# Patient Record
Sex: MALE | Race: WHITE | NOT HISPANIC OR LATINO | Employment: FULL TIME | URBAN - METROPOLITAN AREA
[De-identification: names, ages, dates, MRNs, and addresses within clinical notes are randomized per-mention and may not be internally consistent; named-entity substitution may affect disease eponyms.]

---

## 2017-01-21 ENCOUNTER — COMMUNICATION - HEALTHEAST (OUTPATIENT)
Dept: FAMILY MEDICINE | Facility: CLINIC | Age: 62
End: 2017-01-21

## 2017-04-04 ENCOUNTER — OFFICE VISIT - HEALTHEAST (OUTPATIENT)
Dept: FAMILY MEDICINE | Facility: CLINIC | Age: 62
End: 2017-04-04

## 2017-04-04 DIAGNOSIS — E11.9 TYPE 2 DIABETES MELLITUS (H): ICD-10-CM

## 2017-04-04 DIAGNOSIS — E78.5 HYPERLIPIDEMIA, UNSPECIFIED HYPERLIPIDEMIA TYPE: ICD-10-CM

## 2017-04-04 DIAGNOSIS — I10 ESSENTIAL HYPERTENSION WITH GOAL BLOOD PRESSURE LESS THAN 130/80: ICD-10-CM

## 2017-04-04 DIAGNOSIS — M70.61 TROCHANTERIC BURSITIS, RIGHT HIP: ICD-10-CM

## 2017-04-04 DIAGNOSIS — B35.3 TINEA PEDIS, UNSPECIFIED LATERALITY: ICD-10-CM

## 2017-04-04 DIAGNOSIS — B35.6 TINEA CRURIS: ICD-10-CM

## 2017-04-04 LAB — HBA1C MFR BLD: 8.9 % (ref 3.5–6)

## 2017-05-06 ENCOUNTER — COMMUNICATION - HEALTHEAST (OUTPATIENT)
Dept: FAMILY MEDICINE | Facility: CLINIC | Age: 62
End: 2017-05-06

## 2017-05-06 DIAGNOSIS — J45.30 MILD PERSISTENT ASTHMA WITHOUT COMPLICATION: ICD-10-CM

## 2017-05-18 ENCOUNTER — COMMUNICATION - HEALTHEAST (OUTPATIENT)
Dept: FAMILY MEDICINE | Facility: CLINIC | Age: 62
End: 2017-05-18

## 2017-05-18 DIAGNOSIS — E78.5 HYPERLIPIDEMIA: ICD-10-CM

## 2017-05-23 ENCOUNTER — COMMUNICATION - HEALTHEAST (OUTPATIENT)
Dept: FAMILY MEDICINE | Facility: CLINIC | Age: 62
End: 2017-05-23

## 2017-05-23 ENCOUNTER — AMBULATORY - HEALTHEAST (OUTPATIENT)
Dept: FAMILY MEDICINE | Facility: CLINIC | Age: 62
End: 2017-05-23

## 2017-05-23 ENCOUNTER — OFFICE VISIT - HEALTHEAST (OUTPATIENT)
Dept: FAMILY MEDICINE | Facility: CLINIC | Age: 62
End: 2017-05-23

## 2017-05-23 ENCOUNTER — COMMUNICATION - HEALTHEAST (OUTPATIENT)
Dept: SCHEDULING | Facility: CLINIC | Age: 62
End: 2017-05-23

## 2017-05-23 DIAGNOSIS — E11.9 TYPE 2 DIABETES MELLITUS WITHOUT COMPLICATION, WITHOUT LONG-TERM CURRENT USE OF INSULIN (H): ICD-10-CM

## 2017-05-23 DIAGNOSIS — R21 RASH: ICD-10-CM

## 2017-05-23 DIAGNOSIS — E11.9 TYPE 2 DIABETES MELLITUS (H): ICD-10-CM

## 2017-05-23 DIAGNOSIS — B35.6 TINEA CRURIS: ICD-10-CM

## 2017-06-08 ENCOUNTER — COMMUNICATION - HEALTHEAST (OUTPATIENT)
Dept: FAMILY MEDICINE | Facility: CLINIC | Age: 62
End: 2017-06-08

## 2017-06-15 ENCOUNTER — COMMUNICATION - HEALTHEAST (OUTPATIENT)
Dept: FAMILY MEDICINE | Facility: CLINIC | Age: 62
End: 2017-06-15

## 2017-06-16 ENCOUNTER — COMMUNICATION - HEALTHEAST (OUTPATIENT)
Dept: FAMILY MEDICINE | Facility: CLINIC | Age: 62
End: 2017-06-16

## 2017-06-16 ENCOUNTER — AMBULATORY - HEALTHEAST (OUTPATIENT)
Dept: EDUCATION SERVICES | Facility: CLINIC | Age: 62
End: 2017-06-16

## 2017-06-16 ENCOUNTER — COMMUNICATION - HEALTHEAST (OUTPATIENT)
Dept: EDUCATION SERVICES | Facility: CLINIC | Age: 62
End: 2017-06-16

## 2017-06-16 DIAGNOSIS — Z79.4 UNCONTROLLED TYPE 2 DIABETES MELLITUS WITH HYPERGLYCEMIA, WITH LONG-TERM CURRENT USE OF INSULIN (H): ICD-10-CM

## 2017-06-16 DIAGNOSIS — E11.65 UNCONTROLLED TYPE 2 DIABETES MELLITUS WITH HYPERGLYCEMIA, WITH LONG-TERM CURRENT USE OF INSULIN (H): ICD-10-CM

## 2017-06-17 ENCOUNTER — COMMUNICATION - HEALTHEAST (OUTPATIENT)
Dept: FAMILY MEDICINE | Facility: CLINIC | Age: 62
End: 2017-06-17

## 2017-07-03 ENCOUNTER — AMBULATORY - HEALTHEAST (OUTPATIENT)
Dept: EDUCATION SERVICES | Facility: CLINIC | Age: 62
End: 2017-07-03

## 2017-07-11 ENCOUNTER — OFFICE VISIT - HEALTHEAST (OUTPATIENT)
Dept: FAMILY MEDICINE | Facility: CLINIC | Age: 62
End: 2017-07-11

## 2017-07-11 DIAGNOSIS — Z00.00 PREVENTATIVE HEALTH CARE: ICD-10-CM

## 2017-07-11 DIAGNOSIS — E11.9 TYPE 2 DIABETES MELLITUS (H): ICD-10-CM

## 2017-07-11 DIAGNOSIS — E78.5 HYPERLIPIDEMIA, UNSPECIFIED HYPERLIPIDEMIA TYPE: ICD-10-CM

## 2017-07-11 DIAGNOSIS — J45.30 MILD PERSISTENT ASTHMA WITHOUT COMPLICATION: ICD-10-CM

## 2017-07-11 DIAGNOSIS — B35.6 TINEA CRURIS: ICD-10-CM

## 2017-07-11 DIAGNOSIS — I10 ESSENTIAL HYPERTENSION WITH GOAL BLOOD PRESSURE LESS THAN 130/80: ICD-10-CM

## 2017-07-11 LAB
CHOLEST SERPL-MCNC: 140 MG/DL
FASTING STATUS PATIENT QL REPORTED: YES
HBA1C MFR BLD: 8.8 % (ref 3.5–6)
HDLC SERPL-MCNC: 36 MG/DL
LDLC SERPL CALC-MCNC: 80 MG/DL
TRIGL SERPL-MCNC: 119 MG/DL

## 2017-08-18 ENCOUNTER — COMMUNICATION - HEALTHEAST (OUTPATIENT)
Dept: FAMILY MEDICINE | Facility: CLINIC | Age: 62
End: 2017-08-18

## 2017-08-18 DIAGNOSIS — E11.9 TYPE 2 DIABETES MELLITUS WITHOUT COMPLICATION (H): ICD-10-CM

## 2017-08-23 ENCOUNTER — COMMUNICATION - HEALTHEAST (OUTPATIENT)
Dept: FAMILY MEDICINE | Facility: CLINIC | Age: 62
End: 2017-08-23

## 2017-08-23 DIAGNOSIS — E78.5 HYPERLIPIDEMIA: ICD-10-CM

## 2017-08-23 DIAGNOSIS — B35.6 TINEA CRURIS: ICD-10-CM

## 2017-08-23 DIAGNOSIS — J45.30 MILD PERSISTENT ASTHMA WITHOUT COMPLICATION: ICD-10-CM

## 2017-08-23 DIAGNOSIS — B35.3 TINEA PEDIS, UNSPECIFIED LATERALITY: ICD-10-CM

## 2017-09-19 ENCOUNTER — AMBULATORY - HEALTHEAST (OUTPATIENT)
Dept: EDUCATION SERVICES | Facility: CLINIC | Age: 62
End: 2017-09-19

## 2017-09-19 DIAGNOSIS — E11.9 CONTROLLED TYPE 2 DIABETES MELLITUS WITHOUT COMPLICATION, WITH LONG-TERM CURRENT USE OF INSULIN (H): ICD-10-CM

## 2017-09-19 DIAGNOSIS — Z79.4 CONTROLLED TYPE 2 DIABETES MELLITUS WITHOUT COMPLICATION, WITH LONG-TERM CURRENT USE OF INSULIN (H): ICD-10-CM

## 2017-10-17 ENCOUNTER — COMMUNICATION - HEALTHEAST (OUTPATIENT)
Dept: FAMILY MEDICINE | Facility: CLINIC | Age: 62
End: 2017-10-17

## 2017-10-20 ENCOUNTER — COMMUNICATION - HEALTHEAST (OUTPATIENT)
Dept: FAMILY MEDICINE | Facility: CLINIC | Age: 62
End: 2017-10-20

## 2017-10-20 DIAGNOSIS — I89.0 LYMPHEDEMA: ICD-10-CM

## 2017-10-20 DIAGNOSIS — I10 HTN (HYPERTENSION): ICD-10-CM

## 2017-10-25 ENCOUNTER — OFFICE VISIT - HEALTHEAST (OUTPATIENT)
Dept: FAMILY MEDICINE | Facility: CLINIC | Age: 62
End: 2017-10-25

## 2017-10-25 DIAGNOSIS — E11.9 TYPE 2 DIABETES MELLITUS (H): ICD-10-CM

## 2017-10-25 DIAGNOSIS — E78.5 HYPERLIPIDEMIA, UNSPECIFIED HYPERLIPIDEMIA TYPE: ICD-10-CM

## 2017-10-25 DIAGNOSIS — R20.2 RIGHT HAND PARESTHESIA: ICD-10-CM

## 2017-10-25 DIAGNOSIS — M79.672 PAIN OF LEFT HEEL: ICD-10-CM

## 2017-10-25 DIAGNOSIS — I10 ESSENTIAL HYPERTENSION: ICD-10-CM

## 2017-10-25 LAB — HBA1C MFR BLD: 7.2 % (ref 3.5–6)

## 2017-10-31 ENCOUNTER — COMMUNICATION - HEALTHEAST (OUTPATIENT)
Dept: FAMILY MEDICINE | Facility: CLINIC | Age: 62
End: 2017-10-31

## 2017-10-31 DIAGNOSIS — G89.29 HEEL PAIN, CHRONIC, RIGHT: ICD-10-CM

## 2017-10-31 DIAGNOSIS — M79.671 HEEL PAIN, CHRONIC, RIGHT: ICD-10-CM

## 2017-11-14 ENCOUNTER — COMMUNICATION - HEALTHEAST (OUTPATIENT)
Dept: EDUCATION SERVICES | Facility: CLINIC | Age: 62
End: 2017-11-14

## 2017-11-14 DIAGNOSIS — E11.65 UNCONTROLLED TYPE 2 DIABETES MELLITUS WITH HYPERGLYCEMIA, WITH LONG-TERM CURRENT USE OF INSULIN (H): ICD-10-CM

## 2017-11-14 DIAGNOSIS — Z79.4 UNCONTROLLED TYPE 2 DIABETES MELLITUS WITH HYPERGLYCEMIA, WITH LONG-TERM CURRENT USE OF INSULIN (H): ICD-10-CM

## 2017-12-06 ENCOUNTER — OFFICE VISIT - HEALTHEAST (OUTPATIENT)
Dept: PODIATRY | Age: 62
End: 2017-12-06

## 2017-12-06 DIAGNOSIS — M72.2 PLANTAR FASCIITIS: ICD-10-CM

## 2017-12-06 ASSESSMENT — MIFFLIN-ST. JEOR: SCORE: 1928.44

## 2017-12-08 ENCOUNTER — COMMUNICATION - HEALTHEAST (OUTPATIENT)
Dept: OTHER | Facility: CLINIC | Age: 62
End: 2017-12-08

## 2017-12-21 ENCOUNTER — RECORDS - HEALTHEAST (OUTPATIENT)
Dept: ADMINISTRATIVE | Facility: OTHER | Age: 62
End: 2017-12-21

## 2017-12-28 ENCOUNTER — COMMUNICATION - HEALTHEAST (OUTPATIENT)
Dept: OTHER | Facility: CLINIC | Age: 62
End: 2017-12-28

## 2018-01-08 ENCOUNTER — COMMUNICATION - HEALTHEAST (OUTPATIENT)
Dept: PODIATRY | Age: 63
End: 2018-01-08

## 2018-01-09 ENCOUNTER — COMMUNICATION - HEALTHEAST (OUTPATIENT)
Dept: FAMILY MEDICINE | Facility: CLINIC | Age: 63
End: 2018-01-09

## 2018-01-17 ENCOUNTER — OFFICE VISIT - HEALTHEAST (OUTPATIENT)
Dept: PODIATRY | Age: 63
End: 2018-01-17

## 2018-01-17 DIAGNOSIS — M72.2 PLANTAR FASCIITIS: ICD-10-CM

## 2018-01-17 ASSESSMENT — MIFFLIN-ST. JEOR: SCORE: 1928.44

## 2018-01-30 ENCOUNTER — COMMUNICATION - HEALTHEAST (OUTPATIENT)
Dept: FAMILY MEDICINE | Facility: CLINIC | Age: 63
End: 2018-01-30

## 2018-01-30 ENCOUNTER — AMBULATORY - HEALTHEAST (OUTPATIENT)
Dept: EDUCATION SERVICES | Facility: CLINIC | Age: 63
End: 2018-01-30

## 2018-01-30 DIAGNOSIS — J45.909 ASTHMA: ICD-10-CM

## 2018-02-08 ENCOUNTER — COMMUNICATION - HEALTHEAST (OUTPATIENT)
Dept: FAMILY MEDICINE | Facility: CLINIC | Age: 63
End: 2018-02-08

## 2018-02-08 DIAGNOSIS — Z79.4 UNCONTROLLED TYPE 2 DIABETES MELLITUS WITH HYPERGLYCEMIA, WITH LONG-TERM CURRENT USE OF INSULIN (H): ICD-10-CM

## 2018-02-08 DIAGNOSIS — E11.65 UNCONTROLLED TYPE 2 DIABETES MELLITUS WITH HYPERGLYCEMIA, WITH LONG-TERM CURRENT USE OF INSULIN (H): ICD-10-CM

## 2018-02-12 ENCOUNTER — COMMUNICATION - HEALTHEAST (OUTPATIENT)
Dept: FAMILY MEDICINE | Facility: CLINIC | Age: 63
End: 2018-02-12

## 2018-02-12 DIAGNOSIS — B35.3 TINEA PEDIS, UNSPECIFIED LATERALITY: ICD-10-CM

## 2018-02-12 DIAGNOSIS — B35.6 TINEA CRURIS: ICD-10-CM

## 2018-02-15 ENCOUNTER — COMMUNICATION - HEALTHEAST (OUTPATIENT)
Dept: PODIATRY | Age: 63
End: 2018-02-15

## 2018-02-21 ENCOUNTER — OFFICE VISIT - HEALTHEAST (OUTPATIENT)
Dept: FAMILY MEDICINE | Facility: CLINIC | Age: 63
End: 2018-02-21

## 2018-02-21 ENCOUNTER — OFFICE VISIT - HEALTHEAST (OUTPATIENT)
Dept: PODIATRY | Age: 63
End: 2018-02-21

## 2018-02-21 DIAGNOSIS — I10 ESSENTIAL HYPERTENSION: ICD-10-CM

## 2018-02-21 DIAGNOSIS — D12.6 BENIGN NEOPLASM OF COLON, UNSPECIFIED PART OF COLON: ICD-10-CM

## 2018-02-21 DIAGNOSIS — Z00.00 ROUTINE GENERAL MEDICAL EXAMINATION AT A HEALTH CARE FACILITY: ICD-10-CM

## 2018-02-21 DIAGNOSIS — J45.30 MILD PERSISTENT ASTHMA WITHOUT COMPLICATION: ICD-10-CM

## 2018-02-21 DIAGNOSIS — M72.2 PLANTAR FASCIITIS: ICD-10-CM

## 2018-02-21 DIAGNOSIS — I89.0 LYMPHEDEMA: ICD-10-CM

## 2018-02-21 DIAGNOSIS — N52.9 MALE ERECTILE DYSFUNCTION: ICD-10-CM

## 2018-02-21 DIAGNOSIS — E78.5 HYPERLIPIDEMIA, UNSPECIFIED HYPERLIPIDEMIA TYPE: ICD-10-CM

## 2018-02-21 DIAGNOSIS — E11.9 TYPE 2 DIABETES MELLITUS (H): ICD-10-CM

## 2018-02-21 LAB
ANION GAP SERPL CALCULATED.3IONS-SCNC: 9 MMOL/L (ref 5–18)
BUN SERPL-MCNC: 14 MG/DL (ref 8–22)
CALCIUM SERPL-MCNC: 9.5 MG/DL (ref 8.5–10.5)
CHLORIDE BLD-SCNC: 107 MMOL/L (ref 98–107)
CHOLEST SERPL-MCNC: 134 MG/DL
CO2 SERPL-SCNC: 26 MMOL/L (ref 22–31)
CREAT SERPL-MCNC: 0.81 MG/DL (ref 0.7–1.3)
FASTING STATUS PATIENT QL REPORTED: YES
GFR SERPL CREATININE-BSD FRML MDRD: >60 ML/MIN/1.73M2
GLUCOSE BLD-MCNC: 111 MG/DL (ref 70–125)
HBA1C MFR BLD: 7.5 % (ref 3.5–6)
HDLC SERPL-MCNC: 35 MG/DL
LDLC SERPL CALC-MCNC: 70 MG/DL
POTASSIUM BLD-SCNC: 4.6 MMOL/L (ref 3.5–5)
PSA SERPL-MCNC: 0.8 NG/ML (ref 0–4.5)
SODIUM SERPL-SCNC: 142 MMOL/L (ref 136–145)
TRIGL SERPL-MCNC: 144 MG/DL

## 2018-02-21 ASSESSMENT — MIFFLIN-ST. JEOR: SCORE: 1942.05

## 2018-02-22 LAB — HCV AB SERPL QL IA: NEGATIVE

## 2018-03-05 ENCOUNTER — COMMUNICATION - HEALTHEAST (OUTPATIENT)
Dept: FAMILY MEDICINE | Facility: CLINIC | Age: 63
End: 2018-03-05

## 2018-03-05 DIAGNOSIS — J45.30 MILD PERSISTENT ASTHMA WITHOUT COMPLICATION: ICD-10-CM

## 2018-03-21 ENCOUNTER — COMMUNICATION - HEALTHEAST (OUTPATIENT)
Dept: FAMILY MEDICINE | Facility: CLINIC | Age: 63
End: 2018-03-21

## 2018-03-26 ENCOUNTER — COMMUNICATION - HEALTHEAST (OUTPATIENT)
Dept: EDUCATION SERVICES | Facility: CLINIC | Age: 63
End: 2018-03-26

## 2018-04-23 ENCOUNTER — COMMUNICATION - HEALTHEAST (OUTPATIENT)
Dept: EDUCATION SERVICES | Facility: CLINIC | Age: 63
End: 2018-04-23

## 2018-04-23 DIAGNOSIS — E11.65 UNCONTROLLED TYPE 2 DIABETES MELLITUS WITH HYPERGLYCEMIA, WITH LONG-TERM CURRENT USE OF INSULIN (H): ICD-10-CM

## 2018-04-23 DIAGNOSIS — Z79.4 UNCONTROLLED TYPE 2 DIABETES MELLITUS WITH HYPERGLYCEMIA, WITH LONG-TERM CURRENT USE OF INSULIN (H): ICD-10-CM

## 2018-05-05 ENCOUNTER — COMMUNICATION - HEALTHEAST (OUTPATIENT)
Dept: FAMILY MEDICINE | Facility: CLINIC | Age: 63
End: 2018-05-05

## 2018-05-05 DIAGNOSIS — B35.6 TINEA CRURIS: ICD-10-CM

## 2018-05-05 DIAGNOSIS — B35.3 TINEA PEDIS, UNSPECIFIED LATERALITY: ICD-10-CM

## 2018-05-08 ENCOUNTER — COMMUNICATION - HEALTHEAST (OUTPATIENT)
Dept: FAMILY MEDICINE | Facility: CLINIC | Age: 63
End: 2018-05-08

## 2018-05-09 ENCOUNTER — COMMUNICATION - HEALTHEAST (OUTPATIENT)
Dept: ENDOCRINOLOGY | Facility: CLINIC | Age: 63
End: 2018-05-09

## 2018-05-22 ENCOUNTER — COMMUNICATION - HEALTHEAST (OUTPATIENT)
Dept: EDUCATION SERVICES | Facility: CLINIC | Age: 63
End: 2018-05-22

## 2018-05-22 DIAGNOSIS — Z79.4 UNCONTROLLED TYPE 2 DIABETES MELLITUS WITH HYPERGLYCEMIA, WITH LONG-TERM CURRENT USE OF INSULIN (H): ICD-10-CM

## 2018-05-22 DIAGNOSIS — E11.65 UNCONTROLLED TYPE 2 DIABETES MELLITUS WITH HYPERGLYCEMIA, WITH LONG-TERM CURRENT USE OF INSULIN (H): ICD-10-CM

## 2018-06-06 ENCOUNTER — COMMUNICATION - HEALTHEAST (OUTPATIENT)
Dept: PODIATRY | Age: 63
End: 2018-06-06

## 2018-06-07 ENCOUNTER — AMBULATORY - HEALTHEAST (OUTPATIENT)
Dept: PODIATRY | Facility: CLINIC | Age: 63
End: 2018-06-07

## 2018-06-07 DIAGNOSIS — M72.2 PLANTAR FASCIITIS: ICD-10-CM

## 2018-06-07 DIAGNOSIS — E11.9 TYPE 2 DIABETES MELLITUS WITHOUT COMPLICATION, WITHOUT LONG-TERM CURRENT USE OF INSULIN (H): ICD-10-CM

## 2018-06-19 ENCOUNTER — OFFICE VISIT - HEALTHEAST (OUTPATIENT)
Dept: FAMILY MEDICINE | Facility: CLINIC | Age: 63
End: 2018-06-19

## 2018-06-19 ENCOUNTER — COMMUNICATION - HEALTHEAST (OUTPATIENT)
Dept: FAMILY MEDICINE | Facility: CLINIC | Age: 63
End: 2018-06-19

## 2018-06-19 ENCOUNTER — AMBULATORY - HEALTHEAST (OUTPATIENT)
Dept: FAMILY MEDICINE | Facility: CLINIC | Age: 63
End: 2018-06-19

## 2018-06-19 DIAGNOSIS — M70.62 TROCHANTERIC BURSITIS OF BOTH HIPS: ICD-10-CM

## 2018-06-19 DIAGNOSIS — E11.9 DIABETES MELLITUS, TYPE 2 (H): ICD-10-CM

## 2018-06-19 DIAGNOSIS — M72.2 PLANTAR FASCIITIS: ICD-10-CM

## 2018-06-19 DIAGNOSIS — M70.61 TROCHANTERIC BURSITIS OF BOTH HIPS: ICD-10-CM

## 2018-06-19 DIAGNOSIS — I10 ESSENTIAL HYPERTENSION: ICD-10-CM

## 2018-06-19 DIAGNOSIS — J45.30 MILD PERSISTENT ASTHMA WITHOUT COMPLICATION: ICD-10-CM

## 2018-06-19 DIAGNOSIS — E78.5 HYPERLIPIDEMIA, UNSPECIFIED HYPERLIPIDEMIA TYPE: ICD-10-CM

## 2018-06-19 LAB
ANION GAP SERPL CALCULATED.3IONS-SCNC: 12 MMOL/L (ref 5–18)
BUN SERPL-MCNC: 18 MG/DL (ref 8–22)
CALCIUM SERPL-MCNC: 10.1 MG/DL (ref 8.5–10.5)
CHLORIDE BLD-SCNC: 108 MMOL/L (ref 98–107)
CO2 SERPL-SCNC: 22 MMOL/L (ref 22–31)
CREAT SERPL-MCNC: 0.84 MG/DL (ref 0.7–1.3)
GFR SERPL CREATININE-BSD FRML MDRD: >60 ML/MIN/1.73M2
GLUCOSE BLD-MCNC: 105 MG/DL (ref 70–125)
HBA1C MFR BLD: 6.5 % (ref 3.5–6)
POTASSIUM BLD-SCNC: 4.7 MMOL/L (ref 3.5–5)
SODIUM SERPL-SCNC: 142 MMOL/L (ref 136–145)

## 2018-06-20 ENCOUNTER — COMMUNICATION - HEALTHEAST (OUTPATIENT)
Dept: FAMILY MEDICINE | Facility: CLINIC | Age: 63
End: 2018-06-20

## 2018-06-20 DIAGNOSIS — E11.65 UNCONTROLLED TYPE 2 DIABETES MELLITUS WITH HYPERGLYCEMIA, WITH LONG-TERM CURRENT USE OF INSULIN (H): ICD-10-CM

## 2018-06-20 DIAGNOSIS — Z79.4 UNCONTROLLED TYPE 2 DIABETES MELLITUS WITH HYPERGLYCEMIA, WITH LONG-TERM CURRENT USE OF INSULIN (H): ICD-10-CM

## 2018-07-03 ENCOUNTER — COMMUNICATION - HEALTHEAST (OUTPATIENT)
Dept: FAMILY MEDICINE | Facility: CLINIC | Age: 63
End: 2018-07-03

## 2018-07-03 ENCOUNTER — OFFICE VISIT - HEALTHEAST (OUTPATIENT)
Dept: FAMILY MEDICINE | Facility: CLINIC | Age: 63
End: 2018-07-03

## 2018-07-03 DIAGNOSIS — L03.115 CELLULITIS OF RIGHT ANTERIOR LOWER LEG: ICD-10-CM

## 2018-07-05 ENCOUNTER — COMMUNICATION - HEALTHEAST (OUTPATIENT)
Dept: FAMILY MEDICINE | Facility: CLINIC | Age: 63
End: 2018-07-05

## 2018-07-19 ENCOUNTER — OFFICE VISIT - HEALTHEAST (OUTPATIENT)
Dept: FAMILY MEDICINE | Facility: CLINIC | Age: 63
End: 2018-07-19

## 2018-07-19 DIAGNOSIS — M70.62 TROCHANTERIC BURSITIS OF BOTH HIPS: ICD-10-CM

## 2018-07-19 DIAGNOSIS — M70.61 TROCHANTERIC BURSITIS OF BOTH HIPS: ICD-10-CM

## 2018-07-19 DIAGNOSIS — H00.012 HORDEOLUM EXTERNUM OF RIGHT LOWER EYELID: ICD-10-CM

## 2018-08-07 ENCOUNTER — COMMUNICATION - HEALTHEAST (OUTPATIENT)
Dept: FAMILY MEDICINE | Facility: CLINIC | Age: 63
End: 2018-08-07

## 2018-08-07 DIAGNOSIS — I89.0 LYMPHEDEMA: ICD-10-CM

## 2018-08-21 ENCOUNTER — COMMUNICATION - HEALTHEAST (OUTPATIENT)
Dept: FAMILY MEDICINE | Facility: CLINIC | Age: 63
End: 2018-08-21

## 2018-08-21 DIAGNOSIS — B35.6 TINEA CRURIS: ICD-10-CM

## 2018-08-21 DIAGNOSIS — B35.3 TINEA PEDIS, UNSPECIFIED LATERALITY: ICD-10-CM

## 2018-08-22 ENCOUNTER — COMMUNICATION - HEALTHEAST (OUTPATIENT)
Dept: FAMILY MEDICINE | Facility: CLINIC | Age: 63
End: 2018-08-22

## 2018-08-22 DIAGNOSIS — E11.9 TYPE 2 DIABETES MELLITUS WITHOUT COMPLICATION (H): ICD-10-CM

## 2018-09-02 ENCOUNTER — COMMUNICATION - HEALTHEAST (OUTPATIENT)
Dept: FAMILY MEDICINE | Facility: CLINIC | Age: 63
End: 2018-09-02

## 2018-09-04 ENCOUNTER — COMMUNICATION - HEALTHEAST (OUTPATIENT)
Dept: SCHEDULING | Facility: CLINIC | Age: 63
End: 2018-09-04

## 2018-09-05 ENCOUNTER — OFFICE VISIT - HEALTHEAST (OUTPATIENT)
Dept: FAMILY MEDICINE | Facility: CLINIC | Age: 63
End: 2018-09-05

## 2018-09-05 DIAGNOSIS — M70.62 TROCHANTERIC BURSITIS OF BOTH HIPS: ICD-10-CM

## 2018-09-05 DIAGNOSIS — L03.116 CELLULITIS OF LEFT LEG: ICD-10-CM

## 2018-09-05 DIAGNOSIS — E11.9 TYPE 2 DIABETES MELLITUS WITHOUT COMPLICATION, WITHOUT LONG-TERM CURRENT USE OF INSULIN (H): ICD-10-CM

## 2018-09-05 DIAGNOSIS — M70.61 TROCHANTERIC BURSITIS OF BOTH HIPS: ICD-10-CM

## 2018-09-17 ENCOUNTER — COMMUNICATION - HEALTHEAST (OUTPATIENT)
Dept: FAMILY MEDICINE | Facility: CLINIC | Age: 63
End: 2018-09-17

## 2018-10-02 ENCOUNTER — RECORDS - HEALTHEAST (OUTPATIENT)
Dept: ADMINISTRATIVE | Facility: OTHER | Age: 63
End: 2018-10-02

## 2018-10-02 ENCOUNTER — OFFICE VISIT - HEALTHEAST (OUTPATIENT)
Dept: FAMILY MEDICINE | Facility: CLINIC | Age: 63
End: 2018-10-02

## 2018-10-02 DIAGNOSIS — E11.9 DIABETES MELLITUS, TYPE 2 (H): ICD-10-CM

## 2018-10-02 DIAGNOSIS — I10 ESSENTIAL HYPERTENSION: ICD-10-CM

## 2018-10-02 DIAGNOSIS — E78.5 HYPERLIPIDEMIA, UNSPECIFIED HYPERLIPIDEMIA TYPE: ICD-10-CM

## 2018-10-02 DIAGNOSIS — J45.30 MILD PERSISTENT ASTHMA WITHOUT COMPLICATION: ICD-10-CM

## 2018-10-02 DIAGNOSIS — M48.061 SPINAL STENOSIS OF LUMBAR REGION, UNSPECIFIED WHETHER NEUROGENIC CLAUDICATION PRESENT: ICD-10-CM

## 2018-10-02 LAB
ALBUMIN SERPL-MCNC: 3.9 G/DL (ref 3.5–5)
ALP SERPL-CCNC: 58 U/L (ref 45–120)
ALT SERPL W P-5'-P-CCNC: 43 U/L (ref 0–45)
ANION GAP SERPL CALCULATED.3IONS-SCNC: 11 MMOL/L (ref 5–18)
AST SERPL W P-5'-P-CCNC: 20 U/L (ref 0–40)
BILIRUB SERPL-MCNC: 0.7 MG/DL (ref 0–1)
BUN SERPL-MCNC: 19 MG/DL (ref 8–22)
CALCIUM SERPL-MCNC: 9.9 MG/DL (ref 8.5–10.5)
CHLORIDE BLD-SCNC: 104 MMOL/L (ref 98–107)
CHOLEST SERPL-MCNC: 149 MG/DL
CO2 SERPL-SCNC: 26 MMOL/L (ref 22–31)
CREAT SERPL-MCNC: 0.82 MG/DL (ref 0.7–1.3)
CREAT UR-MCNC: 18.8 MG/DL
FASTING STATUS PATIENT QL REPORTED: YES
GFR SERPL CREATININE-BSD FRML MDRD: >60 ML/MIN/1.73M2
GLUCOSE BLD-MCNC: 118 MG/DL (ref 70–125)
HBA1C MFR BLD: 6.8 % (ref 3.5–6)
HDLC SERPL-MCNC: 48 MG/DL
LDLC SERPL CALC-MCNC: 78 MG/DL
MICROALBUMIN UR-MCNC: <0.5 MG/DL (ref 0–1.99)
MICROALBUMIN/CREAT UR: NORMAL MG/G
POTASSIUM BLD-SCNC: 4.6 MMOL/L (ref 3.5–5)
PROT SERPL-MCNC: 6.6 G/DL (ref 6–8)
SODIUM SERPL-SCNC: 141 MMOL/L (ref 136–145)
TRIGL SERPL-MCNC: 117 MG/DL

## 2018-10-24 ENCOUNTER — COMMUNICATION - HEALTHEAST (OUTPATIENT)
Dept: FAMILY MEDICINE | Facility: CLINIC | Age: 63
End: 2018-10-24

## 2018-10-24 DIAGNOSIS — E78.5 HYPERLIPIDEMIA: ICD-10-CM

## 2018-11-19 ENCOUNTER — RECORDS - HEALTHEAST (OUTPATIENT)
Dept: ADMINISTRATIVE | Facility: OTHER | Age: 63
End: 2018-11-19

## 2018-12-07 ENCOUNTER — COMMUNICATION - HEALTHEAST (OUTPATIENT)
Dept: FAMILY MEDICINE | Facility: CLINIC | Age: 63
End: 2018-12-07

## 2018-12-10 ENCOUNTER — COMMUNICATION - HEALTHEAST (OUTPATIENT)
Dept: FAMILY MEDICINE | Facility: CLINIC | Age: 63
End: 2018-12-10

## 2018-12-11 ENCOUNTER — COMMUNICATION - HEALTHEAST (OUTPATIENT)
Dept: FAMILY MEDICINE | Facility: CLINIC | Age: 63
End: 2018-12-11

## 2018-12-19 ENCOUNTER — AMBULATORY - HEALTHEAST (OUTPATIENT)
Dept: FAMILY MEDICINE | Facility: CLINIC | Age: 63
End: 2018-12-19

## 2018-12-19 DIAGNOSIS — B35.6 TINEA CRURIS: ICD-10-CM

## 2019-01-08 ENCOUNTER — OFFICE VISIT - HEALTHEAST (OUTPATIENT)
Dept: FAMILY MEDICINE | Facility: CLINIC | Age: 64
End: 2019-01-08

## 2019-01-08 DIAGNOSIS — M16.11 PRIMARY OSTEOARTHRITIS OF RIGHT HIP: ICD-10-CM

## 2019-01-08 DIAGNOSIS — E66.812 CLASS 2 SEVERE OBESITY DUE TO EXCESS CALORIES WITH SERIOUS COMORBIDITY AND BODY MASS INDEX (BMI) OF 37.0 TO 37.9 IN ADULT (H): ICD-10-CM

## 2019-01-08 DIAGNOSIS — Z01.810 PRE-OPERATIVE CARDIOVASCULAR EXAMINATION: ICD-10-CM

## 2019-01-08 DIAGNOSIS — E11.8 TYPE 2 DIABETES MELLITUS WITH COMPLICATION, WITH LONG-TERM CURRENT USE OF INSULIN (H): ICD-10-CM

## 2019-01-08 DIAGNOSIS — I10 ESSENTIAL HYPERTENSION: ICD-10-CM

## 2019-01-08 DIAGNOSIS — Z79.4 TYPE 2 DIABETES MELLITUS WITH COMPLICATION, WITH LONG-TERM CURRENT USE OF INSULIN (H): ICD-10-CM

## 2019-01-08 DIAGNOSIS — B35.6 TINEA CRURIS: ICD-10-CM

## 2019-01-08 DIAGNOSIS — J45.30 MILD PERSISTENT ASTHMA WITHOUT COMPLICATION: ICD-10-CM

## 2019-01-08 DIAGNOSIS — M48.061 SPINAL STENOSIS OF LUMBAR REGION, UNSPECIFIED WHETHER NEUROGENIC CLAUDICATION PRESENT: ICD-10-CM

## 2019-01-08 DIAGNOSIS — E66.01 CLASS 2 SEVERE OBESITY DUE TO EXCESS CALORIES WITH SERIOUS COMORBIDITY AND BODY MASS INDEX (BMI) OF 37.0 TO 37.9 IN ADULT (H): ICD-10-CM

## 2019-01-08 DIAGNOSIS — E78.5 HYPERLIPIDEMIA, UNSPECIFIED HYPERLIPIDEMIA TYPE: ICD-10-CM

## 2019-01-08 LAB
ANION GAP SERPL CALCULATED.3IONS-SCNC: 12 MMOL/L (ref 5–18)
ATRIAL RATE - MUSE: 79 BPM
BUN SERPL-MCNC: 15 MG/DL (ref 8–22)
CALCIUM SERPL-MCNC: 10.1 MG/DL (ref 8.5–10.5)
CHLORIDE BLD-SCNC: 104 MMOL/L (ref 98–107)
CO2 SERPL-SCNC: 26 MMOL/L (ref 22–31)
CREAT SERPL-MCNC: 0.83 MG/DL (ref 0.7–1.3)
DIASTOLIC BLOOD PRESSURE - MUSE: NORMAL MMHG
ERYTHROCYTE [DISTWIDTH] IN BLOOD BY AUTOMATED COUNT: 17.2 % (ref 11–14.5)
GFR SERPL CREATININE-BSD FRML MDRD: >60 ML/MIN/1.73M2
GLUCOSE BLD-MCNC: 104 MG/DL (ref 70–125)
HCT VFR BLD AUTO: 40.2 % (ref 40–54)
HGB BLD-MCNC: 13.2 G/DL (ref 14–18)
INTERPRETATION ECG - MUSE: NORMAL
MCH RBC QN AUTO: 22 PG (ref 27–34)
MCHC RBC AUTO-ENTMCNC: 32.7 G/DL (ref 32–36)
MCV RBC AUTO: 67 FL (ref 80–100)
P AXIS - MUSE: 54 DEGREES
PLATELET # BLD AUTO: 289 THOU/UL (ref 140–440)
PMV BLD AUTO: 8.5 FL (ref 7–10)
POTASSIUM BLD-SCNC: 4.5 MMOL/L (ref 3.5–5)
PR INTERVAL - MUSE: 152 MS
QRS DURATION - MUSE: 96 MS
QT - MUSE: 360 MS
QTC - MUSE: 412 MS
R AXIS - MUSE: 25 DEGREES
RBC # BLD AUTO: 5.98 MILL/UL (ref 4.4–6.2)
SODIUM SERPL-SCNC: 142 MMOL/L (ref 136–145)
SYSTOLIC BLOOD PRESSURE - MUSE: NORMAL MMHG
T AXIS - MUSE: 41 DEGREES
VENTRICULAR RATE- MUSE: 79 BPM
WBC: 10.3 THOU/UL (ref 4–11)

## 2019-01-08 ASSESSMENT — MIFFLIN-ST. JEOR: SCORE: 1928.44

## 2019-01-14 ENCOUNTER — COMMUNICATION - HEALTHEAST (OUTPATIENT)
Dept: FAMILY MEDICINE | Facility: CLINIC | Age: 64
End: 2019-01-14

## 2019-01-15 ENCOUNTER — COMMUNICATION - HEALTHEAST (OUTPATIENT)
Dept: FAMILY MEDICINE | Facility: CLINIC | Age: 64
End: 2019-01-15

## 2019-01-16 ENCOUNTER — COMMUNICATION - HEALTHEAST (OUTPATIENT)
Dept: FAMILY MEDICINE | Facility: CLINIC | Age: 64
End: 2019-01-16

## 2019-01-16 DIAGNOSIS — B35.3 TINEA PEDIS, UNSPECIFIED LATERALITY: ICD-10-CM

## 2019-01-16 DIAGNOSIS — B35.6 TINEA CRURIS: ICD-10-CM

## 2019-01-17 ENCOUNTER — COMMUNICATION - HEALTHEAST (OUTPATIENT)
Dept: FAMILY MEDICINE | Facility: CLINIC | Age: 64
End: 2019-01-17

## 2019-01-21 ENCOUNTER — COMMUNICATION - HEALTHEAST (OUTPATIENT)
Dept: FAMILY MEDICINE | Facility: CLINIC | Age: 64
End: 2019-01-21

## 2019-01-23 ENCOUNTER — COMMUNICATION - HEALTHEAST (OUTPATIENT)
Dept: FAMILY MEDICINE | Facility: CLINIC | Age: 64
End: 2019-01-23

## 2019-02-09 ENCOUNTER — COMMUNICATION - HEALTHEAST (OUTPATIENT)
Dept: FAMILY MEDICINE | Facility: CLINIC | Age: 64
End: 2019-02-09

## 2019-02-18 ENCOUNTER — RECORDS - HEALTHEAST (OUTPATIENT)
Dept: ADMINISTRATIVE | Facility: OTHER | Age: 64
End: 2019-02-18

## 2019-02-21 ENCOUNTER — OFFICE VISIT - HEALTHEAST (OUTPATIENT)
Dept: FAMILY MEDICINE | Facility: CLINIC | Age: 64
End: 2019-02-21

## 2019-02-21 DIAGNOSIS — E66.01 CLASS 2 SEVERE OBESITY DUE TO EXCESS CALORIES WITH SERIOUS COMORBIDITY AND BODY MASS INDEX (BMI) OF 36.0 TO 36.9 IN ADULT (H): ICD-10-CM

## 2019-02-21 DIAGNOSIS — D50.9 MICROCYTIC ANEMIA: ICD-10-CM

## 2019-02-21 DIAGNOSIS — E78.5 HYPERLIPIDEMIA, UNSPECIFIED HYPERLIPIDEMIA TYPE: ICD-10-CM

## 2019-02-21 DIAGNOSIS — I10 ESSENTIAL HYPERTENSION: ICD-10-CM

## 2019-02-21 DIAGNOSIS — E66.812 CLASS 2 SEVERE OBESITY DUE TO EXCESS CALORIES WITH SERIOUS COMORBIDITY AND BODY MASS INDEX (BMI) OF 36.0 TO 36.9 IN ADULT (H): ICD-10-CM

## 2019-02-21 DIAGNOSIS — Z22.322 MRSA COLONIZATION: ICD-10-CM

## 2019-02-21 DIAGNOSIS — B35.1 ONYCHOMYCOSIS: ICD-10-CM

## 2019-02-21 DIAGNOSIS — J45.30 MILD PERSISTENT ASTHMA WITHOUT COMPLICATION: ICD-10-CM

## 2019-02-21 DIAGNOSIS — Z79.4 UNCONTROLLED TYPE 2 DIABETES MELLITUS WITH HYPERGLYCEMIA, WITH LONG-TERM CURRENT USE OF INSULIN (H): ICD-10-CM

## 2019-02-21 DIAGNOSIS — D12.6 TUBULAR ADENOMA OF COLON: ICD-10-CM

## 2019-02-21 DIAGNOSIS — E11.65 UNCONTROLLED TYPE 2 DIABETES MELLITUS WITH HYPERGLYCEMIA, WITH LONG-TERM CURRENT USE OF INSULIN (H): ICD-10-CM

## 2019-02-21 DIAGNOSIS — Z00.00 ROUTINE GENERAL MEDICAL EXAMINATION AT A HEALTH CARE FACILITY: ICD-10-CM

## 2019-02-21 DIAGNOSIS — M16.11 PRIMARY OSTEOARTHRITIS OF RIGHT HIP: ICD-10-CM

## 2019-02-21 LAB
ANION GAP SERPL CALCULATED.3IONS-SCNC: 10 MMOL/L (ref 5–18)
BUN SERPL-MCNC: 13 MG/DL (ref 8–22)
CALCIUM SERPL-MCNC: 9.7 MG/DL (ref 8.5–10.5)
CHLORIDE BLD-SCNC: 106 MMOL/L (ref 98–107)
CO2 SERPL-SCNC: 24 MMOL/L (ref 22–31)
CREAT SERPL-MCNC: 0.82 MG/DL (ref 0.7–1.3)
ERYTHROCYTE [DISTWIDTH] IN BLOOD BY AUTOMATED COUNT: 16.2 % (ref 11–14.5)
GFR SERPL CREATININE-BSD FRML MDRD: >60 ML/MIN/1.73M2
GLUCOSE BLD-MCNC: 110 MG/DL (ref 70–125)
HBA1C MFR BLD: 6 % (ref 3.5–6)
HCT VFR BLD AUTO: 40.3 % (ref 40–54)
HGB BLD-MCNC: 12.6 G/DL (ref 14–18)
MCH RBC QN AUTO: 21.8 PG (ref 27–34)
MCHC RBC AUTO-ENTMCNC: 31.2 G/DL (ref 32–36)
MCV RBC AUTO: 70 FL (ref 80–100)
PLATELET # BLD AUTO: 380 THOU/UL (ref 140–440)
PMV BLD AUTO: 8.5 FL (ref 7–10)
POTASSIUM BLD-SCNC: 4.4 MMOL/L (ref 3.5–5)
PSA SERPL-MCNC: 0.9 NG/ML (ref 0–4.5)
RBC # BLD AUTO: 5.77 MILL/UL (ref 4.4–6.2)
SODIUM SERPL-SCNC: 140 MMOL/L (ref 136–145)
WBC: 7 THOU/UL (ref 4–11)

## 2019-02-21 ASSESSMENT — MIFFLIN-ST. JEOR: SCORE: 1919.37

## 2019-02-26 ENCOUNTER — COMMUNICATION - HEALTHEAST (OUTPATIENT)
Dept: FAMILY MEDICINE | Facility: CLINIC | Age: 64
End: 2019-02-26

## 2019-02-26 DIAGNOSIS — I10 ESSENTIAL HYPERTENSION: ICD-10-CM

## 2019-02-27 ENCOUNTER — RECORDS - HEALTHEAST (OUTPATIENT)
Dept: ADMINISTRATIVE | Facility: OTHER | Age: 64
End: 2019-02-27

## 2019-03-07 ENCOUNTER — COMMUNICATION - HEALTHEAST (OUTPATIENT)
Dept: FAMILY MEDICINE | Facility: CLINIC | Age: 64
End: 2019-03-07

## 2019-03-07 DIAGNOSIS — E11.65 UNCONTROLLED TYPE 2 DIABETES MELLITUS WITH HYPERGLYCEMIA, WITH LONG-TERM CURRENT USE OF INSULIN (H): ICD-10-CM

## 2019-03-07 DIAGNOSIS — Z79.4 UNCONTROLLED TYPE 2 DIABETES MELLITUS WITH HYPERGLYCEMIA, WITH LONG-TERM CURRENT USE OF INSULIN (H): ICD-10-CM

## 2019-03-12 ENCOUNTER — COMMUNICATION - HEALTHEAST (OUTPATIENT)
Dept: FAMILY MEDICINE | Facility: CLINIC | Age: 64
End: 2019-03-12

## 2019-03-13 ENCOUNTER — COMMUNICATION - HEALTHEAST (OUTPATIENT)
Dept: FAMILY MEDICINE | Facility: CLINIC | Age: 64
End: 2019-03-13

## 2019-03-13 DIAGNOSIS — I10 ESSENTIAL HYPERTENSION: ICD-10-CM

## 2019-03-13 DIAGNOSIS — E11.65 UNCONTROLLED TYPE 2 DIABETES MELLITUS WITH HYPERGLYCEMIA, WITH LONG-TERM CURRENT USE OF INSULIN (H): ICD-10-CM

## 2019-03-13 DIAGNOSIS — E78.5 HYPERLIPIDEMIA: ICD-10-CM

## 2019-03-13 DIAGNOSIS — Z79.4 UNCONTROLLED TYPE 2 DIABETES MELLITUS WITH HYPERGLYCEMIA, WITH LONG-TERM CURRENT USE OF INSULIN (H): ICD-10-CM

## 2019-04-09 ENCOUNTER — COMMUNICATION - HEALTHEAST (OUTPATIENT)
Dept: FAMILY MEDICINE | Facility: CLINIC | Age: 64
End: 2019-04-09

## 2019-04-10 ENCOUNTER — COMMUNICATION - HEALTHEAST (OUTPATIENT)
Dept: FAMILY MEDICINE | Facility: CLINIC | Age: 64
End: 2019-04-10

## 2019-04-11 ENCOUNTER — COMMUNICATION - HEALTHEAST (OUTPATIENT)
Dept: FAMILY MEDICINE | Facility: CLINIC | Age: 64
End: 2019-04-11

## 2019-04-15 ENCOUNTER — RECORDS - HEALTHEAST (OUTPATIENT)
Dept: ADMINISTRATIVE | Facility: OTHER | Age: 64
End: 2019-04-15

## 2019-04-19 ENCOUNTER — OFFICE VISIT - HEALTHEAST (OUTPATIENT)
Dept: FAMILY MEDICINE | Facility: CLINIC | Age: 64
End: 2019-04-19

## 2019-04-19 DIAGNOSIS — M16.11 PRIMARY OSTEOARTHRITIS OF RIGHT HIP: ICD-10-CM

## 2019-04-19 DIAGNOSIS — L03.115 CELLULITIS OF RIGHT LOWER EXTREMITY: ICD-10-CM

## 2019-04-19 DIAGNOSIS — L30.4 INTERTRIGO: ICD-10-CM

## 2019-04-19 DIAGNOSIS — A41.9 SEPSIS, DUE TO UNSPECIFIED ORGANISM: ICD-10-CM

## 2019-04-19 DIAGNOSIS — I10 ESSENTIAL HYPERTENSION: ICD-10-CM

## 2019-04-19 DIAGNOSIS — E11.8 TYPE 2 DIABETES MELLITUS WITH COMPLICATION, WITH LONG-TERM CURRENT USE OF INSULIN (H): ICD-10-CM

## 2019-04-19 DIAGNOSIS — J45.30 MILD PERSISTENT ASTHMA WITHOUT COMPLICATION: ICD-10-CM

## 2019-04-19 DIAGNOSIS — Z79.4 TYPE 2 DIABETES MELLITUS WITH COMPLICATION, WITH LONG-TERM CURRENT USE OF INSULIN (H): ICD-10-CM

## 2019-04-24 ENCOUNTER — COMMUNICATION - HEALTHEAST (OUTPATIENT)
Dept: FAMILY MEDICINE | Facility: CLINIC | Age: 64
End: 2019-04-24

## 2019-04-24 DIAGNOSIS — J45.909 ASTHMA: ICD-10-CM

## 2019-04-24 DIAGNOSIS — I89.0 LYMPHEDEMA: ICD-10-CM

## 2019-05-08 ENCOUNTER — COMMUNICATION - HEALTHEAST (OUTPATIENT)
Dept: FAMILY MEDICINE | Facility: CLINIC | Age: 64
End: 2019-05-08

## 2019-05-08 DIAGNOSIS — L30.4 INTERTRIGO: ICD-10-CM

## 2019-05-08 DIAGNOSIS — M20.40 HAMMER TOE, UNSPECIFIED LATERALITY: ICD-10-CM

## 2019-05-21 ENCOUNTER — OFFICE VISIT - HEALTHEAST (OUTPATIENT)
Dept: PODIATRY | Facility: CLINIC | Age: 64
End: 2019-05-21

## 2019-05-21 DIAGNOSIS — E11.9 TYPE 2 DIABETES MELLITUS WITHOUT COMPLICATION, WITHOUT LONG-TERM CURRENT USE OF INSULIN (H): ICD-10-CM

## 2019-05-21 DIAGNOSIS — B35.1 NAIL FUNGUS: ICD-10-CM

## 2019-05-21 DIAGNOSIS — L60.2 ONYCHAUXIS: ICD-10-CM

## 2019-05-21 ASSESSMENT — MIFFLIN-ST. JEOR: SCORE: 1978.34

## 2019-05-24 ENCOUNTER — OFFICE VISIT - HEALTHEAST (OUTPATIENT)
Dept: FAMILY MEDICINE | Facility: CLINIC | Age: 64
End: 2019-05-24

## 2019-05-24 DIAGNOSIS — L03.115 CELLULITIS OF RIGHT LOWER EXTREMITY: ICD-10-CM

## 2019-05-24 DIAGNOSIS — I10 ESSENTIAL HYPERTENSION: ICD-10-CM

## 2019-05-24 DIAGNOSIS — E11.65 UNCONTROLLED TYPE 2 DIABETES MELLITUS WITH HYPERGLYCEMIA, WITH LONG-TERM CURRENT USE OF INSULIN (H): ICD-10-CM

## 2019-05-24 DIAGNOSIS — J45.30 MILD PERSISTENT ASTHMA WITHOUT COMPLICATION: ICD-10-CM

## 2019-05-24 DIAGNOSIS — Z79.4 UNCONTROLLED TYPE 2 DIABETES MELLITUS WITH HYPERGLYCEMIA, WITH LONG-TERM CURRENT USE OF INSULIN (H): ICD-10-CM

## 2019-05-24 LAB
ANION GAP SERPL CALCULATED.3IONS-SCNC: 9 MMOL/L (ref 5–18)
BUN SERPL-MCNC: 20 MG/DL (ref 8–22)
CALCIUM SERPL-MCNC: 10.2 MG/DL (ref 8.5–10.5)
CHLORIDE BLD-SCNC: 101 MMOL/L (ref 98–107)
CO2 SERPL-SCNC: 25 MMOL/L (ref 22–31)
CREAT SERPL-MCNC: 0.87 MG/DL (ref 0.7–1.3)
GFR SERPL CREATININE-BSD FRML MDRD: >60 ML/MIN/1.73M2
GLUCOSE BLD-MCNC: 117 MG/DL (ref 70–125)
HBA1C MFR BLD: 7.5 % (ref 3.5–6)
POTASSIUM BLD-SCNC: 4.8 MMOL/L (ref 3.5–5)
SODIUM SERPL-SCNC: 135 MMOL/L (ref 136–145)

## 2019-05-28 ENCOUNTER — COMMUNICATION - HEALTHEAST (OUTPATIENT)
Dept: FAMILY MEDICINE | Facility: CLINIC | Age: 64
End: 2019-05-28

## 2019-05-28 DIAGNOSIS — L30.4 INTERTRIGO: ICD-10-CM

## 2019-05-31 ENCOUNTER — COMMUNICATION - HEALTHEAST (OUTPATIENT)
Dept: FAMILY MEDICINE | Facility: CLINIC | Age: 64
End: 2019-05-31

## 2019-05-31 DIAGNOSIS — E11.65 UNCONTROLLED TYPE 2 DIABETES MELLITUS WITH HYPERGLYCEMIA, WITH LONG-TERM CURRENT USE OF INSULIN (H): ICD-10-CM

## 2019-05-31 DIAGNOSIS — Z79.4 UNCONTROLLED TYPE 2 DIABETES MELLITUS WITH HYPERGLYCEMIA, WITH LONG-TERM CURRENT USE OF INSULIN (H): ICD-10-CM

## 2019-06-03 ENCOUNTER — COMMUNICATION - HEALTHEAST (OUTPATIENT)
Dept: FAMILY MEDICINE | Facility: CLINIC | Age: 64
End: 2019-06-03

## 2019-06-05 ENCOUNTER — RECORDS - HEALTHEAST (OUTPATIENT)
Dept: GENERAL RADIOLOGY | Facility: CLINIC | Age: 64
End: 2019-06-05

## 2019-06-05 ENCOUNTER — OFFICE VISIT - HEALTHEAST (OUTPATIENT)
Dept: FAMILY MEDICINE | Facility: CLINIC | Age: 64
End: 2019-06-05

## 2019-06-05 DIAGNOSIS — M25.551 HIP PAIN, RIGHT: ICD-10-CM

## 2019-06-05 DIAGNOSIS — M25.551 PAIN IN RIGHT HIP: ICD-10-CM

## 2019-06-05 DIAGNOSIS — L30.4 INTERTRIGO: ICD-10-CM

## 2019-06-05 ASSESSMENT — MIFFLIN-ST. JEOR: SCORE: 1953.85

## 2019-06-06 ENCOUNTER — COMMUNICATION - HEALTHEAST (OUTPATIENT)
Dept: FAMILY MEDICINE | Facility: CLINIC | Age: 64
End: 2019-06-06

## 2019-06-06 DIAGNOSIS — Z79.4 UNCONTROLLED TYPE 2 DIABETES MELLITUS WITH HYPERGLYCEMIA, WITH LONG-TERM CURRENT USE OF INSULIN (H): ICD-10-CM

## 2019-06-06 DIAGNOSIS — E11.65 UNCONTROLLED TYPE 2 DIABETES MELLITUS WITH HYPERGLYCEMIA, WITH LONG-TERM CURRENT USE OF INSULIN (H): ICD-10-CM

## 2019-06-10 ENCOUNTER — HOSPITAL ENCOUNTER (OUTPATIENT)
Dept: PHYSICAL MEDICINE AND REHAB | Facility: CLINIC | Age: 64
Discharge: HOME | End: 2019-06-10
Attending: PHYSICIAN ASSISTANT

## 2019-06-10 DIAGNOSIS — Z98.890 HISTORY OF LUMBAR SURGERY: ICD-10-CM

## 2019-06-10 DIAGNOSIS — M54.16 LUMBAR RADICULAR PAIN: ICD-10-CM

## 2019-06-10 DIAGNOSIS — M47.816 LUMBAR FACET ARTHROPATHY: ICD-10-CM

## 2019-06-10 ASSESSMENT — MIFFLIN-ST. JEOR: SCORE: 1957.47

## 2019-06-17 ENCOUNTER — COMMUNICATION - HEALTHEAST (OUTPATIENT)
Dept: FAMILY MEDICINE | Facility: CLINIC | Age: 64
End: 2019-06-17

## 2019-06-17 DIAGNOSIS — E11.65 UNCONTROLLED TYPE 2 DIABETES MELLITUS WITH HYPERGLYCEMIA, WITH LONG-TERM CURRENT USE OF INSULIN (H): ICD-10-CM

## 2019-06-17 DIAGNOSIS — Z79.4 UNCONTROLLED TYPE 2 DIABETES MELLITUS WITH HYPERGLYCEMIA, WITH LONG-TERM CURRENT USE OF INSULIN (H): ICD-10-CM

## 2019-06-19 ENCOUNTER — COMMUNICATION - HEALTHEAST (OUTPATIENT)
Dept: FAMILY MEDICINE | Facility: CLINIC | Age: 64
End: 2019-06-19

## 2019-06-19 DIAGNOSIS — E11.65 UNCONTROLLED TYPE 2 DIABETES MELLITUS WITH HYPERGLYCEMIA, WITH LONG-TERM CURRENT USE OF INSULIN (H): ICD-10-CM

## 2019-06-19 DIAGNOSIS — Z79.4 UNCONTROLLED TYPE 2 DIABETES MELLITUS WITH HYPERGLYCEMIA, WITH LONG-TERM CURRENT USE OF INSULIN (H): ICD-10-CM

## 2019-07-08 ENCOUNTER — HOSPITAL ENCOUNTER (OUTPATIENT)
Dept: PHYSICAL MEDICINE AND REHAB | Facility: CLINIC | Age: 64
Discharge: HOME | End: 2019-07-08
Attending: PHYSICIAN ASSISTANT

## 2019-07-08 DIAGNOSIS — M54.16 LUMBAR RADICULAR PAIN: ICD-10-CM

## 2019-07-08 DIAGNOSIS — Z98.890 HISTORY OF LUMBAR SURGERY: ICD-10-CM

## 2019-07-08 DIAGNOSIS — M47.816 LUMBAR FACET ARTHROPATHY: ICD-10-CM

## 2019-07-11 ENCOUNTER — COMMUNICATION - HEALTHEAST (OUTPATIENT)
Dept: FAMILY MEDICINE | Facility: CLINIC | Age: 64
End: 2019-07-11

## 2019-07-11 DIAGNOSIS — I89.0 LYMPHEDEMA: ICD-10-CM

## 2019-07-25 ENCOUNTER — OFFICE VISIT - HEALTHEAST (OUTPATIENT)
Dept: FAMILY MEDICINE | Facility: CLINIC | Age: 64
End: 2019-07-25

## 2019-07-25 ENCOUNTER — RECORDS - HEALTHEAST (OUTPATIENT)
Dept: ADMINISTRATIVE | Facility: OTHER | Age: 64
End: 2019-07-25

## 2019-07-25 ENCOUNTER — COMMUNICATION - HEALTHEAST (OUTPATIENT)
Dept: FAMILY MEDICINE | Facility: CLINIC | Age: 64
End: 2019-07-25

## 2019-07-25 DIAGNOSIS — B35.3 TINEA PEDIS OF RIGHT FOOT: ICD-10-CM

## 2019-07-25 DIAGNOSIS — Z86.14 HISTORY OF MRSA INFECTION: ICD-10-CM

## 2019-07-25 DIAGNOSIS — E11.65 UNCONTROLLED TYPE 2 DIABETES MELLITUS WITH HYPERGLYCEMIA, WITH LONG-TERM CURRENT USE OF INSULIN (H): ICD-10-CM

## 2019-07-25 DIAGNOSIS — L30.4 INTERTRIGO: ICD-10-CM

## 2019-07-25 DIAGNOSIS — L03.818 CELLULITIS OF OTHER SPECIFIED SITE: ICD-10-CM

## 2019-07-25 DIAGNOSIS — Z79.4 UNCONTROLLED TYPE 2 DIABETES MELLITUS WITH HYPERGLYCEMIA, WITH LONG-TERM CURRENT USE OF INSULIN (H): ICD-10-CM

## 2019-07-27 LAB — BACTERIA SPEC CULT: ABNORMAL

## 2019-08-07 ENCOUNTER — COMMUNICATION - HEALTHEAST (OUTPATIENT)
Dept: FAMILY MEDICINE | Facility: CLINIC | Age: 64
End: 2019-08-07

## 2019-08-07 DIAGNOSIS — J45.30 MILD PERSISTENT ASTHMA WITHOUT COMPLICATION: ICD-10-CM

## 2019-08-08 ENCOUNTER — OFFICE VISIT - HEALTHEAST (OUTPATIENT)
Dept: FAMILY MEDICINE | Facility: CLINIC | Age: 64
End: 2019-08-08

## 2019-08-08 DIAGNOSIS — I89.0 LYMPHEDEMA: ICD-10-CM

## 2019-08-08 DIAGNOSIS — J45.30 MILD PERSISTENT ASTHMA WITHOUT COMPLICATION: ICD-10-CM

## 2019-08-08 DIAGNOSIS — I10 ESSENTIAL HYPERTENSION: ICD-10-CM

## 2019-08-08 DIAGNOSIS — Z86.14 HISTORY OF MRSA INFECTION: ICD-10-CM

## 2019-08-08 DIAGNOSIS — E11.65 UNCONTROLLED TYPE 2 DIABETES MELLITUS WITH HYPERGLYCEMIA, WITH LONG-TERM CURRENT USE OF INSULIN (H): ICD-10-CM

## 2019-08-08 DIAGNOSIS — Z79.4 UNCONTROLLED TYPE 2 DIABETES MELLITUS WITH HYPERGLYCEMIA, WITH LONG-TERM CURRENT USE OF INSULIN (H): ICD-10-CM

## 2019-08-08 DIAGNOSIS — L03.116 CELLULITIS OF LEFT LOWER EXTREMITY: ICD-10-CM

## 2019-08-08 DIAGNOSIS — B35.3 TINEA PEDIS OF LEFT FOOT: ICD-10-CM

## 2019-08-23 ENCOUNTER — COMMUNICATION - HEALTHEAST (OUTPATIENT)
Dept: FAMILY MEDICINE | Facility: CLINIC | Age: 64
End: 2019-08-23

## 2019-08-23 DIAGNOSIS — Z79.4 UNCONTROLLED TYPE 2 DIABETES MELLITUS WITH HYPERGLYCEMIA, WITH LONG-TERM CURRENT USE OF INSULIN (H): ICD-10-CM

## 2019-08-23 DIAGNOSIS — E11.65 UNCONTROLLED TYPE 2 DIABETES MELLITUS WITH HYPERGLYCEMIA, WITH LONG-TERM CURRENT USE OF INSULIN (H): ICD-10-CM

## 2019-08-24 ENCOUNTER — COMMUNICATION - HEALTHEAST (OUTPATIENT)
Dept: FAMILY MEDICINE | Facility: CLINIC | Age: 64
End: 2019-08-24

## 2019-08-24 DIAGNOSIS — E11.65 UNCONTROLLED TYPE 2 DIABETES MELLITUS WITH HYPERGLYCEMIA, WITH LONG-TERM CURRENT USE OF INSULIN (H): ICD-10-CM

## 2019-08-24 DIAGNOSIS — Z79.4 UNCONTROLLED TYPE 2 DIABETES MELLITUS WITH HYPERGLYCEMIA, WITH LONG-TERM CURRENT USE OF INSULIN (H): ICD-10-CM

## 2019-09-08 ENCOUNTER — OFFICE VISIT - HEALTHEAST (OUTPATIENT)
Dept: FAMILY MEDICINE | Facility: CLINIC | Age: 64
End: 2019-09-08

## 2019-09-08 DIAGNOSIS — A49.02 METHICILLIN RESISTANT STAPHYLOCOCCUS AUREUS INFECTION: ICD-10-CM

## 2019-09-08 DIAGNOSIS — R60.0 LEG EDEMA, RIGHT: ICD-10-CM

## 2019-09-08 DIAGNOSIS — R00.0 TACHYCARDIA: ICD-10-CM

## 2019-09-08 DIAGNOSIS — E11.65 UNCONTROLLED TYPE 2 DIABETES MELLITUS WITH HYPERGLYCEMIA, WITH LONG-TERM CURRENT USE OF INSULIN (H): ICD-10-CM

## 2019-09-08 DIAGNOSIS — Z79.4 UNCONTROLLED TYPE 2 DIABETES MELLITUS WITH HYPERGLYCEMIA, WITH LONG-TERM CURRENT USE OF INSULIN (H): ICD-10-CM

## 2019-09-08 ASSESSMENT — MIFFLIN-ST. JEOR: SCORE: 1976.07

## 2019-09-12 ENCOUNTER — COMMUNICATION - HEALTHEAST (OUTPATIENT)
Dept: CARE COORDINATION | Facility: CLINIC | Age: 64
End: 2019-09-12

## 2019-09-15 ENCOUNTER — COMMUNICATION - HEALTHEAST (OUTPATIENT)
Dept: FAMILY MEDICINE | Facility: CLINIC | Age: 64
End: 2019-09-15

## 2019-09-15 DIAGNOSIS — E78.5 HYPERLIPIDEMIA: ICD-10-CM

## 2019-09-17 ENCOUNTER — OFFICE VISIT - HEALTHEAST (OUTPATIENT)
Dept: FAMILY MEDICINE | Facility: CLINIC | Age: 64
End: 2019-09-17

## 2019-09-17 DIAGNOSIS — B35.1 ONYCHOMYCOSIS: ICD-10-CM

## 2019-09-17 DIAGNOSIS — Z23 NEED FOR VACCINATION: ICD-10-CM

## 2019-09-17 DIAGNOSIS — N52.9 MALE ERECTILE DYSFUNCTION: ICD-10-CM

## 2019-09-17 DIAGNOSIS — D50.9 MICROCYTIC ANEMIA: ICD-10-CM

## 2019-09-17 DIAGNOSIS — L03.115 CELLULITIS OF RIGHT LOWER LEG: ICD-10-CM

## 2019-09-17 DIAGNOSIS — E11.42 DIABETIC POLYNEUROPATHY ASSOCIATED WITH TYPE 2 DIABETES MELLITUS (H): ICD-10-CM

## 2019-09-17 DIAGNOSIS — I89.0 LYMPHEDEMA: ICD-10-CM

## 2019-09-17 DIAGNOSIS — I10 ESSENTIAL HYPERTENSION: ICD-10-CM

## 2019-09-17 DIAGNOSIS — Z79.4 UNCONTROLLED TYPE 2 DIABETES MELLITUS WITH HYPERGLYCEMIA, WITH LONG-TERM CURRENT USE OF INSULIN (H): ICD-10-CM

## 2019-09-17 DIAGNOSIS — Z51.81 ENCOUNTER FOR THERAPEUTIC DRUG MONITORING: ICD-10-CM

## 2019-09-17 DIAGNOSIS — Z86.14 HISTORY OF MRSA INFECTION: ICD-10-CM

## 2019-09-17 DIAGNOSIS — E11.65 UNCONTROLLED TYPE 2 DIABETES MELLITUS WITH HYPERGLYCEMIA, WITH LONG-TERM CURRENT USE OF INSULIN (H): ICD-10-CM

## 2019-09-17 LAB
ALBUMIN SERPL-MCNC: 3.8 G/DL (ref 3.5–5)
ALP SERPL-CCNC: 87 U/L (ref 45–120)
ALT SERPL W P-5'-P-CCNC: 47 U/L (ref 0–45)
ANION GAP SERPL CALCULATED.3IONS-SCNC: 11 MMOL/L (ref 5–18)
AST SERPL W P-5'-P-CCNC: 23 U/L (ref 0–40)
BASOPHILS # BLD AUTO: 0.1 THOU/UL (ref 0–0.2)
BASOPHILS NFR BLD AUTO: 1 % (ref 0–2)
BILIRUB SERPL-MCNC: 0.4 MG/DL (ref 0–1)
BUN SERPL-MCNC: 17 MG/DL (ref 8–22)
C REACTIVE PROTEIN LHE: 1.9 MG/DL (ref 0–0.8)
CALCIUM SERPL-MCNC: 10.1 MG/DL (ref 8.5–10.5)
CHLORIDE BLD-SCNC: 104 MMOL/L (ref 98–107)
CO2 SERPL-SCNC: 24 MMOL/L (ref 22–31)
CREAT SERPL-MCNC: 0.87 MG/DL (ref 0.7–1.3)
EOSINOPHIL # BLD AUTO: 0.3 THOU/UL (ref 0–0.4)
EOSINOPHIL NFR BLD AUTO: 3 % (ref 0–6)
ERYTHROCYTE [DISTWIDTH] IN BLOOD BY AUTOMATED COUNT: 20.6 % (ref 11–14.5)
GFR SERPL CREATININE-BSD FRML MDRD: >60 ML/MIN/1.73M2
GLUCOSE BLD-MCNC: 185 MG/DL (ref 70–125)
HCT VFR BLD AUTO: 38.4 % (ref 40–54)
HGB BLD-MCNC: 11.5 G/DL (ref 14–18)
LYMPHOCYTES # BLD AUTO: 2.5 THOU/UL (ref 0.8–4.4)
LYMPHOCYTES NFR BLD AUTO: 27 % (ref 20–40)
MCH RBC QN AUTO: 19.6 PG (ref 27–34)
MCHC RBC AUTO-ENTMCNC: 29.9 G/DL (ref 32–36)
MCV RBC AUTO: 65 FL (ref 80–100)
MONOCYTES # BLD AUTO: 0.5 THOU/UL (ref 0–0.9)
MONOCYTES NFR BLD AUTO: 5 % (ref 2–10)
NEUTROPHILS # BLD AUTO: 5.6 THOU/UL (ref 2–7.7)
NEUTROPHILS NFR BLD AUTO: 61 % (ref 50–70)
PLATELET # BLD AUTO: 445 THOU/UL (ref 140–440)
PMV BLD AUTO: 10 FL (ref 8.5–12.5)
POTASSIUM BLD-SCNC: 5 MMOL/L (ref 3.5–5)
PROT SERPL-MCNC: 7.4 G/DL (ref 6–8)
RBC # BLD AUTO: 5.87 MILL/UL (ref 4.4–6.2)
SODIUM SERPL-SCNC: 139 MMOL/L (ref 136–145)
WBC: 9.2 THOU/UL (ref 4–11)

## 2019-09-21 ENCOUNTER — COMMUNICATION - HEALTHEAST (OUTPATIENT)
Dept: FAMILY MEDICINE | Facility: CLINIC | Age: 64
End: 2019-09-21

## 2019-09-21 DIAGNOSIS — E11.65 UNCONTROLLED TYPE 2 DIABETES MELLITUS WITH HYPERGLYCEMIA, WITH LONG-TERM CURRENT USE OF INSULIN (H): ICD-10-CM

## 2019-09-21 DIAGNOSIS — Z79.4 UNCONTROLLED TYPE 2 DIABETES MELLITUS WITH HYPERGLYCEMIA, WITH LONG-TERM CURRENT USE OF INSULIN (H): ICD-10-CM

## 2019-09-23 ENCOUNTER — COMMUNICATION - HEALTHEAST (OUTPATIENT)
Dept: FAMILY MEDICINE | Facility: CLINIC | Age: 64
End: 2019-09-23

## 2019-09-23 DIAGNOSIS — L03.115 CELLULITIS OF RIGHT LOWER LEG: ICD-10-CM

## 2019-10-01 ENCOUNTER — OFFICE VISIT - HEALTHEAST (OUTPATIENT)
Dept: FAMILY MEDICINE | Facility: CLINIC | Age: 64
End: 2019-10-01

## 2019-10-01 DIAGNOSIS — D50.9 MICROCYTIC ANEMIA: ICD-10-CM

## 2019-10-01 DIAGNOSIS — E11.65 UNCONTROLLED TYPE 2 DIABETES MELLITUS WITH HYPERGLYCEMIA, WITH LONG-TERM CURRENT USE OF INSULIN (H): ICD-10-CM

## 2019-10-01 DIAGNOSIS — J45.30 MILD PERSISTENT ASTHMA WITHOUT COMPLICATION: ICD-10-CM

## 2019-10-01 DIAGNOSIS — Z79.4 UNCONTROLLED TYPE 2 DIABETES MELLITUS WITH HYPERGLYCEMIA, WITH LONG-TERM CURRENT USE OF INSULIN (H): ICD-10-CM

## 2019-10-01 DIAGNOSIS — Z86.14 HISTORY OF MRSA INFECTION: ICD-10-CM

## 2019-10-01 DIAGNOSIS — I89.0 LYMPHEDEMA: ICD-10-CM

## 2019-10-01 DIAGNOSIS — Z01.810 PRE-OPERATIVE CARDIOVASCULAR EXAMINATION: ICD-10-CM

## 2019-10-01 DIAGNOSIS — M16.12 PRIMARY OSTEOARTHRITIS OF LEFT HIP: ICD-10-CM

## 2019-10-01 DIAGNOSIS — B35.1 ONYCHOMYCOSIS: ICD-10-CM

## 2019-10-01 DIAGNOSIS — I10 ESSENTIAL HYPERTENSION: ICD-10-CM

## 2019-10-01 DIAGNOSIS — E11.42 DIABETIC POLYNEUROPATHY ASSOCIATED WITH TYPE 2 DIABETES MELLITUS (H): ICD-10-CM

## 2019-10-01 DIAGNOSIS — Z87.2 HISTORY OF CELLULITIS: ICD-10-CM

## 2019-10-01 LAB
ALBUMIN SERPL-MCNC: 4.5 G/DL (ref 3.5–5)
ALP SERPL-CCNC: 84 U/L (ref 45–120)
ALT SERPL W P-5'-P-CCNC: 21 U/L (ref 0–45)
ANION GAP SERPL CALCULATED.3IONS-SCNC: 10 MMOL/L (ref 5–18)
AST SERPL W P-5'-P-CCNC: 19 U/L (ref 0–40)
ATRIAL RATE - MUSE: 81 BPM
BASO STIPL BLD QL SMEAR: ABNORMAL
BASOPHILS # BLD AUTO: 0.1 THOU/UL (ref 0–0.2)
BASOPHILS NFR BLD AUTO: 1 % (ref 0–2)
BILIRUB SERPL-MCNC: 0.4 MG/DL (ref 0–1)
BUN SERPL-MCNC: 19 MG/DL (ref 8–22)
C REACTIVE PROTEIN LHE: 1.1 MG/DL (ref 0–0.8)
CALCIUM SERPL-MCNC: 10.4 MG/DL (ref 8.5–10.5)
CHLORIDE BLD-SCNC: 104 MMOL/L (ref 98–107)
CO2 SERPL-SCNC: 28 MMOL/L (ref 22–31)
CREAT SERPL-MCNC: 0.91 MG/DL (ref 0.7–1.3)
DIASTOLIC BLOOD PRESSURE - MUSE: NORMAL
EOSINOPHIL # BLD AUTO: 0.1 THOU/UL (ref 0–0.4)
EOSINOPHIL NFR BLD AUTO: 1 % (ref 0–6)
ERYTHROCYTE [DISTWIDTH] IN BLOOD BY AUTOMATED COUNT: 20.9 % (ref 11–14.5)
GFR SERPL CREATININE-BSD FRML MDRD: >60 ML/MIN/1.73M2
GLUCOSE BLD-MCNC: 103 MG/DL (ref 70–125)
HCT VFR BLD AUTO: 38.1 % (ref 40–54)
HGB BLD-MCNC: 11.2 G/DL (ref 14–18)
INTERPRETATION ECG - MUSE: NORMAL
LYMPHOCYTES # BLD AUTO: 2.7 THOU/UL (ref 0.8–4.4)
LYMPHOCYTES NFR BLD AUTO: 33 % (ref 20–40)
MCH RBC QN AUTO: 19.3 PG (ref 27–34)
MCHC RBC AUTO-ENTMCNC: 29.4 G/DL (ref 32–36)
MCV RBC AUTO: 66 FL (ref 80–100)
MONOCYTES # BLD AUTO: 0.6 THOU/UL (ref 0–0.9)
MONOCYTES NFR BLD AUTO: 7 % (ref 2–10)
NEUTROPHILS # BLD AUTO: 4.6 THOU/UL (ref 2–7.7)
NEUTROPHILS NFR BLD AUTO: 58 % (ref 50–70)
OVALOCYTES: ABNORMAL
P AXIS - MUSE: 58 DEGREES
PLAT MORPH BLD: ABNORMAL
PLATELET # BLD AUTO: 396 THOU/UL (ref 140–440)
PMV BLD AUTO: 10 FL (ref 8.5–12.5)
POLYCHROMASIA BLD QL SMEAR: ABNORMAL
POTASSIUM BLD-SCNC: 4.5 MMOL/L (ref 3.5–5)
PR INTERVAL - MUSE: 154 MS
PROT SERPL-MCNC: 7.8 G/DL (ref 6–8)
QRS DURATION - MUSE: 98 MS
QT - MUSE: 356 MS
QTC - MUSE: 413 MS
R AXIS - MUSE: 18 DEGREES
RBC # BLD AUTO: 5.79 MILL/UL (ref 4.4–6.2)
SODIUM SERPL-SCNC: 142 MMOL/L (ref 136–145)
SYSTOLIC BLOOD PRESSURE - MUSE: NORMAL
T AXIS - MUSE: 29 DEGREES
VENTRICULAR RATE- MUSE: 81 BPM
WBC: 7.9 THOU/UL (ref 4–11)

## 2019-10-01 ASSESSMENT — MIFFLIN-ST. JEOR: SCORE: 1937.52

## 2019-10-02 ENCOUNTER — COMMUNICATION - HEALTHEAST (OUTPATIENT)
Dept: PHYSICAL MEDICINE AND REHAB | Facility: CLINIC | Age: 64
End: 2019-10-02

## 2019-10-03 ENCOUNTER — AMBULATORY - HEALTHEAST (OUTPATIENT)
Dept: PHYSICAL MEDICINE AND REHAB | Facility: CLINIC | Age: 64
End: 2019-10-03

## 2019-10-21 ENCOUNTER — RECORDS - HEALTHEAST (OUTPATIENT)
Dept: ADMINISTRATIVE | Facility: OTHER | Age: 64
End: 2019-10-21

## 2019-10-24 ENCOUNTER — RECORDS - HEALTHEAST (OUTPATIENT)
Dept: ADMINISTRATIVE | Facility: OTHER | Age: 64
End: 2019-10-24

## 2019-10-28 ENCOUNTER — RECORDS - HEALTHEAST (OUTPATIENT)
Dept: ADMINISTRATIVE | Facility: OTHER | Age: 64
End: 2019-10-28

## 2019-11-07 ENCOUNTER — COMMUNICATION - HEALTHEAST (OUTPATIENT)
Dept: FAMILY MEDICINE | Facility: CLINIC | Age: 64
End: 2019-11-07

## 2019-11-07 DIAGNOSIS — Z12.11 COLON CANCER SCREENING: ICD-10-CM

## 2019-11-07 DIAGNOSIS — Z12.11 SPECIAL SCREENING FOR MALIGNANT NEOPLASMS, COLON: ICD-10-CM

## 2019-11-12 ENCOUNTER — COMMUNICATION - HEALTHEAST (OUTPATIENT)
Dept: FAMILY MEDICINE | Facility: CLINIC | Age: 64
End: 2019-11-12

## 2019-11-14 ENCOUNTER — COMMUNICATION - HEALTHEAST (OUTPATIENT)
Dept: FAMILY MEDICINE | Facility: CLINIC | Age: 64
End: 2019-11-14

## 2019-11-14 DIAGNOSIS — E11.65 UNCONTROLLED TYPE 2 DIABETES MELLITUS WITH HYPERGLYCEMIA, WITH LONG-TERM CURRENT USE OF INSULIN (H): ICD-10-CM

## 2019-11-14 DIAGNOSIS — Z79.4 UNCONTROLLED TYPE 2 DIABETES MELLITUS WITH HYPERGLYCEMIA, WITH LONG-TERM CURRENT USE OF INSULIN (H): ICD-10-CM

## 2019-11-15 ENCOUNTER — COMMUNICATION - HEALTHEAST (OUTPATIENT)
Dept: FAMILY MEDICINE | Facility: CLINIC | Age: 64
End: 2019-11-15

## 2019-11-15 DIAGNOSIS — E11.65 UNCONTROLLED TYPE 2 DIABETES MELLITUS WITH HYPERGLYCEMIA, WITH LONG-TERM CURRENT USE OF INSULIN (H): ICD-10-CM

## 2019-11-15 DIAGNOSIS — Z79.4 UNCONTROLLED TYPE 2 DIABETES MELLITUS WITH HYPERGLYCEMIA, WITH LONG-TERM CURRENT USE OF INSULIN (H): ICD-10-CM

## 2019-11-16 ENCOUNTER — COMMUNICATION - HEALTHEAST (OUTPATIENT)
Dept: FAMILY MEDICINE | Facility: CLINIC | Age: 64
End: 2019-11-16

## 2019-11-16 DIAGNOSIS — E11.65 UNCONTROLLED TYPE 2 DIABETES MELLITUS WITH HYPERGLYCEMIA, WITH LONG-TERM CURRENT USE OF INSULIN (H): ICD-10-CM

## 2019-11-16 DIAGNOSIS — Z79.4 UNCONTROLLED TYPE 2 DIABETES MELLITUS WITH HYPERGLYCEMIA, WITH LONG-TERM CURRENT USE OF INSULIN (H): ICD-10-CM

## 2019-11-21 ENCOUNTER — RECORDS - HEALTHEAST (OUTPATIENT)
Dept: ADMINISTRATIVE | Facility: OTHER | Age: 64
End: 2019-11-21

## 2019-11-25 ENCOUNTER — RECORDS - HEALTHEAST (OUTPATIENT)
Dept: ADMINISTRATIVE | Facility: OTHER | Age: 64
End: 2019-11-25

## 2019-11-29 ENCOUNTER — HOSPITAL ENCOUNTER (OUTPATIENT)
Dept: PHYSICAL MEDICINE AND REHAB | Facility: CLINIC | Age: 64
Discharge: HOME | End: 2019-11-29
Attending: PHYSICIAN ASSISTANT

## 2019-11-29 DIAGNOSIS — Z98.890 HISTORY OF LUMBAR SURGERY: ICD-10-CM

## 2019-11-29 DIAGNOSIS — M54.16 LUMBAR RADICULITIS: ICD-10-CM

## 2019-12-02 ENCOUNTER — RECORDS - HEALTHEAST (OUTPATIENT)
Dept: ADMINISTRATIVE | Facility: OTHER | Age: 64
End: 2019-12-02

## 2019-12-04 ENCOUNTER — COMMUNICATION - HEALTHEAST (OUTPATIENT)
Dept: FAMILY MEDICINE | Facility: CLINIC | Age: 64
End: 2019-12-04

## 2019-12-04 DIAGNOSIS — I89.0 LYMPHEDEMA: ICD-10-CM

## 2019-12-05 ENCOUNTER — HOSPITAL ENCOUNTER (OUTPATIENT)
Dept: PHYSICAL MEDICINE AND REHAB | Facility: CLINIC | Age: 64
Discharge: HOME | End: 2019-12-05
Attending: PHYSICIAN ASSISTANT

## 2019-12-05 DIAGNOSIS — M54.16 LUMBAR RADICULITIS: ICD-10-CM

## 2019-12-05 LAB — GLUCOSE BLDC GLUCOMTR-MCNC: 246 MG/DL (ref 70–125)

## 2019-12-06 ENCOUNTER — COMMUNICATION - HEALTHEAST (OUTPATIENT)
Dept: FAMILY MEDICINE | Facility: CLINIC | Age: 64
End: 2019-12-06

## 2019-12-06 DIAGNOSIS — B35.1 ONYCHOMYCOSIS: ICD-10-CM

## 2019-12-12 ENCOUNTER — RECORDS - HEALTHEAST (OUTPATIENT)
Dept: ADMINISTRATIVE | Facility: OTHER | Age: 64
End: 2019-12-12

## 2019-12-17 ENCOUNTER — COMMUNICATION - HEALTHEAST (OUTPATIENT)
Dept: FAMILY MEDICINE | Facility: CLINIC | Age: 64
End: 2019-12-17

## 2019-12-19 ENCOUNTER — HOSPITAL ENCOUNTER (OUTPATIENT)
Dept: PHYSICAL MEDICINE AND REHAB | Facility: CLINIC | Age: 64
Discharge: HOME | End: 2019-12-19
Attending: PHYSICIAN ASSISTANT

## 2019-12-19 DIAGNOSIS — M54.16 LUMBAR RADICULITIS: ICD-10-CM

## 2019-12-19 DIAGNOSIS — Z98.890 HISTORY OF LUMBAR SURGERY: ICD-10-CM

## 2019-12-28 ENCOUNTER — HOSPITAL ENCOUNTER (OUTPATIENT)
Dept: MRI IMAGING | Facility: CLINIC | Age: 64
Discharge: HOME | End: 2019-12-28
Attending: PHYSICIAN ASSISTANT

## 2019-12-28 ENCOUNTER — HOSPITAL ENCOUNTER (OUTPATIENT)
Dept: RADIOLOGY | Facility: CLINIC | Age: 64
Discharge: HOME | End: 2019-12-28
Attending: PHYSICIAN ASSISTANT

## 2019-12-28 DIAGNOSIS — M54.16 LUMBAR RADICULITIS: ICD-10-CM

## 2019-12-28 DIAGNOSIS — Z98.890 HISTORY OF LUMBAR SURGERY: ICD-10-CM

## 2019-12-30 ENCOUNTER — COMMUNICATION - HEALTHEAST (OUTPATIENT)
Dept: PHYSICAL MEDICINE AND REHAB | Facility: CLINIC | Age: 64
End: 2019-12-30

## 2019-12-30 DIAGNOSIS — M54.16 LUMBAR RADICULOPATHY: ICD-10-CM

## 2020-01-02 ENCOUNTER — COMMUNICATION - HEALTHEAST (OUTPATIENT)
Dept: FAMILY MEDICINE | Facility: CLINIC | Age: 65
End: 2020-01-02

## 2020-01-02 ENCOUNTER — OFFICE VISIT - HEALTHEAST (OUTPATIENT)
Dept: FAMILY MEDICINE | Facility: CLINIC | Age: 65
End: 2020-01-02

## 2020-01-02 ENCOUNTER — AMBULATORY - HEALTHEAST (OUTPATIENT)
Dept: FAMILY MEDICINE | Facility: CLINIC | Age: 65
End: 2020-01-02

## 2020-01-02 DIAGNOSIS — E66.01 CLASS 2 SEVERE OBESITY DUE TO EXCESS CALORIES WITH SERIOUS COMORBIDITY AND BODY MASS INDEX (BMI) OF 36.0 TO 36.9 IN ADULT (H): ICD-10-CM

## 2020-01-02 DIAGNOSIS — M48.061 SPINAL STENOSIS OF LUMBAR REGION, UNSPECIFIED WHETHER NEUROGENIC CLAUDICATION PRESENT: ICD-10-CM

## 2020-01-02 DIAGNOSIS — M72.2 PLANTAR FASCIITIS: ICD-10-CM

## 2020-01-02 DIAGNOSIS — E78.5 HYPERLIPIDEMIA, UNSPECIFIED HYPERLIPIDEMIA TYPE: ICD-10-CM

## 2020-01-02 DIAGNOSIS — Z79.4 UNCONTROLLED TYPE 2 DIABETES MELLITUS WITH HYPERGLYCEMIA, WITH LONG-TERM CURRENT USE OF INSULIN (H): ICD-10-CM

## 2020-01-02 DIAGNOSIS — E11.65 UNCONTROLLED TYPE 2 DIABETES MELLITUS WITH HYPERGLYCEMIA, WITH LONG-TERM CURRENT USE OF INSULIN (H): ICD-10-CM

## 2020-01-02 DIAGNOSIS — E66.812 CLASS 2 SEVERE OBESITY DUE TO EXCESS CALORIES WITH SERIOUS COMORBIDITY AND BODY MASS INDEX (BMI) OF 36.0 TO 36.9 IN ADULT (H): ICD-10-CM

## 2020-01-02 DIAGNOSIS — I10 ESSENTIAL HYPERTENSION: ICD-10-CM

## 2020-01-02 DIAGNOSIS — J45.30 MILD PERSISTENT ASTHMA WITHOUT COMPLICATION: ICD-10-CM

## 2020-01-02 DIAGNOSIS — E11.42 DIABETIC POLYNEUROPATHY ASSOCIATED WITH TYPE 2 DIABETES MELLITUS (H): ICD-10-CM

## 2020-01-02 DIAGNOSIS — I89.0 LYMPHEDEMA: ICD-10-CM

## 2020-01-02 DIAGNOSIS — Z51.81 ENCOUNTER FOR THERAPEUTIC DRUG MONITORING: ICD-10-CM

## 2020-01-02 DIAGNOSIS — B35.1 ONYCHOMYCOSIS: ICD-10-CM

## 2020-01-02 DIAGNOSIS — Z87.2 HISTORY OF CELLULITIS: ICD-10-CM

## 2020-01-02 DIAGNOSIS — D50.9 MICROCYTIC ANEMIA: ICD-10-CM

## 2020-01-02 LAB
ALBUMIN SERPL-MCNC: 4 G/DL (ref 3.5–5)
ALP SERPL-CCNC: 98 U/L (ref 45–120)
ALT SERPL W P-5'-P-CCNC: 28 U/L (ref 0–45)
ANION GAP SERPL CALCULATED.3IONS-SCNC: 9 MMOL/L (ref 5–18)
AST SERPL W P-5'-P-CCNC: 18 U/L (ref 0–40)
BILIRUB SERPL-MCNC: 0.3 MG/DL (ref 0–1)
BUN SERPL-MCNC: 19 MG/DL (ref 8–22)
CALCIUM SERPL-MCNC: 9.5 MG/DL (ref 8.5–10.5)
CHLORIDE BLD-SCNC: 104 MMOL/L (ref 98–107)
CO2 SERPL-SCNC: 25 MMOL/L (ref 22–31)
CREAT SERPL-MCNC: 0.84 MG/DL (ref 0.7–1.3)
CREAT UR-MCNC: 13.5 MG/DL
ERYTHROCYTE [DISTWIDTH] IN BLOOD BY AUTOMATED COUNT: 20.4 % (ref 11–14.5)
GFR SERPL CREATININE-BSD FRML MDRD: >60 ML/MIN/1.73M2
GLUCOSE BLD-MCNC: 102 MG/DL (ref 70–125)
HBA1C MFR BLD: 7.8 % (ref 3.5–6)
HCT VFR BLD AUTO: 40.1 % (ref 40–54)
HGB BLD-MCNC: 12.1 G/DL (ref 14–18)
MCH RBC QN AUTO: 19.9 PG (ref 27–34)
MCHC RBC AUTO-ENTMCNC: 30.2 G/DL (ref 32–36)
MCV RBC AUTO: 66 FL (ref 80–100)
MICROALBUMIN UR-MCNC: <0.5 MG/DL (ref 0–1.99)
MICROALBUMIN/CREAT UR: NORMAL MG/G{CREAT}
PLATELET # BLD AUTO: 356 THOU/UL (ref 140–440)
PMV BLD AUTO: 10.1 FL (ref 8.5–12.5)
POTASSIUM BLD-SCNC: 4.7 MMOL/L (ref 3.5–5)
PROT SERPL-MCNC: 7.2 G/DL (ref 6–8)
RBC # BLD AUTO: 6.08 MILL/UL (ref 4.4–6.2)
SODIUM SERPL-SCNC: 138 MMOL/L (ref 136–145)
WBC: 7.8 THOU/UL (ref 4–11)

## 2020-01-03 LAB
CHOLEST SERPL-MCNC: 165 MG/DL
HDLC SERPL-MCNC: 36 MG/DL
LDLC SERPL CALC-MCNC: 73 MG/DL
TRIGL SERPL-MCNC: 281 MG/DL

## 2020-01-07 ENCOUNTER — COMMUNICATION - HEALTHEAST (OUTPATIENT)
Dept: FAMILY MEDICINE | Facility: CLINIC | Age: 65
End: 2020-01-07

## 2020-01-07 ENCOUNTER — HOSPITAL ENCOUNTER (OUTPATIENT)
Dept: PHYSICAL MEDICINE AND REHAB | Facility: CLINIC | Age: 65
Discharge: HOME | End: 2020-01-07
Attending: PHYSICIAN ASSISTANT

## 2020-01-07 DIAGNOSIS — E11.65 UNCONTROLLED TYPE 2 DIABETES MELLITUS WITH HYPERGLYCEMIA, WITH LONG-TERM CURRENT USE OF INSULIN (H): ICD-10-CM

## 2020-01-07 DIAGNOSIS — Z79.4 UNCONTROLLED TYPE 2 DIABETES MELLITUS WITH HYPERGLYCEMIA, WITH LONG-TERM CURRENT USE OF INSULIN (H): ICD-10-CM

## 2020-01-07 DIAGNOSIS — M54.16 LUMBAR RADICULOPATHY: ICD-10-CM

## 2020-01-07 LAB — GLUCOSE BLDC GLUCOMTR-MCNC: 182 MG/DL (ref 70–125)

## 2020-01-13 ENCOUNTER — AMBULATORY - HEALTHEAST (OUTPATIENT)
Dept: FAMILY MEDICINE | Facility: CLINIC | Age: 65
End: 2020-01-13

## 2020-01-13 ENCOUNTER — COMMUNICATION - HEALTHEAST (OUTPATIENT)
Dept: FAMILY MEDICINE | Facility: CLINIC | Age: 65
End: 2020-01-13

## 2020-01-13 ENCOUNTER — COMMUNICATION - HEALTHEAST (OUTPATIENT)
Dept: SCHEDULING | Facility: CLINIC | Age: 65
End: 2020-01-13

## 2020-01-13 DIAGNOSIS — Z79.4 UNCONTROLLED TYPE 2 DIABETES MELLITUS WITH HYPERGLYCEMIA, WITH LONG-TERM CURRENT USE OF INSULIN (H): ICD-10-CM

## 2020-01-13 DIAGNOSIS — E11.65 UNCONTROLLED TYPE 2 DIABETES MELLITUS WITH HYPERGLYCEMIA, WITH LONG-TERM CURRENT USE OF INSULIN (H): ICD-10-CM

## 2020-01-15 ENCOUNTER — COMMUNICATION - HEALTHEAST (OUTPATIENT)
Dept: FAMILY MEDICINE | Facility: CLINIC | Age: 65
End: 2020-01-15

## 2020-01-15 DIAGNOSIS — M54.50 CHRONIC MIDLINE LOW BACK PAIN WITHOUT SCIATICA: ICD-10-CM

## 2020-01-15 DIAGNOSIS — M25.551 HIP PAIN, RIGHT: ICD-10-CM

## 2020-01-15 DIAGNOSIS — G89.29 CHRONIC MIDLINE LOW BACK PAIN WITHOUT SCIATICA: ICD-10-CM

## 2020-01-20 ENCOUNTER — HOSPITAL ENCOUNTER (OUTPATIENT)
Dept: PHYSICAL MEDICINE AND REHAB | Facility: CLINIC | Age: 65
Discharge: HOME | End: 2020-01-20
Attending: PHYSICIAN ASSISTANT

## 2020-01-20 DIAGNOSIS — Z98.890 HISTORY OF LUMBAR SURGERY: ICD-10-CM

## 2020-01-20 DIAGNOSIS — M43.16 SPONDYLOLISTHESIS OF LUMBAR REGION: ICD-10-CM

## 2020-01-20 DIAGNOSIS — M54.16 LUMBAR RADICULITIS: ICD-10-CM

## 2020-01-23 ENCOUNTER — RECORDS - HEALTHEAST (OUTPATIENT)
Dept: ADMINISTRATIVE | Facility: OTHER | Age: 65
End: 2020-01-23

## 2020-01-27 ENCOUNTER — RECORDS - HEALTHEAST (OUTPATIENT)
Dept: ADMINISTRATIVE | Facility: OTHER | Age: 65
End: 2020-01-27

## 2020-02-04 ENCOUNTER — COMMUNICATION - HEALTHEAST (OUTPATIENT)
Dept: FAMILY MEDICINE | Facility: CLINIC | Age: 65
End: 2020-02-04

## 2020-02-04 DIAGNOSIS — M54.16 LUMBAR RADICULITIS: ICD-10-CM

## 2020-02-10 ENCOUNTER — AMBULATORY - HEALTHEAST (OUTPATIENT)
Dept: FAMILY MEDICINE | Facility: CLINIC | Age: 65
End: 2020-02-10

## 2020-02-10 DIAGNOSIS — L03.115 CELLULITIS OF RIGHT LOWER EXTREMITY: ICD-10-CM

## 2020-02-11 ENCOUNTER — COMMUNICATION - HEALTHEAST (OUTPATIENT)
Dept: FAMILY MEDICINE | Facility: CLINIC | Age: 65
End: 2020-02-11

## 2020-02-11 DIAGNOSIS — I10 ESSENTIAL HYPERTENSION: ICD-10-CM

## 2020-02-19 ENCOUNTER — COMMUNICATION - HEALTHEAST (OUTPATIENT)
Dept: FAMILY MEDICINE | Facility: CLINIC | Age: 65
End: 2020-02-19

## 2020-02-19 DIAGNOSIS — I89.0 LYMPHEDEMA: ICD-10-CM

## 2020-02-20 ENCOUNTER — RECORDS - HEALTHEAST (OUTPATIENT)
Dept: ADMINISTRATIVE | Facility: OTHER | Age: 65
End: 2020-02-20

## 2020-03-02 ENCOUNTER — RECORDS - HEALTHEAST (OUTPATIENT)
Dept: ADMINISTRATIVE | Facility: OTHER | Age: 65
End: 2020-03-02

## 2020-03-09 ENCOUNTER — COMMUNICATION - HEALTHEAST (OUTPATIENT)
Dept: FAMILY MEDICINE | Facility: CLINIC | Age: 65
End: 2020-03-09

## 2020-03-09 DIAGNOSIS — I10 ESSENTIAL HYPERTENSION: ICD-10-CM

## 2020-03-18 ENCOUNTER — AMBULATORY - HEALTHEAST (OUTPATIENT)
Dept: FAMILY MEDICINE | Facility: CLINIC | Age: 65
End: 2020-03-18

## 2020-03-18 DIAGNOSIS — G89.29 CHRONIC MIDLINE LOW BACK PAIN WITHOUT SCIATICA: ICD-10-CM

## 2020-03-18 DIAGNOSIS — M54.50 CHRONIC MIDLINE LOW BACK PAIN WITHOUT SCIATICA: ICD-10-CM

## 2020-04-02 ENCOUNTER — OFFICE VISIT - HEALTHEAST (OUTPATIENT)
Dept: FAMILY MEDICINE | Facility: CLINIC | Age: 65
End: 2020-04-02

## 2020-04-02 DIAGNOSIS — M54.50 CHRONIC MIDLINE LOW BACK PAIN WITHOUT SCIATICA: ICD-10-CM

## 2020-04-02 DIAGNOSIS — G89.29 CHRONIC MIDLINE LOW BACK PAIN WITHOUT SCIATICA: ICD-10-CM

## 2020-04-02 DIAGNOSIS — J45.30 MILD PERSISTENT ASTHMA WITHOUT COMPLICATION: ICD-10-CM

## 2020-04-02 DIAGNOSIS — I10 ESSENTIAL HYPERTENSION: ICD-10-CM

## 2020-04-02 DIAGNOSIS — M25.551 HIP PAIN, RIGHT: ICD-10-CM

## 2020-04-02 DIAGNOSIS — E11.65 UNCONTROLLED TYPE 2 DIABETES MELLITUS WITH HYPERGLYCEMIA, WITH LONG-TERM CURRENT USE OF INSULIN (H): ICD-10-CM

## 2020-04-02 DIAGNOSIS — Z79.4 UNCONTROLLED TYPE 2 DIABETES MELLITUS WITH HYPERGLYCEMIA, WITH LONG-TERM CURRENT USE OF INSULIN (H): ICD-10-CM

## 2020-04-02 ASSESSMENT — ANXIETY QUESTIONNAIRES
6. BECOMING EASILY ANNOYED OR IRRITABLE: NOT AT ALL
2. NOT BEING ABLE TO STOP OR CONTROL WORRYING: NOT AT ALL
IF YOU CHECKED OFF ANY PROBLEMS ON THIS QUESTIONNAIRE, HOW DIFFICULT HAVE THESE PROBLEMS MADE IT FOR YOU TO DO YOUR WORK, TAKE CARE OF THINGS AT HOME, OR GET ALONG WITH OTHER PEOPLE: NOT DIFFICULT AT ALL
4. TROUBLE RELAXING: NOT AT ALL
3. WORRYING TOO MUCH ABOUT DIFFERENT THINGS: NOT AT ALL
GAD7 TOTAL SCORE: 0
5. BEING SO RESTLESS THAT IT IS HARD TO SIT STILL: NOT AT ALL
7. FEELING AFRAID AS IF SOMETHING AWFUL MIGHT HAPPEN: NOT AT ALL
1. FEELING NERVOUS, ANXIOUS, OR ON EDGE: NOT AT ALL

## 2020-04-02 ASSESSMENT — PATIENT HEALTH QUESTIONNAIRE - PHQ9: SUM OF ALL RESPONSES TO PHQ QUESTIONS 1-9: 6

## 2020-04-07 ENCOUNTER — COMMUNICATION - HEALTHEAST (OUTPATIENT)
Dept: FAMILY MEDICINE | Facility: CLINIC | Age: 65
End: 2020-04-07

## 2020-04-07 DIAGNOSIS — M54.16 LUMBAR RADICULITIS: ICD-10-CM

## 2020-04-12 ENCOUNTER — AMBULATORY - HEALTHEAST (OUTPATIENT)
Dept: FAMILY MEDICINE | Facility: CLINIC | Age: 65
End: 2020-04-12

## 2020-04-12 DIAGNOSIS — L03.115 CELLULITIS OF RIGHT LOWER EXTREMITY: ICD-10-CM

## 2020-04-22 ENCOUNTER — COMMUNICATION - HEALTHEAST (OUTPATIENT)
Dept: FAMILY MEDICINE | Facility: CLINIC | Age: 65
End: 2020-04-22

## 2020-04-22 DIAGNOSIS — Z79.4 UNCONTROLLED TYPE 2 DIABETES MELLITUS WITH HYPERGLYCEMIA, WITH LONG-TERM CURRENT USE OF INSULIN (H): ICD-10-CM

## 2020-04-22 DIAGNOSIS — E11.65 UNCONTROLLED TYPE 2 DIABETES MELLITUS WITH HYPERGLYCEMIA, WITH LONG-TERM CURRENT USE OF INSULIN (H): ICD-10-CM

## 2020-05-05 ENCOUNTER — COMMUNICATION - HEALTHEAST (OUTPATIENT)
Dept: FAMILY MEDICINE | Facility: CLINIC | Age: 65
End: 2020-05-05

## 2020-05-05 DIAGNOSIS — J45.30 MILD PERSISTENT ASTHMA WITHOUT COMPLICATION: ICD-10-CM

## 2020-05-11 ENCOUNTER — COMMUNICATION - HEALTHEAST (OUTPATIENT)
Dept: FAMILY MEDICINE | Facility: CLINIC | Age: 65
End: 2020-05-11

## 2020-05-11 DIAGNOSIS — I10 ESSENTIAL HYPERTENSION: ICD-10-CM

## 2020-05-24 ENCOUNTER — COMMUNICATION - HEALTHEAST (OUTPATIENT)
Dept: FAMILY MEDICINE | Facility: CLINIC | Age: 65
End: 2020-05-24

## 2020-05-24 DIAGNOSIS — E11.65 UNCONTROLLED TYPE 2 DIABETES MELLITUS WITH HYPERGLYCEMIA, WITH LONG-TERM CURRENT USE OF INSULIN (H): ICD-10-CM

## 2020-05-24 DIAGNOSIS — Z79.4 UNCONTROLLED TYPE 2 DIABETES MELLITUS WITH HYPERGLYCEMIA, WITH LONG-TERM CURRENT USE OF INSULIN (H): ICD-10-CM

## 2020-06-13 ENCOUNTER — COMMUNICATION - HEALTHEAST (OUTPATIENT)
Dept: FAMILY MEDICINE | Facility: CLINIC | Age: 65
End: 2020-06-13

## 2020-06-13 DIAGNOSIS — I10 ESSENTIAL HYPERTENSION: ICD-10-CM

## 2020-07-08 ENCOUNTER — OFFICE VISIT - HEALTHEAST (OUTPATIENT)
Dept: FAMILY MEDICINE | Facility: CLINIC | Age: 65
End: 2020-07-08

## 2020-07-08 DIAGNOSIS — Z11.4 ENCOUNTER FOR SCREENING FOR HIV: ICD-10-CM

## 2020-07-08 DIAGNOSIS — E11.65 UNCONTROLLED TYPE 2 DIABETES MELLITUS WITH HYPERGLYCEMIA, WITH LONG-TERM CURRENT USE OF INSULIN (H): ICD-10-CM

## 2020-07-08 DIAGNOSIS — E78.5 HYPERLIPIDEMIA, UNSPECIFIED HYPERLIPIDEMIA TYPE: ICD-10-CM

## 2020-07-08 DIAGNOSIS — Z79.4 UNCONTROLLED TYPE 2 DIABETES MELLITUS WITH HYPERGLYCEMIA, WITH LONG-TERM CURRENT USE OF INSULIN (H): ICD-10-CM

## 2020-07-08 DIAGNOSIS — G89.29 CHRONIC MIDLINE LOW BACK PAIN WITHOUT SCIATICA: ICD-10-CM

## 2020-07-08 DIAGNOSIS — M25.551 HIP PAIN, RIGHT: ICD-10-CM

## 2020-07-08 DIAGNOSIS — I10 ESSENTIAL HYPERTENSION: ICD-10-CM

## 2020-07-08 DIAGNOSIS — M54.50 CHRONIC MIDLINE LOW BACK PAIN WITHOUT SCIATICA: ICD-10-CM

## 2020-07-08 DIAGNOSIS — J45.30 MILD PERSISTENT ASTHMA WITHOUT COMPLICATION: ICD-10-CM

## 2020-07-08 DIAGNOSIS — N52.9 MALE ERECTILE DYSFUNCTION: ICD-10-CM

## 2020-07-08 LAB
ANION GAP SERPL CALCULATED.3IONS-SCNC: 12 MMOL/L (ref 5–18)
BUN SERPL-MCNC: 21 MG/DL (ref 8–22)
CALCIUM SERPL-MCNC: 9.6 MG/DL (ref 8.5–10.5)
CHLORIDE BLD-SCNC: 103 MMOL/L (ref 98–107)
CHOLEST SERPL-MCNC: 149 MG/DL
CO2 SERPL-SCNC: 25 MMOL/L (ref 22–31)
CREAT SERPL-MCNC: 0.87 MG/DL (ref 0.7–1.3)
FASTING STATUS PATIENT QL REPORTED: YES
GFR SERPL CREATININE-BSD FRML MDRD: >60 ML/MIN/1.73M2
GLUCOSE BLD-MCNC: 120 MG/DL (ref 70–125)
HBA1C MFR BLD: 6.7 % (ref 3.5–6)
HDLC SERPL-MCNC: 34 MG/DL
HIV 1+2 AB+HIV1 P24 AG SERPL QL IA: NEGATIVE
LDLC SERPL CALC-MCNC: 63 MG/DL
POTASSIUM BLD-SCNC: 4.6 MMOL/L (ref 3.5–5)
SODIUM SERPL-SCNC: 140 MMOL/L (ref 136–145)
TRIGL SERPL-MCNC: 259 MG/DL

## 2020-07-09 ENCOUNTER — COMMUNICATION - HEALTHEAST (OUTPATIENT)
Dept: FAMILY MEDICINE | Facility: CLINIC | Age: 65
End: 2020-07-09

## 2020-07-13 ENCOUNTER — COMMUNICATION - HEALTHEAST (OUTPATIENT)
Dept: FAMILY MEDICINE | Facility: CLINIC | Age: 65
End: 2020-07-13

## 2020-08-07 ENCOUNTER — COMMUNICATION - HEALTHEAST (OUTPATIENT)
Dept: FAMILY MEDICINE | Facility: CLINIC | Age: 65
End: 2020-08-07

## 2020-08-07 DIAGNOSIS — I10 ESSENTIAL HYPERTENSION: ICD-10-CM

## 2020-09-06 ENCOUNTER — COMMUNICATION - HEALTHEAST (OUTPATIENT)
Dept: FAMILY MEDICINE | Facility: CLINIC | Age: 65
End: 2020-09-06

## 2020-09-06 DIAGNOSIS — G89.29 CHRONIC MIDLINE LOW BACK PAIN WITHOUT SCIATICA: ICD-10-CM

## 2020-09-06 DIAGNOSIS — M54.50 CHRONIC MIDLINE LOW BACK PAIN WITHOUT SCIATICA: ICD-10-CM

## 2020-09-25 ENCOUNTER — COMMUNICATION - HEALTHEAST (OUTPATIENT)
Dept: FAMILY MEDICINE | Facility: CLINIC | Age: 65
End: 2020-09-25

## 2020-09-25 DIAGNOSIS — N52.9 MALE ERECTILE DYSFUNCTION: ICD-10-CM

## 2020-10-04 ENCOUNTER — COMMUNICATION - HEALTHEAST (OUTPATIENT)
Dept: FAMILY MEDICINE | Facility: CLINIC | Age: 65
End: 2020-10-04

## 2020-10-06 ENCOUNTER — COMMUNICATION - HEALTHEAST (OUTPATIENT)
Dept: FAMILY MEDICINE | Facility: CLINIC | Age: 65
End: 2020-10-06

## 2020-10-06 DIAGNOSIS — E11.65 UNCONTROLLED TYPE 2 DIABETES MELLITUS WITH HYPERGLYCEMIA, WITH LONG-TERM CURRENT USE OF INSULIN (H): ICD-10-CM

## 2020-10-06 DIAGNOSIS — Z79.4 UNCONTROLLED TYPE 2 DIABETES MELLITUS WITH HYPERGLYCEMIA, WITH LONG-TERM CURRENT USE OF INSULIN (H): ICD-10-CM

## 2020-10-15 ENCOUNTER — RECORDS - HEALTHEAST (OUTPATIENT)
Dept: ADMINISTRATIVE | Facility: OTHER | Age: 65
End: 2020-10-15

## 2020-10-15 LAB — RETINOPATHY: NEGATIVE

## 2020-10-26 ENCOUNTER — RECORDS - HEALTHEAST (OUTPATIENT)
Dept: HEALTH INFORMATION MANAGEMENT | Facility: CLINIC | Age: 65
End: 2020-10-26

## 2020-10-27 ENCOUNTER — AMBULATORY - HEALTHEAST (OUTPATIENT)
Dept: FAMILY MEDICINE | Facility: CLINIC | Age: 65
End: 2020-10-27

## 2020-10-27 ENCOUNTER — COMMUNICATION - HEALTHEAST (OUTPATIENT)
Dept: FAMILY MEDICINE | Facility: CLINIC | Age: 65
End: 2020-10-27

## 2020-10-27 DIAGNOSIS — M54.50 CHRONIC MIDLINE LOW BACK PAIN WITHOUT SCIATICA: ICD-10-CM

## 2020-10-27 DIAGNOSIS — G89.29 CHRONIC MIDLINE LOW BACK PAIN WITHOUT SCIATICA: ICD-10-CM

## 2020-11-03 ENCOUNTER — COMMUNICATION - HEALTHEAST (OUTPATIENT)
Dept: FAMILY MEDICINE | Facility: CLINIC | Age: 65
End: 2020-11-03

## 2020-11-03 DIAGNOSIS — M54.50 CHRONIC MIDLINE LOW BACK PAIN WITHOUT SCIATICA: ICD-10-CM

## 2020-11-03 DIAGNOSIS — G89.29 CHRONIC MIDLINE LOW BACK PAIN WITHOUT SCIATICA: ICD-10-CM

## 2020-11-05 ENCOUNTER — COMMUNICATION - HEALTHEAST (OUTPATIENT)
Dept: FAMILY MEDICINE | Facility: CLINIC | Age: 65
End: 2020-11-05

## 2020-11-06 ENCOUNTER — HOSPITAL ENCOUNTER (OUTPATIENT)
Dept: PHYSICAL MEDICINE AND REHAB | Facility: CLINIC | Age: 65
Discharge: HOME | End: 2020-11-06
Attending: PHYSICIAN ASSISTANT

## 2020-11-06 DIAGNOSIS — Z98.890 HISTORY OF LUMBAR SURGERY: ICD-10-CM

## 2020-11-06 DIAGNOSIS — M54.16 LUMBAR RADICULOPATHY: ICD-10-CM

## 2020-11-06 ASSESSMENT — MIFFLIN-ST. JEOR: SCORE: 1969.27

## 2020-11-12 ENCOUNTER — OFFICE VISIT - HEALTHEAST (OUTPATIENT)
Dept: FAMILY MEDICINE | Facility: CLINIC | Age: 65
End: 2020-11-12

## 2020-11-12 DIAGNOSIS — Z00.00 ROUTINE GENERAL MEDICAL EXAMINATION AT A HEALTH CARE FACILITY: ICD-10-CM

## 2020-11-12 DIAGNOSIS — Z23 ENCOUNTER FOR IMMUNIZATION: ICD-10-CM

## 2020-11-12 DIAGNOSIS — G62.9 PERIPHERAL POLYNEUROPATHY: ICD-10-CM

## 2020-11-12 DIAGNOSIS — E78.5 HYPERLIPIDEMIA, UNSPECIFIED HYPERLIPIDEMIA TYPE: ICD-10-CM

## 2020-11-12 DIAGNOSIS — Z79.4 UNCONTROLLED TYPE 2 DIABETES MELLITUS WITH HYPERGLYCEMIA, WITH LONG-TERM CURRENT USE OF INSULIN (H): ICD-10-CM

## 2020-11-12 DIAGNOSIS — Z12.5 SCREENING FOR PROSTATE CANCER: ICD-10-CM

## 2020-11-12 DIAGNOSIS — J45.30 MILD PERSISTENT ASTHMA WITHOUT COMPLICATION: ICD-10-CM

## 2020-11-12 DIAGNOSIS — I89.0 LYMPHEDEMA: ICD-10-CM

## 2020-11-12 DIAGNOSIS — E66.01 CLASS 2 SEVERE OBESITY DUE TO EXCESS CALORIES WITH SERIOUS COMORBIDITY AND BODY MASS INDEX (BMI) OF 36.0 TO 36.9 IN ADULT (H): ICD-10-CM

## 2020-11-12 DIAGNOSIS — E11.65 UNCONTROLLED TYPE 2 DIABETES MELLITUS WITH HYPERGLYCEMIA, WITH LONG-TERM CURRENT USE OF INSULIN (H): ICD-10-CM

## 2020-11-12 DIAGNOSIS — E66.812 CLASS 2 SEVERE OBESITY DUE TO EXCESS CALORIES WITH SERIOUS COMORBIDITY AND BODY MASS INDEX (BMI) OF 36.0 TO 36.9 IN ADULT (H): ICD-10-CM

## 2020-11-12 DIAGNOSIS — D12.6 TUBULAR ADENOMA OF COLON: ICD-10-CM

## 2020-11-12 DIAGNOSIS — I10 ESSENTIAL HYPERTENSION, BENIGN: ICD-10-CM

## 2020-11-12 DIAGNOSIS — D50.9 MICROCYTIC ANEMIA: ICD-10-CM

## 2020-11-12 LAB
ANION GAP SERPL CALCULATED.3IONS-SCNC: 12 MMOL/L (ref 5–18)
BUN SERPL-MCNC: 29 MG/DL (ref 8–22)
CALCIUM SERPL-MCNC: 9.5 MG/DL (ref 8.5–10.5)
CHLORIDE BLD-SCNC: 101 MMOL/L (ref 98–107)
CO2 SERPL-SCNC: 26 MMOL/L (ref 22–31)
CREAT SERPL-MCNC: 0.91 MG/DL (ref 0.7–1.3)
ERYTHROCYTE [DISTWIDTH] IN BLOOD BY AUTOMATED COUNT: 17.9 % (ref 11–14.5)
GFR SERPL CREATININE-BSD FRML MDRD: >60 ML/MIN/1.73M2
GLUCOSE BLD-MCNC: 144 MG/DL (ref 70–125)
HBA1C MFR BLD: 7.6 %
HCT VFR BLD AUTO: 35.7 % (ref 40–54)
HGB BLD-MCNC: 11.5 G/DL (ref 14–18)
MCH RBC QN AUTO: 21.2 PG (ref 27–34)
MCHC RBC AUTO-ENTMCNC: 32.3 G/DL (ref 32–36)
MCV RBC AUTO: 66 FL (ref 80–100)
PLATELET # BLD AUTO: 329 THOU/UL (ref 140–440)
PMV BLD AUTO: 9.2 FL (ref 7–10)
POTASSIUM BLD-SCNC: 4.7 MMOL/L (ref 3.5–5)
PSA SERPL-MCNC: 0.9 NG/ML (ref 0–4.5)
RBC # BLD AUTO: 5.42 MILL/UL (ref 4.4–6.2)
SODIUM SERPL-SCNC: 139 MMOL/L (ref 136–145)
WBC: 9.9 THOU/UL (ref 4–11)

## 2020-11-12 ASSESSMENT — ANXIETY QUESTIONNAIRES
2. NOT BEING ABLE TO STOP OR CONTROL WORRYING: NOT AT ALL
7. FEELING AFRAID AS IF SOMETHING AWFUL MIGHT HAPPEN: NOT AT ALL
IF YOU CHECKED OFF ANY PROBLEMS ON THIS QUESTIONNAIRE, HOW DIFFICULT HAVE THESE PROBLEMS MADE IT FOR YOU TO DO YOUR WORK, TAKE CARE OF THINGS AT HOME, OR GET ALONG WITH OTHER PEOPLE: NOT DIFFICULT AT ALL
GAD7 TOTAL SCORE: 0
6. BECOMING EASILY ANNOYED OR IRRITABLE: NOT AT ALL
4. TROUBLE RELAXING: NOT AT ALL
1. FEELING NERVOUS, ANXIOUS, OR ON EDGE: NOT AT ALL
3. WORRYING TOO MUCH ABOUT DIFFERENT THINGS: NOT AT ALL
5. BEING SO RESTLESS THAT IT IS HARD TO SIT STILL: NOT AT ALL

## 2020-11-12 ASSESSMENT — PATIENT HEALTH QUESTIONNAIRE - PHQ9: SUM OF ALL RESPONSES TO PHQ QUESTIONS 1-9: 0

## 2020-11-12 ASSESSMENT — MIFFLIN-ST. JEOR: SCORE: 1922.87

## 2020-11-18 ENCOUNTER — HOSPITAL ENCOUNTER (OUTPATIENT)
Dept: PHYSICAL MEDICINE AND REHAB | Facility: CLINIC | Age: 65
Discharge: HOME | End: 2020-11-18
Attending: PHYSICIAN ASSISTANT

## 2020-11-18 DIAGNOSIS — E11.9 TYPE 2 DIABETES MELLITUS WITHOUT COMPLICATIONS (H): ICD-10-CM

## 2020-11-18 DIAGNOSIS — M54.16 LUMBAR RADICULOPATHY: ICD-10-CM

## 2020-11-18 LAB — GLUCOSE BLDC GLUCOMTR-MCNC: 279 MG/DL (ref 70–125)

## 2020-12-01 ENCOUNTER — HOSPITAL ENCOUNTER (OUTPATIENT)
Dept: PHYSICAL MEDICINE AND REHAB | Facility: CLINIC | Age: 65
Discharge: HOME | End: 2020-12-01
Attending: PHYSICIAN ASSISTANT

## 2020-12-01 DIAGNOSIS — G89.29 CHRONIC RIGHT-SIDED LOW BACK PAIN WITH RIGHT-SIDED SCIATICA: ICD-10-CM

## 2020-12-01 DIAGNOSIS — M54.41 CHRONIC RIGHT-SIDED LOW BACK PAIN WITH RIGHT-SIDED SCIATICA: ICD-10-CM

## 2020-12-01 DIAGNOSIS — M54.16 LUMBAR RADICULOPATHY: ICD-10-CM

## 2020-12-01 ASSESSMENT — MIFFLIN-ST. JEOR: SCORE: 1927.87

## 2020-12-12 ENCOUNTER — HOSPITAL ENCOUNTER (OUTPATIENT)
Dept: RADIOLOGY | Facility: CLINIC | Age: 65
Discharge: HOME | End: 2020-12-12
Attending: PHYSICIAN ASSISTANT

## 2020-12-12 ENCOUNTER — HOSPITAL ENCOUNTER (OUTPATIENT)
Dept: MRI IMAGING | Facility: CLINIC | Age: 65
Discharge: HOME | End: 2020-12-12
Attending: PHYSICIAN ASSISTANT

## 2020-12-12 DIAGNOSIS — M54.41 CHRONIC RIGHT-SIDED LOW BACK PAIN WITH RIGHT-SIDED SCIATICA: ICD-10-CM

## 2020-12-12 DIAGNOSIS — G89.29 CHRONIC RIGHT-SIDED LOW BACK PAIN WITH RIGHT-SIDED SCIATICA: ICD-10-CM

## 2020-12-14 ENCOUNTER — COMMUNICATION - HEALTHEAST (OUTPATIENT)
Dept: PHYSICAL MEDICINE AND REHAB | Facility: CLINIC | Age: 65
End: 2020-12-14

## 2020-12-18 ENCOUNTER — OFFICE VISIT - HEALTHEAST (OUTPATIENT)
Dept: NEUROSURGERY | Facility: CLINIC | Age: 65
End: 2020-12-18

## 2020-12-18 DIAGNOSIS — M47.26 OSTEOARTHRITIS OF SPINE WITH RADICULOPATHY, LUMBAR REGION: ICD-10-CM

## 2020-12-18 DIAGNOSIS — M43.16 SPONDYLOLISTHESIS OF LUMBAR REGION: ICD-10-CM

## 2020-12-18 ASSESSMENT — MIFFLIN-ST. JEOR: SCORE: 1952.92

## 2020-12-24 ENCOUNTER — COMMUNICATION - HEALTHEAST (OUTPATIENT)
Dept: PHYSICAL MEDICINE AND REHAB | Facility: CLINIC | Age: 65
End: 2020-12-24

## 2020-12-24 DIAGNOSIS — G89.29 CHRONIC RIGHT-SIDED LOW BACK PAIN WITH RIGHT-SIDED SCIATICA: ICD-10-CM

## 2020-12-24 DIAGNOSIS — M54.41 CHRONIC RIGHT-SIDED LOW BACK PAIN WITH RIGHT-SIDED SCIATICA: ICD-10-CM

## 2021-01-13 ENCOUNTER — RECORDS - HEALTHEAST (OUTPATIENT)
Dept: ADMINISTRATIVE | Facility: OTHER | Age: 66
End: 2021-01-13

## 2021-01-13 ENCOUNTER — SURGERY - HEALTHEAST (OUTPATIENT)
Dept: NEUROSURGERY | Facility: CLINIC | Age: 66
End: 2021-01-13

## 2021-01-13 ENCOUNTER — AMBULATORY - HEALTHEAST (OUTPATIENT)
Dept: NEUROSURGERY | Facility: CLINIC | Age: 66
End: 2021-01-13

## 2021-01-13 ENCOUNTER — OFFICE VISIT - HEALTHEAST (OUTPATIENT)
Dept: FAMILY MEDICINE | Facility: CLINIC | Age: 66
End: 2021-01-13

## 2021-01-13 ENCOUNTER — COMMUNICATION - HEALTHEAST (OUTPATIENT)
Dept: NEUROSURGERY | Facility: CLINIC | Age: 66
End: 2021-01-13

## 2021-01-13 DIAGNOSIS — M43.16 SPONDYLOLISTHESIS OF LUMBAR REGION: ICD-10-CM

## 2021-01-13 DIAGNOSIS — E66.812 CLASS 2 SEVERE OBESITY DUE TO EXCESS CALORIES WITH SERIOUS COMORBIDITY AND BODY MASS INDEX (BMI) OF 36.0 TO 36.9 IN ADULT (H): ICD-10-CM

## 2021-01-13 DIAGNOSIS — Z79.4 UNCONTROLLED TYPE 2 DIABETES MELLITUS WITH HYPERGLYCEMIA, WITH LONG-TERM CURRENT USE OF INSULIN (H): ICD-10-CM

## 2021-01-13 DIAGNOSIS — I89.0 LYMPHEDEMA: ICD-10-CM

## 2021-01-13 DIAGNOSIS — Z01.818 PREOP GENERAL PHYSICAL EXAM: ICD-10-CM

## 2021-01-13 DIAGNOSIS — Z01.818 PRE-OP EXAM: ICD-10-CM

## 2021-01-13 DIAGNOSIS — E66.01 CLASS 2 SEVERE OBESITY DUE TO EXCESS CALORIES WITH SERIOUS COMORBIDITY AND BODY MASS INDEX (BMI) OF 36.0 TO 36.9 IN ADULT (H): ICD-10-CM

## 2021-01-13 DIAGNOSIS — J45.30 MILD PERSISTENT ASTHMA WITHOUT COMPLICATION: ICD-10-CM

## 2021-01-13 DIAGNOSIS — M47.26 OSTEOARTHRITIS OF SPINE WITH RADICULOPATHY, LUMBAR REGION: ICD-10-CM

## 2021-01-13 DIAGNOSIS — E78.5 HYPERLIPIDEMIA, UNSPECIFIED HYPERLIPIDEMIA TYPE: ICD-10-CM

## 2021-01-13 DIAGNOSIS — E11.65 UNCONTROLLED TYPE 2 DIABETES MELLITUS WITH HYPERGLYCEMIA, WITH LONG-TERM CURRENT USE OF INSULIN (H): ICD-10-CM

## 2021-01-13 DIAGNOSIS — M48.061 SPINAL STENOSIS OF LUMBAR REGION, UNSPECIFIED WHETHER NEUROGENIC CLAUDICATION PRESENT: ICD-10-CM

## 2021-01-13 DIAGNOSIS — I10 ESSENTIAL HYPERTENSION, BENIGN: ICD-10-CM

## 2021-01-13 LAB
ANION GAP SERPL CALCULATED.3IONS-SCNC: 9 MMOL/L (ref 5–18)
APTT PPP: 43 SECONDS (ref 24–37)
BUN SERPL-MCNC: 20 MG/DL (ref 8–22)
CALCIUM SERPL-MCNC: 9.6 MG/DL (ref 8.5–10.5)
CHLORIDE BLD-SCNC: 104 MMOL/L (ref 98–107)
CO2 SERPL-SCNC: 27 MMOL/L (ref 22–31)
CREAT SERPL-MCNC: 0.92 MG/DL (ref 0.7–1.3)
ERYTHROCYTE [DISTWIDTH] IN BLOOD BY AUTOMATED COUNT: 17.5 % (ref 11–14.5)
GFR SERPL CREATININE-BSD FRML MDRD: >60 ML/MIN/1.73M2
GLUCOSE BLD-MCNC: 118 MG/DL (ref 70–125)
HCT VFR BLD AUTO: 34.5 % (ref 40–54)
HGB BLD-MCNC: 11.1 G/DL (ref 14–18)
INR PPP: 1 (ref 0.9–1.1)
MCH RBC QN AUTO: 21.6 PG (ref 27–34)
MCHC RBC AUTO-ENTMCNC: 32.2 G/DL (ref 32–36)
MCV RBC AUTO: 67 FL (ref 80–100)
PLATELET # BLD AUTO: 342 THOU/UL (ref 140–440)
PMV BLD AUTO: 7.8 FL (ref 7–10)
POTASSIUM BLD-SCNC: 4.6 MMOL/L (ref 3.5–5)
RBC # BLD AUTO: 5.13 MILL/UL (ref 4.4–6.2)
SODIUM SERPL-SCNC: 140 MMOL/L (ref 136–145)
WBC: 8.3 THOU/UL (ref 4–11)

## 2021-01-13 ASSESSMENT — MIFFLIN-ST. JEOR: SCORE: 1962.91

## 2021-01-14 ENCOUNTER — AMBULATORY - HEALTHEAST (OUTPATIENT)
Dept: NEUROSURGERY | Facility: CLINIC | Age: 66
End: 2021-01-14

## 2021-01-14 ENCOUNTER — COMMUNICATION - HEALTHEAST (OUTPATIENT)
Dept: FAMILY MEDICINE | Facility: CLINIC | Age: 66
End: 2021-01-14

## 2021-01-14 DIAGNOSIS — Z01.818 PRE-OP TESTING: ICD-10-CM

## 2021-01-14 DIAGNOSIS — Z11.59 ENCOUNTER FOR SCREENING FOR OTHER VIRAL DISEASES: ICD-10-CM

## 2021-01-16 ENCOUNTER — AMBULATORY - HEALTHEAST (OUTPATIENT)
Dept: FAMILY MEDICINE | Facility: CLINIC | Age: 66
End: 2021-01-16

## 2021-01-16 ENCOUNTER — COMMUNICATION - HEALTHEAST (OUTPATIENT)
Dept: FAMILY MEDICINE | Facility: CLINIC | Age: 66
End: 2021-01-16

## 2021-01-16 DIAGNOSIS — Z79.4 UNCONTROLLED TYPE 2 DIABETES MELLITUS WITH HYPERGLYCEMIA, WITH LONG-TERM CURRENT USE OF INSULIN (H): ICD-10-CM

## 2021-01-16 DIAGNOSIS — E11.65 UNCONTROLLED TYPE 2 DIABETES MELLITUS WITH HYPERGLYCEMIA, WITH LONG-TERM CURRENT USE OF INSULIN (H): ICD-10-CM

## 2021-01-16 DIAGNOSIS — Z01.818 PRE-OP TESTING: ICD-10-CM

## 2021-01-17 ENCOUNTER — COMMUNICATION - HEALTHEAST (OUTPATIENT)
Dept: FAMILY MEDICINE | Facility: CLINIC | Age: 66
End: 2021-01-17

## 2021-01-17 ENCOUNTER — COMMUNICATION - HEALTHEAST (OUTPATIENT)
Dept: PHYSICAL MEDICINE AND REHAB | Facility: CLINIC | Age: 66
End: 2021-01-17

## 2021-01-17 DIAGNOSIS — Z79.4 UNCONTROLLED TYPE 2 DIABETES MELLITUS WITH HYPERGLYCEMIA, WITH LONG-TERM CURRENT USE OF INSULIN (H): ICD-10-CM

## 2021-01-17 DIAGNOSIS — G89.29 CHRONIC RIGHT-SIDED LOW BACK PAIN WITH RIGHT-SIDED SCIATICA: ICD-10-CM

## 2021-01-17 DIAGNOSIS — E11.65 UNCONTROLLED TYPE 2 DIABETES MELLITUS WITH HYPERGLYCEMIA, WITH LONG-TERM CURRENT USE OF INSULIN (H): ICD-10-CM

## 2021-01-17 DIAGNOSIS — M54.41 CHRONIC RIGHT-SIDED LOW BACK PAIN WITH RIGHT-SIDED SCIATICA: ICD-10-CM

## 2021-01-17 LAB
SARS-COV-2 PCR COMMENT: NORMAL
SARS-COV-2 RNA SPEC QL NAA+PROBE: NEGATIVE
SARS-COV-2 VIRUS SPECIMEN SOURCE: NORMAL

## 2021-01-18 ENCOUNTER — COMMUNICATION - HEALTHEAST (OUTPATIENT)
Dept: SCHEDULING | Facility: CLINIC | Age: 66
End: 2021-01-18

## 2021-01-19 ENCOUNTER — COMMUNICATION - HEALTHEAST (OUTPATIENT)
Dept: NEUROSURGERY | Facility: CLINIC | Age: 66
End: 2021-01-19

## 2021-01-19 ENCOUNTER — AMBULATORY - HEALTHEAST (OUTPATIENT)
Dept: NURSING | Facility: CLINIC | Age: 66
End: 2021-01-19

## 2021-01-20 ENCOUNTER — SURGERY - HEALTHEAST (OUTPATIENT)
Dept: SURGERY | Facility: HOSPITAL | Age: 66
End: 2021-01-20

## 2021-01-20 ENCOUNTER — ANESTHESIA - HEALTHEAST (OUTPATIENT)
Dept: SURGERY | Facility: HOSPITAL | Age: 66
End: 2021-01-20

## 2021-01-20 ASSESSMENT — MIFFLIN-ST. JEOR
SCORE: 1961.09
SCORE: 1959.73

## 2021-01-22 ENCOUNTER — COMMUNICATION - HEALTHEAST (OUTPATIENT)
Dept: FAMILY MEDICINE | Facility: CLINIC | Age: 66
End: 2021-01-22

## 2021-01-22 ENCOUNTER — COMMUNICATION - HEALTHEAST (OUTPATIENT)
Dept: NEUROLOGY | Facility: CLINIC | Age: 66
End: 2021-01-22

## 2021-01-22 ENCOUNTER — COMMUNICATION - HEALTHEAST (OUTPATIENT)
Dept: PHYSICAL MEDICINE AND REHAB | Facility: CLINIC | Age: 66
End: 2021-01-22

## 2021-01-22 DIAGNOSIS — G89.29 CHRONIC RIGHT-SIDED LOW BACK PAIN WITH RIGHT-SIDED SCIATICA: ICD-10-CM

## 2021-01-22 DIAGNOSIS — M54.16 LUMBAR RADICULITIS: ICD-10-CM

## 2021-01-22 DIAGNOSIS — M54.41 CHRONIC RIGHT-SIDED LOW BACK PAIN WITH RIGHT-SIDED SCIATICA: ICD-10-CM

## 2021-01-29 ENCOUNTER — COMMUNICATION - HEALTHEAST (OUTPATIENT)
Dept: NEUROSURGERY | Facility: CLINIC | Age: 66
End: 2021-01-29

## 2021-02-01 ENCOUNTER — AMBULATORY - HEALTHEAST (OUTPATIENT)
Dept: NEUROSURGERY | Facility: CLINIC | Age: 66
End: 2021-02-01

## 2021-02-01 ENCOUNTER — COMMUNICATION - HEALTHEAST (OUTPATIENT)
Dept: NEUROSURGERY | Facility: CLINIC | Age: 66
End: 2021-02-01

## 2021-02-01 ENCOUNTER — COMMUNICATION - HEALTHEAST (OUTPATIENT)
Dept: FAMILY MEDICINE | Facility: CLINIC | Age: 66
End: 2021-02-01

## 2021-02-01 DIAGNOSIS — R06.83 SNORING: ICD-10-CM

## 2021-02-05 ENCOUNTER — AMBULATORY - HEALTHEAST (OUTPATIENT)
Dept: NEUROSURGERY | Facility: CLINIC | Age: 66
End: 2021-02-05

## 2021-02-10 ENCOUNTER — VIRTUAL VISIT (OUTPATIENT)
Dept: SLEEP MEDICINE | Facility: CLINIC | Age: 66
End: 2021-02-10
Payer: COMMERCIAL

## 2021-02-10 VITALS — HEIGHT: 69 IN | BODY MASS INDEX: 38.51 KG/M2 | WEIGHT: 260 LBS

## 2021-02-10 DIAGNOSIS — R06.83 SNORING: Primary | ICD-10-CM

## 2021-02-10 DIAGNOSIS — I10 ESSENTIAL HYPERTENSION: ICD-10-CM

## 2021-02-10 DIAGNOSIS — E11.9 TYPE 2 DIABETES MELLITUS WITHOUT COMPLICATION, WITHOUT LONG-TERM CURRENT USE OF INSULIN (H): ICD-10-CM

## 2021-02-10 DIAGNOSIS — G47.10 HYPERSOMNIA: ICD-10-CM

## 2021-02-10 PROCEDURE — 99204 OFFICE O/P NEW MOD 45 MIN: CPT | Mod: 95 | Performed by: PEDIATRICS

## 2021-02-10 RX ORDER — ASCORBIC ACID 500 MG
500 TABLET ORAL
COMMUNITY

## 2021-02-10 RX ORDER — BACLOFEN 20 MG
500 TABLET ORAL
COMMUNITY

## 2021-02-10 RX ORDER — SILDENAFIL CITRATE 20 MG/1
40-60 TABLET ORAL
COMMUNITY
Start: 2020-09-28 | End: 2023-05-02

## 2021-02-10 RX ORDER — FUROSEMIDE 40 MG
20 TABLET ORAL
COMMUNITY
Start: 2020-02-19 | End: 2022-01-12

## 2021-02-10 RX ORDER — GABAPENTIN 300 MG/1
600 CAPSULE ORAL
COMMUNITY
Start: 2020-04-07 | End: 2022-03-16

## 2021-02-10 RX ORDER — VITAMIN B COMPLEX
1 CAPSULE ORAL
COMMUNITY
End: 2024-06-24

## 2021-02-10 RX ORDER — DULAGLUTIDE 1.5 MG/.5ML
INJECTION, SOLUTION SUBCUTANEOUS
COMMUNITY
Start: 2021-01-17 | End: 2022-01-12

## 2021-02-10 RX ORDER — LISINOPRIL 20 MG/1
TABLET ORAL
COMMUNITY
Start: 2020-08-07 | End: 2022-08-07

## 2021-02-10 RX ORDER — DULOXETIN HYDROCHLORIDE 30 MG/1
CAPSULE, DELAYED RELEASE ORAL
COMMUNITY
Start: 2021-01-22 | End: 2021-11-15

## 2021-02-10 RX ORDER — FUROSEMIDE 20 MG
20 TABLET ORAL
COMMUNITY
End: 2021-11-16

## 2021-02-10 RX ORDER — AMOXICILLIN 250 MG
1 CAPSULE ORAL
COMMUNITY
Start: 2019-10-17 | End: 2021-11-15

## 2021-02-10 RX ORDER — INSULIN GLARGINE 100 [IU]/ML
40 INJECTION, SOLUTION SUBCUTANEOUS
COMMUNITY
End: 2021-11-15

## 2021-02-10 RX ORDER — ALBUTEROL SULFATE 90 UG/1
2 AEROSOL, METERED RESPIRATORY (INHALATION)
COMMUNITY
Start: 2019-05-24 | End: 2021-07-23

## 2021-02-10 RX ORDER — OXYCODONE HYDROCHLORIDE 5 MG/1
5-10 TABLET ORAL
COMMUNITY
Start: 2019-10-17 | End: 2021-11-15

## 2021-02-10 RX ORDER — PEN NEEDLE, DIABETIC 32GX 5/32"
1 NEEDLE, DISPOSABLE MISCELLANEOUS
COMMUNITY
Start: 2020-04-22 | End: 2021-11-15

## 2021-02-10 RX ORDER — ACETAMINOPHEN 500 MG
1000 TABLET ORAL
COMMUNITY
Start: 2019-10-17 | End: 2023-08-01

## 2021-02-10 RX ORDER — METFORMIN HCL 500 MG
TABLET, EXTENDED RELEASE 24 HR ORAL
COMMUNITY
Start: 2020-05-27 | End: 2021-11-09

## 2021-02-10 RX ORDER — SIMVASTATIN 40 MG
TABLET ORAL
COMMUNITY
Start: 2021-01-22 | End: 2022-01-12

## 2021-02-10 RX ORDER — BECLOMETHASONE DIPROPIONATE HFA 80 UG/1
AEROSOL, METERED RESPIRATORY (INHALATION)
COMMUNITY
Start: 2020-05-06 | End: 2022-02-22

## 2021-02-10 RX ORDER — TERBINAFINE HYDROCHLORIDE 250 MG/1
250 TABLET ORAL
COMMUNITY
End: 2022-05-16

## 2021-02-10 RX ORDER — CHOLECALCIFEROL (VITAMIN D3) 50 MCG
1 TABLET ORAL
COMMUNITY
End: 2024-06-24

## 2021-02-10 ASSESSMENT — MIFFLIN-ST. JEOR: SCORE: 1964.73

## 2021-02-10 NOTE — PROGRESS NOTES
Marc is a 65 year old who is being evaluated via a billable video visit.      How would you like to obtain your AVS? MyChart  If the video visit is dropped, the invitation should be resent by: Send to e-mail at: [email or web address]  Will anyone else be joining your video visit? No      Yudy Brewer, MALDONADO on 2/10/2021 at 10:57 AM    Video Start Time: 11:00 AM    Video-Visit Details    Type of service:  Video Visit    Video End Time:11:28 AM    Originating Location (pt. Location): Home    Distant Location (provider location):  [facility]     Platform used for Video Visit: [facility]  Outpatient Sleep Medicine Consultation, New Patient      Name: Leandro Brewer MRN# [id number]   Age: 65 year old YOB: 1955     Date of Consultation: February 10, 2021  Consultation is requested by: No referring provider defined for this encounter.  Primary care provider: No primary care provider on file.      Assessment and Plan:     1. Snoring  Patient is being evaluated for Obstructive Sleep Apnea (GUERA).  Risk factors for GUERA include:  snoring, witnessed apneas, excessive daytime sleepiness/subjective tiredness, high blood pressure, obesity, age > 50, neck circumference, male gender (STOP BANG score = 8).  Recommend HST since patient is high risk for GUERA without any relevant comorbid conditions.  Counseling included a comprehensive review of diagnostic and therapeutic strategies as well as risks of inadequate therapy.  He will follow up with me approximately two weeks after his sleep study has been completed to review the results and discuss plan of care.    2. Hypersomnia  The patient was strongly advised to avoid driving, operating any heavy machinery or engaging in other hazardous situations while drowsy or sleepy.  Patient was counseled on the importance of driving while alert and to pull over if drowsy.     3. Essential  hypertension  GUERA is an independent risk factor for hypertension; risk for hypertension is related to severity of sleep apnea and episodic nocturnal hypoxemia.  Untreated GUERA can cause loss of blood pressure dipping during nighttime sleep.  PAP therapy can improve blood pressure control in individuals with co-existing GUERA and hypertension.    4. Type 2 diabetes mellitus without complication, without long-term current use of insulin (H)  Untreated sleep apnea can interfere with diabetes management; once sleep apnea is treated, HbA1c may improve.      History:     Leandro Brewer is a 65 year old male whose visit was conducted via video today.  He reports loud snoring and his daughter has witnessed apneic events during sleep.  Occasionally he wakes up with difficulty breathing and this tends to be worse in the supine position.  He has excessive daytime sleepiness and his score on the [place] Sleepiness Scale is 17 out of 24.  He has a tendency to doze off when he is inactive.  He endorses occasional drowsiness while driving and explains that he will ask someone to take over if this occurs.  He has no history of near misses or accidents while driving.  Approximately 3 weeks ago he underwent spinal surgery and tried CPAP for 1 night in the postoperative period.  Apparently, he had difficulty tolerating it and use supplemental oxygen thereafter.  It is unclear whether his was improved.    Medical history is significant for diabetes mellitus, class II obesity with a BMI of 38.4, hypertension, and asthma.  Recent A1c was 7.6%.  Recent BMP shows bicarbonate at 27 mmol/L.  Blood pressures are typically in the 130s systolic over 80s diastolic.  He is rarely using oxycodone as he recovers from his surgery.  He has no history of myocardial infarction or stroke.    Patient reports a brother with obstructive sleep apnea.  Patient is employed and works 10-hour days.    Medications:     Current Outpatient Medications    Medication Sig     acetaminophen (TYLENOL) 500 MG tablet Take 1,000 mg by mouth     albuterol (PROAIR HFA/PROVENTIL HFA/VENTOLIN HFA) 108 (90 Base) MCG/ACT inhaler Inhale 2 puffs into the lungs     APPLE CIDER VINEGAR PO Take 1 tablet by mouth     beclomethasone HFA (QVAR REDIHALER) 80 MCG/ACT inhaler INHALE 2 PUFFS BY MOUTH TWICE A DAY - RINSE MOUTH AFTER USE-DO NOT SHAKE UNIT     blood glucose (NO BRAND SPECIFIED) lancets standard [employer] Microlet MISC  Check blood sugar 2 times per day and as needed     Cholecalciferol (D3 DOTS) 50 MCG (2000 UT) TBDP Take 1 capsule by mouth     dulaglutide (TRULICITY) 1.5 MG/0.5ML pen INJECT 0.5ML SUBCUTANEOUSLY EVERY 7 DAYS.     DULoxetine (CYMBALTA) 30 MG capsule TAKE 1 CAPSULE BY MOUTH EVERY DAY     furosemide (LASIX) 20 MG tablet Take 20 mg by mouth     furosemide (LASIX) 40 MG tablet Take 20 mg by mouth     gabapentin (NEURONTIN) 300 MG capsule Take 900 mg by mouth     insulin glargine (LANTUS SOLOSTAR) 100 UNIT/ML pen Inject 40 Units Subcutaneous     insulin glargine (LANTUS VIAL) 100 UNIT/ML vial Inject 40 Units Subcutaneous     insulin pen needle (BD BRADNON U/F) 32G X 4 MM miscellaneous 1 each     lisinopril (ZESTRIL) 20 MG tablet TAKE 1 TABLET BY MOUTH EVERY DAY     Magnesium Oxide 500 MG TABS Take 500 mg by mouth     metFORMIN (GLUCOPHAGE-XR) 500 MG 24 hr tablet TAKE 2 TABLETS BY MOUTH TWICE A DAY WITH MEALS.     Multiple Vitamin (MULTIVITAMIN ADULT PO) Take 1 tablet by mouth     oxyCODONE (ROXICODONE) 5 MG tablet Take 5-10 mg by mouth     potassium 99 MG TABS Take 1 tablet by mouth     potassium gluconate 2.5 MEQ TABS Take 1 tablet by mouth     senna-docusate (SENOKOT-S/PERICOLACE) 8.6-50 MG tablet Take 1 tablet by mouth     sildenafil (REVATIO) 20 MG tablet Take 40-60 mg by mouth     simvastatin (ZOCOR) 40 MG tablet TAKE 1 TABLET BY MOUTH EVERYDAY AT BEDTIME     terbinafine (LAMISIL) 250 MG tablet Take 250 mg by mouth     vitamin (B COMPLEX) capsule Take 1 capsule by  "mouth     vitamin C (ASCORBIC ACID) 500 MG tablet Take 500 mg by mouth     No current facility-administered medications for this visit.         No Known Allergies    Past Medical History:     No past medical history on file.     Past Surgical History:      No past surgical history on file.    Physical Examination:   Ht 1.753 m (5' 9\")   Wt 117.9 kg (260 lb)   BMI 38.40 kg/m             Data: All pertinent previous laboratory data reviewed     No results found for: PH, PHARTERIAL, PO2, [id number], SAT, PCO2, HCO3, BASEEXCESS, BEAN, BEB  No results found for: TSH  No results found for: GLC  No results found for: HGB  No results found for: BUN, CR  No results found for: AST, ALT, GGT, ALKPHOS, BILITOTAL, BILICONJ, BILIDIRECT, JESSICA  No results found for: UAMP, UBARB, BENZODIAZEUR, UCANN, UCOC, OPIT, UPCP    Brianda Brown MD   2/10/2021   [address], [place] [id number] [phone]    Copy to: No primary care provider on file.  "

## 2021-02-10 NOTE — PATIENT INSTRUCTIONS
Your BMI is Body mass index is 38.4 kg/m .  Weight management is a personal decision.  If you are interested in exploring weight loss strategies, the following discussion covers the approaches that may be successful. Body mass index (BMI) is one way to tell whether you are at a healthy weight, overweight, or obese. It measures your weight in relation to your height.  A BMI of 18.5 to 24.9 is in the healthy range. A person with a BMI of 25 to 29.9 is considered overweight, and someone with a BMI of 30 or greater is considered obese. More than two-thirds of American adults are considered overweight or obese.  Being overweight or obese increases the risk for further weight gain. Excess weight may lead to heart disease and diabetes.  Creating and following plans for healthy eating and physical activity may help you improve your health.  Weight control is part of healthy lifestyle and includes exercise, emotional health, and healthy eating habits. Careful eating habits lifelong are the mainstay of weight control. Though there are significant health benefits from weight loss, long-term weight loss with diet alone may be very difficult to achieve- studies show long-term success with dietary management in less than 10% of people. Attaining a healthy weight may be especially difficult to achieve in those with severe obesity. In some cases, medications, devices and surgical management might be considered.  What can you do?  If you are overweight or obese and are interested in methods for weight loss, you should discuss this with your provider.     Consider reducing daily calorie intake by 500 calories.     Keep a food journal.     Avoiding skipping meals, consider cutting portions instead.    Diet combined with exercise helps maintain muscle while optimizing fat loss. Strength training is particularly important for building and maintaining muscle mass. Exercise helps reduce stress, increase energy, and improves fitness.  Increasing exercise without diet control, however, may not burn enough calories to loose weight.       Start walking three days a week 10-20 minutes at a time    Work towards walking thirty minutes five days a week     Eventually, increase the speed of your walking for 1-2 minutes at time    In addition, we recommend that you review healthy lifestyles and methods for weight loss available through the National Institutes of Health patient information sites:  http://win.niddk.nih.gov/publications/index.htm    And look into health and wellness programs that may be available through your health insurance provider, employer, local community center, or ankur club.    Weight management plan: Patient was referred to their PCP to discuss a diet and exercise plan.

## 2021-02-16 ENCOUNTER — COMMUNICATION - HEALTHEAST (OUTPATIENT)
Dept: FAMILY MEDICINE | Facility: CLINIC | Age: 66
End: 2021-02-16

## 2021-02-16 DIAGNOSIS — E11.65 UNCONTROLLED TYPE 2 DIABETES MELLITUS WITH HYPERGLYCEMIA, WITH LONG-TERM CURRENT USE OF INSULIN (H): ICD-10-CM

## 2021-02-16 DIAGNOSIS — Z79.4 UNCONTROLLED TYPE 2 DIABETES MELLITUS WITH HYPERGLYCEMIA, WITH LONG-TERM CURRENT USE OF INSULIN (H): ICD-10-CM

## 2021-02-16 DIAGNOSIS — I89.0 LYMPHEDEMA: ICD-10-CM

## 2021-02-24 ENCOUNTER — COMMUNICATION - HEALTHEAST (OUTPATIENT)
Dept: FAMILY MEDICINE | Facility: CLINIC | Age: 66
End: 2021-02-24

## 2021-02-24 ENCOUNTER — COMMUNICATION - HEALTHEAST (OUTPATIENT)
Dept: PHYSICAL MEDICINE AND REHAB | Facility: CLINIC | Age: 66
End: 2021-02-24

## 2021-02-24 DIAGNOSIS — M54.41 CHRONIC RIGHT-SIDED LOW BACK PAIN WITH RIGHT-SIDED SCIATICA: ICD-10-CM

## 2021-02-24 DIAGNOSIS — Z79.4 UNCONTROLLED TYPE 2 DIABETES MELLITUS WITH HYPERGLYCEMIA, WITH LONG-TERM CURRENT USE OF INSULIN (H): ICD-10-CM

## 2021-02-24 DIAGNOSIS — E11.65 UNCONTROLLED TYPE 2 DIABETES MELLITUS WITH HYPERGLYCEMIA, WITH LONG-TERM CURRENT USE OF INSULIN (H): ICD-10-CM

## 2021-02-24 DIAGNOSIS — G89.29 CHRONIC RIGHT-SIDED LOW BACK PAIN WITH RIGHT-SIDED SCIATICA: ICD-10-CM

## 2021-02-24 DIAGNOSIS — J45.30 MILD PERSISTENT ASTHMA WITHOUT COMPLICATION: ICD-10-CM

## 2021-03-01 ENCOUNTER — COMMUNICATION - HEALTHEAST (OUTPATIENT)
Dept: FAMILY MEDICINE | Facility: CLINIC | Age: 66
End: 2021-03-01

## 2021-03-01 ENCOUNTER — COMMUNICATION - HEALTHEAST (OUTPATIENT)
Dept: PHYSICAL MEDICINE AND REHAB | Facility: CLINIC | Age: 66
End: 2021-03-01

## 2021-03-05 ENCOUNTER — OFFICE VISIT - HEALTHEAST (OUTPATIENT)
Dept: NEUROSURGERY | Facility: CLINIC | Age: 66
End: 2021-03-05

## 2021-03-05 DIAGNOSIS — M47.26 OSTEOARTHRITIS OF SPINE WITH RADICULOPATHY, LUMBAR REGION: ICD-10-CM

## 2021-03-05 DIAGNOSIS — M54.16 LUMBAR RADICULOPATHY: ICD-10-CM

## 2021-03-05 ASSESSMENT — MIFFLIN-ST. JEOR: SCORE: 1959.73

## 2021-03-07 ENCOUNTER — HEALTH MAINTENANCE LETTER (OUTPATIENT)
Age: 66
End: 2021-03-07

## 2021-03-09 ENCOUNTER — AMBULATORY - HEALTHEAST (OUTPATIENT)
Dept: FAMILY MEDICINE | Facility: CLINIC | Age: 66
End: 2021-03-09

## 2021-03-09 DIAGNOSIS — Z79.4 UNCONTROLLED TYPE 2 DIABETES MELLITUS WITH HYPERGLYCEMIA, WITH LONG-TERM CURRENT USE OF INSULIN (H): ICD-10-CM

## 2021-03-09 DIAGNOSIS — E11.65 UNCONTROLLED TYPE 2 DIABETES MELLITUS WITH HYPERGLYCEMIA, WITH LONG-TERM CURRENT USE OF INSULIN (H): ICD-10-CM

## 2021-03-10 ENCOUNTER — OFFICE VISIT - HEALTHEAST (OUTPATIENT)
Dept: FAMILY MEDICINE | Facility: CLINIC | Age: 66
End: 2021-03-10

## 2021-03-10 DIAGNOSIS — I10 ESSENTIAL HYPERTENSION, BENIGN: ICD-10-CM

## 2021-03-10 DIAGNOSIS — M48.061 SPINAL STENOSIS OF LUMBAR REGION, UNSPECIFIED WHETHER NEUROGENIC CLAUDICATION PRESENT: ICD-10-CM

## 2021-03-10 DIAGNOSIS — E11.65 UNCONTROLLED TYPE 2 DIABETES MELLITUS WITH HYPERGLYCEMIA, WITH LONG-TERM CURRENT USE OF INSULIN (H): ICD-10-CM

## 2021-03-10 DIAGNOSIS — L30.9 DERMATITIS: ICD-10-CM

## 2021-03-10 DIAGNOSIS — Z79.4 UNCONTROLLED TYPE 2 DIABETES MELLITUS WITH HYPERGLYCEMIA, WITH LONG-TERM CURRENT USE OF INSULIN (H): ICD-10-CM

## 2021-03-10 DIAGNOSIS — E78.5 HYPERLIPIDEMIA, UNSPECIFIED HYPERLIPIDEMIA TYPE: ICD-10-CM

## 2021-03-10 LAB
CREAT UR-MCNC: 74.7 MG/DL
HBA1C MFR BLD: 7.8 %
MICROALBUMIN UR-MCNC: <0.5 MG/DL (ref 0–1.99)
MICROALBUMIN/CREAT UR: NORMAL MG/G{CREAT}

## 2021-03-11 ENCOUNTER — HOSPITAL ENCOUNTER (OUTPATIENT)
Dept: RADIOLOGY | Facility: CLINIC | Age: 66
Discharge: HOME | End: 2021-03-11

## 2021-03-11 ENCOUNTER — HOSPITAL ENCOUNTER (OUTPATIENT)
Dept: MRI IMAGING | Facility: CLINIC | Age: 66
Discharge: HOME | End: 2021-03-11

## 2021-03-11 DIAGNOSIS — M47.26 OSTEOARTHRITIS OF SPINE WITH RADICULOPATHY, LUMBAR REGION: ICD-10-CM

## 2021-03-11 LAB
CREAT BLD-MCNC: 0.8 MG/DL (ref 0.7–1.3)
GFR SERPL CREATININE-BSD FRML MDRD: >60 ML/MIN/1.73M2

## 2021-03-12 LAB
ANION GAP SERPL CALCULATED.3IONS-SCNC: 12 MMOL/L (ref 5–18)
BUN SERPL-MCNC: 15 MG/DL (ref 8–22)
CALCIUM SERPL-MCNC: 9.6 MG/DL (ref 8.5–10.5)
CHLORIDE BLD-SCNC: 102 MMOL/L (ref 98–107)
CO2 SERPL-SCNC: 27 MMOL/L (ref 22–31)
CREAT SERPL-MCNC: 0.94 MG/DL (ref 0.7–1.3)
GFR SERPL CREATININE-BSD FRML MDRD: >60 ML/MIN/1.73M2
GLUCOSE BLD-MCNC: 151 MG/DL (ref 70–125)
POTASSIUM BLD-SCNC: 4.6 MMOL/L (ref 3.5–5)
SODIUM SERPL-SCNC: 141 MMOL/L (ref 136–145)

## 2021-03-16 ENCOUNTER — OFFICE VISIT - HEALTHEAST (OUTPATIENT)
Dept: NEUROSURGERY | Facility: CLINIC | Age: 66
End: 2021-03-16

## 2021-03-16 DIAGNOSIS — M47.27 OSTEOARTHRITIS OF SPINE WITH RADICULOPATHY, LUMBOSACRAL REGION: ICD-10-CM

## 2021-03-16 DIAGNOSIS — M47.816 FACET ARTHROPATHY, LUMBAR: ICD-10-CM

## 2021-03-16 DIAGNOSIS — M43.16 SPONDYLOLISTHESIS OF LUMBAR REGION: ICD-10-CM

## 2021-03-16 ASSESSMENT — MIFFLIN-ST. JEOR: SCORE: 2005.09

## 2021-03-24 ENCOUNTER — COMMUNICATION - HEALTHEAST (OUTPATIENT)
Dept: FAMILY MEDICINE | Facility: CLINIC | Age: 66
End: 2021-03-24

## 2021-03-24 ENCOUNTER — COMMUNICATION - HEALTHEAST (OUTPATIENT)
Dept: NEUROSURGERY | Facility: CLINIC | Age: 66
End: 2021-03-24

## 2021-03-24 DIAGNOSIS — M54.16 LUMBAR RADICULOPATHY: ICD-10-CM

## 2021-03-24 DIAGNOSIS — M48.061 SPINAL STENOSIS OF LUMBAR REGION, UNSPECIFIED WHETHER NEUROGENIC CLAUDICATION PRESENT: ICD-10-CM

## 2021-03-24 DIAGNOSIS — G89.29 CHRONIC MIDLINE LOW BACK PAIN WITHOUT SCIATICA: ICD-10-CM

## 2021-03-24 DIAGNOSIS — M54.50 CHRONIC MIDLINE LOW BACK PAIN WITHOUT SCIATICA: ICD-10-CM

## 2021-03-31 ENCOUNTER — HOSPITAL ENCOUNTER (OUTPATIENT)
Dept: PHYSICAL MEDICINE AND REHAB | Facility: CLINIC | Age: 66
Discharge: HOME | End: 2021-03-31
Attending: NEUROLOGICAL SURGERY

## 2021-03-31 DIAGNOSIS — M47.816 FACET ARTHROPATHY, LUMBAR: ICD-10-CM

## 2021-03-31 DIAGNOSIS — M43.16 SPONDYLOLISTHESIS OF LUMBAR REGION: ICD-10-CM

## 2021-03-31 DIAGNOSIS — M47.27 OSTEOARTHRITIS OF SPINE WITH RADICULOPATHY, LUMBOSACRAL REGION: ICD-10-CM

## 2021-04-05 ENCOUNTER — COMMUNICATION - HEALTHEAST (OUTPATIENT)
Dept: NEUROSURGERY | Facility: CLINIC | Age: 66
End: 2021-04-05

## 2021-04-05 ENCOUNTER — COMMUNICATION - HEALTHEAST (OUTPATIENT)
Dept: FAMILY MEDICINE | Facility: CLINIC | Age: 66
End: 2021-04-05

## 2021-04-05 ENCOUNTER — COMMUNICATION - HEALTHEAST (OUTPATIENT)
Dept: PHYSICAL MEDICINE AND REHAB | Facility: CLINIC | Age: 66
End: 2021-04-05

## 2021-04-05 DIAGNOSIS — M54.16 LUMBAR RADICULOPATHY: ICD-10-CM

## 2021-04-06 ENCOUNTER — COMMUNICATION - HEALTHEAST (OUTPATIENT)
Dept: FAMILY MEDICINE | Facility: CLINIC | Age: 66
End: 2021-04-06

## 2021-04-06 DIAGNOSIS — M54.16 LUMBAR RADICULOPATHY: ICD-10-CM

## 2021-04-07 ENCOUNTER — COMMUNICATION - HEALTHEAST (OUTPATIENT)
Dept: FAMILY MEDICINE | Facility: CLINIC | Age: 66
End: 2021-04-07

## 2021-04-16 ENCOUNTER — COMMUNICATION - HEALTHEAST (OUTPATIENT)
Dept: FAMILY MEDICINE | Facility: CLINIC | Age: 66
End: 2021-04-16

## 2021-04-16 DIAGNOSIS — M54.16 LUMBAR RADICULITIS: ICD-10-CM

## 2021-04-19 ENCOUNTER — COMMUNICATION - HEALTHEAST (OUTPATIENT)
Dept: FAMILY MEDICINE | Facility: CLINIC | Age: 66
End: 2021-04-19

## 2021-04-19 ENCOUNTER — HOSPITAL ENCOUNTER (OUTPATIENT)
Dept: PHYSICAL MEDICINE AND REHAB | Facility: CLINIC | Age: 66
Discharge: HOME | End: 2021-04-19
Attending: NEUROLOGICAL SURGERY

## 2021-04-19 DIAGNOSIS — M54.16 LUMBAR RADICULOPATHY: ICD-10-CM

## 2021-04-19 DIAGNOSIS — M54.16 LUMBAR RADICULITIS: ICD-10-CM

## 2021-04-19 DIAGNOSIS — E11.65 UNCONTROLLED TYPE 2 DIABETES MELLITUS WITH HYPERGLYCEMIA, WITH LONG-TERM CURRENT USE OF INSULIN (H): ICD-10-CM

## 2021-04-19 DIAGNOSIS — Z79.4 UNCONTROLLED TYPE 2 DIABETES MELLITUS WITH HYPERGLYCEMIA, WITH LONG-TERM CURRENT USE OF INSULIN (H): ICD-10-CM

## 2021-04-20 ENCOUNTER — RECORDS - HEALTHEAST (OUTPATIENT)
Dept: GENERAL RADIOLOGY | Facility: CLINIC | Age: 66
End: 2021-04-20

## 2021-04-20 ENCOUNTER — OFFICE VISIT - HEALTHEAST (OUTPATIENT)
Dept: FAMILY MEDICINE | Facility: CLINIC | Age: 66
End: 2021-04-20

## 2021-04-20 DIAGNOSIS — M79.604 LOW BACK PAIN RADIATING TO RIGHT LEG: ICD-10-CM

## 2021-04-20 DIAGNOSIS — M54.50 LOW BACK PAIN RADIATING TO RIGHT LEG: ICD-10-CM

## 2021-04-20 DIAGNOSIS — M25.561 CHRONIC PAIN OF RIGHT KNEE: ICD-10-CM

## 2021-04-20 DIAGNOSIS — M25.561 PAIN IN RIGHT KNEE: ICD-10-CM

## 2021-04-20 DIAGNOSIS — G89.29 OTHER CHRONIC PAIN: ICD-10-CM

## 2021-04-20 DIAGNOSIS — M17.11 PRIMARY OSTEOARTHRITIS OF RIGHT KNEE: ICD-10-CM

## 2021-04-20 DIAGNOSIS — G89.29 CHRONIC PAIN OF RIGHT KNEE: ICD-10-CM

## 2021-04-20 LAB
C REACTIVE PROTEIN LHE: 0.8 MG/DL (ref 0–0.8)
ERYTHROCYTE [DISTWIDTH] IN BLOOD BY AUTOMATED COUNT: 19.5 % (ref 11–14.5)
HCT VFR BLD AUTO: 36.6 % (ref 40–54)
HGB BLD-MCNC: 11.4 G/DL (ref 14–18)
MCH RBC QN AUTO: 20.2 PG (ref 27–34)
MCHC RBC AUTO-ENTMCNC: 31.1 G/DL (ref 32–36)
MCV RBC AUTO: 65 FL (ref 80–100)
PLATELET # BLD AUTO: 371 THOU/UL (ref 140–440)
PMV BLD AUTO: 8.7 FL (ref 7–10)
RBC # BLD AUTO: 5.64 MILL/UL (ref 4.4–6.2)
URATE SERPL-MCNC: 4.7 MG/DL (ref 3–8)
WBC: 10.6 THOU/UL (ref 4–11)

## 2021-04-21 ENCOUNTER — AMBULATORY - HEALTHEAST (OUTPATIENT)
Dept: FAMILY MEDICINE | Facility: CLINIC | Age: 66
End: 2021-04-21

## 2021-04-21 ENCOUNTER — COMMUNICATION - HEALTHEAST (OUTPATIENT)
Dept: NEUROSURGERY | Facility: CLINIC | Age: 66
End: 2021-04-21

## 2021-04-21 DIAGNOSIS — M54.16 LUMBAR RADICULOPATHY: ICD-10-CM

## 2021-04-22 ENCOUNTER — COMMUNICATION - HEALTHEAST (OUTPATIENT)
Dept: PHYSICAL MEDICINE AND REHAB | Facility: CLINIC | Age: 66
End: 2021-04-22

## 2021-04-22 DIAGNOSIS — G89.29 CHRONIC RIGHT-SIDED LOW BACK PAIN WITH RIGHT-SIDED SCIATICA: ICD-10-CM

## 2021-04-22 DIAGNOSIS — M54.41 CHRONIC RIGHT-SIDED LOW BACK PAIN WITH RIGHT-SIDED SCIATICA: ICD-10-CM

## 2021-04-23 ENCOUNTER — COMMUNICATION - HEALTHEAST (OUTPATIENT)
Dept: FAMILY MEDICINE | Facility: CLINIC | Age: 66
End: 2021-04-23

## 2021-05-07 ENCOUNTER — RECORDS - HEALTHEAST (OUTPATIENT)
Dept: ADMINISTRATIVE | Facility: OTHER | Age: 66
End: 2021-05-07

## 2021-05-07 ENCOUNTER — OFFICE VISIT - HEALTHEAST (OUTPATIENT)
Dept: NEUROSURGERY | Facility: CLINIC | Age: 66
End: 2021-05-07

## 2021-05-07 DIAGNOSIS — M47.816 FACET ARTHROPATHY, LUMBAR: ICD-10-CM

## 2021-05-07 DIAGNOSIS — M47.26 OSTEOARTHRITIS OF SPINE WITH RADICULOPATHY, LUMBAR REGION: ICD-10-CM

## 2021-05-07 ASSESSMENT — MIFFLIN-ST. JEOR: SCORE: 1977.88

## 2021-05-19 ENCOUNTER — AMBULATORY - HEALTHEAST (OUTPATIENT)
Dept: FAMILY MEDICINE | Facility: CLINIC | Age: 66
End: 2021-05-19

## 2021-05-19 ENCOUNTER — COMMUNICATION - HEALTHEAST (OUTPATIENT)
Dept: NEUROSURGERY | Facility: CLINIC | Age: 66
End: 2021-05-19

## 2021-05-19 ENCOUNTER — COMMUNICATION - HEALTHEAST (OUTPATIENT)
Dept: FAMILY MEDICINE | Facility: CLINIC | Age: 66
End: 2021-05-19
Payer: COMMERCIAL

## 2021-05-19 ENCOUNTER — COMMUNICATION - HEALTHEAST (OUTPATIENT)
Dept: FAMILY MEDICINE | Facility: CLINIC | Age: 66
End: 2021-05-19

## 2021-05-19 DIAGNOSIS — M54.50 CHRONIC MIDLINE LOW BACK PAIN WITHOUT SCIATICA: ICD-10-CM

## 2021-05-19 DIAGNOSIS — M54.16 LUMBAR RADICULOPATHY: ICD-10-CM

## 2021-05-19 DIAGNOSIS — G89.29 CHRONIC MIDLINE LOW BACK PAIN WITHOUT SCIATICA: ICD-10-CM

## 2021-05-21 ENCOUNTER — TELEPHONE (OUTPATIENT)
Dept: SLEEP MEDICINE | Facility: CLINIC | Age: 66
End: 2021-05-21

## 2021-05-21 NOTE — TELEPHONE ENCOUNTER
I reached out to Marc to see if he wanted to get back on the schedule for his HST. He was not sure. He states that due to a surgical procedure he has been in a lot of pain and was not sure the test would be accurate and wanted my opinion. I told him I would send a message but for him to reach out to the provider via [employer] to see what exactly her should do. I let him know to call back when he is ready to schedule.

## 2021-05-23 ENCOUNTER — COMMUNICATION - HEALTHEAST (OUTPATIENT)
Dept: NEUROSURGERY | Facility: CLINIC | Age: 66
End: 2021-05-23

## 2021-05-23 ENCOUNTER — COMMUNICATION - HEALTHEAST (OUTPATIENT)
Dept: FAMILY MEDICINE | Facility: CLINIC | Age: 66
End: 2021-05-23

## 2021-05-23 DIAGNOSIS — M54.16 LUMBAR RADICULOPATHY: ICD-10-CM

## 2021-05-24 ENCOUNTER — HOSPITAL ENCOUNTER (OUTPATIENT)
Dept: RADIOLOGY | Facility: CLINIC | Age: 66
Discharge: HOME | End: 2021-05-24
Attending: NEUROLOGICAL SURGERY
Payer: COMMERCIAL

## 2021-05-24 ENCOUNTER — COMMUNICATION - HEALTHEAST (OUTPATIENT)
Dept: NEUROSURGERY | Facility: CLINIC | Age: 66
End: 2021-05-24

## 2021-05-24 ENCOUNTER — HOSPITAL ENCOUNTER (OUTPATIENT)
Dept: MRI IMAGING | Facility: CLINIC | Age: 66
Discharge: HOME | End: 2021-05-24
Attending: NEUROLOGICAL SURGERY
Payer: COMMERCIAL

## 2021-05-24 DIAGNOSIS — M54.16 LUMBAR RADICULOPATHY: ICD-10-CM

## 2021-05-25 ENCOUNTER — COMMUNICATION - HEALTHEAST (OUTPATIENT)
Dept: FAMILY MEDICINE | Facility: CLINIC | Age: 66
End: 2021-05-25
Payer: COMMERCIAL

## 2021-05-25 ENCOUNTER — COMMUNICATION - HEALTHEAST (OUTPATIENT)
Dept: FAMILY MEDICINE | Facility: CLINIC | Age: 66
End: 2021-05-25

## 2021-05-25 DIAGNOSIS — I10 ESSENTIAL HYPERTENSION: ICD-10-CM

## 2021-05-27 VITALS — WEIGHT: 264 LBS | BODY MASS INDEX: 38.99 KG/M2 | BODY MASS INDEX: 38.99 KG/M2 | HEIGHT: 69 IN

## 2021-05-27 VITALS — OXYGEN SATURATION: 98 % | HEART RATE: 97 BPM | DIASTOLIC BLOOD PRESSURE: 63 MMHG | SYSTOLIC BLOOD PRESSURE: 126 MMHG

## 2021-05-28 ASSESSMENT — ANXIETY QUESTIONNAIRES
GAD7 TOTAL SCORE: 0
GAD7 TOTAL SCORE: 0

## 2021-05-28 ASSESSMENT — ASTHMA QUESTIONNAIRES
ACT_TOTALSCORE: 25
ACT_TOTALSCORE: 24

## 2021-05-28 NOTE — PROGRESS NOTES
Assessment/Plan:    1. Cellulitis of right lower extremity  Transitional care management completed.  Medication reconciliation as noted below.  Admitted to [facility] in [place] with right lower extremity cellulitis with evidence for sepsis.  Treated initially with Zosyn and vancomycin.  Switch to Ancef on day 2 with continuation of vancomycin.  Was able to be discharged on clindamycin 300 mg 3 times daily x7 days and cephalexin 500 mg 3 times daily x7 days.  Ongoing improvement noted however patient would like extension of antibiotics for additional 7 days with history of recurrent cellulitis noted.  Refills were provided.  Patient declines lab follow-up including CRP, CBC with differential, etc.  Reassess at follow-up in 6 weeks for scheduled diabetic follow-up otherwise sooner if recurrent concerns identified.  - cephalexin (KEFLEX) 500 MG capsule; Take 1 capsule (500 mg total) by mouth 3 (three) times a day for 7 days.  Dispense: 21 capsule; Refill: 0  - clindamycin (CLEOCIN) 300 MG capsule; Take 1 capsule (300 mg total) by mouth 3 (three) times a day for 7 days.  Dispense: 21 capsule; Refill: 0    2. Sepsis, due to unspecified organism (H)  As above, sepsis, resolved.  Blood cultures were negative during hospitalization.    3. Type 2 diabetes mellitus with complication, with long-term current use of insulin (H)  Type 2 diabetes excellent control with A1c of 6.0% February 21, 2019 will reassess at scheduled follow-up May 24, 2019.    4. Essential hypertension  Lisinopril 20 mg daily.    5. Primary osteoarthritis of right hip  Status post right total hip arthroplasty by Dr. Evans February 4, 2019.  Fluoroscopic guided aspirate of hip with fluid culture negative during hospitalization.    6. Mild persistent asthma without complication  Mild persistent asthma stable with asthma control test 24 out of 25.    7. Intertrigo  Nystatin powder 3 times daily to affected areas x10 days as needed.  -  "nystatin (MYCOSTATIN) powder; Apply to affected area 3 times daily  Dispense: 60 g; Refill: 1      The following high BMI interventions were performed this visit: encouragement to exercise, weight monitoring, weight loss from baseline weight and lifestyle education regarding diet.  Ensure ongoing efforts to achieve weight goal < 250 pounds initially, < 240 pounds ideally.         Subjective:    Leandro Brewer is seen today for hospital follow-up.  Recent admission to [facility] in [place] on 2019 with evidence for severe sepsis with right lower extremity cellulitis.  Treated initially with Zosyn and vancomycin.  Transition from Zosyn to Ancef on day 2 and continued vancomycin initially then transition to oral antibiotics including clindamycin 300 mg 3 times daily x7 days and cephalexin 500 mg 3 times daily x7 days.  CRP levels did decrease from 14.2 down to 3.7 prior to discharge.  Remains afebrile.  Right lower extremity still with some redness however improving with decreased swelling.  Lower extremity ultrasound negative for DVT 2019.  Due to recent right total hip arthroplasty 2019 did have fluoroscopic guided hip aspiration without evidence for joint infection.  Known history of hypertension hyperlipidemia.  Asthma has been stable, mild.  Type 2 diabetes and continues Lantus, metformin and Trulicity.  Most recent A1c of 6.0%.  Microcytic anemia with hemoglobin Tracey trait.  Comprehensive review of systems as above otherwise all negative.     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 Thompson); right inguinal hernia age 6 months   Hospitalizations: early 20s \"infected gallbladder\" " "WITHOUT surgery... ; cellulitis in legs (hospitalized twice); abdominal wall cyst requiring IV antibiotics x 4 days... ; 4/7/19-4/12/19 [facility] for right LE cellulitis/sepsis  [facility] for eye exams, will schedule at [facility]   FYI: see lab result \"[facility]\" from 4/19/11 re: hemoglobin Tracey trait resulting in mild microcyctic anemia (look for further cause of anemia if Hb < 11.0)    Past Surgical History:   Procedure Laterality Date     BACK SURGERY       HERNIA REPAIR Right     inguinal; child        Family History   Problem Relation Age of Onset     Heart attack Mother      Obesity Mother      Heart disease Father         Past Medical History:   Diagnosis Date     Anemia      Arthritis      Diabetes mellitus (H)      Diabetes mellitus, type II (H)      Hypertension      Obesity      Plantar fasciitis         Social History     Tobacco Use     Smoking status: Former Smoker     Smokeless tobacco: Never Used   Substance Use Topics     Alcohol use: Yes     Drug use: No        Current Outpatient Medications   Medication Sig Dispense Refill     albuterol (VENTOLIN HFA) 90 mcg/actuation inhaler Inhale 2 puffs 4 (four) times a day as needed for wheezing. 18 g 6     ascorbic acid (ASCORBIC ACID WITH EVAN HIPS) 500 MG tablet Take 500 mg by mouth 2 (two) times a day.       aspirin 81 mg chewable tablet Chew 81 mg daily.       blood-glucose meter kit [employer] Contour Monitor KIT  Use as Directed       clotrimazole (LOTRIMIN) 1 % cream Apply sparingly to the affected area twice daily for 2 wks and then as needed 60 g 0     CONTOUR NEXT EZ METER Misc Use daily for blood sugar testing 1 each 5     CONTOUR NEXT TEST STRIPS strips TEST 3 TIMES DAILY 300 strip 3     dulaglutide (TRULICITY) 1.5 mg/0.5 mL PnIj Inject 1.5 mg under the skin every 7 days. 6 mL 0     insulin glargine (LANTUS SOLOSTAR U-100 INSULIN) 100 unit/mL (3 mL) pen Inject 45 Units under the skin daily. Do not mix Lantus with any other " "insulin 12 adj dose pen 3     LANCETS MISC [employer]ia Microlet MISC  Check blood sugar 2 times per day and as needed       lisinopril (PRINIVIL,ZESTRIL) 20 MG tablet Take 1 tablet (20 mg total) by mouth daily. 90 tablet 3     metFORMIN (GLUCOPHAGE-XR) 500 MG 24 hr tablet Take 2 tablets (1,000 mg total) by mouth 2 (two) times a day with meals. 360 tablet 0     multivitamin (MULTIPLE VITAMINS DAILY) per tablet Take 1 tablet by mouth daily.       naftifine 2 % Crea APPLY TO AFFECTED AREA TOPICALLY ONCE DAILY FOR 2 WEEKS 45 g 2     naproxen (NAPROSYN) 500 MG tablet Take 1 tablet (500 mg total) by mouth 2 (two) times a day with meals. 60 tablet 0     oxyCODONE-acetaminophen (PERCOCET/ENDOCET) 5-325 mg per tablet Take 1-2 tablets by mouth every 4 (four) hours as needed for pain. 13 tablet 0     pen needle, diabetic (BD ULTRA-FINE BRANDON PEN NEEDLE) 32 gauge x 5/32\" Ndle Use 1 per day. 100 each 3     potassium 99 mg Tab Take by mouth. Take 1 Tablet Twice Daily       QVAR 80 mcg/actuation inhaler Inhale 2 puffs 2 times daily -rinse mouth after use -shake well before using 1 Inhaler 9     sildenafil, antihypertensive, (REVATIO) 20 mg tablet Take 2-3 tablets (40-60 mg total) by mouth daily as needed. 30 tablet 5     simvastatin (ZOCOR) 40 MG tablet 1 TABLET DAILY AT BEDTIME -[EZ-OPEN-CAP] 90 tablet 1     triamcinolone (KENALOG) 0.1 % ointment apply topically to affected areas twice daily as needed 80 g 2     cephalexin (KEFLEX) 500 MG capsule Take 1 capsule (500 mg total) by mouth 3 (three) times a day for 7 days. 21 capsule 0     clindamycin (CLEOCIN) 300 MG capsule Take 1 capsule (300 mg total) by mouth 3 (three) times a day for 7 days. 21 capsule 0     nystatin (MYCOSTATIN) powder Apply to affected area 3 times daily 60 g 1     No current facility-administered medications for this visit.           Objective:    Vitals:    04/19/19 0702   BP: 110/60   Pulse: 97   SpO2: 98%   Weight: (!) 263 lb (119.3 kg)      Body mass index is " 38.84 kg/m .    Alert.  No apparent distress.  Chest clear.  Cardiac exam regular.  Right lower extremity with mild erythema more anteromedial shin.  No calf tenderness.  Groin does show evidence for intertrigo left greater than right without excoriation.      This note has been dictated using voice recognition software and as a result may contain minor grammatical errors and unintended word substitutions.

## 2021-05-29 ENCOUNTER — RECORDS - HEALTHEAST (OUTPATIENT)
Dept: ADMINISTRATIVE | Facility: CLINIC | Age: 66
End: 2021-05-29

## 2021-05-29 NOTE — PROGRESS NOTES
ASSESSMENT: Leandro Brewer is a 63 y.o. male with past medical history significant for diverticulosis, microcytic anemia, asthma, type 2 diabetes mellitus, obesity, hypertension, hyperlipidemia, history of MRSA infection who presents today for new patient evaluation of an acute flareup of chronic low back pain with radiation into the right lower extremity with associated numbness, tingling, and weakness.  Patient has a history of a partial laminectomy at L4-5 by Dr. Thompson 11 years ago.  MRI lumbar spine from September 2018 shows  what I would describe as moderate spinal stenosis at L3-4 related to a moderate diffuse annular bulge and facet arthropathy.  In my opinion, there also appears to be right lateral recess stenosis at L4-5 possibly related to a small juxta facet synovial cyst.  On exam today, patient's symptoms followed both in L4 and L5 distribution.  He was neurologically intact.    ALDA: 78  Who 5: 19    PLAN:  A shared decision making model was used.  The patient's values and choices were respected.  The following represents what was discussed and decided upon by the physician assistant and the patient.      1.  DIAGNOSTIC TESTS: I reviewed the MRI lumbar spine from September 2018.  I also reviewed the recent x-ray right hip.  No further diagnostic tests were ordered.    2.  PHYSICAL THERAPY:   I entered a referral for the patient begin physical therapy at [facility].  Patient already goes to [facility] on a regular basis to use their pool and exercise equipment.    3.  MEDICATIONS:    -Gabapentin 300 mg was prescribed.  He was given the dosage titration chart.  He may increase his dose up to 300 mg 3 times daily. We could consider titrating this dose higher, depending on his response.  -I also provided a prescription for tizanidine 2 to 4 mg every 8 hours as needed.  -Patient can continue using Tylenol as needed.  -I recommended patient limit his use of NSAIDs given his comorbidities including  type 2 diabetes mellitus.  -For this reason, I did not prescribe prednisone.  -I do not prescribe an opiate.  I recommended that we try to avoid opiates.    4.  INTERVENTIONS: No interventions were ordered.  We will see how patient does with a trial of conservative treatment first.  If patient fails to improve, we could consider an epidural steroid injection.  -I would likely begin with a right L4-5 transforaminal epidural steroid injection.  -If that did not help, we could try a right L3-4 transforaminal epidural steroid injection.  -If leg pain improves but he continues have axial low back pain, we could also try lumbar facet joint injections.    5.  PATIENT EDUCATION: Patient is in agreement the above plan.  All questions were answered.  -I discussed with the patient that I would recommend that we try conservative treatments before referring him back to a spine surgeon.  Patient reports that he saw his spine surgeon earlier this year and they were in agreement with that.    6.  FOLLOW-UP:   I will see the patient back in the clinic in 4 weeks to follow-up.  If he has any questions or concerns in the meantime, he should not hesitate to call.      SUBJECTIVE:  Leandro Brewer  Is a 63 y.o. male who presents today in consultation at the request of Harriett Gallardo CNP for new patient evaluation of low back pain with radiation into the right lower cavity with associated numbness, tingling, and weakness. patient has a history of a partial laminectomy at L4-5 11 years ago with Dr. Thompson.  Patient states that he did well after that surgery.  Patient has had several flareups of back and leg pain over the past 1 year.  These have required trips to the emergency department.  Each flareup was able to be managed medically.  Patient reports that most recent flareup began 1 to 2 weeks ago.  He denies any specific injury or event to cause the pain.  Patient was initially worried because he had his hip replaced on the right in  February and he was concerned there may be a problem with the hip.  X-ray of the right hip  shows no complications.  He was referred to our clinic for further evaluation and treatment.    Patient complains of right-sided low back pain.  Pain begins at the lumbosacral junction.  Pain extends into the right buttock and extends into the lateral hip.  Patient states he has pain which radiates down the anterior and lateral thigh to the knee.  He states that at times he has significant pain in the knee.  When pain is most severe he feels it radiate into the lateral calf.  He has intermittent numbness and tingling in the foot on the right.  He states that his right leg feels weak.  He states that it feels like his knee and his ankle want to give way.  He describes his pain as a jabbing pain in a throbbing pain.  Patient states that it feels like nerve pain.  His pain is aggravated with standing, although his ability to stand is not limited by the pain.  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  Increased activity such as mowing the grass tends to aggravate the pain.  Bending also aggravates the pain.  Pain is alleviated with sitting and lying down.  He denies loss of bowel or bladder control.  Denies any recent fevers, chills, sweats.  Denies any significant left leg pain other than left groin pain related to known osteoarthritis of the left hip.    Patient has not had any recent physical therapy for his lower back.  He goes to the chiropractor occasionally.  He has not had any spine injections.  As mentioned above, he did have the L4-5 laminectomy 11 years ago with Dr. Thompson.  Patient uses ibuprofen 600 mg every 6 hours.  He also uses Tylenol up to 3 tabs every 6 hours.      Current Outpatient Medications on File Prior to Visit   Medication Sig Dispense Refill     acetaminophen (TYLENOL) 500 MG tablet Take 1,000 mg by mouth.       albuterol (VENTOLIN HFA) 90 mcg/actuation inhaler  "Inhale 2 puffs 4 (four) times a day as needed for wheezing. 18 g 6     ascorbic acid (ASCORBIC ACID WITH EVAN HIPS) 500 MG tablet Take 500 mg by mouth 2 (two) times a day.       aspirin 81 mg chewable tablet Chew 81 mg daily.       b complex vitamins (VITAMINS B COMPLEX) capsule Take 1 capsule by mouth.       blood-glucose meter kit [employer] Contour Monitor KIT  Use as Directed       CONTOUR NEXT EZ METER Misc Use daily for blood sugar testing 1 each 5     CONTOUR NEXT TEST STRIPS strips TEST 3 TIMES DAILY 300 strip 3     insulin glargine (LANTUS SOLOSTAR U-100 INSULIN) 100 unit/mL (3 mL) pen Inject 45 Units under the skin daily. Do not mix Lantus with any other insulin. 15 mL 1     LANCETS MISC [employer] Microlet MISC  Check blood sugar 2 times per day and as needed       lisinopril (PRINIVIL,ZESTRIL) 20 MG tablet Take 1 tablet (20 mg total) by mouth daily. 90 tablet 3     magnesium oxide 500 mg Tab Take 500 mg by mouth.       metFORMIN (GLUCOPHAGE-XR) 500 MG 24 hr tablet Take 2 tablets (1,000 mg total) by mouth 2 (two) times a day with meals. 360 tablet 3     multivitamin (MULTIPLE VITAMINS DAILY) per tablet Take 1 tablet by mouth daily.       nystatin (MYCOSTATIN) powder APPLY TO AFFECTED AREA THREE TIMES A  g 3     pen needle, diabetic (BD ULTRA-FINE BRANDON PEN NEEDLE) 32 gauge x 5/32\" Ndle Use 1 per day. 100 each 3     potassium 99 mg Tab Take by mouth. Take 1 Tablet Twice Daily       QVAR 80 mcg/actuation inhaler Inhale 2 puffs 2 times daily -rinse mouth after use -shake well before using 1 Inhaler 9     sildenafil, antihypertensive, (REVATIO) 20 mg tablet Take 2-3 tablets (40-60 mg total) by mouth daily as needed. 30 tablet 5     simvastatin (ZOCOR) 40 MG tablet 1 TABLET DAILY AT BEDTIME -[EZ-OPEN-CAP] 90 tablet 1     TRULICITY 1.5 mg/0.5 mL PnIj INJECT 1.5MG UNDER THE SKIN EVERY 7 DAYS. 6 mL 0         No Known Allergies    Past Medical History:   Diagnosis Date     Anemia      Arthritis      Diabetes " mellitus (H)      Diabetes mellitus, type II (H)      Hypertension      Obesity      Plantar fasciitis       Patient Active Problem List   Diagnosis     Diverticulosis     Microcytic anemia     Asthma     Hammer Toe Of The Right Second Toe     Dermatitis     Medial Epicondylitis     Plantar Fasciitis     Tubular adenoma of colon     Type 2 diabetes mellitus with complication, with long-term current use of insulin (H)     Class 2 severe obesity due to excess calories with serious comorbidity and body mass index (BMI) of 36.0 to 36.9 in adult (H)     Carpal Tunnel Syndrome     Essential Hypertension     Arthralgias In Multiple Sites     Hyperlipidemia     Male Erectile Disorder     Edema     Lymphedema     Methicillin Resistant Staphylococcus Aureus Infection     Trochanteric bursitis of both hips     Plantar fasciitis     Mild persistent asthma without complication     Tinea cruris     Spinal stenosis of lumbar region, unspecified whether neurogenic claudication present     Primary osteoarthritis of right hip     Uncontrolled type 2 diabetes mellitus with hyperglycemia, with long-term current use of insulin (H)         Past Surgical History:   Procedure Laterality Date     BACK SURGERY       HERNIA REPAIR Right     inguinal; child       Family History   Problem Relation Age of Onset     Heart attack Mother      Obesity Mother      Heart disease Father      Cancer Father         throat       Social history: Patient is .  He works as a manager at a convenience store.  He denies tobacco use.  He drinks alcohol once or twice per month.  He denies illicit drug use.    ROS: Positive for joint pain, muscle pain, muscle fatigue, sciatica.  Specifically negative for bowel/bladder dysfunction, fevers,chills, appetite changes, unexplained weight loss.   Otherwise 13 systems reviewed are negative.  Please see the patient's intake questionnaire from today for details.      OBJECTIVE:  PHYSICAL  EXAMINATION:    CONSTITUTIONAL:  Vital signs as above.  No acute distress.  The patient is well nourished and well groomed.  PSYCHIATRIC:  The patient is awake, alert, oriented to person, place, time and answering questions appropriately with clear speech.    HEENT: Normocephalic, atraumatic.  Sclera clear.  Neck is supple.  SKIN:  Skin over the face, bilateral lower extremities, and posterior torso is clean, dry, intact without rashes.    GAIT:  Gait is non-antalgic.  The patient is able to rise onto toes and heels bilaterally with support.  STANDING EXAMINATION: No significant tenderness palpation bilateral lower lumbar paraspinous muscles.  Lumbar flexion mildly restricted.  Lumbar extension mildly restricted with reproduction of low back pain.  MUSCLE STRENGTH:  The patient has 5/5 strength for the bilateral hip flexors, knee flexors/extensors, ankle dorsiflexors/plantar flexors, great toe extensors, ankle evertors/invertors.  NEUROLOGICAL: 1+ patellar, and 2+ Achilles reflexes bilaterally.  Negative Babinski's bilaterally.  No ankle clonus bilaterally. Sensation to light touch is intact in the bilateral L4, L5, and S1 dermatomes.  VASCULAR:  2/4 dorsalis pedis pulses bilaterally.  Bilateral lower extremities are warm.  There is no pitting edema of the bilateral lower extremities.  ABDOMINAL:  Soft, non-distended, non-tender throughout all quadrants.  No pulsatile mass palpated in the left lower quadrant.  LYMPH NODES:  No palpable or tender inguinal lymph nodes.  MUSCULOSKELETAL: Straight leg is positive on the right, negative on the left.    RESULTS: I reviewed the MRI lumbar spine from [facility] dated September 27, 2019.  At L2-3 there is mild bilateral lateral recess stenosis.  At L3-4 there is what I would describe as moderate spinal canal stenosis with bilateral lateral recess stenosis and minimal bilateral foraminal stenosis.  At L4-5 there is postoperative change of a partial laminectomy with mild  central canal stenosis secondary to facet arthropathy.  There appears to be a small right juxta facet synovial cyst which could cause lateral recess stenosis at this level.  At L5-S1 there is mild right lateral recess stenosis.  Please see report for further details.    EXAM: XR HIP RIGHT 2 OR MORE VWS  LOCATION: [facility]  DATE/TIME: 6/5/2019 9:10 AM     INDICATION: Pain in right hip  COMPARISON: None.     FINDINGS: Right hip arthroplasty with components in good position. Moderate degenerative joint disease left hip.

## 2021-05-29 NOTE — TELEPHONE ENCOUNTER
Refills Approved x 2    Rx renewed per Medication Renewal Policy. Medication was last renewed on   Lantus = 3/13/2019 with 3 refills  Metformin = 3/13/2019  Last office visit:6/5/2019 with DARIO Gallardo CNP @ [facility]    Dedra Garcia, [facility] Triage/Med Refill 6/8/2019     Requested Prescriptions   Pending Prescriptions Disp Refills     metFORMIN (GLUCOPHAGE-XR) 500 MG 24 hr tablet [Pharmacy Med Name: METFORMIN HCL ER 500MG TB24] 360 tablet 0     Sig: TAKE TWO TABLETS BY MOUTH TWO TIMES A DAY WITH MEALS       Metformin Refill Protocol Passed - 6/6/2019  3:19 AM        Passed - Blood pressure in last 12 months     BP Readings from Last 1 Encounters:   06/05/19 124/64             Passed - LFT or AST or ALT in last 12 months     Albumin   Date Value Ref Range Status   10/02/2018 3.9 3.5 - 5.0 g/dL Final     Bilirubin, Total   Date Value Ref Range Status   10/02/2018 0.7 0.0 - 1.0 mg/dL Final     Bilirubin, Direct   Date Value Ref Range Status   12/13/2016 0.2 <=0.5 mg/dL Final     Alkaline Phosphatase   Date Value Ref Range Status   10/02/2018 58 45 - 120 U/L Final     AST   Date Value Ref Range Status   10/02/2018 20 0 - 40 U/L Final     ALT   Date Value Ref Range Status   10/02/2018 43 0 - 45 U/L Final     Protein, Total   Date Value Ref Range Status   10/02/2018 6.6 6.0 - 8.0 g/dL Final                Passed - GFR or Serum Creatinine in last 6 months     GFR MDRD Non Af Amer   Date Value Ref Range Status   05/24/2019 >60 >60 mL/min/1.73m2 Final     GFR MDRD Af Amer   Date Value Ref Range Status   05/24/2019 >60 >60 mL/min/1.73m2 Final             Passed - Visit with PCP or prescribing provider visit in last 6 months or next 3 months     Last office visit with prescriber/PCP: 5/24/2019 OR same dept: 6/5/2019 Harriett Gallardo CNP OR same specialty: 6/5/2019 Harriett Gallardo CNP Last physical: 2/21/2019 Last MTM visit: Visit date not found         Next appt within 3 mo: Visit date not found  Next physical within 3 mo:  Visit date not found  Prescriber OR PCP: Josh Shafer MD  Last diagnosis associated with med order: 1. Uncontrolled type 2 diabetes mellitus with hyperglycemia, with long-term current use of insulin (H)  - metFORMIN (GLUCOPHAGE-XR) 500 MG 24 hr tablet [Pharmacy Med Name: METFORMIN HCL ER 500MG TB24]; TAKE TWO TABLETS BY MOUTH TWO TIMES A DAY WITH MEALS  Dispense: 360 tablet; Refill: 0  - LANTUS SOLOSTAR U-100 INSULIN 100 unit/mL (3 mL) pen [Pharmacy Med Name: LANTUS SOLOSTAR 100UNIT/ML SOPN]; INJECT 45 UNITS UNDER THE SKIN DAILY. DO NOT MIX LANTUS WITH ANY OTHER INSULIN.  Dispense: 15 mL; Refill: 3     If protocol passes may refill for 12 months if within 3 months of last provider visit (or a total of 15 months).           Passed - A1C in last 6 months     Hemoglobin A1c   Date Value Ref Range Status   05/24/2019 7.5 (H) 3.5 - 6.0 % Final               Passed - Microalbumin in last year      Microalbumin, Random Urine   Date Value Ref Range Status   10/02/2018 <0.50 0.00 - 1.99 mg/dL Final                  LANTUS SOLOSTAR U-100 INSULIN 100 unit/mL (3 mL) pen [Pharmacy Med Name: LANTUS SOLOSTAR 100UNIT/ML SOPN] 15 mL 3     Sig: INJECT 45 UNITS UNDER THE SKIN DAILY. DO NOT MIX LANTUS WITH ANY OTHER INSULIN.       Insulin/GLP-1 Refill Protocol Passed - 6/6/2019  3:19 AM        Passed - Visit with PCP or prescribing provider visit in last 6 months     Last office visit with prescriber/PCP: 5/24/2019 OR same dept: 6/5/2019 Harriett Gallardo CNP OR same specialty: 6/5/2019 Harriett Gallardo CNP Last physical: 2/21/2019 Last MTM visit: Visit date not found     Next appt within 3 mo: Visit date not found  Next physical within 3 mo: Visit date not found  Prescriber OR PCP: Josh Shafer MD  Last diagnosis associated with med order: 1. Uncontrolled type 2 diabetes mellitus with hyperglycemia, with long-term current use of insulin (H)  - metFORMIN (GLUCOPHAGE-XR) 500 MG 24 hr tablet [Pharmacy Med Name: METFORMIN HCL ER  500MG TB24]; TAKE TWO TABLETS BY MOUTH TWO TIMES A DAY WITH MEALS  Dispense: 360 tablet; Refill: 0  - LANTUS SOLOSTAR U-100 INSULIN 100 unit/mL (3 mL) pen [Pharmacy Med Name: LANTUS SOLOSTAR 100UNIT/ML SOPN]; INJECT 45 UNITS UNDER THE SKIN DAILY. DO NOT MIX LANTUS WITH ANY OTHER INSULIN.  Dispense: 15 mL; Refill: 3    If protocol passes may refill for 6 months if within 3 months of last provider visit (or a total of 9 months).              Passed - A1C in last 6 months     Hemoglobin A1c   Date Value Ref Range Status   05/24/2019 7.5 (H) 3.5 - 6.0 % Final               Passed - Microalbumin in last year     Microalbumin, Random Urine   Date Value Ref Range Status   10/02/2018 <0.50 0.00 - 1.99 mg/dL Final                  Passed - Blood pressure in last year     BP Readings from Last 1 Encounters:   06/05/19 124/64             Passed - Creatinine done in last year     Creatinine   Date Value Ref Range Status   05/24/2019 0.87 0.70 - 1.30 mg/dL Final

## 2021-05-29 NOTE — TELEPHONE ENCOUNTER
RN cannot approve Refill Request    RN can NOT refill this medication med is not covered by policy/route to provider.    Arcenio Smith, [facility] Triage/Med Refill 5/29/2019    Requested Prescriptions   Pending Prescriptions Disp Refills     nystatin (MYCOSTATIN) powder [Pharmacy Med Name: NYSTATIN [id number]/GM POWD] 60 g 1     Sig: APPLY TO AFFECTED AREA THREE TIMES A DAY       There is no refill protocol information for this order

## 2021-05-29 NOTE — TELEPHONE ENCOUNTER
Refill Approved    Rx renewed per Medication Renewal Policy. Medication was last renewed on 6/8/19.    Nuvia Milan, [facility] Triage/Med Refill 6/20/2019     Requested Prescriptions   Pending Prescriptions Disp Refills     LANTUS SOLOSTAR U-100 INSULIN 100 unit/mL (3 mL) pen [Pharmacy Med Name: LANTUS SOLOSTAR 100UNIT/ML SOPN] 15 mL 1     Sig: INJECT 45 UNITS SUBCUTANEOUSLY DAILY. DO NOT MIX WITH ANY OTHER INSULIN       Insulin/GLP-1 Refill Protocol Passed - 6/20/2019  2:32 PM        Passed - Visit with PCP or prescribing provider visit in last 6 months     Last office visit with prescriber/PCP: 5/24/2019 OR same dept: 6/5/2019 Harriett Gallardo CNP OR same specialty: 6/5/2019 Harriett Gallardo CNP Last physical: 2/21/2019 Last MTM visit: Visit date not found     Next appt within 3 mo: Visit date not found  Next physical within 3 mo: Visit date not found  Prescriber OR PCP: Josh Shafer MD  Last diagnosis associated with med order: 1. Uncontrolled type 2 diabetes mellitus with hyperglycemia, with long-term current use of insulin (H)  - LANTUS SOLOSTAR U-100 INSULIN 100 unit/mL (3 mL) pen [Pharmacy Med Name: LANTUS SOLOSTAR 100UNIT/ML SOPN]; INJECT 45 UNITS SUBCUTANEOUSLY DAILY. DO NOT MIX WITH ANY OTHER INSULIN  Dispense: 15 mL; Refill: 1    If protocol passes may refill for 6 months if within 3 months of last provider visit (or a total of 9 months).              Passed - A1C in last 6 months     Hemoglobin A1c   Date Value Ref Range Status   05/24/2019 7.5 (H) 3.5 - 6.0 % Final               Passed - Microalbumin in last year     Microalbumin, Random Urine   Date Value Ref Range Status   10/02/2018 <0.50 0.00 - 1.99 mg/dL Final                  Passed - Blood pressure in last year     BP Readings from Last 1 Encounters:   06/10/19 135/72             Passed - Creatinine done in last year     Creatinine   Date Value Ref Range Status   05/24/2019 0.87 0.70 - 1.30 mg/dL Final

## 2021-05-29 NOTE — TELEPHONE ENCOUNTER
Refill Approved    Rx renewed per Medication Renewal Policy. Medication was last renewed on 3/13/19.    Nuvia Milan, [facility] Triage/Med Refill 5/31/2019     Requested Prescriptions   Pending Prescriptions Disp Refills     TRULICITY 1.5 mg/0.5 mL PnIj [Pharmacy Med Name: TRULICITY 1.5MG/0.5ML SOPN] 6 mL 0     Sig: INJECT 1.5MG UNDER THE SKIN EVERY 7 DAYS.       Insulin/GLP-1 Refill Protocol Passed - 5/31/2019  3:18 AM        Passed - Visit with PCP or prescribing provider visit in last 6 months     Last office visit with prescriber/PCP: 5/24/2019 OR same dept: 5/24/2019 Josh Shafer MD OR same specialty: 5/24/2019 Josh Shafer MD Last physical: 2/21/2019 Last MTM visit: Visit date not found     Next appt within 3 mo: Visit date not found  Next physical within 3 mo: Visit date not found  Prescriber OR PCP: Josh Shafer MD  Last diagnosis associated with med order: 1. Uncontrolled type 2 diabetes mellitus with hyperglycemia, with long-term current use of insulin (H)  - TRULICITY 1.5 mg/0.5 mL PnIj [Pharmacy Med Name: TRULICITY 1.5MG/0.5ML SOPN]; INJECT 1.5MG UNDER THE SKIN EVERY 7 DAYS.  Dispense: 6 mL; Refill: 0    If protocol passes may refill for 6 months if within 3 months of last provider visit (or a total of 9 months).              Passed - A1C in last 6 months     Hemoglobin A1c   Date Value Ref Range Status   05/24/2019 7.5 (H) 3.5 - 6.0 % Final               Passed - Microalbumin in last year     Microalbumin, Random Urine   Date Value Ref Range Status   10/02/2018 <0.50 0.00 - 1.99 mg/dL Final                  Passed - Blood pressure in last year     BP Readings from Last 1 Encounters:   05/24/19 110/60             Passed - Creatinine done in last year     Creatinine   Date Value Ref Range Status   05/24/2019 0.87 0.70 - 1.30 mg/dL Final

## 2021-05-29 NOTE — PATIENT INSTRUCTIONS - HE
Gabapentin 300mg Dosing Chart    DATE  MORNING AFTERNOON BEDTIME    Day 1 0 0 1    Day 2 0 0 1    Day 3 0 0 1    Day 4 1 0 1    Day 5 1 0 1    Day 6 1 0 1    Day 7 1 1 1    Day 8 1 1 1    Day 9 1 1 1     Continue medication, taking 1 capsules three times daily    Please call if you have any questions regarding how to take your medication  Clinic Phone # [phone]

## 2021-05-29 NOTE — PROGRESS NOTES
FOOT AND ANKLE SURGERY/PODIATRY CONSULT NOTE         ASSESSMENT:   Onychomycosis, onychauxis, diabetes mellitus      TREATMENT:  Debrided nails 1 through 5 both feet        HPI:Leandro Brewer presented to the clinic today for diabetic foot check.  The patient stated he has no pain.  He has no numbness or tingling involving his feet.  The patient stated that his nails are extremely long and thick.  He has difficulty trimming his nails.  He has not had any redness, swelling, drainage or bleeding.  He denies any other previous treatment.       Past Medical History:   Diagnosis Date     Anemia      Arthritis      Diabetes mellitus (H)      Diabetes mellitus, type II (H)      Hypertension      Obesity      Plantar fasciitis        Past Surgical History:   Procedure Laterality Date     BACK SURGERY       HERNIA REPAIR Right     inguinal; child       No Known Allergies      Current Outpatient Medications:      acetaminophen (TYLENOL) 500 MG tablet, Take 1,000 mg by mouth., Disp: , Rfl:      albuterol (VENTOLIN HFA) 90 mcg/actuation inhaler, Inhale 2 puffs 4 (four) times a day as needed for wheezing., Disp: 18 g, Rfl: 6     ascorbic acid (ASCORBIC ACID WITH EVAN HIPS) 500 MG tablet, Take 500 mg by mouth 2 (two) times a day., Disp: , Rfl:      aspirin 81 mg chewable tablet, Chew 81 mg daily., Disp: , Rfl:      b complex vitamins (VITAMINS B COMPLEX) capsule, Take 1 capsule by mouth., Disp: , Rfl:      blood-glucose meter kit, [employer] Contour Monitor KIT  Use as Directed, Disp: , Rfl:      clindamycin (CLEOCIN) 150 MG capsule, , Disp: , Rfl:      clotrimazole (LOTRIMIN) 1 % cream, Apply sparingly to the affected area twice daily for 2 wks and then as needed, Disp: 60 g, Rfl: 0     CONTOUR NEXT EZ METER Misc, Use daily for blood sugar testing, Disp: 1 each, Rfl: 5     CONTOUR NEXT TEST STRIPS strips, TEST 3 TIMES DAILY, Disp: 300 strip, Rfl: 3     dulaglutide (TRULICITY) 1.5 mg/0.5 mL PnIj, Inject 1.5 mg under the skin  "every 7 days., Disp: 6 mL, Rfl: 0     FLUZONE QUAD 9274-8060 60 mcg (15 mcg x 4)/0.5 mL Susp injection, , Disp: , Rfl: 0     insulin glargine (LANTUS SOLOSTAR U-100 INSULIN) 100 unit/mL (3 mL) pen, Inject 45 Units under the skin daily. Do not mix Lantus with any other insulin, Disp: 12 adj dose pen, Rfl: 3     LANCETS MISC, [employer] Microlet MISC  Check blood sugar 2 times per day and as needed, Disp: , Rfl:      lisinopril (PRINIVIL,ZESTRIL) 20 MG tablet, Take 1 tablet (20 mg total) by mouth daily., Disp: 90 tablet, Rfl: 3     magnesium oxide 500 mg Tab, Take 500 mg by mouth., Disp: , Rfl:      metFORMIN (GLUCOPHAGE-XR) 500 MG 24 hr tablet, Take 2 tablets (1,000 mg total) by mouth 2 (two) times a day with meals., Disp: 360 tablet, Rfl: 0     methocarbamol (ROBAXIN) 750 MG tablet, , Disp: , Rfl:      multivitamin (MULTIPLE VITAMINS DAILY) per tablet, Take 1 tablet by mouth daily., Disp: , Rfl:      mupirocin (BACTROBAN) 2 % ointment, , Disp: , Rfl:      naftifine 2 % Crea, APPLY TO AFFECTED AREA TOPICALLY ONCE DAILY FOR 2 WEEKS, Disp: 45 g, Rfl: 2     naproxen (NAPROSYN) 500 MG tablet, Take 1 tablet (500 mg total) by mouth 2 (two) times a day with meals., Disp: 60 tablet, Rfl: 0     naproxen sodium (ALEVE) 220 MG tablet, Take 440 mg by mouth., Disp: , Rfl:      nystatin (MYCOSTATIN) powder, Apply to affected area 3 times daily, Disp: 60 g, Rfl: 1     oxyCODONE (ROXICODONE) 5 MG immediate release tablet, , Disp: , Rfl:      oxyCODONE-acetaminophen (PERCOCET/ENDOCET) 5-325 mg per tablet, Take 1-2 tablets by mouth every 4 (four) hours as needed for pain., Disp: 13 tablet, Rfl: 0     pen needle, diabetic (BD ULTRA-FINE BRANDON PEN NEEDLE) 32 gauge x 5/32\" Ndle, Use 1 per day., Disp: 100 each, Rfl: 3     potassium 99 mg Tab, Take by mouth. Take 1 Tablet Twice Daily, Disp: , Rfl:      QVAR 80 mcg/actuation inhaler, Inhale 2 puffs 2 times daily -rinse mouth after use -shake well before using, Disp: 1 Inhaler, Rfl: 9     " sildenafil, antihypertensive, (REVATIO) 20 mg tablet, Take 2-3 tablets (40-60 mg total) by mouth daily as needed., Disp: 30 tablet, Rfl: 5     simvastatin (ZOCOR) 40 MG tablet, 1 TABLET DAILY AT BEDTIME -[EZ-OPEN-CAP], Disp: 90 tablet, Rfl: 1     traMADol (ULTRAM) 50 mg tablet, , Disp: , Rfl:      triamcinolone (KENALOG) 0.1 % ointment, apply topically to affected areas twice daily as needed, Disp: 80 g, Rfl: 2    Family History   Problem Relation Age of Onset     Heart attack Mother      Obesity Mother      Heart disease Father        Social History     Socioeconomic History     Marital status:      Spouse name: Not on file     Number of children: Not on file     Years of education: Not on file     Highest education level: Not on file   Occupational History     Occupation: Manager     Employer: [employer]   Social Needs     Financial resource strain: Not on file     Food insecurity:     Worry: Not on file     Inability: Not on file     Transportation needs:     Medical: Not on file     Non-medical: Not on file   Tobacco Use     Smoking status: Former Smoker     Smokeless tobacco: Never Used   Substance and Sexual Activity     Alcohol use: Yes     Drug use: No     Sexual activity: Not on file   Lifestyle     Physical activity:     Days per week: Not on file     Minutes per session: Not on file     Stress: Not on file   Relationships     Social connections:     Talks on phone: Not on file     Gets together: Not on file     Attends [employer] service: Not on file     Active member of club or organization: Not on file     Attends meetings of clubs or organizations: Not on file     Relationship status: Not on file     Intimate partner violence:     Fear of current or ex partner: Not on file     Emotionally abused: Not on file     Physically abused: Not on file     Forced sexual activity: Not on file   Other Topics Concern     Not on file   Social History Narrative     Not on file       Review of Systems  - Patient denies fever, chills, rash, wound, stiffness, limping, numbness, weakness, heart burn, blood in stool, chest pain with activity, calf pain when walking, shortness of breath with activity, chronic cough, easy bleeding/bruising, swelling of ankles, excessive thirst, fatigue, depression, anxiety.  Patient admits to long thick nails both feet.      OBJECTIVE:  Appearance: alert, well appearing, and in no distress.    Vitals:    05/21/19 1512   BP: 140/70       BMI= Body mass index is 38.84 kg/m .    General appearance: Patient is alert and fully cooperative with history & exam.  No sign of distress is noted during the visit.  Psychiatric: Affect is pleasant & appropriate.  Patient appears motivated to improve health.  Respiratory: Breathing is regular & unlabored while sitting.  HEENT: Hearing is intact to spoken word.  Speech is clear.  No gross evidence of visual impairment that would impact ambulation.    Vascular: Dorsalis pedis and posterior tibial pulses are palpable. There is no pedal hair growth bilaterally.  CFT < 3 sec from anterior tibial surface to distal digits bilaterally. There is no appreciable edema noted.  Dermatologic: Nails bilaterally are elongated and 2-3 times normal thickness.  Turgor and texture are within normal limits. No coloration or temperature changes. No primary or secondary lesions noted.  Neurologic: All epicritic and proprioceptive sensations are grossly intact bilaterally.  Musculoskeletal: All active and passive ankle, subtalar, midtarsal, and 1st MPJ range of motion are grossly intact without pain or crepitus, with the exception of none. Manual muscle strength is within normal limits bilaterally. All dorsiflexors, plantarflexors, invertors, evertors are intact bilaterally.  No tenderness present to feet or ankles on palpation.  No tenderness to bilateral feet or ankles with range of motion. Calf is soft/non-tender without warmth/induration    Imaging:     No results found.        TREATMENT:  Debrided nails 1 through 5 both feet today.  I recommended the patient return to the clinic as needed.    Wilbert Delacruz; SHARIF  [facility] Foot & Ankle Surgery/Podiatry

## 2021-05-29 NOTE — PROGRESS NOTES
Assessment/Plan:    1. Hip pain, right  Right hip x-ray appears normal status post hip replacement.  Will have radiology confirm this.  Patient's symptoms and findings on exam today are more consistent with herniated disc.  Given ongoing symptoms, will place referral to spine care.  If hip pain worsens or if he develops any new symptoms, he should reach out and we will consider referral back to orthopedic specialist for further evaluation of hip.  Continue with Tylenol and ibuprofen as needed for pain management.  - XR Hip Right 2 or More VWS; Future  - Ambulatory referral to Spine Care    2. Intertrigo  Refill provided for some nystatin powder.  - nystatin (MYCOSTATIN) powder; APPLY TO AFFECTED AREA THREE TIMES A DAY  Dispense: 180 g; Refill: 3    Subjective:   Leandro Brewer is a 63 year old male seen today for evaluation of back pain.  Past medical history includes asthma, type 2 diabetes, hypertension, hyperlipidemia, obesity, carpal tunnel.  Patient is status post right hip replacement 2018.  Today, patient reports reports pain in his right hip, low back and numbness and tingling down right leg.  States that he has a history of herniated disc, he believes and C3-C5.  He has symptoms of numbness and tingling down his leg and low back pain intermittently for the last 6 months or so.  MRI in September 2018 confirmed mild herniation.  States that symptoms seem to improve at times and flareup at times.  Over the last 5 to 7 days he has had a flareup of the symptoms.  States that pain somewhat improved today.  Patient is most concerned because he had a right hip replacement in 2018.  Since he is having pain in his right hip he wants to make sure that it is not a issue post surgery.  He denies any new injury to his hip or low back.  Is fairly certain it is his low back rather than his hip given symptoms in the right leg.  Patient sees a chiropractor on occasion but has not been seen by them in a few weeks.  He has  "seen spine care in the past.  Reports feeling well otherwise.  He does not have any additional concerns today. Review of systems is as stated in HPI, and the remainder of the 10 system review is otherwise unremarkable.    Past Medical History, Family History, and Social History reviewed.   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 Thompson); right inguinal hernia age 6 months   Hospitalizations: early 20s \"infected gallbladder\" WITHOUT surgery... ; cellulitis in legs (hospitalized twice); abdominal wall cyst requiring IV antibiotics x 4 days... ; 19-19 [facility] for right LE cellulitis/sepsis  Would like a 90 day supply on all meds.   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  It started on Saturday and progressed severely on  so I went straight to the hospital because of my hip replacement.     Past Surgical History:   Procedure Laterality Date     BACK SURGERY       HERNIA REPAIR Right     inguinal; child        Family History   Problem Relation Age of Onset     Heart attack Mother      Obesity Mother      Heart disease Father         Past Medical History:   Diagnosis Date     Anemia      Arthritis      Diabetes mellitus (H)      Diabetes mellitus, type II (H)      Hypertension      Obesity      Plantar fasciitis         Social History     Tobacco Use     Smoking status: Former Smoker " "    Smokeless tobacco: Never Used   Substance Use Topics     Alcohol use: Yes     Drug use: No        Current Outpatient Medications   Medication Sig Dispense Refill     acetaminophen (TYLENOL) 500 MG tablet Take 1,000 mg by mouth.       albuterol (VENTOLIN HFA) 90 mcg/actuation inhaler Inhale 2 puffs 4 (four) times a day as needed for wheezing. 18 g 6     ascorbic acid (ASCORBIC ACID WITH EVAN HIPS) 500 MG tablet Take 500 mg by mouth 2 (two) times a day.       aspirin 81 mg chewable tablet Chew 81 mg daily.       b complex vitamins (VITAMINS B COMPLEX) capsule Take 1 capsule by mouth.       blood-glucose meter kit [employer] other things you bake Contour Monitor KIT  Use as Directed       CONTOUR NEXT EZ METER Misc Use daily for blood sugar testing 1 each 5     CONTOUR NEXT TEST STRIPS strips TEST 3 TIMES DAILY 300 strip 3     insulin glargine (LANTUS SOLOSTAR U-100 INSULIN) 100 unit/mL (3 mL) pen Inject 45 Units under the skin daily. Do not mix Lantus with any other insulin 12 adj dose pen 3     LANCETS MISC [employer] Microlet MISC  Check blood sugar 2 times per day and as needed       lisinopril (PRINIVIL,ZESTRIL) 20 MG tablet Take 1 tablet (20 mg total) by mouth daily. 90 tablet 3     magnesium oxide 500 mg Tab Take 500 mg by mouth.       metFORMIN (GLUCOPHAGE-XR) 500 MG 24 hr tablet Take 2 tablets (1,000 mg total) by mouth 2 (two) times a day with meals. 360 tablet 0     multivitamin (MULTIPLE VITAMINS DAILY) per tablet Take 1 tablet by mouth daily.       nystatin (MYCOSTATIN) powder APPLY TO AFFECTED AREA THREE TIMES A DAY 60 g 3     pen needle, diabetic (BD ULTRA-FINE BRANDON PEN NEEDLE) 32 gauge x 5/32\" Ndle Use 1 per day. 100 each 3     potassium 99 mg Tab Take by mouth. Take 1 Tablet Twice Daily       QVAR 80 mcg/actuation inhaler Inhale 2 puffs 2 times daily -rinse mouth after use -shake well before using 1 Inhaler 9     sildenafil, antihypertensive, (REVATIO) 20 mg tablet Take 2-3 tablets (40-60 mg total) by mouth daily as needed. " "30 tablet 5     simvastatin (ZOCOR) 40 MG tablet 1 TABLET DAILY AT BEDTIME -[EZ-OPEN-CAP] 90 tablet 1     TRULICITY 1.5 mg/0.5 mL PnIj INJECT 1.5MG UNDER THE SKIN EVERY 7 DAYS. 6 mL 0     clindamycin (CLEOCIN) 150 MG capsule        clotrimazole (LOTRIMIN) 1 % cream Apply sparingly to the affected area twice daily for 2 wks and then as needed 60 g 0     FLUZONE QUAD 0952-7822 60 mcg (15 mcg x 4)/0.5 mL Susp injection   0     methocarbamol (ROBAXIN) 750 MG tablet        mupirocin (BACTROBAN) 2 % ointment        naftifine 2 % Crea APPLY TO AFFECTED AREA TOPICALLY ONCE DAILY FOR 2 WEEKS 45 g 2     naproxen (NAPROSYN) 500 MG tablet Take 1 tablet (500 mg total) by mouth 2 (two) times a day with meals. 60 tablet 0     naproxen sodium (ALEVE) 220 MG tablet Take 440 mg by mouth.       oxyCODONE (ROXICODONE) 5 MG immediate release tablet        oxyCODONE-acetaminophen (PERCOCET/ENDOCET) 5-325 mg per tablet Take 1-2 tablets by mouth every 4 (four) hours as needed for pain. 13 tablet 0     traMADol (ULTRAM) 50 mg tablet        triamcinolone (KENALOG) 0.1 % ointment apply topically to affected areas twice daily as needed 80 g 2     No current facility-administered medications for this visit.           Objective:    Vitals:    06/05/19 0827   BP: 124/64   Patient Site: Left Arm   Patient Position: Sitting   Cuff Size: Adult Large   Pulse: 80   Weight: (!) 257 lb 9.6 oz (116.8 kg)   Height: 5' 9\" (1.753 m)      Body mass index is 38.04 kg/m .      General Appearance:    Alert, cooperative, no distress, appears stated age.     Back:     Symmetric, no curvature, ROM normal, no CVA tenderness.  No point tenderness along spine.  Negative straight leg raise.   Lungs:     Clear to auscultation bilaterally, respirations unlabored   Heart:    Regular rate and rhythm, S1 and S2 normal, no murmur, rub   or gallop   Extremities:  Extremities normal.  Bilateral hips with full range of motion and strength.  No edema.     Pulses:   2+ and " symmetric all extremities   Skin:   Skin color, texture, turgor normal, no rashes or lesions   Neurologic:  Mildly decreased right patellar reflex.  Otherwise normal strength, sensation and reflexes throughout     This note has been dictated using voice recognition software. Any grammatical or context distortions are unintentional and inherent to the use of this software.

## 2021-05-29 NOTE — PROGRESS NOTES
Assessment/Plan:    1. Uncontrolled type 2 diabetes mellitus with hyperglycemia, with long-term current use of insulin (H)  Type 2 diabetes with worsening.  A1c increasing from 6% up to 7.5% following recent hospitalization for his right lower extremity cellulitis.  14-day average however improved to 168 and anticipate ongoing benefits of current diabetes management now that significant leg infection has resolved.  Continues Lantus 45 units daily, metformin extended release 500 mg using 2 tablets twice daily and Trulicity 1.5 mg/week.  Reassess at follow-up in 4 months.  - Glycosylated Hemoglobin A1c  - Basic Metabolic Panel    2. Mild persistent asthma without complication  Recent mild exacerbation of persistent asthma following URI.  Now resolved.  No respiratory distress.  No expiratory wheeze etc.  - albuterol (VENTOLIN HFA) 90 mcg/actuation inhaler; Inhale 2 puffs 4 (four) times a day as needed for wheezing.  Dispense: 18 g; Refill: 6    3. Essential hypertension  Hypertension stable on lisinopril 20 mg daily.  - Basic Metabolic Panel    4. Cellulitis of right lower extremity  Right lower extremity exam normal without any residual evidence for concerns.  Does have persistent right hip issues however and will follow up with orthopedic specialist regarding treatment options.      The following high BMI interventions were performed this visit: encouragement to exercise, weight monitoring, weight loss from baseline weight and lifestyle education regarding diet.  Ensure ongoing efforts to achieve weight goal < 240 pounds initially, < 220 pounds ideally.         Subjective:    Leandro Brewer is seen today for follow-up evaluation.  Type 2 diabetes.  Reviewed recent hospitalization April 7, 2019 secondary to right lower extremity cellulitis with sepsis.  Patient has continued to do well with resolved redness and leg pain etc.  Still has hip pain and anticipates possible surgery in October 2019.  Continues use of  "Lantus 45 units daily, metformin extended release 500 mg using 2 tablets twice daily Trulicity 1.5 mg/week.  Lisinopril 20 mg daily for hypertension.  Simvastatin 40 mg at bedtime for lipid management.  Is fasting today.  Recent upper respiratory infection did settle in the chest with some shortness of breath about a week and a half ago however now this has resolved and not requiring increased use of albuterol any longer.  Comprehensive review of systems as above otherwise all negative.      Reviewed office note from 19:  Leandro Brewer is seen today for hospital follow-up.  Recent admission to [facility] in [place] on 2019 with evidence for severe sepsis with right lower extremity cellulitis.  Treated initially with Zosyn and vancomycin.  Transition from Zosyn to Ancef on day 2 and continued vancomycin initially then transition to oral antibiotics including clindamycin 300 mg 3 times daily x7 days and cephalexin 500 mg 3 times daily x7 days.  CRP levels did decrease from 14.2 down to 3.7 prior to discharge.  Remains afebrile.  Right lower extremity still with some redness however improving with decreased swelling.  Lower extremity ultrasound negative for DVT 2019.  Due to recent right total hip arthroplasty 2019 did have fluoroscopic guided hip aspiration without evidence for joint infection.  Known history of hypertension hyperlipidemia.  Asthma has been stable, mild.  Type 2 diabetes and continues Lantus, metformin and Trulicity.  Most recent A1c of 6.0%.  Microcytic anemia with hemoglobin Tracey trait.  Comprehensive review of systems as above otherwise all negative.       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 Thompson); right inguinal hernia age 6 months   Hospitalizations: early 20s \"infected gallbladder\" WITHOUT surgery... ; cellulitis in legs (hospitalized twice); abdominal wall cyst requiring IV antibiotics x 4 days... ; 4/7/19-4/12/19 [facility] for right LE cellulitis/sepsis  Would like a 90 day supply on all meds.   [facility] for eye exams, will schedule at [facility]   FYI: see lab result \"[facility]\" from 4/19/11 re: hemoglobin Tracey trait resulting in mild microcyctic anemia (look for further cause of anemia if Hb < 11.0)      Past Surgical History:   Procedure Laterality Date     BACK SURGERY       HERNIA REPAIR Right     inguinal; child        Family History   Problem Relation Age of Onset     Heart attack Mother      Obesity Mother      Heart disease Father         Past Medical History:   Diagnosis Date     Anemia      Arthritis      Diabetes mellitus (H)      Diabetes mellitus, type II (H)      Hypertension      Obesity      Plantar fasciitis         Social History     Tobacco Use     Smoking status: Former Smoker     Smokeless tobacco: Never Used   Substance Use Topics     Alcohol use: Yes     Drug use: No        Current Outpatient Medications   Medication Sig Dispense Refill     acetaminophen (TYLENOL) 500 MG tablet Take 1,000 mg by mouth.       albuterol (VENTOLIN HFA) 90 mcg/actuation inhaler Inhale 2 puffs 4 (four) times a day as needed for wheezing. 18 g 6     ascorbic acid (ASCORBIC ACID WITH EVAN HIPS) 500 MG tablet Take 500 mg by mouth 2 (two) times a day.       aspirin 81 mg chewable tablet Chew 81 mg daily.       b complex vitamins (VITAMINS B COMPLEX) capsule Take 1 capsule by mouth.       blood-glucose meter kit [employer] Contour Monitor KIT  Use as Directed       clindamycin (CLEOCIN) 150 MG capsule        clotrimazole (LOTRIMIN) 1 % cream Apply sparingly to the affected area twice daily for 2 wks and then as needed 60 g 0     " "CONTOUR NEXT EZ METER Misc Use daily for blood sugar testing 1 each 5     CONTOUR NEXT TEST STRIPS strips TEST 3 TIMES DAILY 300 strip 3     dulaglutide (TRULICITY) 1.5 mg/0.5 mL PnIj Inject 1.5 mg under the skin every 7 days. 6 mL 0     FLUZONE QUAD 9385-2204 60 mcg (15 mcg x 4)/0.5 mL Susp injection   0     insulin glargine (LANTUS SOLOSTAR U-100 INSULIN) 100 unit/mL (3 mL) pen Inject 45 Units under the skin daily. Do not mix Lantus with any other insulin 12 adj dose pen 3     LANCETS MISC [employer] Microlet MISC  Check blood sugar 2 times per day and as needed       lisinopril (PRINIVIL,ZESTRIL) 20 MG tablet Take 1 tablet (20 mg total) by mouth daily. 90 tablet 3     magnesium oxide 500 mg Tab Take 500 mg by mouth.       metFORMIN (GLUCOPHAGE-XR) 500 MG 24 hr tablet Take 2 tablets (1,000 mg total) by mouth 2 (two) times a day with meals. 360 tablet 0     methocarbamol (ROBAXIN) 750 MG tablet        multivitamin (MULTIPLE VITAMINS DAILY) per tablet Take 1 tablet by mouth daily.       mupirocin (BACTROBAN) 2 % ointment        naftifine 2 % Crea APPLY TO AFFECTED AREA TOPICALLY ONCE DAILY FOR 2 WEEKS 45 g 2     naproxen (NAPROSYN) 500 MG tablet Take 1 tablet (500 mg total) by mouth 2 (two) times a day with meals. 60 tablet 0     naproxen sodium (ALEVE) 220 MG tablet Take 440 mg by mouth.       nystatin (MYCOSTATIN) powder Apply to affected area 3 times daily 60 g 1     oxyCODONE (ROXICODONE) 5 MG immediate release tablet        oxyCODONE-acetaminophen (PERCOCET/ENDOCET) 5-325 mg per tablet Take 1-2 tablets by mouth every 4 (four) hours as needed for pain. 13 tablet 0     pen needle, diabetic (BD ULTRA-FINE BRANDON PEN NEEDLE) 32 gauge x 5/32\" Ndle Use 1 per day. 100 each 3     potassium 99 mg Tab Take by mouth. Take 1 Tablet Twice Daily       QVAR 80 mcg/actuation inhaler Inhale 2 puffs 2 times daily -rinse mouth after use -shake well before using 1 Inhaler 9     sildenafil, antihypertensive, (REVATIO) 20 mg tablet Take " 2-3 tablets (40-60 mg total) by mouth daily as needed. 30 tablet 5     simvastatin (ZOCOR) 40 MG tablet 1 TABLET DAILY AT BEDTIME -[EZ-OPEN-CAP] 90 tablet 1     traMADol (ULTRAM) 50 mg tablet        triamcinolone (KENALOG) 0.1 % ointment apply topically to affected areas twice daily as needed 80 g 2     No current facility-administered medications for this visit.           Objective:    Vitals:    05/24/19 0753   BP: 110/60   Patient Site: Left Arm   Patient Position: Sitting   Cuff Size: Adult Large   Pulse: 82   SpO2: 97%   Weight: (!) 253 lb 9.6 oz (115 kg)      Body mass index is 37.45 kg/m .    Alert.  No apparent distress.  Chest clear without expiratory wheeze.  Cardiac exam regular.  Bilateral lower extremity exam with compression stockings in place.  Resolved right lower extremity redness without any concerns at all currently regarding abnormal findings.  No calf tenderness.      This note has been dictated using voice recognition software and as a result may contain minor grammatical errors and unintended word substitutions.

## 2021-05-30 ENCOUNTER — COMMUNICATION - HEALTHEAST (OUTPATIENT)
Dept: NEUROSURGERY | Facility: CLINIC | Age: 66
End: 2021-05-30
Payer: COMMERCIAL

## 2021-05-30 VITALS — WEIGHT: 258 LBS | BODY MASS INDEX: 38.1 KG/M2

## 2021-05-30 NOTE — PATIENT INSTRUCTIONS - HE
Follow up with Dr. Shafer in 2 weeks to recheck cellulitis and discuss plans for treatment of athlete's foot.

## 2021-05-30 NOTE — TELEPHONE ENCOUNTER
Orders being requested: 4 pairs of compression socks.  Wife did not know degrees.   Reason service is needed/diagnosis: please add  When are orders needed by: ASAP  Where to send Orders: Fax: [place]  Okay to leave detailed message?  Yes [phone]

## 2021-05-30 NOTE — PROGRESS NOTES
Assessment:   Leandro Brewer is a 63 y.o. y.o. male with past medical history significant for diverticulosis, microcytic anemia, asthma, type 2 diabetes mellitus, obesity, hypertension, hyperlipidemia, history of MRSA infection who presents today for follow-up regarding acute on chronic low back pain with radiation into the right lower cavity with associated numbness, tingling, and weakness.  Patient has a history of partial laminectomy at L4-5 by Dr. Camacho 11 years ago.  MRI lumbar spine from September 2018 shows what I would describe is moderate spinal stenosis at L3-4 related to a moderate diffuse annular bulge and facet arthropathy.  In my opinion, there also appears to be right lateral recess stenosis at L4-5 possibly related to a small juxta facet synovial cyst.  Patient reports 99% improvement in his pain over the past 1 month with physical therapy and gabapentin.    ALDA: 8       Plan:     A shared decision making plan was used.  The patient's values and choices were respected.  The following represents what was discussed and decided upon by the physician assistant and the patient.      1.  DIAGNOSTIC TESTS: I reviewed the MRI lumbar spine from September 2018.  I also reviewed the recent x-ray right hip.  No further diagnostic tests were ordered.    2.  PHYSICAL THERAPY: Patient is currently in physical therapy at [facility].  He has had 2 sessions so far.  He has 1 more scheduled.  He will continue with a home exercise program.    3.  MEDICATIONS: Patient is currently taking gabapentin 300 mill grams 3 times daily.  I recommended that the patient try to stay on this medication for 1 more month.  At that time he may wean off the medication.  I recommended he decrease his dose by 1 capsule every 3 days.  As long as his pain does not worsen, he could wean off the medication completely.  If his pain does worsen as he decreases his dose, he should continue using the lowest possible dose for another 4 weeks  before attempting to wean off again.  -Patient can also use Tylenol and ibuprofen as needed.    4.  INTERVENTIONS: No interventions were ordered.  -If leg pain were to flare back up and not respond to conservative treatment, recommend a right L4-5 transforaminal epidural steroid injection.  -If that did not help, we could try a right L3-4 transforaminal epidural steroid injection.  -If leg pain improves but patient continues have axial low back pain, we could also try lumbar facet joint injections.    5.  PATIENT EDUCATION: Patient is in agreement the above plan.  All questions were answered.    6.  FOLLOW-UP: Patient to follow-up with me as needed.  He has questions or concerns, he should not hesitate to call.    Subjective:     Leandro Brewer is a 63 y.o. male who presents today for follow-up regarding low back pain with radiation into the right lower extremity with associated numbness, tingling, and weakness.  I last saw the patient on Beryl 10, 2019.  At that time I prescribed gabapentin.  Also prescribed tizanidine.  Also refer the patient to physical therapy.  Patient reports 99% improvement in his pain since he was last seen.    Patient complains of only mild residual stiffness in the right lower back.  He denies any pain in the lower back.  He denies any pain down the leg.  He states that he has not had any significant pain for the last 2 to 3 weeks.  Patient does have residual numbness in the right proximal lateral thigh.  He also has intermittent tingling in the right dorsal foot and toes.  He rates his pain today as a 1 out of 10.  At its worst it is a 3 out of 10.  At its best it is a 0-10.  Pain is aggravated with sitting and alleviated with activity.  He denies any new symptoms since he was last seen.    Patient is currently in physical therapy at [facility].  He has had 2 sessions so far.  He has one session remaining.  He is doing his.  He uses gabapentin 300 mill grams 3 times daily.  He also  uses Tylenol and ibuprofen as needed.  He has tizanidine available to take as needed as well.    Past medical history is reviewed and is unchanged.    Review of Systems:  Positive for numbness/tingling, weakness, loss of bowel control.  Negative loss of bladder control, inability to urinate, headache, pain much worse at night, trip/stumble/falls, difficulty swallowing, difficulty with hand skills, fevers, unintentional weight loss.     Objective:   CONSTITUTIONAL:  Vital signs as above.  No acute distress.  The patient is well nourished and well groomed.    PSYCHIATRIC:  The patient is awake, alert, oriented to person, place and time.  The patient is answering questions appropriately with clear speech.  Normal affect.  HEENT: Normocephalic, atraumatic.  Sclera clear.    SKIN:  Skin over the face, posterior torso, bilateral upper and lower extremities is clean, dry, intact without rashes.  MUSCULOSKELETAL:  Gait is non-antalgic.  The patient is able to heel and toe walk without any difficulty.  No significant tenderness over the bilateral lower lumbar paraspinal muscles.      The patient has 5/5 strength for the bilateral hip flexors, knee flexors/extensors, ankle dorsiflexors/plantar flexors, ankle evertors/invertors.    NEUROLOGICAL: 1+ patellar, 2+ Achilles reflexes which are symmetric bilaterally.  No ankle clonus bilaterally.  Subjective diminished/altered sensation right proximal lateral thigh.     RESULTS:     I reviewed the MRI lumbar spine from [facility] dated September 27, 2018.  At L2-3 there is mild bilateral lateral recess stenosis.  At L3-4 there is what I would describe as moderate spinal canal stenosis with bilateral lateral recess stenosis and minimal bilateral foraminal stenosis.  At L4-5 there is postoperative change of a partial laminectomy with mild central canal stenosis secondary to facet arthropathy.  There appears to be a small right juxta facet synovial cyst which could cause lateral recess  stenosis at this level.  At L5-S1 there is mild right lateral recess stenosis.  Please see report for further details.     EXAM: XR HIP RIGHT 2 OR MORE VWS  LOCATION: [facility]  DATE/TIME: 6/5/2019 9:10 AM     INDICATION: Pain in right hip  COMPARISON: None.     FINDINGS: Right hip arthroplasty with components in good position. Moderate degenerative joint disease left hip.

## 2021-05-30 NOTE — PROGRESS NOTES
Assessment/Plan:     1. Cellulitis of other specified site  cephalexin (KEFLEX) 500 MG capsule   2. Uncontrolled type 2 diabetes mellitus with hyperglycemia, with long-term current use of insulin (H)     3. Intertrigo  nystatin (MYCOSTATIN) powder   4. History of MRSA infection  Culture, MRSA (ID Only)   5. Tinea pedis of right foot         Diagnoses and all orders for this visit:    Cellulitis of other specified site  -     cephalexin (KEFLEX) 500 MG capsule; Take 1 capsule (500 mg total) by mouth 4 (four) times a day for 14 days.  Dispense: 56 capsule; Refill: 0  -He reports that he is usually treated with cephalexin for episodes of cellulitis.  Usually does a 2-week duration of treatment.  Prescription sent to pharmacy.  Consult on use of medication and side effects.  Advised elevation of leg and close monitoring of symptoms.  Recommend follow-up with primary care physician in 2 weeks.  Follow-up sooner if symptoms worsening or not improving as expected    Uncontrolled type 2 diabetes mellitus with hyperglycemia, with long-term current use of insulin (H)  Continue current medications.  Follow-up with PCP    Intertrigo  -     nystatin (MYCOSTATIN) powder; APPLY TO AFFECTED AREA THREE TIMES A DAY  Dispense: 180 g; Refill: 3    History of MRSA infection  -     Culture, MRSA (ID Only)    Tinea pedis of right foot  Continue Lamisil spray.  Recommend he discuss with primary care provider whether oral therapy is indicated                   Subjective:      Leandro Brewer is a 63 y.o. male who comes in today with concern about cellulitis of his left leg.  He reports that he has history of recurrent episodes of cellulitis.  He has been dealing with this for many years.  He also has history of MRSA infection.  He was hospitalized for cellulitis and sepsis at [facility] in April of this year.  He has diabetes and is treated with insulin.  His last A1c was 7.5% in May.  He reports that he is  scheduled to follow-up with his primary care physician in October.  He noticed development of redness, warmth and discomfort in his left lower leg today.  States he has a little bit of a scratch in the lower part of his leg.  He has had associated body aches and chills.  These are the typical symptoms that he has with cellulitis.  He does wear compression stockings.  He also has athlete's foot that has been difficult to control.  He is using Lamisil spray.  He has difficulty using any topical creams due to hip problems.  He is in need of hip surgery but surgeon will not perform surgery when he has an infection on his leg.  He currently does not have any fevers.  He is able to eat and drink.  We reviewed his medications and allergies and updated the chart.  He is wondering if he can be tested to see if he still carries MRSA.  No other concerns or questions.    Current Outpatient Medications   Medication Sig Dispense Refill     acetaminophen (TYLENOL) 500 MG tablet Take 1,000 mg by mouth.       albuterol (VENTOLIN HFA) 90 mcg/actuation inhaler Inhale 2 puffs 4 (four) times a day as needed for wheezing. 18 g 6     ascorbic acid (ASCORBIC ACID WITH EVAN HIPS) 500 MG tablet Take 500 mg by mouth 2 (two) times a day.       aspirin 81 mg chewable tablet Chew 81 mg daily.       b complex vitamins (VITAMINS B COMPLEX) capsule Take 1 capsule by mouth.       blood-glucose meter kit [employer] Contour Monitor KIT  Use as Directed       CONTOUR NEXT EZ METER Misc Use daily for blood sugar testing 1 each 5     CONTOUR NEXT TEST STRIPS strips TEST 3 TIMES DAILY 300 strip 3     gabapentin (NEURONTIN) 300 MG capsule Take 1 capsule (300 mg total) by mouth daily. Increase by 1 tab every 3 days.  Max dose 300 mg tid 90 capsule 2     LANCETS MISC [employer] Microlet MISC  Check blood sugar 2 times per day and as needed       LANTUS SOLOSTAR U-100 INSULIN 100 unit/mL (3 mL) pen INJECT 45 UNITS SUBCUTANEOUSLY DAILY. DO NOT MIX WITH ANY OTHER  "INSULIN 30 mL 1     lisinopril (PRINIVIL,ZESTRIL) 20 MG tablet Take 1 tablet (20 mg total) by mouth daily. 90 tablet 3     magnesium oxide 500 mg Tab Take 500 mg by mouth.       metFORMIN (GLUCOPHAGE-XR) 500 MG 24 hr tablet Take 2 tablets (1,000 mg total) by mouth 2 (two) times a day with meals. 360 tablet 3     multivitamin (MULTIPLE VITAMINS DAILY) per tablet Take 1 tablet by mouth daily.       nystatin (MYCOSTATIN) powder APPLY TO AFFECTED AREA THREE TIMES A  g 3     pen needle, diabetic (BD ULTRA-FINE BRANDON PEN NEEDLE) 32 gauge x 5/32\" Ndle Use 1 per day. 100 each 3     potassium 99 mg Tab Take by mouth. Take 1 Tablet Twice Daily       QVAR 80 mcg/actuation inhaler Inhale 2 puffs 2 times daily -rinse mouth after use -shake well before using 1 Inhaler 9     sildenafil, antihypertensive, (REVATIO) 20 mg tablet Take 2-3 tablets (40-60 mg total) by mouth daily as needed. 30 tablet 5     simvastatin (ZOCOR) 40 MG tablet 1 TABLET DAILY AT BEDTIME -[EZ-OPEN-CAP] 90 tablet 1     TRULICITY 1.5 mg/0.5 mL PnIj INJECT 1.5MG UNDER THE SKIN EVERY 7 DAYS. 6 mL 0     cephalexin (KEFLEX) 500 MG capsule Take 1 capsule (500 mg total) by mouth 4 (four) times a day for 14 days. 56 capsule 0     No current facility-administered medications for this visit.        Past Medical History, Family History, and Social History reviewed.  Past Medical History:   Diagnosis Date     Anemia      Arthritis      Diabetes mellitus (H)      Diabetes mellitus, type II (H)      Hypertension      Obesity      Plantar fasciitis      Past Surgical History:   Procedure Laterality Date     BACK SURGERY       HERNIA REPAIR Right     inguinal; child     Patient has no known allergies.  Family History   Problem Relation Age of Onset     Heart attack Mother      Obesity Mother      Heart disease Father      Cancer Father         throat     Social History     Socioeconomic History     Marital status:      Spouse name: Not on file     Number of " children: Not on file     Years of education: Not on file     Highest education level: Not on file   Occupational History     Occupation: Manager     Employer: [employer]   Social Needs     Financial resource strain: Not on file     Food insecurity:     Worry: Not on file     Inability: Not on file     Transportation needs:     Medical: Not on file     Non-medical: Not on file   Tobacco Use     Smoking status: Former Smoker     Smokeless tobacco: Never Used   Substance and Sexual Activity     Alcohol use: Yes     Drug use: No     Sexual activity: Not on file   Lifestyle     Physical activity:     Days per week: Not on file     Minutes per session: Not on file     Stress: Not on file   Relationships     Social connections:     Talks on phone: Not on file     Gets together: Not on file     Attends Hindu service: Not on file     Active member of club or organization: Not on file     Attends meetings of clubs or organizations: Not on file     Relationship status: Not on file     Intimate partner violence:     Fear of current or ex partner: Not on file     Emotionally abused: Not on file     Physically abused: Not on file     Forced sexual activity: Not on file   Other Topics Concern     Not on file   Social History Narrative     Not on file         Review of systems is as stated in HPI, and the remainder of the 10 system review is otherwise negative.    Objective:     Vitals:    07/25/19 1345   BP: 122/70   Patient Site: Right Arm   Patient Position: Sitting   Cuff Size: Adult Large   Pulse: (!) 102   Temp: 98.5  F (36.9  C)   TempSrc: Oral   SpO2: 96%   Weight: (!) 260 lb 8 oz (118.2 kg)    Body mass index is 38.47 kg/m .      General appearance: alert, appears stated age and cooperative  Head: Normocephalic, without obvious abnormality, atraumatic  Lungs: clear to auscultation bilaterally  Heart: regular rate and rhythm, S1, S2 normal, no murmur, click, rub or gallop  Extremities: tinea pedis present  right foot  Skin: erythema, warmth and tenderness present left anterior lower leg from just below knee down to ankle and extending medial toward calf muscle      This note has been dictated using voice recognition software. Any grammatical or context distortions are unintentional and inherent to the the software.

## 2021-05-31 ENCOUNTER — AMBULATORY - HEALTHEAST (OUTPATIENT)
Dept: NEUROSURGERY | Facility: CLINIC | Age: 66
End: 2021-05-31

## 2021-05-31 VITALS — WEIGHT: 252 LBS | HEIGHT: 69 IN | BODY MASS INDEX: 37.33 KG/M2

## 2021-05-31 VITALS — HEIGHT: 69 IN | BODY MASS INDEX: 37.33 KG/M2 | WEIGHT: 252 LBS

## 2021-05-31 VITALS — BODY MASS INDEX: 38.4 KG/M2 | WEIGHT: 260 LBS

## 2021-05-31 VITALS — WEIGHT: 258 LBS | BODY MASS INDEX: 38.1 KG/M2

## 2021-05-31 VITALS — BODY MASS INDEX: 37.21 KG/M2 | WEIGHT: 252 LBS

## 2021-05-31 VITALS — WEIGHT: 254 LBS | BODY MASS INDEX: 37.51 KG/M2

## 2021-05-31 VITALS — BODY MASS INDEX: 37.8 KG/M2 | WEIGHT: 256 LBS

## 2021-05-31 DIAGNOSIS — M54.16 LUMBAR RADICULOPATHY: ICD-10-CM

## 2021-05-31 NOTE — TELEPHONE ENCOUNTER
Refill Approved    Rx renewed per Medication Renewal Policy. Medication was last renewed on 5/31/19.    [facility] [place], [facility] Triage/Med Refill 8/23/2019     Requested Prescriptions   Pending Prescriptions Disp Refills     TRULICITY 1.5 mg/0.5 mL PnIj [Pharmacy Med Name: TRULICITY 1.5MG/0.5ML SOPN] 6 mL 0     Sig: INJECT 1.5MG UNDER THE SKIN EVERY 7 DAYS.       Insulin/GLP-1 Refill Protocol Passed - 8/23/2019  3:19 AM        Passed - Visit with PCP or prescribing provider visit in last 6 months     Last office visit with prescriber/PCP: 8/8/2019 OR same dept: 8/8/2019 Josh Shafer MD OR same specialty: 8/8/2019 Josh Shafer MD Last physical: Visit date not found Last MTM visit: Visit date not found     Next appt within 3 mo: Visit date not found  Next physical within 3 mo: Visit date not found  Prescriber OR PCP: Josh Shafer MD  Last diagnosis associated with med order: 1. Uncontrolled type 2 diabetes mellitus with hyperglycemia, with long-term current use of insulin (H)  - TRULICITY 1.5 mg/0.5 mL PnIj [Pharmacy Med Name: TRULICITY 1.5MG/0.5ML SOPN]; INJECT 1.5MG UNDER THE SKIN EVERY 7 DAYS.  Dispense: 6 mL; Refill: 0    If protocol passes may refill for 6 months if within 3 months of last provider visit (or a total of 9 months).              Passed - A1C in last 6 months     Hemoglobin A1c   Date Value Ref Range Status   05/24/2019 7.5 (H) 3.5 - 6.0 % Final               Passed - Microalbumin in last year     Microalbumin, Random Urine   Date Value Ref Range Status   10/02/2018 <0.50 0.00 - 1.99 mg/dL Final                  Passed - Blood pressure in last year     BP Readings from Last 1 Encounters:   08/08/19 120/60             Passed - Creatinine done in last year     Creatinine   Date Value Ref Range Status   05/24/2019 0.87 0.70 - 1.30 mg/dL Final

## 2021-05-31 NOTE — PROGRESS NOTES
Assessment/Plan:    1. Cellulitis of left lower extremity  Left lower extremity cellulitis, improved.  Has 2 doses left of a 14-day course of cephalexin 500 mg 4 times daily.  Continues compression stockings.  Patient resistant to low-dose loop diuretic.  Complains of increased urinary frequency with diuretic use.  Ultimately agrees to use furosemide 40 mg every morning x5 days then 20 mg every morning until follow-up visit October 1, 2019.  Continue management for interdigital tinea pedis with initial infection likely occurring after scraping anterior shin with tree branch while mowing grass.    2. History of MRSA infection  History of MRSA colonization.  Recent culture positive.    3. Uncontrolled type 2 diabetes mellitus with hyperglycemia, with long-term current use of insulin (H)  Type 2 diabetes, fair control with A1c having previously increased from 6% to 7.5%.  Diabetic recheck as scheduled October 1, 2019.    4. Lymphedema  Bilateral lower extremity compression stockings to continue.  Patient agrees to using furosemide 40 mg every morning x5 days then half tablet daily until follow-up visit October 1, 2019.    5. Tinea pedis of left foot  Interdigital tinea pedis.  Continue current management with antifungal as noted.    6. Essential hypertension  Hypertension, stable.  Continue home meds.    7. Mild persistent asthma without complication  No recent asthma exacerbation described.  Continues home meds including Qvar 80 mcg using 2 puffs twice daily.          Subjective:    Leandro Brewer is seen today for follow-up evaluation.  Left lower extremity cellulitis.  Has had recurrent issues with right lower extremity cellulitis in the past.  Was mowing the lawn and scraped left leg in a tree branch while cutting grass.  Subsequent redness noted more anterior shin.  Has also been dealing with interdigital tinea pedis infection.  Using antifungal.  Saw Dr. Yates July 25, 2019.  Cephalexin 500 mg 4 times daily x14  "days was provided.  MRSA coag positive staph infection noted on culture of nasopharynx.  Has had history of MRSA previously.  Known intertrigo concerns.  Underlying type 2 diabetes.  Hypertension and asthma remains stable.  Continue home medications as noted.  Comprehensive review of systems as above otherwise all negative.     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 Thompson); right inguinal hernia age 6 months   Hospitalizations: early 20s \"infected gallbladder\" WITHOUT surgery... ; cellulitis in legs (hospitalized twice); abdominal wall cyst requiring IV antibiotics x 4 days... ; 19-19 [facility] for right LE cellulitis/sepsis  Would like a 90 day supply on all meds.   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  It started on Saturday and progressed severely on  so I went straight to the hospital because of my hip replacement.     Marc Brewer    Past Surgical History:   Procedure Laterality Date     BACK SURGERY       HERNIA REPAIR Right     inguinal; child        Family History   Problem Relation Age of Onset     Heart attack Mother      Obesity Mother      Heart disease Father      Cancer Father         throat        Past Medical History:   Diagnosis Date     Anemia      Arthritis      Diabetes mellitus (H)      Diabetes mellitus, type II (H)      Hypertension      Obesity      " "Plantar fasciitis         Social History     Tobacco Use     Smoking status: Former Smoker     Smokeless tobacco: Never Used   Substance Use Topics     Alcohol use: Yes     Drug use: No        Current Outpatient Medications   Medication Sig Dispense Refill     acetaminophen (TYLENOL) 500 MG tablet Take 1,000 mg by mouth.       albuterol (VENTOLIN HFA) 90 mcg/actuation inhaler Inhale 2 puffs 4 (four) times a day as needed for wheezing. 18 g 6     ascorbic acid (ASCORBIC ACID WITH EVAN HIPS) 500 MG tablet Take 500 mg by mouth 2 (two) times a day.       aspirin 81 mg chewable tablet Chew 81 mg daily.       b complex vitamins (VITAMINS B COMPLEX) capsule Take 1 capsule by mouth.       blood-glucose meter kit [employer] Contour Monitor KIT  Use as Directed       cephalexin (KEFLEX) 500 MG capsule Take 1 capsule (500 mg total) by mouth 4 (four) times a day for 14 days. 56 capsule 0     CONTOUR NEXT EZ METER Misc Use daily for blood sugar testing 1 each 5     CONTOUR NEXT TEST STRIPS strips TEST 3 TIMES DAILY 300 strip 3     gabapentin (NEURONTIN) 300 MG capsule Take 1 capsule (300 mg total) by mouth daily. Increase by 1 tab every 3 days.  Max dose 300 mg tid 90 capsule 2     LANCETS MISC [employer] Microlet MISC  Check blood sugar 2 times per day and as needed       LANTUS SOLOSTAR U-100 INSULIN 100 unit/mL (3 mL) pen INJECT 45 UNITS SUBCUTANEOUSLY DAILY. DO NOT MIX WITH ANY OTHER INSULIN 30 mL 1     lisinopril (PRINIVIL,ZESTRIL) 20 MG tablet Take 1 tablet (20 mg total) by mouth daily. 90 tablet 3     magnesium oxide 500 mg Tab Take 500 mg by mouth.       metFORMIN (GLUCOPHAGE-XR) 500 MG 24 hr tablet Take 2 tablets (1,000 mg total) by mouth 2 (two) times a day with meals. 360 tablet 3     multivitamin (MULTIPLE VITAMINS DAILY) per tablet Take 1 tablet by mouth daily.       nystatin (MYCOSTATIN) powder APPLY TO AFFECTED AREA THREE TIMES A  g 3     pen needle, diabetic (BD ULTRA-FINE BRANDON PEN NEEDLE) 32 gauge x 5/32\" " Ndle Use 1 per day. 100 each 3     potassium 99 mg Tab Take by mouth. Take 1 Tablet Twice Daily       QVAR REDIHALER 80 mcg/actuation HFAB inhaler INHALE 2 PUFFS 2 TIMES DAILY -RINSE MOUTH AFTER USE --DO NOT SHAKE UNIT-- 10.6 g 5     sildenafil, antihypertensive, (REVATIO) 20 mg tablet Take 2-3 tablets (40-60 mg total) by mouth daily as needed. 30 tablet 5     simvastatin (ZOCOR) 40 MG tablet 1 TABLET DAILY AT BEDTIME -[EZ-OPEN-CAP] 90 tablet 1     TRULICITY 1.5 mg/0.5 mL PnIj INJECT 1.5MG UNDER THE SKIN EVERY 7 DAYS. 6 mL 0     No current facility-administered medications for this visit.           Objective:    Vitals:    08/08/19 1359   BP: 120/60   Pulse: 87   SpO2: 97%   Weight: (!) 258 lb (117 kg)      Body mass index is 38.1 kg/m .    Alert.  No apparent distress.  Chest clear.  Cardiac exam regular.  Lower extremity exam.  Compression stockings removed.  Interdigital tinea pedis.  Excoriation anterior shin is resolved otherwise minimal residual erythema anterior shin without tenderness.  Trace pitting edema.      This note has been dictated using voice recognition software and as a result may contain minor grammatical errors and unintended word substitutions.

## 2021-05-31 NOTE — TELEPHONE ENCOUNTER
RN cannot approve Refill Request    RN can NOT refill this medication med is not covered by policy/route to provider. Last office visit: 5/24/2019 Josh Shafer MD Last Physical: 2/21/2019 Last MTM visit: Visit date not found Last visit same specialty: 7/25/2019 Kiley Yates MD.  Next visit within 3 mo: Visit date not found  Next physical within 3 mo: Visit date not found      Laurita Hope, [facility] Connection Triage/Med Refill 8/8/2019    Requested Prescriptions   Pending Prescriptions Disp Refills     QVAR REDIHALER 80 mcg/actuation HFAB inhaler [Pharmacy Med Name: QVAR REDIHALER 80MCG/ACT INHALER SPRAY] 10.6 g 5     Sig: INHALE 2 PUFFS 2 TIMES DAILY -RINSE MOUTH AFTER USE --DO NOT SHAKE UNIT--       There is no refill protocol information for this order

## 2021-06-01 ENCOUNTER — OFFICE VISIT - HEALTHEAST (OUTPATIENT)
Dept: NEUROSURGERY | Facility: CLINIC | Age: 66
End: 2021-06-01

## 2021-06-01 VITALS — WEIGHT: 249.8 LBS | BODY MASS INDEX: 36.89 KG/M2

## 2021-06-01 VITALS — HEIGHT: 69 IN | WEIGHT: 255 LBS | BODY MASS INDEX: 37.77 KG/M2

## 2021-06-01 VITALS — WEIGHT: 247 LBS | BODY MASS INDEX: 36.48 KG/M2

## 2021-06-01 VITALS — BODY MASS INDEX: 37.51 KG/M2 | WEIGHT: 254 LBS

## 2021-06-01 VITALS — WEIGHT: 248 LBS | BODY MASS INDEX: 36.62 KG/M2

## 2021-06-01 DIAGNOSIS — M47.26 OSTEOARTHRITIS OF SPINE WITH RADICULOPATHY, LUMBAR REGION: ICD-10-CM

## 2021-06-01 ASSESSMENT — MIFFLIN-ST. JEOR: SCORE: 1977.88

## 2021-06-01 NOTE — TELEPHONE ENCOUNTER
Patient returned call stating he is taking gabapentin 300mg three times a day. Tried weaning off as instructed back in July but noticed his LBP acting up so he remained on the gabapentin dosing of 1 tab three times a day.

## 2021-06-01 NOTE — TELEPHONE ENCOUNTER
If patient calls back, please verify if he is still taking gabapentin 300mg and if he is how often.

## 2021-06-01 NOTE — PROGRESS NOTES
Subjective:      Patient ID: Leandro Brewer is a 63 y.o. male.    Chief Complaint:  Leandro Brewer is a 63 y.o. male with a past medical history of lower extremity lymphedema, hypertension, DMT2 and a past medical history for MRSA infection and right lower extremity cellulitis with previous hospitalization who presents today complaining of one day cute onset of swelling and redness and warmth on the right lower extremity.    At this time the patient is denying fever but he is tachycardic.  He has no shortness of breath dizziness syncope presyncope chest palpitations heart pain arm pain jaw pain dizziness.  He does not have any swelling or involvement of the left lower extremity and no other rash anywhere else on the body.  He denies any history of dehydration, sedentary or long travel, and no recent trauma to the right lower extremity.    He has had a right total hip arthroplasty done earlier this year in the spring.  Is been doing very well after the surgery.  The right total hip arthroplasty is on the affected right lower extremity patient did not have difficulty with shortness of breath walking from the parking lot to the examination room.          Past Medical History:   Diagnosis Date     Anemia      Arthritis      Diabetes mellitus (H)      Diabetes mellitus, type II (H)      Hypertension      Obesity      Plantar fasciitis        Past Surgical History:   Procedure Laterality Date     BACK SURGERY       HERNIA REPAIR Right     inguinal; child       Family History   Problem Relation Age of Onset     Heart attack Mother      Obesity Mother      Heart disease Father      Cancer Father         throat       Social History     Tobacco Use     Smoking status: Former Smoker     Smokeless tobacco: Never Used   Substance Use Topics     Alcohol use: Yes     Drug use: No       Review of Systems  As above in HPI, otherwise balance of Review of Systems are negative.    Objective:     /76 (Patient Site: Right Arm,  "Patient Position: Sitting, Cuff Size: Adult Large)   Pulse (!) 122   Temp 98.1  F (36.7  C) (Oral)   Resp 18   Ht 5' 9\" (1.753 m)   Wt (!) 262 lb 8 oz (119.1 kg)   SpO2 99%   BMI 38.76 kg/m      Physical Exam  General: Patient is resting comfortably no acute distress is afebrile  HEENT: Head is normocephalic atraumatic   eyes are PERRL EOMI sclera anicteric   LUNGS: Clear to auscultation bilaterally patient is speaking in full sentences does not appear to be short of breath.  HEART: Regular rate and rhythm  Skin: Without rash non-diaphoretic  Musculoskeletal: The patient's right calf is twice the size of the left calf.  It is extremely warm to touch and is erythematous.  He has a positive Homans sign on the right lower extremity.  He has no involvement on the thigh.  The compartments of the thigh are supple.  No pain over the tract of the vein or artery on the anterior medial thigh.      Assessment:     Procedures    The primary encounter diagnosis was Leg edema, right. Diagnoses of Tachycardia, Uncontrolled type 2 diabetes mellitus with hyperglycemia, with long-term current use of insulin (H), and Methicillin resistant Staphylococcus aureus infection were also pertinent to this visit.    Plan:     1. Leg edema, right     2. Tachycardia     3. Uncontrolled type 2 diabetes mellitus with hyperglycemia, with long-term current use of insulin (H)     4. Methicillin resistant Staphylococcus aureus infection         I am concerned that the patient may be at risk for a DVT or possibly a lower extremity infection.  He has had a history of lymphedema but he did not have any swelling since yesterday.  All of the new symptoms have been over the last 24 hours.  He has had increased calf size warmth to touch and he also has had a history of MRSA and a recent right total hip arthroplasty.  For these reasons, I am concerned that he might have a DVT that needs to be ruled out and possibly an infection in the right lower " extremity that could be MRSA and also spread to his recent prosthesis in the right hip.  These are various serious concerns.  I think they do need work-up and evaluation by the emergency room and possible consult with infectious disease.  I was able to talk to the patient's family doctor on the phone after the patient contacted him.  He shared my concerns and did recommend valuation by emergency room for definitive evaluation and treatment and possible hospitalization or consult with infectious disease.  Patient will follow-up in the emergency room.  I called and gave report to the emergency room physician.  Questions were answered to his satisfaction before discharge.    Patient Instructions   Present to the emergency room for definitive evaluation and treatment of your swelling and redness in the right lower extremity.

## 2021-06-01 NOTE — PROGRESS NOTES
Assessment/Plan:    1. Cellulitis of right lower leg  Transitional care management completed.  Medication reconciliation as noted below.  Recurrent right leg cellulitis, improving.  Completing cephalexin 500 mg 4 times daily x10 days then patient to use Pen-Vee K 500 mg twice daily x6 months prophylactically per infectious disease recommendation.  Also recommend treatment of onychomycosis due to potential etiology of recurrent cellulitis.  Lab follow-up obtained today and will notify with results.  - penicillin VK (PEN VK) 500 MG tablet; Take 1 tablet (500 mg total) by mouth 2 (two) times a day.  Dispense: 60 tablet; Refill: 5  - HM1(CBC and Differential)  - C-Reactive Protein  - HM1 (CBC with Diff)    2. Uncontrolled type 2 diabetes mellitus with hyperglycemia, with long-term current use of insulin (H)  Type 2 diabetes with A1c of 7.5% September 9, 2019.  Refill on Lantus 40 units daily.  Continues metformin extended release 500 mg using 2 tablets twice daily.  - insulin glargine (LANTUS SOLOSTAR U-100 INSULIN) 100 unit/mL (3 mL) pen; Inject 40 Units under the skin every morning.  Dispense: 30 mL; Refill: 5    3. Essential hypertension  Hypertension stable with lisinopril 20 mg daily.    4. Diabetic polyneuropathy associated with type 2 diabetes mellitus (H)  Polyneuropathy associate with diabetes.  Continues gabapentin 300 mg twice daily.    5. Onychomycosis  Terbinafine 250 mg daily x12 weeks.  Check LFTs and CBC at baseline and repeat at 6-week kellen to ensure no LFT elevation etc.  - terbinafine HCl (LAMISIL) 250 mg tablet; Take 1 tablet (250 mg total) by mouth daily.  Dispense: 84 tablet; Refill: 0    6. Need for vaccination  Flu shot provided.  - Influenza, Recombinant, Inj, Quadrivalent, PF, 18+YRS    7. Male erectile dysfunction  Refill on sildenafil per patient request.  - sildenafil (REVATIO) 20 mg tablet; Take 2-3 tablets (40-60 mg total) by mouth daily as needed.  Dispense: 30 tablet; Refill: 5    8.  "History of MRSA infection  History of MRSA infection noted.    9. Lymphedema  Lymphedema.  Can resume diuretics including furosemide 40 mg using half tablet daily.    10. Encounter for therapeutic drug monitoring  Metabolic and CBC with differential pending.  - Comprehensive Metabolic Panel  - HM1(CBC and Differential)  - HM1 (CBC with Diff)    11. Microcytic anemia  History of microcytic anemia, chronic.  - HM1(CBC and Differential)  - HM1 (CBC with Diff)      Post Discharge Medication Reconciliation Status: discharge medications reconciled, continue medications without change           Subjective:    Leandro Brewer is seen today for hospital follow-up.  Recent admission to [facility]  through 2019 with right lower extremity cellulitis.  This is a recurrent issue for patient.  Improving currently.  Was treated initially with vancomycin and Zosyn and switched to IV cefazolin.  Discharged on cephalexin 500 mg 4 times daily x10 days.  Infectious disease consult with recommendation for treatment of onychomycosis x12 weeks as well as Pen-Vee K 500 mg twice daily x6 months prophylactically.  Does have upcoming left total hip arthroplasty scheduled October 15, 2019 with Dr. Evans with preop clearance 2019 noted.  Patient with history of MRSA colonization.  Prior right total hip arthroplasty 2019 with Dr. Evans.  Lisinopril 20 mg daily for hypertension.  Continues use of Lantus 40 units each morning plus metformin extended release 500 mg using 2 tablets twice daily for diabetes management.  Simvastatin 40 mg at bedtime for lipid management.  Comprehensive review of systems as above otherwise all negative.     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 Thompson); right inguinal hernia age 6 months; right total hip arthroplasty at [facility] February 5, 2019 with Dr. Evans.   Hospitalizations: early 20s \"infected gallbladder\" WITHOUT surgery... ; cellulitis in legs (hospitalized twice); abdominal wall cyst requiring IV antibiotics x 4 days... ; 4/7/19-4/12/19 [facility] for right LE cellulitis/sepsis; right LE cellulitis 9/8/19-9/9/19 ([place])  Would like a 90 day supply on all meds.   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 (look for further cause of anemia if Hb < 11.0)    Past Surgical History:   Procedure Laterality Date     BACK SURGERY       HERNIA REPAIR Right     inguinal; child        Family History   Problem Relation Age of Onset     Heart attack Mother      Obesity Mother      Heart disease Father      Cancer Father         throat        Past Medical History:   Diagnosis Date     Anemia      Arthritis      Diabetes mellitus (H)      Diabetes mellitus, type II (H)      Hypertension      Obesity      Plantar fasciitis         Social History     Tobacco Use     Smoking status: Former Smoker     Smokeless tobacco: Never Used   Substance Use Topics     Alcohol use: Yes     Drug use: No        Current Outpatient Medications   Medication Sig Dispense Refill     acetaminophen (TYLENOL) 500 MG tablet Take 1,000 mg by mouth 3 (three) times a day. Try to limit intake to 3000 mg a day             albuterol (VENTOLIN HFA) 90 mcg/actuation inhaler Inhale 2 puffs 4 (four) times a day as needed for wheezing. 18 g 6     ascorbic acid, vitamin C, (VITAMIN C) 500 MG tablet Take 500 mg by mouth daily.       aspirin 81 mg chewable tablet Chew 81 mg daily.       b complex vitamins (VITAMINS B COMPLEX) capsule Take 1 capsule by " "mouth.       blood-glucose meter kit [employer] Contour Monitor KIT  Use as Directed       cephalexin (KEFLEX) 500 MG capsule Take 1 capsule (500 mg total) by mouth 4 (four) times a day for 10 days. 40 capsule 0     CONTOUR NEXT EZ METER Misc Use daily for blood sugar testing 1 each 5     CONTOUR NEXT TEST STRIPS strips TEST 3 TIMES DAILY 300 strip 3     furosemide (LASIX) 40 MG tablet Take 20 mg by mouth daily. 1/2 tab       gabapentin (NEURONTIN) 300 MG capsule Take 300 mg by mouth 2 (two) times a day.       insulin glargine (LANTUS SOLOSTAR U-100 INSULIN) 100 unit/mL (3 mL) pen Inject 40 Units under the skin every morning. 30 mL 5     LANCETS MISC [employer] Microlet MISC  Check blood sugar 2 times per day and as needed       lisinopril (PRINIVIL,ZESTRIL) 20 MG tablet Take 1 tablet (20 mg total) by mouth daily. 90 tablet 3     magnesium oxide 500 mg Tab Take 500 mg by mouth daily.              metFORMIN (GLUCOPHAGE-XR) 500 MG 24 hr tablet Take 2 tablets (1,000 mg total) by mouth 2 (two) times a day with meals. 360 tablet 3     multivitamin (MULTIPLE VITAMINS DAILY) per tablet Take 1 tablet by mouth daily.       nystatin (MYCOSTATIN) powder Apply 1 application topically 3 (three) times a day. Groin rash       pen needle, diabetic (BD ULTRA-FINE BRANDON PEN NEEDLE) 32 gauge x 5/32\" Ndle Use 1 per day. 100 each 3     potassium 99 mg Tab Take 1 tablet by mouth 2 (two) times a day.              QVAR REDIHALER 80 mcg/actuation HFAB inhaler INHALE 2 PUFFS 2 TIMES DAILY -RINSE MOUTH AFTER USE --DO NOT SHAKE UNIT-- 10.6 g 5     simvastatin (ZOCOR) 40 MG tablet Take 40 mg by mouth at bedtime.       TRULICITY 1.5 mg/0.5 mL PnIj INJECT 1.5MG UNDER THE SKIN EVERY 7 DAYS. 6 mL 0     penicillin VK (PEN VK) 500 MG tablet Take 1 tablet (500 mg total) by mouth 2 (two) times a day. 60 tablet 5     sildenafil (REVATIO) 20 mg tablet Take 2-3 tablets (40-60 mg total) by mouth daily as needed. 30 tablet 5     terbinafine HCl (LAMISIL) 250 mg " tablet Take 1 tablet (250 mg total) by mouth daily. 84 tablet 0     No current facility-administered medications for this visit.           Objective:    Vitals:    09/17/19 0853   BP: 130/60   Pulse: (!) 112   SpO2: 97%   Weight: (!) 256 lb (116.1 kg)      Body mass index is 37.8 kg/m .    Alert.  No apparent distress.  Right lower extremity with lymphedema and resolving cellulitis with minimal residual erythema.  Outlined area of cellulitis looks much improved.  Left lower extremity appears uninvolved without significant peripheral edema at this time.  Chest clear.  Cardiac exam regular.  Extremities warm and dry.  Neurologic exam nonfocal.      This note has been dictated using voice recognition software and as a result may contain minor grammatical errors and unintended word substitutions.

## 2021-06-01 NOTE — PATIENT INSTRUCTIONS - HE
Present to the emergency room for definitive evaluation and treatment of your swelling and redness in the right lower extremity.

## 2021-06-02 VITALS — WEIGHT: 250 LBS | HEIGHT: 69 IN | BODY MASS INDEX: 37.03 KG/M2

## 2021-06-02 VITALS — WEIGHT: 250 LBS | BODY MASS INDEX: 36.92 KG/M2

## 2021-06-02 VITALS — BODY MASS INDEX: 37.33 KG/M2 | WEIGHT: 252 LBS | HEIGHT: 69 IN

## 2021-06-02 VITALS — WEIGHT: 244 LBS | BODY MASS INDEX: 36.03 KG/M2

## 2021-06-03 ENCOUNTER — RECORDS - HEALTHEAST (OUTPATIENT)
Dept: ADMINISTRATIVE | Facility: OTHER | Age: 66
End: 2021-06-03

## 2021-06-03 VITALS
WEIGHT: 256 LBS | SYSTOLIC BLOOD PRESSURE: 130 MMHG | DIASTOLIC BLOOD PRESSURE: 60 MMHG | BODY MASS INDEX: 37.8 KG/M2 | OXYGEN SATURATION: 97 % | HEART RATE: 112 BPM

## 2021-06-03 VITALS — BODY MASS INDEX: 38.16 KG/M2 | WEIGHT: 257.6 LBS | HEIGHT: 69 IN

## 2021-06-03 VITALS
DIASTOLIC BLOOD PRESSURE: 70 MMHG | HEIGHT: 69 IN | BODY MASS INDEX: 37.62 KG/M2 | HEART RATE: 110 BPM | SYSTOLIC BLOOD PRESSURE: 110 MMHG | OXYGEN SATURATION: 99 % | WEIGHT: 254 LBS

## 2021-06-03 VITALS — HEIGHT: 69 IN | BODY MASS INDEX: 38.27 KG/M2 | WEIGHT: 258.4 LBS

## 2021-06-03 VITALS
WEIGHT: 262.5 LBS | DIASTOLIC BLOOD PRESSURE: 76 MMHG | HEART RATE: 122 BPM | TEMPERATURE: 98.1 F | RESPIRATION RATE: 18 BRPM | OXYGEN SATURATION: 99 % | SYSTOLIC BLOOD PRESSURE: 133 MMHG | BODY MASS INDEX: 38.88 KG/M2 | HEIGHT: 69 IN

## 2021-06-03 VITALS — BODY MASS INDEX: 38.1 KG/M2 | WEIGHT: 258 LBS

## 2021-06-03 VITALS — BODY MASS INDEX: 38.47 KG/M2 | WEIGHT: 260.5 LBS

## 2021-06-03 VITALS — HEIGHT: 69 IN | WEIGHT: 263 LBS | BODY MASS INDEX: 38.95 KG/M2

## 2021-06-03 VITALS — WEIGHT: 263 LBS | BODY MASS INDEX: 38.84 KG/M2

## 2021-06-03 VITALS — WEIGHT: 253.6 LBS | BODY MASS INDEX: 37.45 KG/M2

## 2021-06-03 NOTE — TELEPHONE ENCOUNTER
Orders being requested: Pair compression socks  Reason service is needed/diagnosis: edema  When are orders needed by: as soon as possible   Where to send Orders: Phone:  [phone] Keily Murillo  Okay to leave detailed message?  Yes  [phone]

## 2021-06-03 NOTE — TELEPHONE ENCOUNTER
Name of form/paperwork: [facility]mv form  Have you been seen for this request yes   When is form needed: ASAP    Call for : yes   Phone Number: [phone]  Okay to leave a detailed message?  Yes       Fax form:   Fax Number:     Patient Notified form requests are processed in 3-5 business days:  Yes (If patient needs form sooner, please note that in this message.)      In [place] mailbox 11/12

## 2021-06-03 NOTE — PROGRESS NOTES
Assessment:   Leandro Brewer is a 64 y.o. y.o. male with past medical history significant for diverticulosis, microcytic anemia, asthma, type 2 diabetes mellitus, obesity, hypertension, hyperlipidemia, history of MRSA infection who presents today for follow-up regarding chronic right low back pain with radiation into the right lower extremity patient has a history of partial laminectomy at L4-5 by Dr. Camacho 11 years ago.  MRI lumbar spine from September 2018 shows what I would describe is moderate spinal stenosis at L3-4 related to a moderate diffuse annular bulge and facet arthropathy.  Pain follows a right L3 distribution.  Pain is been refractory to conservative treatment including physical therapy and neuropathic pain medication.  He was neurologically intact on exam.           Plan:     A shared decision making plan was used.  The patient's values and choices were respected.  The following represents what was discussed and decided upon by the physician assistant and the patient.      1.  DIAGNOSTIC TESTS: I reviewed the MRI lumbar spine from September 2018.  If patient fails to improve with a right L3-4 transforaminal epidural steroid injection, recommend obtaining an updated MRI lumbar spine for further evaluation.    2.  PHYSICAL THERAPY: Patient is currently in physical therapy at [facility] for rehabilitation after his hip replacement.  He went to physical therapy at [facility] for his lower back in July 2019 he still doing his home exercises on a regular basis..    3.  MEDICATIONS:   -Patient will continue to gabapentin 300 mill grams 3 times daily.  -Patient can also use Tylenol and ibuprofen as needed.    4.  INTERVENTIONS:   -I offer the patient a right L3-4 transforaminal epidural steroid injection.  Patient indicated he would like to proceed and an order was placed.    5.  PATIENT EDUCATION: Patient is in agreement the above plan.  All questions were answered.    6.  FOLLOW-UP: I will see  the patient back in the clinic for 2-week post procedure follow-up visit after the right L3-4 transforaminal epidural steroid injection.  If he has questions or concerns in the meantime, he should not hesitate to call.    Subjective:     Leandro Brewer is a 64 y.o. male who presents today for follow-up regarding chronic right low back pain with radiation into the right lower extremity which has been worse over the past 8 weeks..  I last saw the patient on July 8, 2019.  At that time he had 99% improvement in symptoms with physical therapy and gabapentin.  I recommended the patient try to wean off of gabapentin and continue home exercises.  Patient states that he weaning off of the gabapentin and by the time he got to 1 pill twice daily his pain worsened again.  He states that his pain increased about 8 weeks ago.  He attributed this to compensating with his right leg for his left hip pain.  He had bone-on-bone arthritis in the left hip.  He had a left hip replacement surgery on October 15.  He has done very well after that surgery.  He was hopeful that once the left hip was feeling better he would be able to walk normally and his right leg would hurt less.  Unfortunately, the right leg pain persists.    Patient complains of right-sided low back pain.  Pain radiates in the right buttock, wraps around the right lateral hip into the right anterior thigh.  Pain extends down the right anterior thigh to the knee.  He denies any pain radiating distal to the knee.  He has numbness and tingling along the right lateral thigh which is been present since his right hip replacement surgery in February (he had similar left lateral hip numbness since his left hip replacement surgery).  He states that he has intermittent weakness in the right leg.  He states that when pain becomes severe if he is walking, feels like the right leg could go out from under him.  He rates his pain today as a 2 out of 10.  At its worst it is a 10-10.   At its best it is a 2 out of 10.  Pain is aggravated with walking and coughing and alleviated with sitting and lying down.    Patient is currently in physical therapy at [facility] for rehabilitation after hip replacement.  He attended 3 sessions of physical therapy for his lower back in July 2019.  He still doing his home exercises for his back on a regular basis.  He uses gabapentin 300 mill grams 3 times daily.  He uses Tylenol and ibuprofen as needed.    Past medical history is reviewed and is pertinent for having left [facility] October 15, 2019.  He notes that he did receive clearance from his hip surgeon to have a back injection at his follow-up visit for his hip replacement surgery earlier this week.  It is also pertinent for admission to the hospital for right lower extremely cellulitis discharge September 11, 2019..    Review of Systems:  Positive for numbness/tingling, weakness.  Negative loss of bowel/bladder control, inability to urinate, headache, pain much worse at night, trip/stumble/falls, difficulty swallowing, difficulty with hand skills, fevers, unintentional weight loss.     Objective:   CONSTITUTIONAL:  Vital signs as above.  No acute distress.  The patient is well nourished and well groomed.    PSYCHIATRIC:  The patient is awake, alert, oriented to person, place and time.  The patient is answering questions appropriately with clear speech.  Normal affect.  HEENT: Normocephalic, atraumatic.  Sclera clear.    SKIN:  Skin over the face, posterior torso, bilateral upper and lower extremities is clean, dry, intact without rashes.  MUSCULOSKELETAL:  Gait is mildly antalgic, favoring the right.  The patient is able to heel and toe walk without any difficulty.  No significant tenderness over the bilateral lower lumbar paraspinal muscles.      The patient has 5/5 strength for the bilateral hip flexors, knee flexors/extensors, ankle dorsiflexors/plantar flexors, ankle evertors/invertors.    NEUROLOGICAL:  1+ patellar, 2+ Achilles reflexes which are symmetric bilaterally.  No ankle clonus bilaterally.  Subjective diminished/altered sensation bilateral Proximal lateral thigh.     RESULTS:     I reviewed the MRI lumbar spine from [facility] dated September 27, 2018.  At L2-3 there is mild bilateral lateral recess stenosis.  At L3-4 there is what I would describe as moderate spinal canal stenosis with bilateral lateral recess stenosis and minimal bilateral foraminal stenosis.  At L4-5 there is postoperative change of a partial laminectomy with mild central canal stenosis secondary to facet arthropathy.  There appears to be a small right juxta facet synovial cyst which could cause lateral recess stenosis at this level.  At L5-S1 there is mild right lateral recess stenosis.  Please see report for further details.     EXAM: XR HIP RIGHT 2 OR MORE VWS  LOCATION: [facility]  DATE/TIME: 6/5/2019 9:10 AM     INDICATION: Pain in right hip  COMPARISON: None.     FINDINGS: Right hip arthroplasty with components in good position. Moderate degenerative joint disease left hip.

## 2021-06-03 NOTE — TELEPHONE ENCOUNTER
Medication refill request declined: already responded to by other means     Disp Refills Start End    TRULICITY 1.5 mg/0.5 mL PnIj 6 mL 5 11/14/2019     Sig: INJECT 1.5MG (0.5 ML) SUBCUTANEOUSLY EVERY 7 DAYS    Sent to pharmacy as: Trulicity 1.5 mg/0.5 mL subcutaneous pen injector (dulaglutide)    E-Prescribing Status: Receipt confirmed by pharmacy (11/14/2019  6:55 AM CST)      Valorie Bennett RN BS [facility] Triage/Med Refill 11/15/2019 9:30 AM

## 2021-06-03 NOTE — TELEPHONE ENCOUNTER
RN cannot approve Refill Request    RN can NOT refill this medication Protocol failed and NO refill given.       Nuvia Milan, [facility] Triage/Med Refill 11/14/2019    Requested Prescriptions   Pending Prescriptions Disp Refills     TRULICITY 1.5 mg/0.5 mL PnIj [Pharmacy Med Name: TRULICITY 1.5MG/0.5ML SOPN] 6 mL 0     Sig: INJECT 1.5MG (0.5 ML) SUBCUTANEOUSLY EVERY 7 DAYS       Insulin/GLP-1 Refill Protocol Failed - 11/14/2019  3:20 AM        Failed - Microalbumin in last year     Microalbumin, Random Urine   Date Value Ref Range Status   10/02/2018 <0.50 0.00 - 1.99 mg/dL Final                  Passed - Visit with PCP or prescribing provider visit in last 6 months     Last office visit with prescriber/PCP: 9/17/2019 OR same dept: 9/17/2019 Josh Sahfer MD OR same specialty: 9/17/2019 Josh Shafre MD Last physical: 10/1/2019 Last MTM visit: Visit date not found     Next appt within 3 mo: Visit date not found  Next physical within 3 mo: Visit date not found  Prescriber OR PCP: Josh Shafer MD  Last diagnosis associated with med order: 1. Uncontrolled type 2 diabetes mellitus with hyperglycemia, with long-term current use of insulin (H)  - TRULICITY 1.5 mg/0.5 mL PnIj [Pharmacy Med Name: TRULICITY 1.5MG/0.5ML SOPN]; INJECT 1.5MG (0.5 ML) SUBCUTANEOUSLY EVERY 7 DAYS  Dispense: 6 mL; Refill: 0    If protocol passes may refill for 6 months if within 3 months of last provider visit (or a total of 9 months).              Passed - A1C in last 6 months     Hemoglobin A1c   Date Value Ref Range Status   09/09/2019 7.5 (H) 4.2 - 6.1 % Final               Passed - Blood pressure in last year     BP Readings from Last 1 Encounters:   10/01/19 110/70             Passed - Creatinine done in last year     Creatinine   Date Value Ref Range Status   10/01/2019 0.91 0.70 - 1.30 mg/dL Final

## 2021-06-03 NOTE — TELEPHONE ENCOUNTER
Last  Colonoscopy was on 4/1/2014  Patient instructed to repeat this again in 5 years.     Order pended for approval

## 2021-06-03 NOTE — TELEPHONE ENCOUNTER
RN cannot approve Refill Request    RN can NOT refill this medication Protocol failed and NO refill given. Last office visit: 9/17/2019 Josh Shafer MD Last Physical: 10/1/2019 Last MTM visit: Visit date not found Last visit same specialty: 9/17/2019 Josh Shafer MD.  Next visit within 3 mo: Visit date not found  Next physical within 3 mo: Visit date not found      Kathya Harrison, [facility] Triage/Med Refill 11/16/2019    Requested Prescriptions   Pending Prescriptions Disp Refills     TRULICITY 1.5 mg/0.5 mL PnIj [Pharmacy Med Name: TRULICITY 1.5MG/0.5ML SOPN] 6 mL 0     Sig: INJECT 1.5MG (0.5 ML) SUBCUTANEOUSLY EVERY 7 DAYS       Insulin/GLP-1 Refill Protocol Failed - 11/16/2019  3:20 AM        Failed - Microalbumin in last year     Microalbumin, Random Urine   Date Value Ref Range Status   10/02/2018 <0.50 0.00 - 1.99 mg/dL Final                  Passed - Visit with PCP or prescribing provider visit in last 6 months     Last office visit with prescriber/PCP: 9/17/2019 OR same dept: 9/17/2019 Josh Shafer MD OR same specialty: 9/17/2019 Josh Shafer MD Last physical: 10/1/2019 Last MTM visit: Visit date not found     Next appt within 3 mo: Visit date not found  Next physical within 3 mo: Visit date not found  Prescriber OR PCP: Josh Shafer MD  Last diagnosis associated with med order: 1. Uncontrolled type 2 diabetes mellitus with hyperglycemia, with long-term current use of insulin (H)  - TRULICITY 1.5 mg/0.5 mL PnIj [Pharmacy Med Name: TRULICITY 1.5MG/0.5ML SOPN]; INJECT 1.5MG (0.5 ML) SUBCUTANEOUSLY EVERY 7 DAYS  Dispense: 6 mL; Refill: 0    If protocol passes may refill for 6 months if within 3 months of last provider visit (or a total of 9 months).              Passed - A1C in last 6 months     Hemoglobin A1c   Date Value Ref Range Status   09/09/2019 7.5 (H) 4.2 - 6.1 % Final               Passed - Blood pressure in last year     BP Readings from Last 1 Encounters:   10/01/19  110/70             Passed - Creatinine done in last year     Creatinine   Date Value Ref Range Status   10/01/2019 0.91 0.70 - 1.30 mg/dL Final

## 2021-06-03 NOTE — TELEPHONE ENCOUNTER
Per [facility] : They have called patient twice with no response. I sent a weipass message to patient with the contact information to [facility] so he can call and get himself scheduled.

## 2021-06-04 VITALS — BODY MASS INDEX: 39.72 KG/M2 | WEIGHT: 269 LBS

## 2021-06-04 VITALS — BODY MASS INDEX: 37.51 KG/M2 | WEIGHT: 254 LBS

## 2021-06-04 NOTE — TELEPHONE ENCOUNTER
Per Dr. Martinez: Nurse navigation to call the patient and let him know his MRI does not show any significant changes compared to his one in September of 2018.  His xrays do show transitional anatomy which does not mean anything significant, except it helps to guide any future injections.     Phone call to pt to discuss. Explained treatment plan/options had not been given by covering provider. Explained Antonieta MUNIZ is back in clinic on Thursday, 1/2/20. He will receive a phone call with treatment recommendations then. Patient is OK with this.

## 2021-06-04 NOTE — PATIENT INSTRUCTIONS - HE
Gabapentin 300mg Dosing Chart    DATE  MORNING AFTERNOON BEDTIME    Day 1 0 0 1    Day 2 0 0 1    Day 3 0 0 1    Day 4 1 0 1    Day 5 1 0 1    Day 6 1 0 1    Day 7 1 1 1    Day 8 1 1 1    Day 9 1 1 1    Day 10 1 1 2    Day 11 1 1 2    Day 12 1 1 2    Day 13 2 1 2    Day 14 2 1 2    Day 15 2 1 2    Day 16 2 2 2    Day 17 2 2 2    Day 18 2 2 2    Day 19 2 2 3    Day 20 2 2 3    Day 21 2 2 3    Day 22 3 2 3    Day 23 3 2 3    Day 24 3 2 3    Day 25 3 3 3    Day 26 3 3 3    Day 27 3 3 3     Continue medication, taking 3 capsules three times daily    Please call if you have any questions regarding how to take your medication  Clinic Phone # [phone]

## 2021-06-04 NOTE — TELEPHONE ENCOUNTER
RN cannot approve Refill Request    RN can NOT refill this medication med is not covered by policy/route to provider. Last office visit: 9/17/2019 Josh Shafer MD Last Physical: 10/1/2019 Last MTM visit: Visit date not found Last visit same specialty: 9/17/2019 Josh Shafer MD.  Next visit within 3 mo: Visit date not found  Next physical within 3 mo: Visit date not found      Yvonne Arriaza, [facility] Triage/Med Refill 12/7/2019    Requested Prescriptions   Pending Prescriptions Disp Refills     terbinafine HCl (LAMISIL) 250 mg tablet 84 tablet 0     Sig: Take 1 tablet (250 mg total) by mouth daily.       There is no refill protocol information for this order

## 2021-06-04 NOTE — PROGRESS NOTES
Assessment/Plan:    1. Uncontrolled type 2 diabetes mellitus with hyperglycemia, with long-term current use of insulin (H)  Type 2 diabetes with A1c stable at 7.8%.  A1c goal remains less than 7.5% ideally.  Continue current management with follow-up evaluation in next 3 to 4 months anticipated.  Trulicity 1.5 mg/week along with Metformin extended release 500 mg using 2 tablets twice daily and Lantus 40 units daily.  - Glycosylated Hemoglobin A1c  - Microalbumin, Random Urine  - Comprehensive Metabolic Panel    2. Essential hypertension  Hypertension stable.  Lisinopril 20 mg daily continues.  - Comprehensive Metabolic Panel    3. Hyperlipidemia, unspecified hyperlipidemia type  Patient remains on simvastatin 40 mg at bedtime for lipid management.  Lipid cascade obtained.  - Comprehensive Metabolic Panel    4. Diabetic polyneuropathy associated with type 2 diabetes mellitus (H)  History of polyneuropathy, stable.    5. History of cellulitis  Prior history of recurrent cellulitis with no current concerns.    6. Lymphedema  Lymphedema, stable.    7. 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  - HM2(CBC w/o Differential)    8. Class 2 severe obesity due to excess calories with serious comorbidity and body mass index (BMI) of 36.0 to 36.9 in adult (H)  Dietary and exercise modification for weight goal less than 250 pounds initially, less than 240 pounds ideally.    9. Spinal stenosis of lumbar region, unspecified whether neurogenic claudication present  Spinal stenosis.  Patient has scheduled injection January 7, 2020 through spine care clinic at L4-L5 with noted mild central stenosis.    10. Onychomycosis  Onychomycosis.  Completing 12-week course of terbinafine 250 mg daily.  Nail trimming performed today due to dystrophic nail changes throughout.    11. Encounter for therapeutic drug monitoring  Med monitoring with CBC and LFTs while on terbinafine.  - Comprehensive Metabolic  "Panel    12. Mild persistent asthma  Continues Qvar and albuterol metered-dose inhaler as noted.  Asthma control test 24 out of 25.      40 minutes total time with patient, > 50% with counseling and coordination of cares.     The following high BMI interventions were performed this visit: encouragement to exercise, weight monitoring, weight loss from baseline weight and lifestyle education regarding diet .  Ensure ongoing efforts to achieve weight goal < 250 pounds initially, < 240 pounds ideally.         Subjective:    Leandro Brewer is seen today for multiple concerns.  Type 2 diabetes.  Continues use of Trulicity 1.5 mg/week, metformin extended release 500 mg using 2 tablets twice daily and Lantus insulin 40 units/day.  No hypoglycemic episodes.  Simvastatin 40 mg at bedtime for lipid management.  Lisinopril 20 mg daily for hypertension management.  Dystrophic nails with onychomycosis history.  Using terbinafine 250 mg daily to complete 12-week course.  Needs nail trimming performed today.  Wife unable to help.  Lower back pain.  Spinal stenosis history.  Has scheduled injection L4-L5 on the right side 2020 apparently.  Persistent asthma history.  No recent exacerbation.  Not requiring albuterol metered-dose inhaler on regular basis.  Has Qvar available 80 mcg using 2 puffs twice daily.  Microcytic anemia with hemoglobin Tracey trait.  Recurrent cellulitis history however no recent concerns.  Status post left total hip arthroplasty with Dr. Evans October 15, 2019 with appropriate healing.  Has been cleared to return to work by Dr. Evans.  Comprehensive review of systems as above otherwise all negative.     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 Thompson); right inguinal hernia age 6 months; right total hip arthroplasty at [facility] February 5, 2019 with Dr. Evans.   Hospitalizations: early 20s \"infected gallbladder\" WITHOUT surgery... ; cellulitis in legs (hospitalized twice); abdominal wall cyst requiring IV antibiotics x 4 days... ; 4/7/19-4/12/19 [facility] for right LE cellulitis/sepsis; right LE cellulitis 9/8/19-9/9/19 ([facility]); 10/15/19 s/p left MELITA (Dr. Evans)  Would like a 90 day supply on all meds.   [facility] for eye exams, will schedule at [facility]   FYI: see lab result \"[facility] [employer]\" from 4/19/11 re: hemoglobin Tracey trait resulting in mild microcyctic anemia (look for further cause of anemia if Hb < 11.0)    Past Surgical History:   Procedure Laterality Date     BACK SURGERY       HERNIA REPAIR Right     inguinal; child        Family History   Problem Relation Age of Onset     Heart attack Mother      Obesity Mother      Heart disease Father      Cancer Father         throat        Past Medical History:   Diagnosis Date     Anemia      Arthritis      Diabetes mellitus (H)      Diabetes mellitus, type II (H)      Hypertension      Obesity      Plantar fasciitis         Social History     Tobacco Use     Smoking status: Former Smoker     Smokeless tobacco: Never Used   Substance Use Topics     Alcohol use: Yes     Drug use: No        Current Outpatient Medications   Medication Sig Dispense Refill     acetaminophen (TYLENOL) 500 MG tablet Take 1,000 mg by mouth 3 (three) times a day. Try to limit intake to 3000 mg a day             albuterol (VENTOLIN HFA) 90 mcg/actuation inhaler Inhale 2 puffs 4 (four) times a day as needed for wheezing. 18 g 6     ascorbic acid, vitamin C, (VITAMIN C) 500 MG tablet Take 500 mg by mouth daily.       aspirin 81 mg chewable tablet Chew 81 mg daily.       b complex vitamins (VITAMINS B COMPLEX) capsule Take 1 capsule by " "mouth.       blood-glucose meter kit [employer] Translate Contour Monitor KIT  Use as Directed       CONTOUR NEXT EZ METER Misc Use daily for blood sugar testing 1 each 5     CONTOUR NEXT TEST STRIPS strips TEST 3 TIMES DAILY 300 strip 3     furosemide (LASIX) 40 MG tablet Take 20 mg by mouth daily. 1/2 tab       gabapentin (NEURONTIN) 300 MG capsule Take 1 capsule (300 mg total) by mouth 3 (three) times a day. Increase by 1 tab every 3 days.  Max dose 900 mg tid 270 capsule 2     insulin glargine (LANTUS SOLOSTAR U-100 INSULIN) 100 unit/mL (3 mL) pen Inject 40 Units under the skin every morning. 30 mL 5     LANCETS MISC [employer] Micro[employer] MISC  Check blood sugar 2 times per day and as needed       lisinopril (PRINIVIL,ZESTRIL) 20 MG tablet Take 1 tablet (20 mg total) by mouth daily. 90 tablet 3     magnesium oxide 500 mg Tab Take 500 mg by mouth daily.              metFORMIN (GLUCOPHAGE-XR) 500 MG 24 hr tablet Take 2 tablets (1,000 mg total) by mouth 2 (two) times a day with meals. 360 tablet 3     multivitamin (MULTIPLE VITAMINS DAILY) per tablet Take 1 tablet by mouth daily.       nystatin (MYCOSTATIN) powder Apply 1 application topically 3 (three) times a day. Groin rash       pen needle, diabetic (BD ULTRA-FINE BRANDON PEN NEEDLE) 32 gauge x 5/32\" Ndle Use 1 per day. 100 each 3     penicillin VK (PEN VK) 500 MG tablet Take 1 tablet (500 mg total) by mouth 2 (two) times a day. 60 tablet 5     potassium 99 mg Tab Take 1 tablet by mouth 2 (two) times a day.              QVAR REDIHALER 80 mcg/actuation HFAB inhaler INHALE 2 PUFFS 2 TIMES DAILY -RINSE MOUTH AFTER USE --DO NOT SHAKE UNIT-- 10.6 g 5     sildenafil (REVATIO) 20 mg tablet Take 2-3 tablets (40-60 mg total) by mouth daily as needed. 30 tablet 5     simvastatin (ZOCOR) 40 MG tablet Take 40 mg by mouth at bedtime.       terbinafine HCl (LAMISIL) 250 mg tablet Take 1 tablet (250 mg total) by mouth daily. 84 tablet 0     TRULICITY 1.5 mg/0.5 mL PnIj INJECT 1.5MG (0.5 ML) " SUBCUTANEOUSLY EVERY 7 DAYS 6 mL 5     No current facility-administered medications for this visit.           Objective:    Vitals:    01/02/20 0832   BP: 138/78   Pulse: (!) 124   SpO2: 97%   Weight: (!) 269 lb (122 kg)      Body mass index is 39.72 kg/m .    Alert.  No apparent distress.  Does transfer slowly due to lower back pain.  Dystrophic nails bilateral feet.  Foot soaks and nail trimming performed personally.  Trace edema only.  No calf tenderness or erythema.  Chest clear.  Cardiac exam regular.  Neuropathy noted with decreased monofilament testing previously.        EXAM: MR LUMBAR SPINE WO CONTRAST  LOCATION: [facility]  DATE/TIME: 12/28/2019 10:43 AM     INDICATION: Chronic right lower back pain and right leg pain, history of lumbar surgery February 2008.  COMPARISON: X-rays 12/28/2019, MRI 9/27/2018.  TECHNIQUE: Without IV contrast.     FINDINGS:   Nomenclature is based on 5 lumbar type vertebral bodies. The L5 segment is slightly sacralized on the left. No fracture. No significant marrow signal abnormality. Slight convex left lumbar curve. Normal distal spinal cord and cauda equina with conus   medullaris at L1. No extraspinal abnormality. Mild degenerative change of the SI joints.     T12-L1: Disc dehydration. Mild disc space loss. Anterior interbody spurring. Mild annular bulge. Very small shallow right paracentral disc protrusion. Mild degenerative facet arthropathy. No central canal stenosis. No foraminal stenosis.      L1-L2: Disc dehydration. Mild disc space loss. Mild interbody spurring. Annular bulge. Mild degenerative facet arthropathy. Minimal central canal stenosis. No foraminal stenosis.     L2-L3: Mild disc dehydration. Normal disc height. Anterior body spurring. Mild annular bulge. Mild degenerative facet arthropathy. No central canal stenosis. No foraminal stenosis.      L3-L4: Disc dehydration. Mild disc space loss. Interbody spurring. Diffuse annular bulge. Moderate facet  arthropathy and ligamentum flavum thickening. Mild central canal stenosis and bilateral lateral recess stenosis. No foraminal stenosis.     L4-L5: Minimal anterolisthesis L4 on L5. Previous partial laminectomy. Disc dehydration. Mild disc space loss. No herniation. Advanced degenerative facet arthropathy. Mild narrowing of the central canal transversely unchanged. No foraminal stenosis.     L5-S1: Disc dehydration. Mild disc space loss. Mild interbody spurring. No herniation. Mild to moderate degenerative facet arthropathy. No central canal stenosis. No foraminal stenosis.     IMPRESSION:   1.  Degenerative changes in the lumbar spine as described above.  2.  At L3-L4 there is mild central canal stenosis and bilateral lateral recess stenosis.  3.  At L4-L5 there is mild narrowing of the central canal transversely, unchanged.  4.  At L1-L2 there is minimal central canal stenosis.  5.  No foraminal stenosis.  6.  No new abnormality.        EXAM: XR LUMBAR SPINE 2 OR 3 VWS  LOCATION: [facility]  DATE/TIME: 12/28/2019 10:51 AM     INDICATION: Back pain. Evaluate for transitional anatomy.   COMPARISON: Lumbar spine MRI 12/28/2019.     IMPRESSION:   There is a partial sacralization of the lowest lumbar segment which will be considered L5. There is grade 1 spondylolisthesis of L4 on L5. 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 No compression fracture. Vertebral body heights are within normal   limits. There is mild loss of disc height at L3-L4 and L4-L5 and moderate loss of disc height at L5-S1. There are bridging osteophytes in the lower thoracic spine and there are moderate to large osteophytes in the mid and lower lumbar spine.      This note has been dictated using voice recognition software and as a result may contain minor grammatical errors and unintended word substitutions.

## 2021-06-04 NOTE — TELEPHONE ENCOUNTER
"Per Antonieta MUNIZ: \"Please call the patient let him know I reviewed his MRI lumbar spine.  I recommend a right L4-5 transforaminal epidural steroid injection as the next step.\"    Patient calling to get the official reading of his MRI and the recommendations from his PSP. Information given. Order placed in Epic. Injection requirements reviewed with patient. Stated understanding. Transferred to scheduling to make appointment.    "

## 2021-06-04 NOTE — PROGRESS NOTES
Assessment:   Leandro Brewer is a 64 y.o. y.o. male with past medical history significant for diverticulosis, microcytic anemia, asthma, type 2 diabetes mellitus, obesity, hypertension, hyperlipidemia, history of MRSA infection who presents today for follow-up regarding chronic right low back pain with radiation into the right lower extremity.  Patient has a history of partial laminectomy at L4-5 by Dr. Thompson 11 years ago.  MRI lumbar spine from September 2018 shows what I would describe is moderate spinal stenosis at L3-4 related to a moderate diffuse annular bulge and facet arthropathy.  Pain follows a right L3 distribution.  Patient is status post a right L3-4 transforaminal epidural steroid injection December 5, 2019 which provided 80 to 90% relief of his pain but only lasted for 1 day (yesterday).  I am concerned that the patient may have more significant neural impingement now compared with his scan in 2018.  Pain does most closely follow a right L3 distribution, although he states occasionally he feels pain in the right calf and numbness and tingling in the toes of the right foot which would not be consistent with right L3 radiculitis.           Plan:     A shared decision making plan was used.  The patient's values and choices were respected.  The following represents what was discussed and decided upon by the physician assistant and the patient.      1.  DIAGNOSTIC TESTS: I reviewed the MRI lumbar spine from September 2018.  I did not dated MRI lumbar spine for further evaluation.  I also ordered AP and lateral lumbar spine x-rays to evaluate for possible transitional anatomy.    2.  PHYSICAL THERAPY: Patient is currently in physical therapy at [facility].  They are treating both his hip and his lower back.    3.  MEDICATIONS:   -Patient has been using gabapentin 300 g 3 times daily.  He tolerates this well.  We will have him titrate his dose up to a maximum of 900 mg 3 times daily.  He was given the  dosage titration chart.  -Patient can also use Tylenol and ibuprofen as needed.    4.  INTERVENTIONS: No interventions were ordered.  We will await the results of the MRI lumbar spine.  Depending on what that shows, we may wish to try a second lumbar epidural steroid injection.    5.  PATIENT EDUCATION: Patient is in agreement the above plan.  All questions were answered.  -I told the patient that we may also need to have him see spine surgery.  Patient had his surgery with Dr. Thompson, who is retiring at the end of the month.  We would have him see [facility] neurosurgery if needed.    6.  FOLLOW-UP: A nurse will call the patient with results the MRI lumbar spine.  At that time I will either order another epidural steroid injection versus neurosurgical referral.  If he has questions or concerns in the meantime, he should not hesitate to call.    Subjective:     Leandro Brewer is a 64 y.o. male who presents today for follow-up regarding chronic right low back pain with radiation into the right lower extremity.  Patient status post a right L3-4 transforaminal epidural steroid injection December 5, 2019.  Patient reports he had 80-90% relief of his pain for only 1 day after the injections, which was yesterday.  Patient states that yesterday he felt like the pain down the leg was less severe during the day, however, that he went [employer] shopping yesterday evening and pain returned.    Patient complains of right-sided low back pain.  Pain radiates into the right buttock, wraps around the right lateral hip, and radiates down the right anterior/lateral thigh.  Patient reports that the most severe area of pain is the right anterior/lateral thigh to the knee.  He states that when the pain is at its worst he also feels pain extending into the right calf.  He has intermittent numbness and tingling in the toes the right foot.  He states that he has a sensation of weakness when he gets a shooting pain down the leg  which she describes as feeling as the leg going out from under him.  He rates his pain today as a 1 out of 10.  At its worst it is a 9 out of 10.  At its best it is a 0-10.  Pain is aggravated with walking and alleviated with lying down.    Patient is currently in physical therapy at [facility].  This had been rehab for his hip but he states his therapist is now working more on his back.  Patient also attended 3 sessions of physical therapy for his lower back in July 2019.    He uses gabapentin 300 mill grams 3 times daily.  He uses Tylenol and ibuprofen as needed.    Past medical history is reviewed and is unchanged.    Review of Systems:  Positive for numbness/tingling, weakness.  Negative loss of bowel/bladder control, inability to urinate, headache, pain much worse at night, trip/stumble/falls, difficulty swallowing, difficulty with hand skills, fevers, unintentional weight loss.     Objective:   CONSTITUTIONAL:  Vital signs as above.  No acute distress.  The patient is well nourished and well groomed.    PSYCHIATRIC:  The patient is awake, alert, oriented to person, place and time.  The patient is answering questions appropriately with clear speech.  Normal affect.  HEENT: Normocephalic, atraumatic.  Sclera clear.    SKIN:  Skin over the face, posterior torso, bilateral upper and lower extremities is clean, dry, intact without rashes.  MUSCULOSKELETAL:  Gait is mildly antalgic, favoring the right.  The patient is able to heel and toe walk without any difficulty.  No significant tenderness over the bilateral lower lumbar paraspinal muscles.      The patient has 5/5 strength for the bilateral hip flexors, knee flexors/extensors, ankle dorsiflexors/plantar flexors, ankle evertors/invertors.    NEUROLOGICAL: 2+ patellar, 1+ Achilles reflexes which are symmetric bilaterally.  No ankle clonus bilaterally.  Sensation to light touch is intact over bilateral lower extremities.     RESULTS:     I reviewed the MRI  lumbar spine from [facility] dated September 27, 2018.  At L2-3 there is mild bilateral lateral recess stenosis.  At L3-4 there is what I would describe as moderate spinal canal stenosis with bilateral lateral recess stenosis and minimal bilateral foraminal stenosis.  At L4-5 there is postoperative change of a partial laminectomy with mild central canal stenosis secondary to facet arthropathy.  There appears to be a small right juxta facet synovial cyst which could cause lateral recess stenosis at this level.  At L5-S1 there is mild right lateral recess stenosis.  Please see report for further details.     EXAM: XR HIP RIGHT 2 OR MORE VWS  LOCATION: [facility]  DATE/TIME: 6/5/2019 9:10 AM     INDICATION: Pain in right hip  COMPARISON: None.     FINDINGS: Right hip arthroplasty with components in good position. Moderate degenerative joint disease left hip.

## 2021-06-04 NOTE — TELEPHONE ENCOUNTER
RN cannot approve Refill Request    RN can NOT refill this medication med is not covered by policy/route to provider. Last office visit: 9/17/2019 Josh Shafer MD Last Physical: 10/1/2019 Last MTM visit: Visit date not found Last visit same specialty: 9/17/2019 Josh Shafer MD.  Next visit within 3 mo: Visit date not found  Next physical within 3 mo: Visit date not found      Yvonne Arriaza, [facility] Triage/Med Refill 12/7/2019    Requested Prescriptions   Pending Prescriptions Disp Refills     terbinafine HCl (LAMISIL) 250 mg tablet [Pharmacy Med Name: TERBINAFINE 250MG ORAL TABLET] 28 tablet      Sig: TAKE ONE TABLET BY MOUTH ONCE DAILY       There is no refill protocol information for this order

## 2021-06-04 NOTE — TELEPHONE ENCOUNTER
Question following Office Visit  When did you see your provider: today  What is your question: We discussed getting the handicap tag form filled out but this never happened. Please return call to discuss this.  Okay to leave a detailed message: Yes

## 2021-06-04 NOTE — PATIENT INSTRUCTIONS - HE
Follow-up visit in 2 weeks with Antonieta MUNIZ to discuss injection outcome and determine care plan going forward.  Please be advised that your blood glucose levels may be increased for the next 5-7 days as a result of the steroid you received with your injection today.  It is recommended that you monitor your blood glucose levels closely over the next week and direct any additional questions regarding your blood glucose management to your primary doctor.       DISCHARGE INSTRUCTIONS    During office hours (8:00 a.m.- 4:00 p.m.) questions or concerns may be answered  by calling Spine Center Navigation Nurses at  [phone].  Messages received after hours will be returned the following business day.      In the case of an emergency, please dial 911 or seek assistance at the nearest Emergency Room/Urgent Care facility.     All Patients:    ? You may experience an increase in your symptoms for the first 2 days (It may take anywhere between 2 days- 2 weeks for the steroid to have maximum effect).    ? You may use ice on the injection site, as frequently as 20 minutes each hour if needed.    ? You may take your pain medicine.    ? You may continue taking your regular medication after your injection. If you have had a Medial Branch Block you may resume pain medication once your pain diary is completed.    ? You may shower. No swimming, tub bath or hot tub for 48 hours.  You may remove your bandaid/bandage as soon as you are home.    ? You may resume light activities, as tolerated.    ? Resume your usual diet as tolerated.    ? It is strongly advised that you do not drive for 1-3 hours post injection.    ? If you have had oral sedation:  Do not drive for 8 hours post injection.      ? If you have had IV sedation:  Do not drive for 24 hours post injection.  Do not operate hazardous machinery or make important personal/business decisions for 24 hours.      POSSIBLE STEROID SIDE EFFECTS (If steroid/cortisone was used for  your procedure)    -If you experience these symptoms, it should only last for a short period      Swelling of the legs                Skin redness (flushing)       Mouth (oral) irritation     Blood sugar (glucose) levels              Sweats                      Mood changes    Headache    Sleeplessness         POSSIBLE PROCEDURE SIDE EFFECTS  -Call the Spine Center if you are concerned    Increased Pain             Increased numbness/tingling        Nausea/Vomiting            Bruising/bleeding at site        Redness or swelling                                                Difficulty walking        Weakness             Fever greater than 100.5    *In the event of a severe headache after an epidural steroid injection that is relieved by lying down, please call the [facility] to speak with a clinical staff member*

## 2021-06-04 NOTE — TELEPHONE ENCOUNTER
Patient had called and left message on nurse navigation line at 114 PM. Asking about results of MRI and x-rays he had done on 12/28/19. Patient seen in clinic by Antonieta MUNIZ on 12/19/19.

## 2021-06-05 VITALS — BODY MASS INDEX: 37.18 KG/M2 | HEIGHT: 69 IN | WEIGHT: 251 LBS

## 2021-06-05 VITALS — WEIGHT: 261 LBS | HEIGHT: 69 IN | BODY MASS INDEX: 38.66 KG/M2

## 2021-06-05 NOTE — TELEPHONE ENCOUNTER
"Patient's wife was given Dr Porras's advice below. To avoid any other confusion, we will not call again.     \"If he can get the medication right away and administer it before noon or shortly thereafter I think it would be reasonable to do the remaining dose.  If he is not going to be able to administer the medication until tonight I would not recommend giving the remaining dose as this could lead to unpredictable blood sugars including hypoglycemia.\"  "

## 2021-06-05 NOTE — TELEPHONE ENCOUNTER
Refill Approved    Rx renewed per Medication Renewal Policy. Medication was last renewed on 12/19/19.    Nuvia Milan, [facility] Triage/Med Refill 2/4/2020     Requested Prescriptions   Pending Prescriptions Disp Refills     gabapentin (NEURONTIN) 300 MG capsule 270 capsule 2     Sig: Take 1 capsule (300 mg total) by mouth 3 (three) times a day. Increase by 1 tab every 3 days.  Max dose 900 mg tid       Gabapentin/Levetiracetam/Tiagabine Refill Protocol  Passed - 2/4/2020  1:57 PM        Passed - PCP or prescribing provider visit in past 12 months or next 3 months     Last office visit with prescriber/PCP: 1/2/2020 Josh Shafer MD OR same dept: 1/2/2020 Josh Shafer MD OR same specialty: 1/2/2020 Josh Shafer MD  Last physical: 10/1/2019 Last MTM visit: Visit date not found   Next visit within 3 mo: Visit date not found  Next physical within 3 mo: Visit date not found  Prescriber OR PCP: Josh Shafer MD  Last diagnosis associated with med order: 1. Lumbar radiculitis  - gabapentin (NEURONTIN) 300 MG capsule; Take 1 capsule (300 mg total) by mouth 3 (three) times a day. Increase by 1 tab every 3 days.  Max dose 900 mg tid  Dispense: 270 capsule; Refill: 2    If protocol passes may refill for 12 months if within 3 months of last provider visit (or a total of 15 months).             simvastatin (ZOCOR) 40 MG tablet 90 tablet 3     Sig: Take 1 tablet (40 mg total) by mouth at bedtime.       Statins Refill Protocol (Hmg CoA Reductase Inhibitors) Passed - 2/4/2020  1:57 PM        Passed - PCP or prescribing provider visit in past 12 months      Last office visit with prescriber/PCP: 1/2/2020 Josh Shafer MD OR same dept: 1/2/2020 Josh Shafer MD OR same specialty: 1/2/2020 Josh Shafer MD  Last physical: 10/1/2019 Last MTM visit: Visit date not found   Next visit within 3 mo: Visit date not found  Next physical within 3 mo: Visit date not found  Prescriber OR PCP: Josh Shafer  MD  Last diagnosis associated with med order: 1. Lumbar radiculitis  - gabapentin (NEURONTIN) 300 MG capsule; Take 1 capsule (300 mg total) by mouth 3 (three) times a day. Increase by 1 tab every 3 days.  Max dose 900 mg tid  Dispense: 270 capsule; Refill: 2    If protocol passes may refill for 12 months if within 3 months of last provider visit (or a total of 15 months).

## 2021-06-05 NOTE — TELEPHONE ENCOUNTER
If he can get the medication right away and administer it before noon or shortly thereafter I think it would be reasonable to do the remaining dose.  If he is not going to be able to administer the medication until tonight I would not recommend giving the remaining dose as this could lead to unpredictable blood sugars including hypoglycemia.

## 2021-06-05 NOTE — TELEPHONE ENCOUNTER
Sorry for the confusion.  I reached out to this patient myself (just now).  I left a message.  He has my personal phone number in order to call if he has further questions or concerns.  No further assistance needed with this message.  Thank you.

## 2021-06-05 NOTE — TELEPHONE ENCOUNTER
Please see message below from patient and place order to [facility] if you agree. Thank you.

## 2021-06-05 NOTE — TELEPHONE ENCOUNTER
RN cannot approve Refill Request    RN can NOT refill this medication historical medication requested.       Nuvia Milan, [facility] Triage/Med Refill 2/4/2020    Requested Prescriptions   Pending Prescriptions Disp Refills     simvastatin (ZOCOR) 40 MG tablet 90 tablet 3     Sig: Take 1 tablet (40 mg total) by mouth at bedtime.       Statins Refill Protocol (Hmg CoA Reductase Inhibitors) Passed - 2/4/2020  1:57 PM        Passed - PCP or prescribing provider visit in past 12 months      Last office visit with prescriber/PCP: 1/2/2020 Josh Shafer MD OR same dept: 1/2/2020 Josh Shafer MD OR same specialty: 1/2/2020 Josh Shafer MD  Last physical: 10/1/2019 Last MTM visit: Visit date not found   Next visit within 3 mo: Visit date not found  Next physical within 3 mo: Visit date not found  Prescriber OR PCP: Josh Shafer MD  Last diagnosis associated with med order: 1. Lumbar radiculitis  - gabapentin (NEURONTIN) 300 MG capsule; Take 1 capsule (300 mg total) by mouth 3 (three) times a day. Increase by 1 tab every 3 days.  Max dose 900 mg tid  Dispense: 270 capsule; Refill: 3    If protocol passes may refill for 12 months if within 3 months of last provider visit (or a total of 15 months).           Signed Prescriptions Disp Refills    gabapentin (NEURONTIN) 300 MG capsule 270 capsule 3     Sig: Take 1 capsule (300 mg total) by mouth 3 (three) times a day. Increase by 1 tab every 3 days.  Max dose 900 mg tid       Gabapentin/Levetiracetam/Tiagabine Refill Protocol  Passed - 2/4/2020  1:57 PM        Passed - PCP or prescribing provider visit in past 12 months or next 3 months     Last office visit with prescriber/PCP: 1/2/2020 Josh Shafer MD OR same dept: 1/2/2020 Josh Shafer MD OR same specialty: 1/2/2020 Josh Shafer MD  Last physical: 10/1/2019 Last MTM visit: Visit date not found   Next visit within 3 mo: Visit date not found  Next physical within 3 mo: Visit date not  found  Prescriber OR PCP: Josh Shafer MD  Last diagnosis associated with med order: 1. Lumbar radiculitis  - gabapentin (NEURONTIN) 300 MG capsule; Take 1 capsule (300 mg total) by mouth 3 (three) times a day. Increase by 1 tab every 3 days.  Max dose 900 mg tid  Dispense: 270 capsule; Refill: 3    If protocol passes may refill for 12 months if within 3 months of last provider visit (or a total of 15 months).

## 2021-06-05 NOTE — TELEPHONE ENCOUNTER
Are you ok with Dr Porras's advice? There were way too many messages about this today, which caused multiple people working on it.

## 2021-06-05 NOTE — TELEPHONE ENCOUNTER
Dr. Porras provided refill today for Lantus.  Please notify patient or patient's wife (Brandie) to have patient take additioanl 10 units of Lantus tonight, then resume Lantus 40 units daily beginning tomorrow.

## 2021-06-05 NOTE — TELEPHONE ENCOUNTER
Patient is out of insulin, unable to take full dose of medication this morning.        Medication Question or Clarification  Who is calling: Brandie, patient's wife  What medication are you calling about (include dose and sig)?: Lantus SoloStar U-100 insulin, 40 units under the skin every morning  Who prescribed the medication?: Josh Shafer MD     What is your question/concern?:   1) Patient ran out of Lantus this morning and was only had enough for 10 units of his normal 40 units.    2) Please send 90 day supply to new pharmacy.  [facility] [place] [facility] [address]    Requested Pharmacy: Patient  Okay to leave a detailed message?: Yes

## 2021-06-05 NOTE — TELEPHONE ENCOUNTER
Triage call:     Went to get insulin filled last week- NEEDS NEW RX SENT TO [facility] [address]- updated in encounter.   Needs a 3 month supply- insurance issues. See additional telephone message from LPN for medication.     Wife has a medication question for PCP-      - only able to take 10 U this morning @ 4:30 am- all he had left- he won't get home till 6 pm tonight.     Should he take anymore lantus this evening when he gets home or should he wait to take more until tomorrow? Please advise    [facility] [address]    Disp Refills Start End     insulin glargine (LANTUS SOLOSTAR U-100 INSULIN) 100 unit/mL (3 mL) pen 30 mL 5 9/17/2019     Sig - Route: Inject 40 Units under the skin every morning. - Subcutaneous    Sent to pharmacy as: insulin glargine (LANTUS SOLOSTAR U-100 INSULIN) 100 unit/mL (3 mL) pen    Notes to Pharmacy: If LANTUS is not covered by insurance, may substitute BASAGLAR at same dose and frequency.      E-Prescribing Status: Receipt confirmed by pharmacy (9/17/2019  9:44 AM CDT)      Valorie Bennett, RN BSBA [facility] Triage/Med Refill 1/13/2020 10:08 AM    Reason for Disposition    Caller has NON-URGENT medication question about med that PCP prescribed and triager unable to answer question    Protocols used: MEDICATION QUESTION CALL-A-

## 2021-06-05 NOTE — TELEPHONE ENCOUNTER
I also reached out to pt's wife, who has consent to communicate, and since pt was concerned about his insulin. Pt's wife, Brandie, picked up and we spoke. I relayed Dr. Porras's message to her. Brandie states that pt is at work in [place] but she will  medication and follow as needed.

## 2021-06-05 NOTE — PROGRESS NOTES
Assessment:   Leandro Brewer is a 64 y.o. y.o. male with past medical history significant for diverticulosis, microcytic anemia, asthma, type 2 diabetes mellitus, obesity, hypertension, hyperlipidemia, history of MRSA infection who presents today for follow-up regarding chronic right low back pain with radiation into the right lower extremity.  Patient has a history of partial laminectomy at L4-5 by Dr. Thompson 11 years ago.  MRI lumbar spine shows mild spinal stenosis L3-4 and L4-5.  There is also slight anterolisthesis L4 on L5 due to advanced degenerative facet arthropathy.  Patient status post a right L4-5 transforaminal epidural steroid injection January 7, 2020 which has provided 70% relief of his pain.  He had previously tried a right L3-4 transforaminal epidural steroid injection December 5, 2019 which provided 80 to 90% relief of his pain but only lasted 1 day.  On exam today, patient demonstrated slight weakness in the right EHL.  He had a diminished right patellar reflex.           Plan:     A shared decision making plan was used.  The patient's values and choices were respected.  The following represents what was discussed and decided upon by the physician assistant and the patient.      1.  DIAGNOSTIC TESTS: I reviewed the MRI lumbar spine and lumbar spine flexion-extension x-rays.  No further diagnostic tests were ordered.    2.  PHYSICAL THERAPY: Patient has participated in physical therapy at [facility]. He should continue doing home exercises.    3.  MEDICATIONS:   -Patient can continue the gabapentin 900 mg 3 times daily.  -Patient can also use Tylenol and ibuprofen as needed.    4.  INTERVENTIONS: I offer the patient a right L5-S1 transforaminal epidural steroid injection.  Patient did demonstrate some weakness in the right EHL on exam today.  Patient declined for now.  He is going have a surgical consultation later this week at [facility].  He will let us know after that  consultation if he wants to pursue the injection.    5.  PATIENT EDUCATION: Patient is in agreement the above plan.  All questions were answered.    6.  FOLLOW-UP: Patient follow-up with me as needed.  He will call our clinic if he would like to pursue the right L5-S1 transforaminal epidural steroid injection.  If he has questions or concerns in the meantime, he should not hesitate to call.    Subjective:     Leandro Brewer is a 64 y.o. male who presents today for follow-up regarding chronic right low back pain with radiation into the right lower extremity.  Patient status post a right L4-5 transforaminal epidural steroid injection January 7, 2020.  Patient reports 70% improvement in his pain.    Patient complains of right-sided low back pain.  Pain is located the lumbosacral junction.  Pain radiates in the right buttock, wraps around the right lateral hip, radiates down the right anterolateral thigh to the knee.  He states that he then has pain that involves the entire right lower leg.  He has numbness in the dorsal foot extending into the right great toe.  He states that his knee feels weak.  He states it gives out from under him.  He rates his pain today as a 3 out of 10.  At its worst it is a 7 out of 10.  At its best it is a 2 out of 10.  Pain is aggravated with walking and standing alleviated with sitting.  Patient is still having significant difficulty sleeping.  He has difficulty walking.  He states that at times he has to stand only on his left leg to take the weight off of the right to get relief.  He denies any new symptoms since he was last seen.    Patient is currently in physical therapy at [facility]. He uses gabapentin 900 mg 3 times daily.  He uses Tylenol and ibuprofen as needed.    Past medical history is reviewed and is unchanged.    Review of Systems:  Positive for numbness/tingling, weakness, pain much worse at night.  Negative loss of bowel/bladder control, inability to urinate,  headache,  trip/stumble/falls, difficulty swallowing, difficulty with hand skills, fevers, unintentional weight loss.     Objective:   CONSTITUTIONAL:  Vital signs as above.  No acute distress.  The patient is well nourished and well groomed.    PSYCHIATRIC:  The patient is awake, alert, oriented to person, place and time.  The patient is answering questions appropriately with clear speech.  Normal affect.  HEENT: Normocephalic, atraumatic.  Sclera clear.    SKIN:  Skin over the face, posterior torso, bilateral upper and lower extremities is clean, dry, intact without rashes.  MUSCULOSKELETAL:  Gait is mildly antalgic, favoring the right.  The patient is able to heel and toe walk without any difficulty.  Mild tenderness palpation right lower lumbar paraspinous muscles L5-S1.    The patient has 4+/5 strength right EHL, otherwise 5/5 strength for the bilateral hip flexors, knee flexors/extensors, ankle dorsiflexors/plantar flexors, ankle evertors/invertors.    NEUROLOGICAL: Reflexes were 2+ left trace right patellar, 1+ bilateral Achilles.  No ankle clonus bilaterally.  Sensation to light touch is intact over bilateral lower extremities.      RESULTS:     I reviewed the MRI lumbar spine from [facility] dated December 20, 2019.  This shows partial sacralization of the lowest segment which will be considered L5.  There is minimal anterolisthesis L4 on L5 related to advanced degenerative facet hypertrophy with mild central canal stenosis.  At L3-4 there is mild central canal stenosis and bilateral lateral recess stenosis.  At L1-L2 there is minimal central canal stenosis.  Please see report for further details.    EXAM: XR LUMBAR SPINE 2 OR 3 VWS  LOCATION: [facility]  DATE/TIME: 12/28/2019 10:51 AM     INDICATION: Back pain. Evaluate for transitional anatomy.   COMPARISON: Lumbar spine MRI 12/28/2019.     IMPRESSION:   There is a partial sacralization of the lowest lumbar segment which will be considered L5.  There is grade 1 spondylolisthesis of L4 on L5. 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 No compression fracture. Vertebral body heights are within normal   limits. There is mild loss of disc height at L3-L4 and L4-L5 and moderate loss of disc height at L5-S1. There are bridging osteophytes in the lower thoracic spine and there are moderate to large osteophytes in the mid and lower lumbar spine.     EXAM: XR HIP RIGHT 2 OR MORE VWS  LOCATION: [facility]  DATE/TIME: 6/5/2019 9:10 AM     INDICATION: Pain in right hip  COMPARISON: None.     FINDINGS: Right hip arthroplasty with components in good position. Moderate degenerative joint disease left hip.

## 2021-06-05 NOTE — TELEPHONE ENCOUNTER
Refill pended for 12 pens (90 days) Please sign & give further direction if needed for short dose today.     There are 3 messages for this request/issue. Triage, call and my chart.

## 2021-06-05 NOTE — PATIENT INSTRUCTIONS - HE
DISCHARGE INSTRUCTIONS    During office hours (8:00 a.m.- 4:00 p.m.) questions or concerns may be answered  by calling [facility] Center Navigation Nurses at  [phone].  Messages received after hours will be returned the following business day.      In the case of an emergency, please dial 911 or seek assistance at the nearest Emergency Room/Urgent Care facility.     All Patients:    ? You may experience an increase in your symptoms for the first 2 days (It may take anywhere between 2 days- 2 weeks for the steroid to have maximum effect).    ? You may use ice on the injection site, as frequently as 20 minutes each hour if needed.    ? You may take your pain medicine.    ? You may continue taking your regular medication after your injection. If you have had a Medial Branch Block you may resume pain medication once your pain diary is completed.    ? You may shower. No swimming, tub bath or hot tub for 48 hours.  You may remove your bandaid/bandage as soon as you are home.    ? You may resume light activities, as tolerated.    ? Resume your usual diet as tolerated.    ? It is strongly advised that you do not drive for 1-3 hours post injection.    ? If you have had oral sedation:  Do not drive for 8 hours post injection.      ? If you have had IV sedation:  Do not drive for 24 hours post injection.  Do not operate hazardous machinery or make important personal/business decisions for 24 hours.      POSSIBLE STEROID SIDE EFFECTS (If steroid/cortisone was used for your procedure)    -If you experience these symptoms, it should only last for a short period      Swelling of the legs                Skin redness (flushing)       Mouth (oral) irritation     Blood sugar (glucose) levels              Sweats                      Mood changes    Headache    Sleeplessness         POSSIBLE PROCEDURE SIDE EFFECTS  -Call the Spine Center if you are concerned    Increased Pain             Increased numbness/tingling         Nausea/Vomiting            Bruising/bleeding at site        Redness or swelling                                                Difficulty walking        Weakness             Fever greater than 100.5    *In the event of a severe headache after an epidural steroid injection that is relieved by lying down, please call the [facility] to speak with a clinical staff member*

## 2021-06-06 NOTE — TELEPHONE ENCOUNTER
Refill Approved    Rx renewed per Medication Renewal Policy. Medication was last renewed on 2/15/20.    Nuvia Milan, [facility] Triage/Med Refill 3/12/2020     Requested Prescriptions   Pending Prescriptions Disp Refills     lisinopriL (PRINIVIL,ZESTRIL) 20 MG tablet [Pharmacy Med Name: LISINOPRIL 20MG TABS] 90 tablet 3     Sig: TAKE ONE TABLET BY MOUTH EVERY DAY       Ace Inhibitors Refill Protocol Passed - 3/9/2020  3:18 AM        Passed - PCP or prescribing provider visit in past 12 months       Last office visit with prescriber/PCP: 1/2/2020 Josh Shafer MD OR same dept: 1/2/2020 Josh Shafer MD OR same specialty: 1/2/2020 Josh Shafer MD  Last physical: 10/1/2019 Last MTM visit: Visit date not found   Next visit within 3 mo: Visit date not found  Next physical within 3 mo: Visit date not found  Prescriber OR PCP: Josh Shafer MD  Last diagnosis associated with med order: 1. Essential hypertension  - lisinopriL (PRINIVIL,ZESTRIL) 20 MG tablet [Pharmacy Med Name: LISINOPRIL 20MG TABS]; TAKE ONE TABLET BY MOUTH EVERY DAY  Dispense: 90 tablet; Refill: 3    If protocol passes may refill for 12 months if within 3 months of last provider visit (or a total of 15 months).             Passed - Serum Potassium in past 12 months     Lab Results   Component Value Date    Potassium 4.7 01/02/2020             Passed - Blood pressure filed in past 12 months     BP Readings from Last 1 Encounters:   01/20/20 126/84             Passed - Serum Creatinine in past 12 months     Creatinine   Date Value Ref Range Status   01/02/2020 0.84 0.70 - 1.30 mg/dL Final

## 2021-06-06 NOTE — TELEPHONE ENCOUNTER
Medication Question or Clarification  [facility]  What medication are you calling about (include dose and sig)?: Furosomide 40 mg, one daily  Who prescribed the medication?: Josh Shafer MD   What is your question/concern?: Could patient have a 90 day supply?  [facility]  Okay to leave a detailed message?: No

## 2021-06-06 NOTE — TELEPHONE ENCOUNTER
Refill Approved    Rx renewed per Medication Renewal Policy. Medication was last renewed on 3/13/2019 for 90/3.  Last OV 1/2/2020   RTC about 4/2/2020  Dinora Flynn, [facility] Triage/Med Refill 2/15/2020     Requested Prescriptions   Pending Prescriptions Disp Refills     lisinopril (PRINIVIL,ZESTRIL) 20 MG tablet 90 tablet 3     Sig: Take 1 tablet (20 mg total) by mouth daily.       Ace Inhibitors Refill Protocol Passed - 2/11/2020 10:42 AM        Passed - PCP or prescribing provider visit in past 12 months       Last office visit with prescriber/PCP: 1/2/2020 Josh Shafer MD OR same dept: 1/2/2020 Josh Shafer MD OR same specialty: 1/2/2020 Josh Shafer MD  Last physical: 10/1/2019 Last MTM visit: Visit date not found   Next visit within 3 mo: Visit date not found  Next physical within 3 mo: Visit date not found  Prescriber OR PCP: Josh Shafer MD  Last diagnosis associated with med order: 1. Essential hypertension  - lisinopril (PRINIVIL,ZESTRIL) 20 MG tablet; Take 1 tablet (20 mg total) by mouth daily.  Dispense: 90 tablet; Refill: 3    If protocol passes may refill for 12 months if within 3 months of last provider visit (or a total of 15 months).             Passed - Serum Potassium in past 12 months     Lab Results   Component Value Date    Potassium 4.7 01/02/2020             Passed - Blood pressure filed in past 12 months     BP Readings from Last 1 Encounters:   01/20/20 126/84             Passed - Serum Creatinine in past 12 months     Creatinine   Date Value Ref Range Status   01/02/2020 0.84 0.70 - 1.30 mg/dL Final

## 2021-06-07 ENCOUNTER — COMMUNICATION - HEALTHEAST (OUTPATIENT)
Dept: FAMILY MEDICINE | Facility: CLINIC | Age: 66
End: 2021-06-07
Payer: COMMERCIAL

## 2021-06-07 ENCOUNTER — COMMUNICATION - HEALTHEAST (OUTPATIENT)
Dept: NEUROSURGERY | Facility: CLINIC | Age: 66
End: 2021-06-07

## 2021-06-07 DIAGNOSIS — M54.16 LUMBAR RADICULOPATHY: ICD-10-CM

## 2021-06-07 NOTE — TELEPHONE ENCOUNTER
Medication Question or Clarification  [facility]  What medication are you calling about (include dose and sig)?: Gabapentin 300 mg, increase to 900 mg daily  Who prescribed the medication?: Josh Shafer MD   What is your question/concern?: Patient requests a 90 day supply.  [facility]  Okay to leave a detailed message?: No

## 2021-06-07 NOTE — TELEPHONE ENCOUNTER
Medication refill, change in dosage. Per patient taking 900mg three times a day, please change dose and qty

## 2021-06-07 NOTE — PROGRESS NOTES
"Leandro Brewer is a 64 y.o. male who is being evaluated via a billable telephone visit.      The patient has been notified of following:     \"This telephone visit will be conducted via a call between you and your physician/provider. We have found that certain health care needs can be provided without the need for a physical exam.  This service lets us provide the care you need with a short phone conversation.  If a prescription is necessary we can send it directly to your pharmacy.  If lab work is needed we can place an order for that and you can then stop by our lab to have the test done at a later time.    If during the course of the call the physician/provider feels a telephone visit is not appropriate, you will not be charged for this service.\"     Patient has given verbal consent to a Telephone visit? Yes    Leandro Brewer complains of    Chief Complaint   Patient presents with     Medication Management     put on prednisone x6 days - finished treatment it on 3/24/20 - felt good on it.  -  pain comes and goes - he feels that maybe its his hip       I have reviewed and updated the patient's Past Medical History, Social History, Family History and Medication List.    ALLERGIES  Patient has no known allergies.     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 Thompson); right inguinal hernia age 6 months; right total hip arthroplasty at [facility] 2019 with Dr. Evans.   Hospitalizations: early 20s \"infected gallbladder\" WITHOUT surgery... ; cellulitis in legs (hospitalized twice); abdominal wall cyst requiring IV antibiotics x 4 days... ; 19-19 [facility] for right LE " "cellulitis/sepsis; right LE cellulitis 9/8/19-9/9/19 ([facility]); 10/15/19 s/p left MELITA (Dr. Evans)  Would like a 90 day supply on all meds.   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  Multiple concerns.  Right hip pain described.  History of bilateral total hip arthroplasty with most recent left hip replacement October 15, 2019 with Dr. Evans after initial right hip replacement February 5, 2019.  Has had assessment for lower back pain with question of spinal stenosis.  Had 2 injections through spine care clinic and then a third through [facility] orthopedics.  EMG study appeared normal without evidence for lateral femoral cutaneous nerve syndrome etc.  Continues gabapentin 900 mg 3 times daily.  Mild persistent asthma remains stable with Qvar.  No respiratory distress.  No recent illness.  Remainder of home medications for diabetes, hypertension hyperlipidemia stable.  Comprehensive review of systems as above otherwise all negative.        Assessment/Plan:    1. Hip pain, right  Right hip pain status post prior right total hip arthroplasty February 5, 2019.  Patient will reach out to Dr. Evans to discuss further possibility for etiology with recent lower back assessment appearing with evidence for mild lumbar spinal canal stenosis with prior injections x3 described.  EMG study described as normal.  Has seen some benefit from prednisone however did encourage patient to avoid further prednisone use due to immunosuppression and risk for Covid-19.    2. Chronic midline low back pain without sciatica  As above.    3. Uncontrolled type 2 diabetes mellitus with hyperglycemia, with long-term current use of insulin (H)  Type 2 diabetes with fair control.  A1c of 7.8% " January 2, 2020 and will reassess in July 2020 through office visit.    4. Essential hypertension  Continue home medication for hypertension including lisinopril 20 mg daily.    5. Mild persistent asthma without complication  Continues Qvar and albuterol metered-dose inhaler as needed.  Recent asthma control test 24 out of 25.  Not requiring albuterol MDI.        Phone call duration:  18 minutes    Josh Shafer MD

## 2021-06-07 NOTE — TELEPHONE ENCOUNTER
Received fax from [facility] requesting a prescription for BD ultra fine pen needles.

## 2021-06-07 NOTE — TELEPHONE ENCOUNTER
Updated rx for gabapentin 300 mg take 3 tablets (900 mg) three times per day - 90 day supply = #810 with 3 refills

## 2021-06-07 NOTE — TELEPHONE ENCOUNTER
RN cannot approve Refill Request    RN can NOT refill this medication med is not covered by policy/route to provider. Last office visit: 1/2/2020 Josh Shafer MD Last Physical: 10/1/2019 Last MTM visit: Visit date not found Last visit same specialty: 1/2/2020 Josh Shafer MD.  Next visit within 3 mo: Visit date not found  Next physical within 3 mo: Visit date not found      Dedra Garcia, [facility] Triage/Med Refill 5/5/2020    Requested Prescriptions   Pending Prescriptions Disp Refills     QVAR REDIHALER 80 mcg/actuation HFAB inhaler [Pharmacy Med Name: QVAR REDIHALER 80 MCG] 31.8 g 1     Sig: INHALE 2 PUFFS BY MOUTH TWICE A DAY - RINSE MOUTH AFTER USE-DO NOT SHAKE UNIT       There is no refill protocol information for this order

## 2021-06-08 ENCOUNTER — COMMUNICATION - HEALTHEAST (OUTPATIENT)
Dept: NEUROSURGERY | Facility: CLINIC | Age: 66
End: 2021-06-08
Payer: COMMERCIAL

## 2021-06-08 NOTE — TELEPHONE ENCOUNTER
Refill Approved    Rx renewed per Medication Renewal Policy. Medication was last renewed on 6/8/19.    Nuvia Milan, [facility] Triage/Med Refill 5/27/2020     Requested Prescriptions   Pending Prescriptions Disp Refills     metFORMIN (GLUCOPHAGE-XR) 500 MG 24 hr tablet [Pharmacy Med Name: METFORMIN HCL  MG TABLET] 360 tablet 0     Sig: TAKE 2 TABLETS BY MOUTH TWICE A DAY WITH MEALS.       Metformin Refill Protocol Passed - 5/24/2020  9:43 AM        Passed - Blood pressure in last 12 months     BP Readings from Last 1 Encounters:   01/20/20 126/84             Passed - LFT or AST or ALT in last 12 months     Albumin   Date Value Ref Range Status   01/02/2020 4.0 3.5 - 5.0 g/dL Final     Bilirubin, Total   Date Value Ref Range Status   01/02/2020 0.3 0.0 - 1.0 mg/dL Final     Bilirubin, Direct   Date Value Ref Range Status   09/09/2019 0.2 <=0.5 mg/dL Final     Alkaline Phosphatase   Date Value Ref Range Status   01/02/2020 98 45 - 120 U/L Final     AST   Date Value Ref Range Status   01/02/2020 18 0 - 40 U/L Final     ALT   Date Value Ref Range Status   01/02/2020 28 0 - 45 U/L Final     Protein, Total   Date Value Ref Range Status   01/02/2020 7.2 6.0 - 8.0 g/dL Final                Passed - GFR or Serum Creatinine in last 6 months     GFR MDRD Non Af Amer   Date Value Ref Range Status   01/02/2020 >60 >60 mL/min/1.73m2 Final     GFR MDRD Af Amer   Date Value Ref Range Status   01/02/2020 >60 >60 mL/min/1.73m2 Final             Passed - Visit with PCP or prescribing provider visit in last 6 months or next 3 months     Last office visit with prescriber/PCP: 1/2/2020 OR same dept: 1/2/2020 Josh Shafer MD OR same specialty: 1/2/2020 Josh Shafer MD Last physical: Visit date not found Last MTM visit: Visit date not found         Next appt within 3 mo: Visit date not found  Next physical within 3 mo: Visit date not found  Prescriber OR PCP: Josh Shafer MD  Last diagnosis associated with med  order: 1. Uncontrolled type 2 diabetes mellitus with hyperglycemia, with long-term current use of insulin (H)  - metFORMIN (GLUCOPHAGE-XR) 500 MG 24 hr tablet [Pharmacy Med Name: METFORMIN HCL  MG TABLET]; TAKE 2 TABLETS BY MOUTH TWICE A DAY WITH MEALS.  Dispense: 360 tablet; Refill: 0     If protocol passes may refill for 12 months if within 3 months of last provider visit (or a total of 15 months).           Passed - A1C in last 6 months     Hemoglobin A1c   Date Value Ref Range Status   01/02/2020 7.8 (H) 3.5 - 6.0 % Final               Passed - Microalbumin in last year      Microalbumin, Random Urine   Date Value Ref Range Status   01/02/2020 <0.50 0.00 - 1.99 mg/dL Final

## 2021-06-09 NOTE — PROGRESS NOTES
Assessment/Plan:    1. Uncontrolled type 2 diabetes mellitus with hyperglycemia, with long-term current use of insulin (H)  Type 2 diabetes with improved control.  Weight remains stable.  A1c improved from 7.8% down to 6.7%.  Tolerating Trulicity 1.5 mg weekly, Lantus 40 units daily and Metformin  mg using 2 tablets twice daily.  Reassess at annual physical exam in 4 months.  Recent microalbumin screen normal January 2, 2020.  Diabetic eye exam November 13, 2019.  Monofilament testing today was normal.  - Glycosylated Hemoglobin A1c  - Basic Metabolic Panel  - Ambulatory referral for Orthotics / Prosthetics - [facility]    2. Essential hypertension  Hypertension stable continues lisinopril 20 mg daily.  - Basic Metabolic Panel    3. Hyperlipidemia, unspecified hyperlipidemia type  Simvastatin 40 mg at bedtime continues.  Weight goal less than 250 pounds initially, less than 240 pounds ideally.  Check lipid cascade today while fasting.  - Lipid Cascade    4. Mild persistent asthma without complication  Asthma control test 25 out of 25 and continues Qvar as well as albuterol metered-dose inhaler available however not requiring.    5. Hip pain, right  Right hip pain described as tendinitis and continues to see improvement with physical therapy through orthopedic specialist.    6. Chronic midline low back pain without sciatica  Medrol Dosepak has been beneficial in the past.  Some right leg radicular symptoms perhaps currently.  Medrol Dosepak refilled.  - methylPREDNISolone (MEDROL DOSEPACK) 4 mg tablet; Follow package directions  Dispense: 21 tablet; Refill: 0    7. Male erectile dysfunction  Refill provided for sildenafil.  Benefits noted.  - sildenafil (REVATIO) 20 mg tablet; Take 2-3 tablets (40-60 mg total) by mouth daily as needed.  Dispense: 30 tablet; Refill: 5    8. Encounter for screening for HIV  Routine HIV screen completed.  - HIV Antigen/Antibody Screening Cascade      The following high BMI  "interventions were performed this visit: encouragement to exercise, weight monitoring, weight loss from baseline weight and lifestyle education regarding diet .  Ensure ongoing efforts to achieve weight goal < 250 pounds initially, < 240 pounds ideally.         Subjective:    Leandro Brewer is seen today for follow-up evaluation.  Type 2 diabetes.  Continues Trulicity 1.5 mg weekly, Lantus 40 units daily Metformin  mg using 2 tablets twice daily.  No GI distress.  No diarrhea.  Lisinopril 20 mg daily for hypertension.  Not checking blood pressures consistently outside of office.  Asthma is been well controlled with asthma control test 25 out of 25 today while using Qvar.  Not requiring albuterol metered-dose inhaler.  Prior bilateral total hip arthroplasties noted.  Completing physical therapy for right hip pain described as \"tendinitis\".  Would like referral to [place] for custom orthotics and possible diabetic shoes.  Simvastatin 40 mg at bedtime for lipid management.  Needs refill on sildenafil.  Lower back pain.  Some right leg symptoms at times.  Sees a chiropractor with some benefit.  Comprehensive review of systems as above otherwise all negative.     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 Thompson); right inguinal hernia age 6 months; right total hip arthroplasty at [facility] 2019 with Dr. Evans.   Hospitalizations: early 20s \"infected gallbladder\" WITHOUT surgery... ; cellulitis in legs (hospitalized twice); abdominal wall cyst requiring IV antibiotics x 4 days... ; 19-19 [facility] for right LE cellulitis/sepsis; right LE cellulitis 19-19 ([facility]); " "10/15/19 s/p left MELITA (Dr. Evans)  Would like a 90 day supply on all meds.   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 (look for further cause of anemia if Hb < 11.0)    Past Surgical History:   Procedure Laterality Date     BACK SURGERY       HERNIA REPAIR Right     inguinal; child        Family History   Problem Relation Age of Onset     Heart attack Mother      Obesity Mother      Heart disease Father      Cancer Father         throat        Past Medical History:   Diagnosis Date     Anemia      Arthritis      Diabetes mellitus (H)      Diabetes mellitus, type II (H)      Hypertension      Obesity      Plantar fasciitis         Social History     Tobacco Use     Smoking status: Former Smoker     Smokeless tobacco: Never Used   Substance Use Topics     Alcohol use: Yes     Drug use: No        Current Outpatient Medications   Medication Sig Dispense Refill     acetaminophen (TYLENOL) 500 MG tablet Take 1,000 mg by mouth 3 (three) times a day. Try to limit intake to 3000 mg a day             albuterol (VENTOLIN HFA) 90 mcg/actuation inhaler Inhale 2 puffs 4 (four) times a day as needed for wheezing. 18 g 6     ascorbic acid, vitamin C, (VITAMIN C) 500 MG tablet Take 500 mg by mouth daily.       aspirin 81 mg chewable tablet Chew 81 mg daily.       b complex vitamins (VITAMINS B COMPLEX) capsule Take 1 capsule by mouth.       blood-glucose meter kit [employer] Contour Monitor KIT  Use as Directed       CONTOUR NEXT EZ METER Misc Use daily for blood sugar testing 1 each 5     CONTOUR NEXT TEST STRIPS strips TEST 3 TIMES DAILY 300 strip 3     furosemide (LASIX) 40 MG tablet Take 0.5 tablets (20 mg total) by mouth daily. 1/2 tab 90 tablet 1     gabapentin (NEURONTIN) 300 MG capsule Take 3 capsules (900 mg total) by mouth 3 (three) times a day. Increase by 1 tab every 3 days.  Max dose " "900 mg tid 810 capsule 3     insulin glargine (LANTUS SOLOSTAR U-100 INSULIN) 100 unit/mL (3 mL) pen Inject 40 Units under the skin every morning. 12 adj dose pen 1     LANCETS MISC [employer] Microlet MISC  Check blood sugar 2 times per day and as needed       lisinopriL (PRINIVIL,ZESTRIL) 20 MG tablet TAKE 1 TABLET (20 MG TOTAL) BY MOUTH DAILY. 90 tablet 0     magnesium oxide 500 mg Tab Take 500 mg by mouth daily.              metFORMIN (GLUCOPHAGE-XR) 500 MG 24 hr tablet TAKE 2 TABLETS BY MOUTH TWICE A DAY WITH MEALS. 360 tablet 2     multivitamin (MULTIPLE VITAMINS DAILY) per tablet Take 1 tablet by mouth daily.       nystatin (MYCOSTATIN) powder Apply 1 application topically 3 (three) times a day. Groin rash       pen needle, diabetic (BD ULTRA-FINE BRANDON PEN NEEDLE) 32 gauge x 5/32\" Ndle Use 1 each As Directed daily. 100 each 3     potassium 99 mg Tab Take 1 tablet by mouth 2 (two) times a day.              QVAR REDIHALER 80 mcg/actuation HFAB inhaler INHALE 2 PUFFS BY MOUTH TWICE A DAY - RINSE MOUTH AFTER USE-DO NOT SHAKE UNIT 31.8 g 3     sildenafil (REVATIO) 20 mg tablet Take 2-3 tablets (40-60 mg total) by mouth daily as needed. 30 tablet 5     simvastatin (ZOCOR) 40 MG tablet Take 1 tablet (40 mg total) by mouth at bedtime. 90 tablet 3     TRULICITY 1.5 mg/0.5 mL PnIj INJECT 1.5MG (0.5 ML) SUBCUTANEOUSLY EVERY 7 DAYS 6 mL 5     methylPREDNISolone (MEDROL DOSEPACK) 4 mg tablet Follow package directions 21 tablet 0     No current facility-administered medications for this visit.           Objective:    Vitals:    07/08/20 0754   BP: 110/70   Pulse: 90   SpO2: 97%   Weight: (!) 261 lb (118.4 kg)      Body mass index is 38.54 kg/m .    Alert.  No apparent distress.  Compression stockings in place.  Dorsalis pedis and posterior tibial pulses palpable.  Monofilament testing performed which is normal.  No significant left foot swelling however patient does describe some asymmetry.  Onychomycosis of toenails " noted.      EXAM: XR LUMBAR SPINE 2 OR 3 VWS  LOCATION: [facility]  DATE/TIME: 12/28/2019 10:51 AM     INDICATION: Back pain. Evaluate for transitional anatomy.   COMPARISON: Lumbar spine MRI 12/28/2019.     IMPRESSION:   There is a partial sacralization of the lowest lumbar segment which will be considered L5. There is grade 1 spondylolisthesis of L4 on L5. 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 No compression fracture. Vertebral body heights are within normal   limits. There is mild loss of disc height at L3-L4 and L4-L5 and moderate loss of disc height at L5-S1. There are bridging osteophytes in the lower thoracic spine and there are moderate to large osteophytes in the mid and lower lumbar spine.      This note has been dictated using voice recognition software and as a result may contain minor grammatical errors and unintended word substitutions.

## 2021-06-09 NOTE — PROGRESS NOTES
"Subjective:    Leandro Brewer is seen today for follow-up evaluation type 2 diabetes.  Weight has been stable since prior office visit 2016 however dietary indiscretions.  Not exercising as much.  Good compliance with medication currently glyburide/metformin 2.5/500 using 2 capsules twice daily.  Remains on lisinopril 20 mg daily with microalbumin screen normal 2016.  Simvastatin 40 mg at bedtime for lipid management.  Right hip pain more lateral.  Has had issues with lower back concerns and radiculopathy.  Seen by chiropractor.  Pain has improved.  Was associated with increased workouts including squats.  No asthma flare.  Blood sugars slightly higher typically 150-200.  No hypoglycemic symptoms.     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 ; cellulitis in legs (hospitalized twice); abdominal wall cyst requiring IV antibiotics x 4 days... ;   Would like a 90 day supply on all meds.   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 (look for further cause of anemia if Hb < 11.0)     Past Surgical History:   Procedure Laterality Date     BACK SURGERY       HERNIA REPAIR Right     inguinal; child        Family History   Problem Relation Age of Onset     Heart attack Mother      Obesity Mother      Heart disease Father         Past Medical History:   Diagnosis Date     Diabetes mellitus         Social History   Substance Use Topics     Smoking status: Former Smoker     " Smokeless tobacco: Never Used     Alcohol use Yes        Current Outpatient Prescriptions   Medication Sig Dispense Refill     ascorbic acid (ASCORBIC ACID WITH EVAN HIPS) 500 MG tablet Take 500 mg by mouth 2 (two) times a day.       aspirin 81 mg chewable tablet Chew 81 mg daily.       blood-glucose meter kit [employer] [facility] Contour Monitor KIT  Use as Directed       CONTOUR NEXT EZ METER Misc Use daily for blood sugar testing 1 each 5     CONTOUR NEXT STRIPS strips Test 3 times daily [a/s:173-346] 300 each 2     hydrocortisone valerate (WEST-DONNELL) 0.2 % ointment Apply to affected area 2-3 times daily [yessi:390-- 216] 60 g 5     LANCETS MISC [employer] Microlet MISC  Check blood sugar 2 times per day and as needed       lisinopril (PRINIVIL,ZESTRIL) 20 MG tablet Take one tablet by mouth daily 90 tablet 2     multivitamin (MULTIPLE VITAMINS DAILY) per tablet Take 1 tablet by mouth daily.       naftifine 2 % Crea Apply to affected area topically once daily for 2 weeks 60 g 1     potassium 99 mg Tab Take by mouth. Take 1 Tablet Twice Daily       QVAR 80 mcg/actuation inhaler Inhale 2 puffs 2 times daily -rinse mouth after use -shake well before using 8.7 Inhaler 11     sildenafil (VIAGRA) 100 MG tablet Take 1 tablet (100 mg total) by mouth daily as needed for erectile dysfunction. Take 1/2 tab by mouth one hour prior to intercourse 30 tablet 11     simvastatin (ZOCOR) 40 MG tablet Take 1 tablet (40 mg total) by mouth bedtime. 90 tablet 2     tadalafil (CIALIS) 20 MG tablet Take 1/2 Tablet Daily PRN approximately 1 hour prior to intercourse       terbinafine HCl (LAMISIL) 250 mg tablet Take 1 tablet (250 mg total) by mouth daily. 14 tablet 0     VENTOLIN HFA 90 mcg/actuation inhaler Inhale 2 puffs 4 times a day if needed -shake well before using 18 g 0     glipiZIDE-metFORMIN (METAGLIP) 5-500 mg per tablet Take 1 tablet by mouth 2 (two) times a day before meals. 120 tablet 5     No current facility-administered medications for  this visit.           Objective:    Vitals:    04/04/17 0745   BP: 110/68   Pulse: 88   Weight: (!) 258 lb (117 kg)      Body mass index is 38.1 kg/(m^2).    Alert.  No apparent distress.  Obesity noted.  Chest clear.  Cardiac exam regular.  Bilateral hip restriction with hip flexion as well as internal/external range of motion however symmetric and component secondary to body habitus.  Extremities otherwise warm and dry.  Right lateral trochanteric bursa tenderness on direct palpation was identified.      Assessment:    1. Type 2 diabetes mellitus  Glycosylated Hemoglobin A1c    Glycosylated Hemoglobin A1c    Basic Metabolic Panel    glipiZIDE-metFORMIN (METAGLIP) 5-500 mg per tablet   2. Essential hypertension with goal blood pressure less than 130/80     3. Hyperlipidemia, unspecified hyperlipidemia type     4. Trochanteric bursitis, right hip     5. Tinea cruris  naftifine 2 % Crea   6. Tinea pedis, unspecified laterality  naftifine 2 % Crea         Plan:    A1c did increase from 7.4% 8.9% and did discuss need for consistent cardiovascular activity as well as dietary modifications.  Switch glyburide/metformin 2.5/502 tablets twice daily instead to glipizide metformin 5/500 used to capsules twice daily with reassessment just over 3 months in office with dietary and exercise modifications for weight goal less than 240 pounds initially.  Will repeat lipid cascade at that time otherwise immunizations today up-to-date.  Right hip pain has improved will notify if recurrent concerns for trochanteric bursitis for consideration corticosteroid injection.  Provide tetanus booster before December 2017.  Asthma control test 2225 currently.  Refill provided for naftifine 2% cream for tinea management, improved.

## 2021-06-10 ENCOUNTER — COMMUNICATION - HEALTHEAST (OUTPATIENT)
Dept: NEUROSURGERY | Facility: CLINIC | Age: 66
End: 2021-06-10
Payer: COMMERCIAL

## 2021-06-10 NOTE — PROGRESS NOTES
A/P:  1. Tinea cruris  clotrimazole (LOTRIMIN) 1 % cream   2. Rash       Rash on forearm has no cellulitis as this time, would consider an MRSA infection if tx is needed. No drainage present to swab. Would also consider shingles but no tx would be indicated at this time. No significant pain.     Jock itch  Clotrimazole cream prescribed  Keep clean and dry as best possible  Return to clinic if symptoms are not improving as expected or if worsening in any way.     Rash on right forearm  Apply warm compress twice daily for 15 -20 min and then a topical antibiotic such as bacitracin or neosporin  Monitor for signs or symptoms of infection: redness, swelling, warmth and pussy drainage or if you begin to develop fevers. If these are occuring, return to clinic for further evaluation       SUBJECTIVE:  Leandro Brewer is a 61 y.o. male presents with 1 weeks complaint of rash. Rash was first noticed on right forearm and then had 2 other lesions further down the arm about 2 -3 days later. No additional lesions since. Rash is not pruritic. Rash was TTP and had purulent discharge that  Has crusted over. Denies fever, chills, recent illness, joint pain. No contacts with similiar rash. He has not had exposure to new soaps, cosmetics, detergents, fabric softners, animals, plants or anything else that could have caused this rash.  Has tried no OTC cream for relief. Hx of MRSA    Flare up of rash in groin for about 3 wks. This has been an ongoing issue. He has been tx with hydrocortisone and desitin without relief. He has used nystatin cream with some improvement.     Past Medical History:   Diagnosis Date     Diabetes mellitus        Current Medications:  Current Outpatient Prescriptions on File Prior to Visit   Medication Sig Dispense Refill     ascorbic acid (ASCORBIC ACID WITH EVAN HIPS) 500 MG tablet Take 500 mg by mouth 2 (two) times a day.       aspirin 81 mg chewable tablet Chew 81 mg daily.       blood-glucose meter kit  [employer] Contour Monitor KIT  Use as Directed       CONTOUR NEXT EZ METER Misc Use daily for blood sugar testing 1 each 5     CONTOUR NEXT STRIPS strips Test 3 times daily [a/s:173-346] 300 each 2     glipiZIDE-metFORMIN (METAGLIP) 5-500 mg per tablet Take 2 tablets by mouth 2 (two) times a day before meals. 120 tablet 5     hydrocortisone valerate (WEST-DONNELL) 0.2 % ointment Apply to affected area 2-3 times daily [yessi:390-- 216] 60 g 5     LANCETS MISC [employer] Microlet MISC  Check blood sugar 2 times per day and as needed       multivitamin (MULTIPLE VITAMINS DAILY) per tablet Take 1 tablet by mouth daily.       naftifine 2 % Crea Apply to affected area topically once daily for 2 weeks 60 g 1     pioglitazone (ACTOS) 15 MG tablet Take 1 tablet (15 mg total) by mouth daily. 30 tablet 3     potassium 99 mg Tab Take by mouth. Take 1 Tablet Twice Daily       QVAR 80 mcg/actuation inhaler Inhale 2 puffs 2 times daily -rinse mouth after use -shake well before using 8.7 Inhaler 11     simvastatin (ZOCOR) 40 MG tablet 1 tablet daily at bedtime -[ez-open-cap] 90 tablet 0     VENTOLIN HFA 90 mcg/actuation inhaler Inhale 2 puffs 4 times a day if needed -shake well before using 18 g 0     [DISCONTINUED] sildenafil (VIAGRA) 100 MG tablet Take 1 tablet (100 mg total) by mouth daily as needed for erectile dysfunction. Take 1/2 tab by mouth one hour prior to intercourse 30 tablet 11     [DISCONTINUED] tadalafil (CIALIS) 20 MG tablet Take 1/2 Tablet Daily PRN approximately 1 hour prior to intercourse       [DISCONTINUED] terbinafine HCl (LAMISIL) 250 mg tablet Take 1 tablet (250 mg total) by mouth daily. 14 tablet 0     lisinopril (PRINIVIL,ZESTRIL) 20 MG tablet Take one tablet by mouth daily 90 tablet 2     [DISCONTINUED] glipiZIDE-metFORMIN (METAGLIP) 5-500 mg per tablet Take 1 tablet by mouth 2 (two) times a day before meals. 120 tablet 5     No current facility-administered medications on file prior to visit.          Allergies:  No Known Allergies      OBJECTIVE:    There were no vitals taken for this visit.    Physical exam reveals a pleasant 61 y.o. male.   Appears healthy, alert and cooperative  Skin: He has 3 erythematous lesions on right forearm each is about 0.5 -1 cm in diameter with scab in center. First lesion to develop has mild erythema, no edema. Newer 2 lesions have darker erythema, with mild erythema and edema surrounding. There is no evidence of secondary infection.  Rash in groin is erythematous rash that spares the testicles. No vesicles or pustules, scale or crust.   Neuro: rash on arm does follow a dermatone.  No neuropathy.

## 2021-06-10 NOTE — TELEPHONE ENCOUNTER
Refill Approved    Rx renewed per Medication Renewal Policy. Medication was last renewed on 6/15/20.    Amanda Jackson, [facility] Triage/Med Refill 8/7/2020     Requested Prescriptions   Pending Prescriptions Disp Refills     lisinopriL (PRINIVIL,ZESTRIL) 20 MG tablet [Pharmacy Med Name: LISINOPRIL 20 MG TABLET] 90 tablet 0     Sig: TAKE 1 TABLET BY MOUTH EVERY DAY       [facility] Inhibitors Refill Protocol Passed - 8/7/2020  6:50 PM        Passed - PCP or prescribing provider visit in past 12 months       Last office visit with prescriber/PCP: 7/8/2020 Josh Shafer MD OR same dept: 7/8/2020 Josh Shafer MD OR same specialty: 7/8/2020 Josh Shafer MD  Last physical: 10/1/2019 Last MTM visit: Visit date not found   Next visit within 3 mo: Visit date not found  Next physical within 3 mo: Visit date not found  Prescriber OR PCP: Josh Shafer MD  Last diagnosis associated with med order: 1. Essential hypertension  - lisinopriL (PRINIVIL,ZESTRIL) 20 MG tablet [Pharmacy Med Name: LISINOPRIL 20 MG TABLET]; TAKE 1 TABLET BY MOUTH EVERY DAY  Dispense: 90 tablet; Refill: 0    If protocol passes may refill for 12 months if within 3 months of last provider visit (or a total of 15 months).             Passed - Serum Potassium in past 12 months     Lab Results   Component Value Date    Potassium 4.6 07/08/2020             Passed - Blood pressure filed in past 12 months     BP Readings from Last 1 Encounters:   07/08/20 110/70             Passed - Serum Creatinine in past 12 months     Creatinine   Date Value Ref Range Status   07/08/2020 0.87 0.70 - 1.30 mg/dL Final

## 2021-06-11 NOTE — PROGRESS NOTES
Assessment: Leandro is a very pleasant joe coming in for help with managing his diabetes.  He states he does have lower energy with high blood sugars, is peeing often, and has a cold that he can't shake for 3 weeks now.  He checks his blood sugar 2x/daily in the morning and before dinner.  He is on metaglip 5-500mg QID & actos 15mg.  He didn't bring his meter in, but states his blood sugars have been from 250-300s most often.  He was diagnosed with diabetes 20+ years ago and stated he lost 60 pounds at that time.  He states he is plateauing and having trouble losing more weight.  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  He also doesn't have the energy for it.  Today I introduced him to trulicity and tresiba.  Leandro denies hx of pancreatitis and family hx of medullary thyroid cancer.  This is a contraindication for any GLP-1.  I encouraged him to eat less per meal to reduce side effects of nausea.  He will take 0.75mg weekly before we meet in 2 weeks.  Because his blood sugars are quite high, I don't believe the trulicity will bring him down into blood sugar goal range.  I introduced him to a basal insulin called tresiba which he took 10 units at this appointment.  I instructed him how to titrate this dose up by 2 units every couple days until his morning blood sugar is less than 150.        Plan: Tresiba 10 units daily and adjust by 2 units every 2 days until fasting blood sugar is <150.  We will work on tighter goals at our next appointment in 2 weeks.  Continue trulicity 0.75mg & increase to 1.5mg if he tolerates it well.  He will continue the metaglip QID & actos 15mg.  He will continue to check his blood sugars 2x/daily before breakfast and before dinner.  I recommended reducing the amount of carbohydrates he eats in a day.  Nutrition will be addressed at our next appointment.    Subjective and Objective:      Leandro Brewer is referred by Josh Shafer for  Diabetes Education.     Lab Results   Component Value Date    HGBA1C 8.9 (H) 04/04/2017         Current diabetes medications:  metaglip 5-500mg QID, actos 15mg    Goals     None          Follow up:   Primary care visit  CDE (certified diabetic educator)      Education:     Monitoring   Meter (per above goals): Assessed and Discussed  Monitoring: Assessed and Discussed  BG goals: Assessed and Discussed    Nutrition Management  Nutrition Management: Assessed, Discussed and Literature provided  Weight: Assessed, Discussed and Literature provided  Portions/Balance: Assessed and Discussed  Carb ID/Count: Not addressed  Label Reading: Not addressed  Heart Healthy Fats: Not addressed  Menu Planning: Not addressed  Dining Out: Not addressed  Physical Activity: Assessed and Discussed  Medications: Assessed and Discussed  Orals: Assessed and Discussed  Injected Medications: Assessed, Discussed and Literature provided   Storage/Exp:Assessed and Discussed   Site Rotation: Assessed and Discussed   Sites Assessed: yes    Diabetes Disease Process: Assessed and Discussed    Acute Complications: Prevent, Detect, Treat:  Hypoglycemia: Assessed and Discussed  Hyperglycemia: Assessed and Discussed  Sick Days: Assessed and Discussed  Driving: Not addressed    Chronic Complications  Foot Care:Assessed and Discussed  Skin Care: Not addressed  Eye: Assessed and Discussed  ABC: Assessed and Discussed  Teeth:Not addressed  Goal Setting and Problem Solving: Assessed and Discussed  Barriers: Assessed and Discussed  Psychosocial Adjustments: Assessed and Discussed      Time spent with the patient: 60 minutes for diabetes education and counseling.   Previous Education: no  Visit Type:KYM Montemayor  6/16/2017

## 2021-06-11 NOTE — PROGRESS NOTES
"Assessment:    1. Type 2 diabetes mellitus  Glycosylated Hemoglobin A1c    Basic Metabolic Panel   2. Hyperlipidemia, unspecified hyperlipidemia type  Lipid Cascade   3. Essential hypertension with goal blood pressure less than 130/80     4. Mild persistent asthma without complication     5. Preventative health care  Td, Adult, Adsorbed (blue label)   6. Tinea cruris  terbinafine HCl (LAMISIL) 250 mg tablet         Plan:    A1c relatively stable at 8.8% following recent adjustments including addition of triplets that he 1.5 mg weekly subcutaneous as well as Trulicity 20-35 units daily with average of 30 units daily.  Continues metformin extended release 500 mg using 2 capsules twice daily.  Anticipate reassessment late October 2017, sooner with concerns for hyperglycemia.  Will follow up with Bar diabetic educator, at [facility] in September as scheduled.  Continue lisinopril 20 mg daily for hypertension and simvastatin 40 mg daily for lipid management.  Check lipid cascade today as well as basic metabolic panel.  Tetanus booster was provided.  Will use terbinafine 250 mg daily ×2 weeks for persistent tinea cruris.  Notify persistent concerns otherwise consider dermatology referral.  Asthma control test 24 out of 25.        Subjective:    Leandro Brewer is seen today for follow-up evaluation type 2 diabetes.  Hyperglycemia.  Seen by diabetes educator.  Started on [place] 1.5 mg subcutaneous weekly as well as Trulicity 20-35 units daily with average of 30 units per day.  Continues metformin extended release 500 mg using 2 tablets twice daily.  Continues lisinopril 20 mg daily for renal protective effects with history of microalbuminuria as well as hypertension management.  Simvastatin 40 mg at bedtime for lipid management.  Has persistent concerns with jock itch.  Using OTC products.  Tried Diflucan weekly ×2 weeks.  Using OTC powder which helps some.  Has Naftin cream available.     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 Thompson); right inguinal hernia age 6 months   Hospitalizations: early 20s \"infected gallbladder\" WITHOUT surgery... ; cellulitis in legs (hospitalized twice); abdominal wall cyst requiring IV antibiotics x 4 days... ;   Would like a 90 day supply on all meds.   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 (look for further cause of anemia if Hb < 11.0)    Past Surgical History:   Procedure Laterality Date     BACK SURGERY       HERNIA REPAIR Right     inguinal; child        Family History   Problem Relation Age of Onset     Heart attack Mother      Obesity Mother      Heart disease Father         Past Medical History:   Diagnosis Date     Diabetes mellitus         Social History   Substance Use Topics     Smoking status: Former Smoker     Smokeless tobacco: Never Used     Alcohol use Yes        Current Outpatient Prescriptions   Medication Sig Dispense Refill     ascorbic acid (ASCORBIC ACID WITH EAVN HIPS) 500 MG tablet Take 500 mg by mouth 2 (two) times a day.       aspirin 81 mg chewable tablet Chew 81 mg daily.       blood-glucose meter kit [employer] Contour Monitor KIT  Use as Directed       CONTOUR NEXT EZ METER Misc Use daily for blood sugar testing 1 each 5     CONTOUR NEXT STRIPS strips Test 3 times daily [a/s:173-346] 300 each 2     dulaglutide (TRULICITY) 1.5 mg/0.5 mL PnIj Inject 1.5 mg under the skin every 7 days. 2 mL 4     hydrocortisone valerate (WEST-DONNELL) 0.2 % ointment Apply to affected area 2-3 times daily 60 g 4     insulin degludec (TRESIBA FLEXTOUCH " "U-100) 100 unit/mL (3 mL) InPn Inject 20-35 Units under the skin daily. 30 mL 3     LANCETS MISC [employer] Microlet MISC  Check blood sugar 2 times per day and as needed       lisinopril (PRINIVIL,ZESTRIL) 20 MG tablet Take one tablet by mouth daily 90 tablet 2     metFORMIN (GLUCOPHAGE-XR) 500 MG 24 hr tablet Take 2 tablets (1,000 mg total) by mouth 2 (two) times a day. 360 tablet 2     multivitamin (MULTIPLE VITAMINS DAILY) per tablet Take 1 tablet by mouth daily.       naftifine 2 % Crea Apply to affected area topically once daily for 2 weeks 60 g 1     pen needle, diabetic (BD ULTRA-FINE BRANDON PEN NEEDLES) 32 gauge x 5/32\" Ndle Use 1 per day. 50 each 4     potassium 99 mg Tab Take by mouth. Take 1 Tablet Twice Daily       QVAR 80 mcg/actuation inhaler Inhale 2 puffs 2 times daily -rinse mouth after use -shake well before using 8.7 Inhaler 11     simvastatin (ZOCOR) 40 MG tablet 1 tablet daily at bedtime -[ez-open-cap] 90 tablet 0     VENTOLIN HFA 90 mcg/actuation inhaler Inhale 2 puffs 4 times a day if needed -shake well before using 18 g 0     clotrimazole (LOTRIMIN) 1 % cream Apply sparingly to the affected area twice daily for 2 wks and then as needed 60 g 0     terbinafine HCl (LAMISIL) 250 mg tablet Take 1 tablet (250 mg total) by mouth daily for 14 days. 14 tablet 0     No current facility-administered medications for this visit.           Objective:    Vitals:    07/11/17 0805   BP: 120/70   Pulse: 88   Weight: (!) 256 lb (116.1 kg)      Body mass index is 37.8 kg/(m^2).    Alert.  No apparent distress.  Chest clear.  Cardiac exam regular.  Obesity noted with BMI 37.8.  Hyperpigmentation bilateral groin consistent with tinea cruris concerns without significant well demarcated erythema for Candida etiology.       "

## 2021-06-11 NOTE — PROGRESS NOTES
Assessment: Leandro is a pleasant joe who comes in with his wife for a follow up on his diabetes management.  He recently went up to 1.5mg dose of trulicity last Friday and denies any nausea.  He states that since then he has felt more full and has not wanted to eat as much.  He has titrated up to 14 units of tresiba since I last saw him and has been checking his blood sugars 1-2x/daily.  His morning numbers have reduced to: 197, 248, 162, 183, 186, 244, 156, 240, 180.  His BGs before dinner are: 156, 224, 158, 193, 234, 225.  Due to having numbers still above goal I suggested increasing his insulin dose to 22 units and titrating up by 2 until his morning blood sugars are .  Today I looked at his abdomen which has a few bruises from the injections he has been taking.  I suggested he choose other areas of his abdomen to allow the bruising to heal.  He will try pinching the skin before he injects to prevent bruising.  He states he takes metaglip QID, but does not take the actos.  I suggested taking metformin xr as it can help with weight loss, but discontinuing the glipizide now that he is on insulin.  He will start that when he runs out of the metaglip.  Today we discussed his eating regimen throughout the day.  He usually wakes up from 4-5am and has a granola bar.  When he gets to work he has a sausage egg & cheese sandwich.  At lunch he has a sandwich on multigrain low calorie bread.  For a snack he has an apple or a plum with chips.  At dinner they usually have spaghetti, chinese food, or mexican takeout.  He occasionally has a bar for dessert or ice cream.  With these meals he drinks water, sobe, powerade zero, or diet coke.  I discussed incorporating healthy proteins into his diet so he will feel less hungry and less of the need to snack.  I recommended reducing his snacks and his carbs per meal to 45-60g or less.  I suggested incorporating healthy nuts like walnuts, pecans, almonds, or brazil nuts into his  meals.  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  He would like to exercise more, but is not ready to make the time right now.    Plan: Increase tresiba to 22 units and titrate by 2 units every 3-4 days to reach a goal of .  He will continue trulicity 1.5mg & change metaglip to metformin xr 2000mg.  He will start to reduce his snacking and carbs throughout the day to help with weight loss & to lower blood sugars.  He is hoping that better blood sugars will increase his energy level so he feels like going to the gym more often.  He would like to lose weight by our next visit in 2 months.       Subjective and Objective:      Leandro LEONARD Osman is referred by Josh Shafer for Diabetes Education.     Lab Results   Component Value Date    HGBA1C 8.9 (H) 04/04/2017         Current diabetes medications:  trulicity 1.5mg, tresiba 14 units, metaglip QID    Goals     None          Follow up:   Primary care visit      Education:     Monitoring   Meter (per above goals): Assessed, Discussed and Literature provided  Monitoring: Assessed and Discussed  BG goals: Assessed, Discussed and Literature provided    Nutrition Management  Nutrition Management: Assessed, Discussed and Literature provided  Weight: Assessed, Discussed and Literature provided  Portions/Balance: Assessed, Discussed and Literature provided  Carb ID/Count: Assessed, Discussed and Literature provided  Label Reading: Assessed, Discussed and Literature provided  Heart Healthy Fats: Assessed and Discussed  Menu Planning: Assessed and Discussed  Dining Out: Assessed, Discussed and Literature provided  Physical Activity: Assessed and Discussed  Medications: Assessed and Discussed  Orals: Assessed and Discussed  Injected Medications: Assessed and Discussed   Storage/Exp:Assessed and Discussed   Site Rotation: Assessed and Discussed   Sites Assessed: yes    Diabetes Disease Process:  Assessed, Discussed and Literature provided    Acute Complications: Prevent, Detect, Treat:  Hypoglycemia: Assessed and Discussed  Hyperglycemia: Assessed and Discussed  Sick Days: Not addressed  Driving: Not addressed    Chronic Complications  Foot Care:Assessed and Discussed  Skin Care: Not addressed  Eye: Not addressed  ABC: Assessed and Discussed  Teeth:Not addressed  Goal Setting and Problem Solving: Assessed and Discussed  Barriers: Assessed and Discussed  Psychosocial Adjustments: Assessed and Discussed      Time spent with the patient: 60 minutes for diabetes education and counseling.   Previous Education: yes  Visit Type:KYM Montemayor  7/3/2017

## 2021-06-11 NOTE — TELEPHONE ENCOUNTER
Refill Approved    Rx renewed per Medication Renewal Policy. Medication was last renewed on 7/8/20, last OV 7/8/20.    Yvonne Arriaza, [facility] Triage/Med Refill 9/28/2020     Requested Prescriptions   Pending Prescriptions Disp Refills     sildenafil (REVATIO) 20 mg tablet [Pharmacy Med Name: SILDENAFIL 20 MG TABLET] 90 tablet 1     Sig: TAKE 2-3 TABLETS (40-60 MG TOTAL) BY MOUTH DAILY AS NEEDED.       Medications for Impotence Refill Protocol Passed - 9/25/2020  6:30 PM        Passed - PCP or prescribing provider visit in last year     Last office visit with prescriber/PCP: 7/8/2020 Josh Shafer MD OR same dept: 7/8/2020 Josh Shafer MD OR same specialty: 7/8/2020 Josh Shafer MD  Last physical: 10/1/2019 Last MTM visit: Visit date not found   Next visit within 3 mo: Visit date not found  Next physical within 3 mo: Visit date not found  Prescriber OR PCP: Josh Shafer MD  Last diagnosis associated with med order: 1. Male erectile dysfunction  - sildenafil (REVATIO) 20 mg tablet [Pharmacy Med Name: SILDENAFIL 20 MG TABLET]; Take 2-3 tablets (40-60 mg total) by mouth daily as needed.  Dispense: 90 tablet; Refill: 1    If protocol passes may refill for 12 months if within 3 months of last provider visit (or a total of 15 months).

## 2021-06-12 NOTE — TELEPHONE ENCOUNTER
Medication Question or Clarification  Who is calling: patient  What medication are you calling about (include dose and sig)?:   insulin glargine (LANTUS SOLOSTAR U-100 INSULIN) 100 unit/mL (3 mL) pen 12 adj dose pen 1 1/13/2020     Sig - Route: Inject 40 Units under the skin every morning. - Subcutaneous    Sent to pharmacy as: insulin glargine (U-100) 100 unit/mL (3 mL) subcutaneous pen (Lantus Solostar U-100 Insulin)    Notes to Pharmacy: If LANTUS is not covered by insurance, may substitute BASAGLAR at same dose and frequency.      E-Prescribing Status: Receipt confirmed by pharmacy (1/13/2020 11:09 AM CST)        Who prescribed the medication?: Dr. Porras    What is your question/concern?: Patient is requesting a new Rx.  Please send us a new Rx for 45 ML of lantus for a 90 day supply as required by insurance.  They do not allow us to brake insulin boxes anymore thanks!  Requested Pharmacy: [facility]  Okay to leave a detailed message?: Yes

## 2021-06-12 NOTE — PROGRESS NOTES
Assessment:   Leandro Brewer is a 64 y.o. y.o. male with past medical history significant for diverticulosis, microcytic anemia, asthma, type 2 diabetes mellitus, obesity, hypertension, hyperlipidemia, history of MRSA infection who presents today for follow-up regarding chronic right low back pain with radiation into the right lower extremity.  Patient has a history of partial laminectomy at L4-5 by Dr. Thompson 11 years ago.  MRI lumbar spine shows mild spinal stenosis L3-4 and L4-5.  There is also slight anterolisthesis L4 on L5 due to advanced degenerative facet arthropathy.  Patient had an EMG March 2020 which showed chronic, inactive right L5 radiculopathy.  There were also findings of early diabetic peripheral neuropathy.  Patient underwent a right L4-5 transforaminal epidural steroid injection x2.  He had the first injection with us January 7, 2020.  He went on to have a repeat right L4-5 injection at [facility] February 7, 2020.  Patient reports that each of these injections only provided short-term relief of his pain.  Symptoms do follow primarily a right L5 pattern.  He had slight weakness in the right EHL.  He also has pain in the right groin which is thought to be due to tendinitis in the right hip (follows with [facility] and is currently in physical therapy).  -  He had previously tried a right L3-4 transforaminal epidural steroid injection December 5, 2019 which provided 80 to 90% relief of his pain but only lasted 1 day.           Plan:     A shared decision making plan was used.  The patient's values and choices were respected.  The following represents what was discussed and decided upon by the physician assistant and the patient.      1.  DIAGNOSTIC TESTS: I reviewed the MRI lumbar spine and lumbar spine flexion-extension x-rays.  No further diagnostic tests were ordered.    2.  PHYSICAL THERAPY: Patient is currently in physical therapy at [facility].  No further physical therapy was ordered.    3.  MEDICATIONS:    -Patient can continue the gabapentin 900 mg 3 times daily.  -Patient can also use Tylenol and ibuprofen as needed.    4.  INTERVENTIONS: I offer the patient a right L5-S1 transforaminal epidural steroid injection.  I explained how this would be different than the L4-5 transforaminal epidural steroid injection.  Hopefully will be more effective at targeting the right L5 nerve root. The injection will use cortisone.  Cortisone weakens/suppresses the immune system so it does not function as well as it normally does.  We do not know if this could make it more likely that you could get the COVID-19 virus or if you do have the virus, if you are going to be sicker than you would have been if you hadn't had the cortisone injection.  Patient was willing to accept this risk and the order for the injection was placed.    5.  PATIENT EDUCATION: Patient is in agreement the above plan.  All questions were answered.    6.  FOLLOW-UP: I will see the patient back in the clinic for a 2-week post procedure follow-up after his right L5-S1 transforaminal epidural steroid injection.  If he has questions or concerns in the meantime, he should not hesitate to call.    Subjective:     Leandro Brewer is a 64 y.o. male who presents today for follow-up regarding chronic right low back pain with radiation into the right lower extremity.  I last saw the patient January 20, 2020.  At that time he was following up after a right L4-5 transforaminal epidural steroid injection from January 7, 2020.  He went on to have another right L4-5 transforaminal epidural steroid injection at [facility] orthopedics February 7, 2020.  Patient reports that each of these injections only provided short-term relief of his pain.  He estimates the relief lasted 1 week.  He has continued to have pain ever since.  It became more severe about 6 weeks ago.  He denies any injury or event to cause the worsening pain.  Since the patient was last seen he is also developed  new right groin pain.  This is thought to be due to some tendinitis in the hip.  He is in his second round of physical therapy.  He had an excellent response to physical therapy the first time.  He states that with this course of physical therapy they are focusing more on his back.    Patient complains of right low back pain.  Pain radiates into the right buttock, down the lateral thigh, into the anterior shin, ending at the ankle.  He also has pain in the right groin.  Patient reports that the pain that radiates down the lateral thigh and into the anterior shin is the worst area of pain.  He states that he can feel that this pain starts in the low back.  The groin pain is less significant.  He has numbness and tingling in the same distribution as his pain as well as in the plantar aspect of the right foot.  He states that his right leg feels weaker than the left.  He rates his pain today is a 4 out of 10.  At its worst it is a 10-10.  At its best it is a 0-10.  Pain is worse with lack of movement.  He notes that when he lies in his recliner his pain worsens.  He also has significant pain radiating down the leg when he has to lift his leg to get into bed.  Pain is alleviated with repositioning/movement.      Patient is currently in physical therapy at [facility].  This is his second course of physical therapy this year.  The first course focus on the hip and now they are focusing on his low back.  He does home exercises.  He uses gabapentin 900 mg 3 times daily.  He uses Tylenol and ibuprofen as needed.  Medications are somewhat helpful.    Past medical history is reviewed and is unchanged.    Review of Systems:  Positive for numbness/tingling, weakness, pain much worse at night.  Negative loss of bowel/bladder control, inability to urinate, headache,  trip/stumble/falls, difficulty swallowing, difficulty with hand skills, fevers, unintentional weight loss.     Objective:   CONSTITUTIONAL:  Vital signs as above.  No acute  distress.  The patient is well nourished and well groomed.    PSYCHIATRIC:  The patient is awake, alert, oriented to person, place and time.  The patient is answering questions appropriately with clear speech.  Normal affect.  HEENT: Normocephalic, atraumatic.  Sclera clear.    SKIN:  Skin over the face, posterior torso, bilateral upper and lower extremities is clean, dry, intact without rashes.  MUSCULOSKELETAL:  Gait is mildly antalgic, favoring the right.  The patient is able to heel and toe walk without any difficulty.  Mild tenderness palpation right lower lumbar paraspinous muscles L5-S1.    The patient has 4+/5 strength right EHL, otherwise 5/5 strength for the bilateral hip flexors, knee flexors/extensors, ankle dorsiflexors/plantar flexors, ankle evertors/invertors.    NEUROLOGICAL: Reflexes were 2+ left trace right patellar, 1+ bilateral Achilles.  No ankle clonus bilaterally.  Sensation to light touch is intact over bilateral lower extremities.      RESULTS:      I reviewed the report of an EMG from [facility] dated March 2, 2020.  This shows findings of a chronic  inactive right L5 radiculopathy.  There are also findings consistent with early diabetic peripheral neuropathy.     I reviewed the MRI lumbar spine from [facility] dated December 20, 2019.  This shows partial sacralization of the lowest segment which will be considered L5.  There is minimal anterolisthesis L4 on L5 related to advanced degenerative facet hypertrophy with mild central canal stenosis.  At L3-4 there is mild central canal stenosis and bilateral lateral recess stenosis.  At L1-L2 there is minimal central canal stenosis.  Please see report for further details.    EXAM: XR LUMBAR SPINE 2 OR 3 VWS  LOCATION: [facility]  DATE/TIME: 12/28/2019 10:51 AM     INDICATION: Back pain. Evaluate for transitional anatomy.   COMPARISON: Lumbar spine MRI 12/28/2019.     IMPRESSION:   There is a partial sacralization of the  lowest lumbar segment which will be considered L5. There is grade 1 spondylolisthesis of L4 on L5. 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 No compression fracture. Vertebral body heights are within normal   limits. There is mild loss of disc height at L3-L4 and L4-L5 and moderate loss of disc height at L5-S1. There are bridging osteophytes in the lower thoracic spine and there are moderate to large osteophytes in the mid and lower lumbar spine.     EXAM: XR HIP RIGHT 2 OR MORE VWS  LOCATION: [facility]  DATE/TIME: 6/5/2019 9:10 AM     INDICATION: Pain in right hip  COMPARISON: None.     FINDINGS: Right hip arthroplasty with components in good position. Moderate degenerative joint disease left hip.

## 2021-06-12 NOTE — PATIENT INSTRUCTIONS - HE
A right L5/S1 epidural steroid injection has been ordered today.      Please note that this injection uses cortisone.  The cortisone may somewhat weaken the immune system.  It is unknown how much the immune system is weakened.  It is unknown if it is weakened to the point that you may be more likely to get the COVID-19 virus, or if you do get the COVID-19 virus, if you would be sicker than you would have been if you had not had the cortisone injection.  If you do not wish to proceed with the injection, please let the nurse/physician know and do NOT schedule the injection.    Please note that since your immune system is weakened from the cortisone, having a flu vaccine/shot may be less effective if you have this vaccine within 2 weeks from your cortisone injection.  It is advised to wait 2 weeks after your cortisone injection to have the flu shot (or if you have the flu shot first, wait 2 weeks before you have the cortisone injection).    Please schedule this injection at least 1 week  from now to allow time for insurance prior authorization.  On the day of your injection, you cannot be sick or taking antibiotics.  If you become sick and are prescribed, please call the clinic so your injection can be rescheduled for once you have completed your antibiotics.  You will need to bring a  with you for your injection.   If you have any questions or concerns prior to your injection, please do not hesitate to call the nurse navigation line at [phone] or contact Antonieta Thompson through [employer].

## 2021-06-13 NOTE — PROGRESS NOTES
Assessment:    1. Type 2 diabetes mellitus  Glycosylated Hemoglobin A1c    Microalbumin, Random Urine    Comprehensive Metabolic Panel   2. Essential hypertension  Comprehensive Metabolic Panel   3. Hyperlipidemia, unspecified hyperlipidemia type  Comprehensive Metabolic Panel   4. Right hand paresthesia     5. Pain of left heel           Plan:    Discussed improvement in type 2 diabetes with A1c improving from 8.8% down to 7.2%.  Will continue Metformin extended release 500 mg using 2 tablets twice daily.  Trulicity 1.5 mg weekly.  Tresiba 35 units daily.  Continues aspirin 81 mg daily.  Hypertension at goal.  Weight goal less than 240 pounds initially, less than 220 pounds ideally.  Patient states will receive flu shot at work.  Asthma control test 23 out of 25.  Check microalbumin screen today for annual monitoring.  Comprehensive metabolic panel for medication monitoring.  Continues Qvar and Ventolin as needed for asthma management.  Simvastatin 40 mg at bedtime for lipid management and lisinopril 20 mg daily for hypertension renal protective effects.  Recent right hand paresthesias question carpal tunnel syndrome etiology over past couple weeks and will monitor.  Wrist brace recommended.  Left heel pain also discussed over past couple weeks and will change shoes as well as use heel cup or additional padding.  Anticipate physical exam at follow-up in 4 months.         Subjective:    Leandro Brewer is seen today for follow-up evaluation type 2 diabetes.  Continues Tresiba 35 units daily.  Trulicity 1.5 mg weekly.  Aspirin 81 mg daily.  Metformin extended release 500 mg using 2 tablets twice daily.  Continues lisinopril 20 mg daily for hypertension and renal protective effects.  No history of microalbuminuria noted.  Simvastatin 40 mg at bedtime for lipid management.  No recent asthma exacerbation.  Continues Qvar 2 puffs twice daily as well as Ventolin as needed with infrequent use.  Asthma control test 23 out  "of 25 noted.  Does have some right hand numbness in fingertips recently in the last couple weeks.  Does not feel that it is carpal tunnel.  Wondering if it is related to his shoulder and is planning on seeing his chiropractor.  Also left heel pain worse with standing better at rest.  Has had plantar fasciitis in the past however feels more like a \"heel spur\".  Understands need to change shoes since on feet for extended periods of time at work etc.     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 Thompson); right inguinal hernia age 6 months   Hospitalizations: early 20s \"infected gallbladder\" WITHOUT surgery... ; cellulitis in legs (hospitalized twice); abdominal wall cyst requiring IV antibiotics x 4 days... ;   Would like a 90 day supply on all meds.   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 microcyctic anemia (look for further cause of anemia if Hb < 11.0)    Past Surgical History:   Procedure Laterality Date     BACK SURGERY       HERNIA REPAIR Right     inguinal; child        Family History   Problem Relation Age of Onset     Heart attack Mother      Obesity Mother      Heart disease Father         Past Medical History:   Diagnosis Date     Diabetes mellitus         Social History   Substance Use Topics     Smoking status: Former Smoker     Smokeless tobacco: Never Used     Alcohol use Yes        Current Outpatient Prescriptions   Medication Sig Dispense Refill     ascorbic acid (ASCORBIC ACID WITH EVAN HIPS) 500 MG tablet Take 500 mg by mouth 2 (two) times a day.       aspirin 81 mg chewable tablet Chew 81 mg " "daily.       blood-glucose meter kit [employer] Contour Monitor KIT  Use as Directed       clotrimazole (LOTRIMIN) 1 % cream Apply sparingly to the affected area twice daily for 2 wks and then as needed 60 g 0     CONTOUR NEXT EZ METER Misc Use daily for blood sugar testing 1 each 5     CONTOUR NEXT STRIPS strips Test 3 times daily [a/s:173-346] 300 strip 3     dulaglutide (TRULICITY) 1.5 mg/0.5 mL PnIj Inject 1.5 mg under the skin every 7 days. 2 mL 4     hydrocortisone valerate (WEST-DONNELL) 0.2 % ointment Apply to affected area 2-3 times daily 60 g 3     insulin degludec (TRESIBA FLEXTOUCH U-100) 100 unit/mL (3 mL) InPn Inject 20-35 Units under the skin daily. 30 mL 3     LANCETS MISC [employer] Microlet MISC  Check blood sugar 2 times per day and as needed       lisinopril (PRINIVIL,ZESTRIL) 20 MG tablet 1 tablet daily -[ez-open-cap] 90 tablet 2     metFORMIN (GLUCOPHAGE-XR) 500 MG 24 hr tablet Take 2 tablets (1,000 mg total) by mouth 2 (two) times a day. 360 tablet 2     multivitamin (MULTIPLE VITAMINS DAILY) per tablet Take 1 tablet by mouth daily.       naftifine 2 % Crea Apply to affected area topically once daily for 2 weeks [yessi:391-561] 60 g 1     pen needle, diabetic (BD ULTRA-FINE BRANDON PEN NEEDLES) 32 gauge x 5/32\" Ndle Use 1 per day. 50 each 4     potassium 99 mg Tab Take by mouth. Take 1 Tablet Twice Daily       QVAR 80 mcg/actuation inhaler Inhale 2 puffs 2 times daily -rinse mouth after use -shake well before using 8.7 Inhaler 11     simvastatin (ZOCOR) 40 MG tablet 1 tablet daily at bedtime -[ez-open-cap] 90 tablet 3     VENTOLIN HFA 90 mcg/actuation inhaler Inhale 2 puffs 4 times a day if needed -shake well before using 18 g 0     No current facility-administered medications for this visit.           Objective:    Vitals:    10/25/17 0756   BP: 110/70   Pulse: 80   Weight: (!) 252 lb (114.3 kg)      Body mass index is 37.21 kg/(m^2).    Alert.  No apparent distress.  Pleasant.  BMI 37.2.  Chest clear.  " Cardiac exam regular.  Extremities warm and dry.  Patient describes paresthesias more involving thumb index and middle finger more so than small finger etc. No objective findings currently.  Negative Phalen and Tinel sign.

## 2021-06-13 NOTE — PROGRESS NOTES
Assessment/Plan:     1. Routine general medical examination at a health care facility  Routine healthcare maintenance.  Preventative cares reviewed.  Annual physical exams to continue.    2. Class 2 severe obesity due to excess calories with serious comorbidity and body mass index (BMI) of 36.0 to 36.9 in adult (H)  Dietary and exercise modification for weight goal less than 240 pounds initially, less than 220 pounds ideally.    3. Uncontrolled type 2 diabetes mellitus with hyperglycemia, with long-term current use of insulin (H)  Type 2 diabetes with hyperglycemia with ongoing use of Lantus insulin 40 units daily, Trulicity 1.5 mg/week and Metformin  mg using 2 tablets twice daily.  A1c did increase slightly from 6.7% up to 7.6% following recent use of oral steroids for lower back pain concerns.  Anticipate improvement over next 4 months with reassessment at that time.  Recent diabetic eye exam October 15, 2020 without retinopathy.  Prior microalbumin screen January 2, 2020 normal.  Monofilament testing today abnormal with history of peripheral neuropathy.  - Glycosylated Hemoglobin A1c  - Basic Metabolic Panel    4. Essential hypertension, benign  Hypertension appears stable continuing lisinopril 20 mg daily.  - Basic Metabolic Panel    5. Hyperlipidemia, unspecified hyperlipidemia type  Simvastatin 40 mg at bedtime.  Recent lipid cascade near goal.  Continue to implement therapeutic lifestyle changes.    6. Microcytic anemia  History of microcytic anemia.  Repeat CBC to ensure stable.  - HM2(CBC w/o Differential)    7. Tubular adenoma of colon  Tubular adenomas with prior advanced adenoma with recommendation for 3-year follow-up on colonoscopy initially completed January 27, 2020.    8. Encounter for immunization  Shingrix immunization provided with booster in 2 to 6 months.  - Varicella Zoster, Recombinant Vaccine IM    9. Screening for prostate cancer  PSA screen completed.  - PSA (Prostatic-Specific  "Antigen), Annual Screen    10. Peripheral neuropathy  Continues gabapentin 300 mg taken 3 capsules 3 times daily.  Abnormal monofilament testing confirmed today.    11. Lymphedema  Continues compression stocking use with furosemide 40 mg using half tablet each morning.    12. Mild persistent asthma without complication  Asthma control test 25 out of 25.  Continues Qvar 80 mcg using 2 puffs twice daily.     The following high BMI interventions were performed this visit: encouragement to exercise, weight monitoring, weight loss from baseline weight and lifestyle education regarding diet             Subjective:      Leandro Brewer is a 64 y.o. male who presents for an annual exam.  In general has been doing well.  No recent illness.  Chronic low back pain more right-sided.  Is scheduled for lower back injection next Wednesday through spine care clinic.  Has been using Medrol Dosepaks.  Blood sugars typically 1 50-1 80 however has been 202 on average over past 7 days.  Prior A1c had improved from 7.8% down to 6.7% while continuing Trulicity 1.5 mg/week, Lantus 40 units daily and Metformin extended release 500 mg using 2 tablets twice daily.  Qvar 80 mcg using 2 puffs twice daily with well-controlled asthma without need for rescue inhaler.  Gabapentin 900 mg 3 times daily for peripheral neuropathy associate with diabetes.  Lymphedema management with compression stockings and continues furosemide 40 mg using half tablet each morning.  Simvastatin 40 mg at bedtime for lipid management with recent lipid cascade July 8, 2020 near goal.  Lisinopril 20 mg daily for hypertension.  Microalbumin screen normal January 2, 2020.  Seen by associated eye clinic without retinopathy noted October 15, 2020.  Advanced adenoma on recent colonoscopy January 27, 2020 and will repeat at 3-year interval.  Comprehensive review of systems as above otherwise all negative.     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 Thompson); right inguinal hernia age 6 months; right total hip arthroplasty at [facility] 2019 with Dr. Evans.   Hospitalizations: early 20s \"infected gallbladder\" WITHOUT surgery... ; cellulitis in legs (hospitalized twice); abdominal wall cyst requiring IV antibiotics x 4 days... ; 19-19 [facility] for right LE cellulitis/sepsis; right LE cellulitis 19-19 ([facility]); 10/15/19 s/p left MELITA (Dr. Evans)  Would like a 90 day supply on all meds.   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 microcyctic anemia (look for further cause of anemia if Hb < 11.0)    Healthy Habits:   Regular Exercise: No  Healthy Diet: No  Dental Visits Regularly: Yes  Seat Belt: Yes   Sexually active: Yes  Colonoscopy: Yes and 20 (repeat in 3 years)  Lipid Profile: Yes  Glucose Screen: Yes    Immunization History   Administered Date(s) Administered     DT (pediatric) 1998     INFLUENZA,RECOMBINANT,INJ,PF QUADRIVALENT 18+YRS 2019     INFLUENZA,SEASONAL QUAD, PF, =/> 6months 2015     Influenza [id number], 2010     Influenza, inj, historic,unspecified 2007, 10/22/2008, 12/15/2018, 10/31/2020     Influenza, seasonal,quad inj 6-35 mos 2009, 2010     Pneumo Polysac 23-V 2007     Td, Adult, Absorbed 2017     Td,adult,historic,unspecified 2007     Tdap 2007     ZOSTER, RECOMBINANT, IM 2020     Immunization status: needs Shingrix series, otherwise UTD.  Vision Screening:both eyes and glasses  Hearing: PASS     Current Outpatient Medications " "  Medication Sig Dispense Refill     acetaminophen (TYLENOL) 500 MG tablet Take 1,000 mg by mouth 3 (three) times a day. Try to limit intake to 3000 mg a day             albuterol (VENTOLIN HFA) 90 mcg/actuation inhaler Inhale 2 puffs 4 (four) times a day as needed for wheezing. 18 g 6     ascorbic acid, vitamin C, (VITAMIN C) 500 MG tablet Take 500 mg by mouth daily.       aspirin 81 mg chewable tablet Chew 81 mg daily.       b complex vitamins (VITAMINS B COMPLEX) capsule Take 1 capsule by mouth.       furosemide (LASIX) 40 MG tablet Take 0.5 tablets (20 mg total) by mouth daily. 1/2 tab 90 tablet 1     gabapentin (NEURONTIN) 300 MG capsule Take 3 capsules (900 mg total) by mouth 3 (three) times a day. Increase by 1 tab every 3 days.  Max dose 900 mg tid (Patient taking differently: Take 900 mg by mouth 3 (three) times a day. ) 810 capsule 3     insulin glargine (LANTUS SOLOSTAR U-100 INSULIN) 100 unit/mL (3 mL) pen Inject 40 Units under the skin every morning. 45 mL 1     LANCETS MISC [employer] Microlet MISC  Check blood sugar 2 times per day and as needed       lisinopriL (PRINIVIL,ZESTRIL) 20 MG tablet TAKE 1 TABLET BY MOUTH EVERY DAY 90 tablet 3     magnesium oxide 500 mg Tab Take 500 mg by mouth daily.              metFORMIN (GLUCOPHAGE-XR) 500 MG 24 hr tablet TAKE 2 TABLETS BY MOUTH TWICE A DAY WITH MEALS. 360 tablet 2     methylPREDNISolone (MEDROL DOSEPACK) 4 mg tablet Follow package directions 21 tablet 1     multivitamin (MULTIPLE VITAMINS DAILY) per tablet Take 1 tablet by mouth daily.       nystatin (MYCOSTATIN) powder Apply 1 application topically 3 (three) times a day. Groin rash       pen needle, diabetic (BD ULTRA-FINE BRANDON PEN NEEDLE) 32 gauge x 5/32\" Ndle Use 1 each As Directed daily. 100 each 3     potassium 99 mg Tab Take 1 tablet by mouth 2 (two) times a day.              QVAR REDIHALER 80 mcg/actuation HFAB inhaler INHALE 2 PUFFS BY MOUTH TWICE A DAY - RINSE MOUTH AFTER USE-DO NOT SHAKE UNIT " 31.8 g 3     sildenafil (REVATIO) 20 mg tablet Take 2-3 tablets (40-60 mg total) by mouth daily as needed. 90 tablet 2     simvastatin (ZOCOR) 40 MG tablet Take 1 tablet (40 mg total) by mouth at bedtime. 90 tablet 3     TRULICITY 1.5 mg/0.5 mL PnIj INJECT 1.5MG (0.5 ML) SUBCUTANEOUSLY EVERY 7 DAYS 6 mL 5     blood-glucose meter kit [employer] Contour Monitor KIT  Use as Directed       CONTOUR NEXT EZ METER Misc Use daily for blood sugar testing 1 each 5     CONTOUR NEXT TEST STRIPS strips TEST 3 TIMES DAILY 300 strip 3     No current facility-administered medications for this visit.      Past Medical History:   Diagnosis Date     Anemia      Arthritis      Diabetes mellitus (H)      Diabetes mellitus, type II (H)      Hypertension      Obesity      Plantar fasciitis      Past Surgical History:   Procedure Laterality Date     BACK SURGERY       HERNIA REPAIR Right     inguinal; child     Patient has no known allergies.  Family History   Problem Relation Age of Onset     Heart attack Mother      Obesity Mother      Heart disease Father      Cancer Father         throat     Social History     Socioeconomic History     Marital status:      Spouse name: Not on file     Number of children: Not on file     Years of education: Not on file     Highest education level: Not on file   Occupational History     Occupation: Manager     Employer: [employer]   Social Needs     Financial resource strain: Not on file     Food insecurity     Worry: Not on file     Inability: Not on file     Transportation needs     Medical: Not on file     Non-medical: Not on file   Tobacco Use     Smoking status: Former Smoker     Smokeless tobacco: Never Used   Substance and Sexual Activity     Alcohol use: Yes     Drug use: No     Sexual activity: Not on file   Lifestyle     Physical activity     Days per week: Not on file     Minutes per session: Not on file     Stress: Not on file   Relationships     Social connections     Talks  "on phone: Not on file     Gets together: Not on file     Attends Yarsani service: Not on file     Active member of club or organization: Not on file     Attends meetings of clubs or organizations: Not on file     Relationship status: Not on file     Intimate partner violence     Fear of current or ex partner: Not on file     Emotionally abused: Not on file     Physically abused: Not on file     Forced sexual activity: Not on file   Other Topics Concern     Not on file   Social History Narrative     Not on file       Review of Systems  Comprehensive ROS: as above, otherwise all negative.           Objective:     /60   Pulse 87   Temp 97.2  F (36.2  C)   Ht 5' 9.25\" (1.759 m)   Wt (!) 251 lb (113.9 kg)   SpO2 96%   BMI 36.80 kg/m    Body mass index is 36.8 kg/m .    Physical    General Appearance:    Alert, cooperative, no distress, appears stated age.  BMI = 36.8   Head:    Normocephalic, without obvious abnormality, atraumatic   Eyes:    PERRL, conjunctiva/corneas clear, EOM's intact, fundi     benign, both eyes.  Glasses.        Ears:    Normal TM's and external ear canals, both ears   Nose:   Nares normal, septum midline, mucosa normal, no drainage    or sinus tenderness   Throat:   Lips, mucosa, and tongue normal; teeth and gums normal   Neck:   Supple, symmetrical, trachea midline, no adenopathy;        thyroid:  No enlargement/tenderness/nodules; no carotid    bruit or JVD   Back:     Symmetric, no curvature, ROM normal, no CVA tenderness.  Right lower back tenderness just right of midline with midline scar well-healed from prior surgery.   Lungs:     Clear to auscultation bilaterally, respirations unlabored   Chest wall:    No tenderness or deformity   Heart:    Regular rate and rhythm, S1 and S2 normal, no murmur, rub   or gallop   Abdomen:     Soft, non-tender, bowel sounds active all four quadrants,     no masses, no organomegaly.     Genitalia:    Normal male without lesion, discharge or " tenderness.  No inguinal hernia noted.     Rectal:    Normal tone.  Prostate normal/symmetric, no masses or tenderness.   Extremities:   Extremities normal, atraumatic, no cyanosis or edema.  Utilizing bilateral lower extremity compression stockings, knee-high.   Pulses:   2+ and symmetric all extremities   Skin:   Skin color, texture, turgor normal, no rashes or lesions   Lymph nodes:   Cervical, supraclavicular, and axillary nodes normal   Neurologic:   CNII-XII intact. Normal strength and reflexes       throughout.  Abnormal monofilament testing confirmed.                This note has been dictated using voice recognition software and as a result may contain minor grammatical errors and unintended word substitutions.

## 2021-06-13 NOTE — PROGRESS NOTES
Assessment: Leandro is in for a follow up on his diabetes management.  He had another a1c drawn shortly after our last visit in July and it lowered to 8.8.  He did not bring his meter with him today, but let me know his averages for when he checks 2x/daily.  In the mornings he is most often ranging from 130-140, but has high numbers like 190 today after his favorite food the night before - Spaghetti.  In the evening before dinner he is usually from 120-130 and his lowest was 87.  He has had no lows and has been taking 30 units of tresiba on average daily.  Although today he took 25 units for no specific reason he states.  I suggested increasing his dose to 32 or 34 to get his morning blood sugars <130.  He is still taking metformin xr 2000mg and trulicity 1.5mg along with the tresiba.  Since starting the insulin he has noticed he is peeing less and has slightly more energy.  He says he is still quite busy with life and is not ready to start exercising.      Plan: He will continue to check his blood sugars 2x/daily.  He will increase his tresiba to 32-34 units and continue trulicity 1.5mg & metformin xr 2000mg.  He will increase his tresiba dose by  2 units if he is not consistently under <130 in the morning.  He will call me with any lows, questions, or concerns in the meantime.  He will meet with Dr. Shafer in October for a much improved a1c & will follow up with me in January.    Subjective and Objective:      Leandro Brewer is referred by Dr. Josh Shafer for Diabetes Education.     Lab Results   Component Value Date    HGBA1C 8.8 (H) 07/11/2017         Current diabetes medications:  Metformin xr 2000mg, trulicity 1.5mg, tresiba 30 units    Goals       A1C < 8.0            9/19/17:  Leandro will reduce his a1c <8.0 to lower his risk for complications from diabetes.        Monitoring            9/19/17:  Leandro will check his blood sugars 2x/daily before breakfast and in the evening.            Follow up:    Primary care visit  CDE (certified diabetic educator)      Education:     Monitoring   Meter (per above goals): Assessed and Discussed  Monitoring: Assessed and Discussed  BG goals: Assessed, Discussed and Literature provided    Nutrition Management  Nutrition Management: Assessed and Discussed  Weight: Assessed and Discussed  Portions/Balance: Assessed and Discussed  Carb ID/Count: Assessed and Discussed  Label Reading: Not addressed  Heart Healthy Fats: Not addressed  Menu Planning: Assessed and Discussed  Dining Out: Not addressed  Physical Activity: Assessed and Discussed  Medications: Assessed and Discussed  Orals: Assessed and Discussed  Injected Medications: Assessed and Discussed   Storage/Exp:Assessed and Discussed   Site Rotation: Assessed and Discussed   Sites Assessed: no    Diabetes Disease Process: Assessed, Discussed and Literature provided    Acute Complications: Prevent, Detect, Treat:  Hypoglycemia: Assessed and Discussed  Hyperglycemia: Assessed and Discussed  Sick Days: Not addressed  Driving: Not addressed    Chronic Complications  Foot Care:Assessed and Discussed  Skin Care: Not addressed  Eye: Assessed and Discussed  ABC: Assessed and Discussed  Teeth:Not addressed  Goal Setting and Problem Solving: Assessed and Discussed  Barriers: Assessed and Discussed  Psychosocial Adjustments: Assessed and Discussed      Time spent with the patient: 60 minutes for diabetes education and counseling.   Previous Education: yes  Visit Type:KYM Montemayor  9/19/2017

## 2021-06-13 NOTE — PROGRESS NOTES
Assessment:   Leandro Brewer is a 65 y.o. y.o. male with past medical history significant for diverticulosis, microcytic anemia, asthma, type 2 diabetes mellitus, obesity, hypertension, hyperlipidemia, history of MRSA infection who presents today for follow-up regarding chronic right low back pain with radiation into the right lower extremity.  Patient has a history of partial laminectomy at L4-5 by Dr. Thompson 11 years ago.  MRI lumbar spine shows mild spinal stenosis L3-4 and L4-5.  There is also slight anterolisthesis L4 on L5 due to advanced degenerative facet arthropathy.  Patient had an EMG March 2020 which showed chronic, inactive right L5 radiculopathy.  There were also findings of early diabetic peripheral neuropathy.  Patient status post a right L5-S1 transforaminal epidural steroid injection November 18, 2020 which provided 75% relief for only one week.  - Patient underwent a right L4-5 transforaminal epidural steroid injection x2.  He had the first injection with  January 7, 2020.  He went on to have a repeat right L4-5 injection at [facility] February 7, 2020.  Patient reports that each of these injections only provided short-term relief of his pain.    -  He had previously tried a right L3-4 transforaminal epidural steroid injection December 5, 2019 which provided 80 to 90% relief of his pain but only lasted 1 day.           Plan:     A shared decision making plan was used.  The patient's values and choices were respected.  The following represents what was discussed and decided upon by the physician assistant and the patient.      1.  DIAGNOSTIC TESTS: I reviewed the MRI lumbar spine and lumbar spine x-rays.  I ordered an updated MRI lumbar spine in preparation for his neurosurgery consult since his old scan is 1-year-old.  I also ordered lumbar spine flexion-extension x-rays.    2.  PHYSICAL THERAPY: Patient is currently in physical therapy at [facility].  No further physical therapy was ordered.    3.   MEDICATIONS:  -Cymbalta 30 mg daily was prescribed.  We could increase his dose up to 60 mg daily if needed.  -Tizanidine 4 mg 3 times daily as needed was prescribed.  -Patient can continue the gabapentin 900 mg 3 times daily.  -Patient has been taking too much Tylenol and ibuprofen.  I told the patient to limit his Tylenol to 3000 mg daily (4000 mg at the most).  He should limit ibuprofen as much as possible given comorbidities.    4.  INTERVENTIONS: No additional interventions were ordered.  He has had 4 epidural steroid injections in the past 1 year with no lasting relief.  -If no surgery is recommended, we could consider a spinal cord stimulator trial.    5.  REFERRALS: I entered a referral to neurosurgery.      6.  FOLLOW-UP: Patient will follow up with me as needed.  We will await recommendations from neurosurgery.  A nurse can call him with the results of his imaging studies in the meantime.  If he has any other questions or concerns, he should not hesitate to call.    Subjective:     Leandro Brewer is a 65 y.o. male who presents today for follow-up regarding chronic right low back pain with radiation into the right lower extremity.  Patient status post a right L5-S1 transforaminal epidural steroid injection November 18, 2020.  Patient reports the injection provided 75% relief of his pain but only lasted 1 week.    Patient complains of right low back pain.  Pain radiates in the right buttock.  Pain extends into the right lateral hip.  Pain radiates into the right groin and down the right anterolateral thigh.  Occasionally extends into the shin.  He has numbness and tingling in both feet.  He has intermittent numbness and tingling on the anterolateral thighs as well.  He denies weakness.  He rates his pain today as a 5-10.  At its worst it is a 10-10.  At its best it is a 1 out of 10.  Pain is aggravated with inactivity.    Patient is currently in physical therapy at [facility].  This is his second course of  physical therapy this year.  The first course focus on the hip and now they are focusing on his low back.  He does home exercises.  He uses gabapentin 900 mg 3 times daily.  He is taking Tylenol 1500 mg 4 times daily.  He is also using ibuprofen 800 mg 4 times daily.  Medications are somewhat helpful.    Past medical history is reviewed and is unchanged.    Review of Systems:  Positive for numbness/tingling,  pain much worse at night.  Negative for weakness, loss of bowel/bladder control, inability to urinate, headache,  trip/stumble/falls, difficulty swallowing, difficulty with hand skills, fevers, unintentional weight loss.     Objective:   CONSTITUTIONAL:  Vital signs as above.  No acute distress.  The patient is well nourished and well groomed.    PSYCHIATRIC:  The patient is awake, alert, oriented to person, place and time.  The patient is answering questions appropriately with clear speech.  Normal affect.  HEENT: Normocephalic, atraumatic.  Sclera clear.    SKIN:  Skin over the face, posterior torso, bilateral upper and lower extremities is clean, dry, intact without rashes.  MUSCULOSKELETAL:  Gait is mildly antalgic, favoring the right.  The patient is able to heel and toe walk without any difficulty.  Mild tenderness palpation right lower lumbar paraspinous muscles L5-S1.    The patient has 4+/5 strength right EHL, otherwise 5/5 strength for the bilateral hip flexors, knee flexors/extensors, ankle dorsiflexors/plantar flexors, ankle evertors/invertors.    NEUROLOGICAL: Reflexes were 2+ left trace right patellar, 1+ bilateral Achilles.  No ankle clonus bilaterally.  Sensation to light touch is subjectively diminished bilateral lateral thighs.     RESULTS:      I reviewed the report of an EMG from [facility] orthopedics dated March 2, 2020.  This shows findings of a chronic  inactive right L5 radiculopathy.  There are also findings consistent with early diabetic peripheral neuropathy.     I reviewed the MRI  lumbar spine from [facility] dated December 20, 2019.  This shows partial sacralization of the lowest segment which will be considered L5.  There is minimal anterolisthesis L4 on L5 related to advanced degenerative facet hypertrophy with mild central canal stenosis.  At L3-4 there is mild central canal stenosis and bilateral lateral recess stenosis.  At L1-L2 there is minimal central canal stenosis.  Please see report for further details.    EXAM: XR LUMBAR SPINE 2 OR 3 VWS  LOCATION: [facility]  DATE/TIME: 12/28/2019 10:51 AM     INDICATION: Back pain. Evaluate for transitional anatomy.   COMPARISON: Lumbar spine MRI 12/28/2019.     IMPRESSION:   There is a partial sacralization of the lowest lumbar segment which will be considered L5. There is grade 1 spondylolisthesis of L4 on L5. 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 No compression fracture. Vertebral body heights are within normal   limits. There is mild loss of disc height at L3-L4 and L4-L5 and moderate loss of disc height at L5-S1. There are bridging osteophytes in the lower thoracic spine and there are moderate to large osteophytes in the mid and lower lumbar spine.     EXAM: XR HIP RIGHT 2 OR MORE VWS  LOCATION: [facility]  DATE/TIME: 6/5/2019 9:10 AM     INDICATION: Pain in right hip  COMPARISON: None.     FINDINGS: Right hip arthroplasty with components in good position. Moderate degenerative joint disease left hip.

## 2021-06-13 NOTE — PATIENT INSTRUCTIONS - HE
Please follow up two weeks post procedure with Antonieta to evaluate your plan of care.    Please be advised that your blood glucose levels may be increased for the next 5-7 days as a result of the steroid you received with your injection today.  It is recommended that you monitor your blood glucose levels closely over the next week and direct any additional questions regarding your blood glucose management to your primary doctor.    DISCHARGE INSTRUCTIONS    During office hours (8:00 a.m.- 4:00 p.m.) questions or concerns may be answered  by calling [facility] Navigation Nurses at  [phone].  Messages received after hours will be returned the following business day.      In the case of an emergency, please dial 911 or seek assistance at the nearest Emergency Room/Urgent Care facility.     All Patients:    ? You may experience an increase in your symptoms for the first 2 days (It may take anywhere between 2 days- 2 weeks for the steroid to have maximum effect).    ? You may use ice on the injection site, as frequently as 20 minutes each hour if needed.    ? You may take your pain medicine.    ? You may continue taking your regular medication after your injection. If you have had a Medial Branch Block you may resume pain medication once your pain diary is completed.    ? You may shower. No swimming, tub bath or hot tub for 48 hours.  You may remove your bandaid/bandage as soon as you are home.    ? You may resume light activities, as tolerated.    ? Resume your usual diet as tolerated.    ? It is strongly advised that you do not drive for 1-3 hours post injection.    ? If you have had oral sedation:  Do not drive for 8 hours post injection.      ? If you have had IV sedation:  Do not drive for 24 hours post injection.  Do not operate hazardous machinery or make important personal/business decisions for 24 hours.      POSSIBLE STEROID SIDE EFFECTS (If steroid/cortisone was used for your procedure)    -If you  experience these symptoms, it should only last for a short period      Swelling of the legs                Skin redness (flushing)       Mouth (oral) irritation     Blood sugar (glucose) levels              Sweats                      Mood changes    Headache    Sleeplessness    Weakened immune system for up to 14 days, which could increase the risk of marshall the COVID-19 virus and/or experiencing more severe symptoms of the disease, if exposed.    Decreased effectiveness of the flu vaccine if given within 2 weeks of the steroid.         POSSIBLE PROCEDURE SIDE EFFECTS  -Call the Spine Center if you are concerned    Increased Pain             Increased numbness/tingling        Nausea/Vomiting            Bruising/bleeding at site        Redness or swelling                                                Difficulty walking        Weakness             Fever greater than 100.5    *In the event of a severe headache after an epidural steroid injection that is relieved by lying down, please call the [facility] to speak with a clinical staff member*

## 2021-06-13 NOTE — TELEPHONE ENCOUNTER
----- Message from Antonieta Thompson PA-C sent at 12/13/2020  2:26 PM CST -----  Please call the patient and let him know that I reviewed his MRI and xray.  The MRI does show significant narrowing around the nerve on the right at L4/5, which correlates with his pain, and appears progressed compared with the MRI from 2019.  Flex/ex xrays do not show any abnormal movement in the spine.  Dr. Lopez can review results at consultation this week.

## 2021-06-13 NOTE — PROGRESS NOTES
NEUROSURGERY CONSULTATION NOTE    12/18/2020       CHIEF COMPLAINT: Low back pain, lumbar radiculopathy    HPI:    Leandro Brewer is a 65 y.o. male with PMH sig for IDDM2 (last Hgb A1c 7.6- on insulin for the last three years) who is sent to us in consultation by Antonieta Thompson for further evaluation of lumbar radiculopathy and back pain     Onset: May 2019  Character: Right low back pain that radiates into the LE. Pain is somewhat intermittent but it is present more often than not. It more tends to fluctuate in severity. He states his pain radiates into the buttock, into the anterolateral thigh and then down into the anterior shin, but not past the ankle.   Pain max: 10/10- at least once per day  Average pain: 4-5/10    Numbness/tingling: intermittent (mostly night time) tingling in both feet  Weakness: Intermittently feels like the leg will buckle    Aggravating factors: Heavy lifting, kneeling  Relieving factors: Ice (used to be more beneficial), tylenol, ibuprofen, laying position is most comfortable (as opposed to sitting or standing)    Pain management: Gabapentin was started in mid 2019- no obvious impact/improvement, but he notes that when he tried to wean himself off but then the pain became more noticeable then. Tylenol, ibuprofen. Underwent a right L5-S1 TF HAYDEN on 11/18/2020- a few days of relief where the pain intensity was lowered but not resolved. PCP had trial him on oral steroids x 4- they are helpful initially while starting, but by the time the pack was over, the pain returned to baseline. Cymbalta without significant improvement.    Past Medical History:   Diagnosis Date     Anemia      Arthritis      Diabetes mellitus (H)      Diabetes mellitus, type II (H)      Hypertension      Obesity      Plantar fasciitis      Past Surgical History:   Procedure Laterality Date     BACK SURGERY      laminectomy L45 by Dr. Villarreal      HERNIA REPAIR Right     inguinal; child     REVIEW OF SYSTEMS:  Pt denies  "changes in bowel or bladder habits. No incontinence. A full 14 point review of systems was otherwise completed and is negative aside from that mentioned above in the HPI    MEDICATIONS:  Current Outpatient Medications   Medication Sig Dispense Refill     acetaminophen (TYLENOL) 500 MG tablet Take 1,000 mg by mouth 3 (three) times a day. Try to limit intake to 3000 mg a day             albuterol (VENTOLIN HFA) 90 mcg/actuation inhaler Inhale 2 puffs 4 (four) times a day as needed for wheezing. 18 g 6     ascorbic acid, vitamin C, (VITAMIN C) 500 MG tablet Take 500 mg by mouth daily.       aspirin 81 mg chewable tablet Chew 81 mg daily.       b complex vitamins (VITAMINS B COMPLEX) capsule Take 1 capsule by mouth.       DULoxetine (CYMBALTA) 30 MG capsule Take 1 capsule (30 mg total) by mouth daily. 30 capsule 0     furosemide (LASIX) 40 MG tablet Take 0.5 tablets (20 mg total) by mouth daily. 1/2 tab 90 tablet 1     gabapentin (NEURONTIN) 300 MG capsule Take 3 capsules (900 mg total) by mouth 3 (three) times a day. Increase by 1 tab every 3 days.  Max dose 900 mg tid (Patient taking differently: Take 900 mg by mouth 3 (three) times a day. ) 810 capsule 3     insulin glargine (LANTUS SOLOSTAR U-100 INSULIN) 100 unit/mL (3 mL) pen Inject 40 Units under the skin every morning. 45 mL 1     LANCETS MISC [employer] Microlet MISC  Check blood sugar 2 times per day and as needed       lisinopriL (PRINIVIL,ZESTRIL) 20 MG tablet TAKE 1 TABLET BY MOUTH EVERY DAY 90 tablet 3     magnesium oxide 500 mg Tab Take 500 mg by mouth daily.              metFORMIN (GLUCOPHAGE-XR) 500 MG 24 hr tablet TAKE 2 TABLETS BY MOUTH TWICE A DAY WITH MEALS. 360 tablet 2     multivitamin (MULTIPLE VITAMINS DAILY) per tablet Take 1 tablet by mouth daily.       nystatin (MYCOSTATIN) powder Apply 1 application topically 3 (three) times a day. Groin rash       pen needle, diabetic (BD ULTRA-FINE BRANDON PEN NEEDLE) 32 gauge x 5/32\" Ndle Use 1 each As " Directed daily. 100 each 3     potassium 99 mg Tab Take 1 tablet by mouth 2 (two) times a day.              QVAR REDIHALER 80 mcg/actuation HFAB inhaler INHALE 2 PUFFS BY MOUTH TWICE A DAY - RINSE MOUTH AFTER USE-DO NOT SHAKE UNIT 31.8 g 3     sildenafil (REVATIO) 20 mg tablet Take 2-3 tablets (40-60 mg total) by mouth daily as needed. 90 tablet 2     simvastatin (ZOCOR) 40 MG tablet Take 1 tablet (40 mg total) by mouth at bedtime. 90 tablet 3     tiZANidine (ZANAFLEX) 4 MG tablet Take 4 mg by mouth every 6 (six) hours as needed.       TRULICITY 1.5 mg/0.5 mL PnIj INJECT 1.5MG (0.5 ML) SUBCUTANEOUSLY EVERY 7 DAYS 6 mL 5     No current facility-administered medications for this visit.          ALLERGIES/SENSITIVITIES:     No Known Allergies    PERTINENT SOCIAL HISTORY:   Social History     Socioeconomic History     Marital status:      Spouse name: None     Number of children: None     Years of education: None     Highest education level: None   Occupational History     Occupation: Manager     Employer: [employer]   Social Needs     Financial resource strain: None     Food insecurity     Worry: None     Inability: None     Transportation needs     Medical: None     Non-medical: None   Tobacco Use     Smoking status: Former Smoker     Smokeless tobacco: Never Used   Substance and Sexual Activity     Alcohol use: Yes     Drug use: No     Sexual activity: None   Lifestyle     Physical activity     Days per week: None     Minutes per session: None     Stress: None   Relationships     Social connections     Talks on phone: None     Gets together: None     Attends [employer] service: None     Active member of club or organization: None     Attends meetings of clubs or organizations: None     Relationship status: None     Intimate partner violence     Fear of current or ex partner: None     Emotionally abused: None     Physically abused: None     Forced sexual activity: None   Other Topics Concern     None  "  Social History Narrative     None         FAMILY HISTORY:  Family History   Problem Relation Age of Onset     Heart attack Mother      Obesity Mother      Heart disease Father      Cancer Father         throat     Heart attack Father      Diabetes Sister      Diabetes Brother       PHYSICAL EXAM:     Constitution: /70   Pulse (!) 106   Ht 5' 10\" (1.778 m)   Wt (!) 255 lb (115.7 kg)   SpO2 96%   BMI 36.59 kg/m  .   Awake, alert and in NAD  Eyes: Conjugate gaze. Conjunctiva benign without icterus or injection  Heart: RRR  Lungs: Non-labored respiration without accessory muscle use  Skin: No obvious rash or lesion  Psych: Appropriate mood and affect, alert and oriented x 3  Mental Status:  Speech is fluent.  Recent and remote memory are intact.  Attention span and concentration are normal.     Motor: Normal bulk and tone all muscle groups of upper and lower extremities.    Right Left   Hip flexion 4++ 5   Hip extension 5 5   Knee flexion 5 5   Knee ex 5 5   Dorsiflex 5 5   Plantar flex 5 5   EHL 5 5      Sensory: Sensation intact bilaterally to light touch and temperature throughout. Vibratory sense is diminished in the right great toe.     Coordination:  Gait is WNL. INGA in the UE is WNL     Reflexes; 2+ supinator, biceps, triceps. 3+ patellar and absent achilles. No yousif. No clonus. Toes are down-going bilaterally    Musculoskeletal: Negative straight leg raise bilaterally. Negative EMMANUEL testing.     IMAGING: I personally reviewed all radiographic images    MRI lumbar spine 12/12/2020: Patient has evidence of mild multilevel lumbar spondylosis.  At L3-4, there is a broad-based paracentral disc bulge eccentric through the left neural foramen with bilateral facet arthropathy and mild facet joint effusions as well as mild ligamentum flavum hypertrophy which is resulting in moderate lateral recess stenosis bilaterally.  No significant foraminal stenosis bilaterally.  At L4-5, patient has a mild anterior " listhesis of L4 and L5 with marked bilateral facet arthropathy, evidence of prior hemilaminectomy with bilateral lateral recess stenosis      CONSULTATION ASSESSMENT AND PLAN:    Leandro Brewer is a 65 y.o. male who has lumbar spondylosis, degenerative disc disease, a stable mild grade 1 spondylolisthesis at L4-5 who has symptoms of right lumbar radiculopathy    I reviewed Marc's imaging with him today in clinic noting his right lateral recess stenosis at L3-4 and L4-5. I recommended a right L3-4, L4-5 hemilaminectomy, medial facetectomy which will be redo at the L4-5 level. We reviewed the risks, benefits and alternatives to surgery. Patient wishes to proceed, we will work to get this scheduled.    I spent more than 60 minutes in this apt, examining the pt, reviewing the scans, reviewing notes from chart, discussing treatment options with risks and benefits and coordinating care. >50 % clinic time was spent in face to face counseling and coordinating care    Julissa Lopez MD      Cc:   Josh Shafer MD

## 2021-06-13 NOTE — TELEPHONE ENCOUNTER
Call to pt with provider's results and recommendations. Pt stated understanding. Pt is scheduled for neurosurgery consult on 12/18.

## 2021-06-13 NOTE — PROGRESS NOTES
Neurosurgery consultation was requested by: CRISTY Ferrari  Pain: Right low back pain   Radicular Pain is present: Right buttock and hip and anterior leg in to ankle   Lhermitte sign: no   Motor complaints: Occasional weakness in right leg   Sensory complaints: occasional tingling in bottom of both feet and toes   Gait and balance issues: yes   Bowel or bladder issues: no   Duration of SX is: Over 1 year   The symptoms are worse with:everything   The symptoms are better with: adjusting positions.   Injury: Denies   Severity is:mild - moderate  Patient has tried the following conservative measures: he has had injections and had about a week of relief with Right L45 TFESI and Right L5-S1 as well. He has done PT and would only have short term relief of a day or so at a time.   MALDONADO Sesay    Modified Oswestry Low back Pain Disability Questionnaire    1. PAIN INTENSITY  3- Pain medication provides me with moderate relief from pain  2. PERSONAL CARE  2- It is painful to take care of myself, and I am slow and careful.   3. LIFTING  1- I can lift heavy weights, but it gives me extra pain  4. WALKING  1- Pain prevents me from walking more than 1 mile  5. SITTING  2- Pain prevents me from sitting more than 1 hour.  6. STANDING  1- I can stand as long as I want, but it increases my pain.  7. SLEEPING  2- Even when I take medication, I sleep less than 6 hours.  8. SOCIAL LIFE  2- Pain prevents me from participating in more energetic activities (e.g., sports, dancing).  9. TRAVELING  2- My pain restricts my travel over 2 hours.  10. EMPLOYMENT/HOMEMAKING  1- My normal homemaking/job activities increase my pain, but I can still perform all that is required of me.    SCORE:_17___ x2=__34%____

## 2021-06-13 NOTE — PATIENT INSTRUCTIONS - HE
YOU WERE TAUGHT TODAY FOR A RIGHT L343 L45 HEMILAMINECTOMY MEDIAL FACETECTOMY  Provided complete information about approaching surgery.  Discussed discharge planning and expected outcomes after surgery as well as follow-ups and restrictions.  Emphasized on stop taking ASA, NSAID's, vitamins and /or OTC herbal supplements within 10 days and any anticoagulant meds within 7 days prior to surgery.  Reminded patient to bring all pertinent films to hospital the day of surgery.    NPO after midnight    Provided written pamphlet about surgery.  Answered all questions.  The  will call patient with all pre-op orders and instructions.  Patient to complete all required diagnostic tests prior to surgery.  If test are not completed this will cancel the surgery; contact the clinic nurses at [phone] if unable to complete test.    Using a washcloth and a bottle of provided Hibiclens, wash your body, avoiding your face and genitals. Preferably, shower the night before surgery and the morning of surgery using a half a bottle each time for your whole body shower. If you are unable to take a shower in the morning of surgery, please discuss your options with the nurse at your readiness visit.PATIENT GOT SOAP TODAY

## 2021-06-14 NOTE — PROGRESS NOTES
[facility]  [address] [facility]  [facility] [address]  Dept: [phone]  Dept Fax: [phone]  Primary Provider: Josh Shafer MD  Pre-op Performing Provider: LEX AGARWAL    PREOPERATIVE EVALUATION:  Today's date: 1/13/2021    Leandro Brewer is a 65 y.o. male who presents for a preoperative evaluation.    Surgical Information:  Surgery/Procedure: back surgery on L3 AND L4 AND L4 AND L5   Surgery Location:   Surgeon: Dr. Lopez  Surgery Date: 1/20/2021  Time of Surgery: 11:45am   Where patient plans to recover: At home with family  Fax number for surgical facility:     Type of Anesthesia Anticipated: General    Subjective     HPI related to upcoming procedure:.  Patient has had low back pain with lumbar radiculopathy for the last several years pain is described as radiating down his lower extremities.  Has occasional numbness and tingling in his feet and intermittent weakness.  He has tried ice, Tylenol, ibuprofen and changing positions.  Was started on gabapentin a little over a year ago.  Feels that this is helping manage discomfort.  Per chart review, he had also tried a course of steroids in the past which was helpful initially.  He is on Cymbalta as well without significant improvement.  He has decided to undergo 3 and L4 and L4-L5 hemilaminectomies and facetectomies.     Patient has had back surgery and hip surgeries and has tolerated anesthesia well in the past without any adverse reactions.  No history of bleeding or clotting disorders.      He has type 2 diabetes on insulin, Metformin and Trulicity.  Most recent hemoglobin A1c was 7.6 December 2020.  His blood pressure has been adequately controlled on current dose of lisinopril.  He remains on Qvar inhaler and albuterol as needed for mild persistent asthma.  This has been well controlled.  He continues simvastatin and baby aspirin.  Using tizanidine, duloxetine and gabapentin for this chronic low back pain.      He denies  any recent illness.  Reports of feeling well today.  Plans to have Covid testing completed next week prior to surgery.    Jakeates anethesa well.   Preop Questions 1/13/2021   Have you ever had a heart attack or stroke? No   Have you ever had surgery on your heart or blood vessels, such as a stent placement, a coronary artery bypass, or surgery on an artery in your head, neck, heart, or legs? No   Do you have chest pain with activity? No   Do you have a history of  heart failure? No   Do you currently have a cold, bronchitis or symptoms of other infection? No   Do you have a cough, shortness of breath, or wheezing? No   Do you or anyone in your family have previous history of blood clots? No   Do you or does anyone in your family have a serious bleeding problem such as prolonged bleeding following surgeries or cuts? No   Have you ever had problems with anemia or been told to take iron pills? No   Have you had any abnormal blood loss such as black, tarry or bloody stools? No   Have you ever had a blood transfusion? No   Are you willing to have a blood transfusion if it is medically needed before, during, or after your surgery? Yes   Have you or any of your relatives ever had problems with anesthesia? No   Do you have sleep apnea, excessive snoring or daytime drowsiness? No   Do you have any artifical heart valves or other implanted medical devices like a pacemaker, defibrillator, or continuous glucose monitor? No   Do you have artificial joints? YES - bilateral hips   Are you allergic to latex? No     Health Care Directive:  Patient does not have a Health Care Directive or Living Will: Discussed advance care planning with patient; information given to patient to review.    Preoperative Review of :    reviewed - no record of controlled substances prescribed.    ASTHMA - Patient has a longstanding history of moderate-severe Asthma . Patient has been doing well overall noting NO SYMPTOMS and continues on  medication regimen consisting of QVAR inhaler and albuterol without adverse reactions or side effects.     DIABETES - Patient has a longstanding history of DiabetesType Type II . Patient is being treated with long acting insulin and denies significant side effects. Control has been fair. Complicating factors include but are not limited to: hypertension and hyperlipidemia.     HYPERLIPIDEMIA - Patient has a long history of significant Hyperlipidemia requiring medication for treatment with recent good control. Patient reports no problems or side effects with the medication.     HYPERTENSION - Patient has longstanding history of HTN , currently denies any symptoms referable to elevated blood pressure. Specifically denies chest pain, palpitations, dyspnea, orthopnea, PND or peripheral edema. Blood pressure readings have been in normal range. Current medication regimen is as listed below. Patient denies any side effects of medication.       Review of Systems  CONSTITUTIONAL: NEGATIVE for fever, chills, change in weight  INTEGUMENTARY/SKIN: NEGATIVE for worrisome rashes, moles or lesions  EYES: NEGATIVE for vision changes or irritation  ENT/MOUTH: NEGATIVE for ear, mouth and throat problems  RESP: NEGATIVE for significant cough or SOB  BREAST: NEGATIVE for masses, tenderness or discharge  CV: NEGATIVE for chest pain, palpitations or peripheral edema  GI: NEGATIVE for nausea, abdominal pain, heartburn, or change in bowel habits  : NEGATIVE for frequency, dysuria, or hematuria  MUSCULOSKELETAL: NEGATIVE for significant arthralgias or myalgia  NEURO: NEGATIVE for weakness, dizziness or paresthesias  ENDOCRINE: NEGATIVE for temperature intolerance, skin/hair changes  HEME: NEGATIVE for bleeding problems  PSYCHIATRIC: NEGATIVE for changes in mood or affect    Patient Active Problem List    Diagnosis Date Noted     Osteoarthritis of spine with radiculopathy, lumbar region 01/14/2021     Spondylolisthesis of lumbar region  01/14/2021     History of cellulitis 10/01/2019     History of MRSA infection 10/01/2019     Diabetic neuropathy associated with type 2 diabetes mellitus (H) 09/09/2019     Primary osteoarthritis of right hip 02/21/2019     Uncontrolled type 2 diabetes mellitus with hyperglycemia, with long-term current use of insulin (H) 02/21/2019     Tinea cruris 01/08/2019     Spinal stenosis of lumbar region, unspecified whether neurogenic claudication present 01/08/2019     Mild persistent asthma without complication 06/19/2018     Lymphedema      Hyperlipidemia      Male Erectile Disorder      Diverticulosis      Microcytic anemia      Hammer Toe Of The Right Second Toe      Tubular adenoma of colon      Insulin dependent diabetes mellitus      Class 2 severe obesity due to excess calories with serious comorbidity and body mass index (BMI) of 36.0 to 36.9 in adult (H)      Carpal Tunnel Syndrome      Essential hypertension, benign      Past Medical History:   Diagnosis Date     Anemia      Arthritis      Diabetes mellitus (H)      Diabetes mellitus, type II (H)      Hypertension      Obesity      Plantar fasciitis      Past Surgical History:   Procedure Laterality Date     BACK SURGERY      laminectomy L45 by Dr. Villarreal      HERNIA REPAIR Right     inguinal; child     TOTAL HIP ARTHROPLASTY Bilateral 2019 Feb 5, 2019 (right) and October 19, 2019 (left)     Current Outpatient Medications   Medication Sig Dispense Refill     acetaminophen (TYLENOL) 500 MG tablet Take 1,000 mg by mouth 3 (three) times a day. Try to limit intake to 3000 mg a day             albuterol (VENTOLIN HFA) 90 mcg/actuation inhaler Inhale 2 puffs 4 (four) times a day as needed for wheezing. 18 g 6     ascorbic acid, vitamin C, (VITAMIN C) 500 MG tablet Take 500 mg by mouth daily.       aspirin 81 mg chewable tablet Chew 81 mg daily.       b complex vitamins (VITAMINS B COMPLEX) capsule Take 1 capsule by mouth.       DULoxetine (CYMBALTA) 30 MG capsule  "Take 1 capsule (30 mg total) by mouth daily. 30 capsule 0     furosemide (LASIX) 40 MG tablet Take 0.5 tablets (20 mg total) by mouth daily. 1/2 tab 90 tablet 1     gabapentin (NEURONTIN) 300 MG capsule Take 3 capsules (900 mg total) by mouth 3 (three) times a day. Increase by 1 tab every 3 days.  Max dose 900 mg tid (Patient taking differently: Take 900 mg by mouth 3 (three) times a day. ) 810 capsule 3     insulin glargine (LANTUS SOLOSTAR U-100 INSULIN) 100 unit/mL (3 mL) pen Inject 40 Units under the skin every morning. 45 mL 1     LANCETS MISC [employer] Microlet MISC  Check blood sugar 2 times per day and as needed       lisinopriL (PRINIVIL,ZESTRIL) 20 MG tablet TAKE 1 TABLET BY MOUTH EVERY DAY 90 tablet 3     magnesium oxide 500 mg Tab Take 500 mg by mouth daily.              metFORMIN (GLUCOPHAGE-XR) 500 MG 24 hr tablet TAKE 2 TABLETS BY MOUTH TWICE A DAY WITH MEALS. 360 tablet 2     multivitamin (MULTIPLE VITAMINS DAILY) per tablet Take 1 tablet by mouth daily.       nystatin (MYCOSTATIN) powder Apply 1 application topically 3 (three) times a day. Groin rash       pen needle, diabetic (BD ULTRA-FINE BRANDON PEN NEEDLE) 32 gauge x 5/32\" Ndle Use 1 each As Directed daily. 100 each 3     potassium 99 mg Tab Take 1 tablet by mouth 2 (two) times a day.              QVAR REDIHALER 80 mcg/actuation HFAB inhaler INHALE 2 PUFFS BY MOUTH TWICE A DAY - RINSE MOUTH AFTER USE-DO NOT SHAKE UNIT 31.8 g 3     sildenafil (REVATIO) 20 mg tablet Take 2-3 tablets (40-60 mg total) by mouth daily as needed. 90 tablet 2     simvastatin (ZOCOR) 40 MG tablet Take 1 tablet (40 mg total) by mouth at bedtime. 90 tablet 3     tiZANidine (ZANAFLEX) 4 MG tablet Take 1 tablet (4 mg total) by mouth every 8 (eight) hours as needed. 30 tablet 0     TRULICITY 1.5 mg/0.5 mL PnIj INJECT 1.5MG (0.5 ML) SUBCUTANEOUSLY EVERY 7 DAYS 6 mL 5     No current facility-administered medications for this visit.        No Known Allergies    Social History " "    Tobacco Use     Smoking status: Former Smoker     Smokeless tobacco: Never Used   Substance Use Topics     Alcohol use: Yes      Family History   Problem Relation Age of Onset     Heart attack Mother      Obesity Mother      Heart disease Father      Cancer Father         throat     Heart attack Father      Diabetes Sister      Diabetes Brother      Social History     Substance and Sexual Activity   Drug Use No        Objective     /62 (Patient Site: Right Arm, Patient Position: Sitting, Cuff Size: Adult Large)   Pulse 88   Temp 97.4  F (36.3  C)   Ht 5' 9\" (1.753 m)   Wt (!) 260 lb 11.2 oz (118.3 kg)   BMI 38.50 kg/m    Physical Exam    GENERAL APPEARANCE: healthy, alert and no distress     EYES: EOMI,  PERRL     HENT: ear canals and TM's normal and nose and mouth without ulcers or lesions     NECK: no adenopathy, no asymmetry, masses, or scars and thyroid normal to palpation     RESP: lungs clear to auscultation - no rales, rhonchi or wheezes     CV: regular rates and rhythm, normal S1 S2, no S3 or S4 and no murmur, click or rub     ABDOMEN:  soft, nontender, no HSM or masses and bowel sounds normal     MS: extremities normal- no gross deformities noted, no evidence of inflammation in joints, FROM in all extremities.     SKIN: no suspicious lesions or rashes     NEURO: Normal strength and tone, sensory exam grossly normal, mentation intact and speech normal     PSYCH: mentation appears normal. and affect normal/bright     LYMPHATICS: No cervical adenopathy    Recent Labs   Lab Test 01/13/21  1715 11/12/20  0957   HGB 11.1* 11.5*    329   INR 1.00  --     139   K 4.6 4.7   CREATININE 0.92 0.91        PRE-OP Diagnostics:   Labs pending at this time. Results will be reviewed when available.  No EKG required, no history of coronary heart disease, significant arrhythmia, peripheral arterial disease or other structural heart disease.    REVISED CARDIAC RISK INDEX (RCRI)   The patient has the " following serious cardiovascular risks for perioperative complications:   - No serious cardiac risks = 0 points    RCRI INTERPRETATION: 0 points: Class I (very low risk - 0.4% complication rate)       Assessment & Plan      The proposed surgical procedure is considered INTERMEDIATE risk.    Pre-op exam  Spinal stenosis of lumbar region, unspecified whether neurogenic claudication present  Preoperative examination completed today.  Exam is without any significant findings that would be of concern for his upcoming procedure.  We reviewed his current medications and allergies.  No contraindications to the procedure identified today.  Pending stable lab results, patient may proceed with scheduled procedure with choice of anesthesia.  Covid testing to be completed 3 to 5 days prior to procedure.  - HM2(CBC w/o Differential)  - Basic Metabolic Panel  - INR  - APTT(PTT)    Class 2 severe obesity due to excess calories with serious comorbidity and body mass index (BMI) of 36.0 to 36.9 in adult (H)  Encouraged healthy lifestyle on occasions in regards to diet and exercise for weight management.    Uncontrolled type 2 diabetes mellitus with hyperglycemia, with long-term current use of insulin (H)  Reviewed most recent hemoglobin A1c of 7.6 in December 2020.  Continue to work on diet and exercise as above.  Continue current dose of Metformin, long-acting insulin and Trulicity.  Patient will take 60% of long-acting insulin prior to surgery.  Continue Trulicity as prescribed.  Follow-up for diabetic check in March 2021.    Mild persistent asthma without complication  Symptoms stable on Qvar and albuterol as needed.    Hyperlipidemia, unspecified hyperlipidemia type  Continue statin.  Hold baby aspirin 7 days prior to procedure.    Essential hypertension, benign  Blood pressure remains adequately controlled on lisinopril.  Patient will continue at current dose.    Lymphedema  Chronic lymphedema managed with 20 mg of furosemide  daily.  Patient is instructed to hold furosemide morning of surgery.       Risks and Recommendations:  The patient has the following additional risks and recommendations for perioperative complications:    Diabetes:  - Patient is on insulin therapy; diabetic NPO guidelines provided and discussed.   - He will continue Trulicity as prescribed.    Medication Instructions:   - ACE/ARB: May be continued on the day of surgery.    - Diuretics: HOLD on the day of surgery.   - Statins: Continue taking on the day of surgery.    - Hold Aspirin 7  Days before surgery   - LONG ACTING INSULIN (e.g. glargine, detemir): Take 60% of the usual evening or morning dose before surgery. Alternative dosing may be appropriate depending on patient circumstances.     RECOMMENDATION:  APPROVAL GIVEN to proceed with proposed procedure, without further diagnostic evaluation.    Signed Electronically by: Harriett Gallardo CNP    Copy of this evaluation report is provided to requesting physician.    [facility]    Revised Cardiac Risk Index

## 2021-06-14 NOTE — ANESTHESIA POSTPROCEDURE EVALUATION
"Patient: Leandro Brewer  Procedure(s):  Right Lumbar 3 - Lumbar 4 hemilaminectomy and medial facetectomy and Right redo Lumbar 4 -Lumbar 5 hemilaminectomy and medial facetectomy (Right)  Anesthesia type: general    Patient location: PACU  Last vitals: /77   Pulse (!) 105   Temp 37  C (98.6  F) (Temporal)   Resp 12   Ht 5' 9\" (1.753 m)   Wt (!) 260 lb (117.9 kg)   SpO2 98%   BMI 38.40 kg/m      Vitals Value Taken Time   /77 01/20/21 1930   Temp  01/20/21 1933   Pulse 104 01/20/21 1931   Resp 15 01/20/21 1931   SpO2 97 % 01/20/21 1931   Vitals shown include unvalidated device data.  Post vital signs: stable  Level of consciousness: awake and responds to simple questions  Post-anesthesia pain: pain controlled  Post-anesthesia nausea and vomiting: no  Pulmonary: unassisted, return to baseline  Cardiovascular: stable and blood pressure at baseline  Hydration: adequate  Anesthetic events: no    QCDR Measures:  ASA# 11 - Chelsie-op Cardiac Arrest: ASA11B - Patient did NOT experience unanticipated cardiac arrest  ASA# 12 - Chelsie-op Mortality Rate: ASA12B - Patient did NOT die  ASA# 13 - PACU Re-Intubation Rate: ASA13B - Patient did NOT require a new airway mgmt  ASA# 10 - Composite Anes Safety: ASA10A - No serious adverse event    Additional Notes:  "

## 2021-06-14 NOTE — TELEPHONE ENCOUNTER
PATIENT NAME:  Leandro Brewer  YOB: 1955  MRN: [id number]  SURGEON: Dr. Lopez  DATE of SURGERY/CONSULT: 1/20/2021  PROCEDURE/DIAGNOSIS: Right Lumbar 3 - Lumbar 4 hemilaminectomy and medial facetectomy and Right redo Lumbar 4 -Lumbar 5 hemilaminectomy and medial facetectomy    FOLLOW-UP:  Wound Check:  7-10days  days [On nurse schedule unless noted otherwise]  Staples/Sutures Out : 7-10 days Days [On nurse schedule unless noted otherwise]   Post Op/Follow up  Visit: 6 weeks All post op/follow up appointments will be scheduled with a NP  unless specifically indicated here.  DIAGNOSTICS:  none  ( Place any orders for needed follow up imaging or testing at the time the telephone encounter is placed)  DISPOSITION:  home  Route to NS SCHEDULING AND REGISTRATION

## 2021-06-14 NOTE — TELEPHONE ENCOUNTER
Called patient, discussed surgery, post-op course, expectations, follow up plan.    Reviewed H&P from - 1/13/21: cleared for surgery   Labs: Hgb 11.1, aPTT 43, will recheck in AM    MRI done on 12/12/20  - in [place]    To OR as planned.     Check in - 0945    Nothing to eat or drink after midnight the night before surgery.     Reviewed with patient: Arrive 2.5 -3 hours prior to scheduled surgery.    Bring all pertinent films to hospital the day of surgery.     Continue to refrain from NSAIDS (Ibuprofen, Aleve, Naprosyn), ASA, Over the counter herbal medications or supplements, anti-coagulants and blood thinners. Pt confirmed understanding of PCP medication instructions from pre-op.    Patient confirmed they have help/assistance in place at home upon discharge    Instructions: using a washcloth and a bottle of provided Hibiclens, wash your body, avoiding your face and genitals. Preferably, shower the night before surgery and the morning of surgery using a half a bottle each time for your whole body shower.        Patient was informed that we will provide up to 1 week prescription of pain medication for post-operative pain.      Instructed patient about the following: After your surgery, if you will be staying in-patient, a nursing provider team will be monitoring you closely throughout your stay and communicate your health status to your surgeon and other providers.  You will be seen by Advanced Practice Providers (e.g., nurse practitioners, clinic nurse specialist, and physician assistants) who will check on you regularly to assess the status of your surgery.   Pt was informed that the clinic will call to schedule his post-op appts after he has been discharged.   All questions have been answered to the pt's satisfaction.     Laquita Savage RN

## 2021-06-14 NOTE — PROGRESS NOTES
"Leandro Brewer is status post Right L3-4 hemilaminectomy with medial facetectomy; Redo right L4-5 hemilaminectomy with medial facetectomy on 01/20/2021 with Dr. Lopez.  Preoperatively presented with history of progressively worsening right lumbar radiculopathy radiating down the thigh and into the anterior shin.  Today he returns in follow up for wound check. He is very pleased with his outocome - reports a resolved leg pain. States his back feels \"stiff\". Denies numbness or tingling in LE. Uses a cane for safety. Gait and balance are stable. Takes Tylenol prn. Uses ice packs.       Surgical wound WNL - CDI, no signs of infection or skin breakdown.  Incision well-healed: good skin approximation, no redness or visible/palpable edema, no tenderness to palpation.  PT. AF, denies fever, chills or sweats.  Pt. reports that the symptoms are improved from pre-op.    Staples intact.    Olivia Mccall RN, CNRN    "

## 2021-06-14 NOTE — TELEPHONE ENCOUNTER
RN cannot approve Refill Request    RN can NOT refill this medication PCP messaged that patient is overdue for Labs. Last office visit: 7/8/2020 Josh Shafer MD Last Physical: 11/12/2020 Last MTM visit: Visit date not found Last visit same specialty: 7/8/2020 Josh Shafer MD.  Next visit within 3 mo: Visit date not found  Next physical within 3 mo: Visit date not found      Yvonne Arriaza, [facility] Triage/Med Refill 1/17/2021    Requested Prescriptions   Pending Prescriptions Disp Refills     TRULICITY 1.5 mg/0.5 mL PnIj [Pharmacy Med Name: TRULICITY 1.5 MG/0.5 ML PEN] 6 Syringe 4     Sig: INJECT 0.5ML SUBCUTANEOUSLY EVERY 7 DAYS.       Insulin/GLP-1 Refill Protocol Failed - 1/17/2021 12:05 PM        Failed - Microalbumin in last year     Microalbumin, Random Urine   Date Value Ref Range Status   01/02/2020 <0.50 0.00 - 1.99 mg/dL Final                  Passed - Visit with PCP or prescribing provider visit in last 6 months     Last office visit with prescriber/PCP: Visit date not found OR same dept: 7/8/2020 Josh Shafer MD OR same specialty: 7/8/2020 Josh Shafer MD Last physical: 11/12/2020 Last MTM visit: Visit date not found     Next appt within 3 mo: Visit date not found  Next physical within 3 mo: Visit date not found  Prescriber OR PCP: Josh Shafer MD  Last diagnosis associated with med order: 1. Uncontrolled type 2 diabetes mellitus with hyperglycemia, with long-term current use of insulin (H)  - TRULICITY 1.5 mg/0.5 mL PnIj [Pharmacy Med Name: TRULICITY 1.5 MG/0.5 ML PEN]; INJECT 0.5ML SUBCUTANEOUSLY EVERY 7 DAYS.  Dispense: 6 Syringe; Refill: 4    If protocol passes may refill for 6 months if within 3 months of last provider visit (or a total of 9 months).              Passed - A1C in last 6 months     Hemoglobin A1c   Date Value Ref Range Status   11/12/2020 7.6 (H) <=5.6 % Final     Comment:     Normal <5.7% Prediabete 5.7-6.4% Diabletes 6.5% or higher - adopted from ADA  consensus guidelines               Passed - Blood pressure in last year     BP Readings from Last 1 Encounters:   01/13/21 100/62             Passed - Creatinine done in last year     Creatinine   Date Value Ref Range Status   01/13/2021 0.92 0.70 - 1.30 mg/dL Final

## 2021-06-14 NOTE — PROGRESS NOTES
"ASSESSMENT: Left plantar fasciitis, diabetes    PLAN: Cortisone injection in the left heel today.  He was given 4 mg of dexamethasone sodium phosphate.  He tolerated that well.  Orders written for custom orthotics.  Continue with stretching and icing and anti-inflammatory medication at home.  Return to clinic if pain persists.      SUBJECTIVE: New patient visit at the [facility] regarding left heel pain.  He describes post static pain.  Slightly better with an over-the-counter heel pad.  He stands all day at work.  Previous episode of plantar fasciitis about 15 years ago that responded to cortisone injections and orthotics.  He is diabetic.    PHYSICAL EXAM:  Pulse (!) 59  Resp 16  Ht 5' 9\" (1.753 m)  Wt (!) 252 lb (114.3 kg)  SpO2 97%  BMI 37.21 kg/m2  General: Pleasant 62 y.o. male in no acute distress.  Vascular: DP pulses are palpable. PT pulses are palpable. Pedal hair is present. Feet are warm to the touch.  Cardiac: Pulse is regular.  Lymphatic: No edema at the ankles.  Neuro: Sensation in the feet is grossly intact to light touch.  Derm: Bilateral great toenails are thickened and dystrophic with discoloration and subungual debris.  Musculoskeletal: Tenderness with palpation around the left plantar fascial insertion.  Right second hammertoe.  Palpable subdermal mass near the right third metatarsal midshaft.  Not mobile with the tendon.  Seems to be attached to bone.    Past Medical History:   Diagnosis Date     Diabetes mellitus      Plantar fasciitis        He has a past surgical history that includes Hernia repair (Right) and Back surgery.    No Known Allergies    Current Outpatient Prescriptions   Medication Sig Dispense Refill     ascorbic acid (ASCORBIC ACID WITH EVAN HIPS) 500 MG tablet Take 500 mg by mouth 2 (two) times a day.       aspirin 81 mg chewable tablet Chew 81 mg daily.       blood-glucose meter kit [employer]ia Contour Monitor KIT  Use as Directed       clotrimazole (LOTRIMIN) 1 % cream " "Apply sparingly to the affected area twice daily for 2 wks and then as needed 60 g 0     CONTOUR NEXT EZ METER Misc Use daily for blood sugar testing 1 each 5     CONTOUR NEXT STRIPS strips Test 3 times daily [a/s:173-346] 300 strip 3     FLUCELVAX QUAD 6972-8409, PF, 60 mcg (15 mcg x 4)/0.5 mL Syrg TO BE ADMINISTERED BY PHARMACIST FOR IMMUNIZATION  0     hydrocortisone valerate (WEST-DONNELL) 0.2 % ointment Apply to affected area 2-3 times daily 60 g 3     insulin degludec (TRESIBA FLEXTOUCH U-100) 100 unit/mL (3 mL) InPn Inject 20-35 Units under the skin daily. 30 mL 3     LANCETS MISC Ascensia Microlet MISC  Check blood sugar 2 times per day and as needed       lisinopril (PRINIVIL,ZESTRIL) 20 MG tablet 1 tablet daily -[ez-open-cap] 90 tablet 2     metFORMIN (GLUCOPHAGE-XR) 500 MG 24 hr tablet Take 2 tablets (1,000 mg total) by mouth 2 (two) times a day. 360 tablet 2     multivitamin (MULTIPLE VITAMINS DAILY) per tablet Take 1 tablet by mouth daily.       naftifine 2 % Crea Apply to affected area topically once daily for 2 weeks [yessi:391-561] 60 g 1     pen needle, diabetic (BD ULTRA-FINE BRANDON PEN NEEDLES) 32 gauge x 5/32\" Ndle Use 1 per day. 50 each 4     potassium 99 mg Tab Take by mouth. Take 1 Tablet Twice Daily       QVAR 80 mcg/actuation inhaler Inhale 2 puffs 2 times daily -rinse mouth after use -shake well before using 8.7 Inhaler 11     simvastatin (ZOCOR) 40 MG tablet 1 tablet daily at bedtime -[ez-open-cap] 90 tablet 3     TRULICITY 1.5 mg/0.5 mL PnIj Inject 1.5 mg under the skin every 7 days. 2 mL 4     VENTOLIN HFA 90 mcg/actuation inhaler Inhale 2 puffs 4 times a day if needed -shake well before using 18 g 0     No current facility-administered medications for this visit.        Family History:  family history includes Heart attack in his mother; Heart disease in his father; Obesity in his mother.    Social History:  Reviewed, and he reports that he has quit smoking. He has never used smokeless " tobacco. He reports that he drinks alcohol. He reports that he does not use illicit drugs.    Review of Systems:  A 12 point comprehensive review of systems was negative except as noted.

## 2021-06-14 NOTE — TELEPHONE ENCOUNTER
This message is being sent on behalf of the COVID testing team:   Patient is scheduled 1/16/21 for a pre-procedure COVID test in preparation for his surgery (RIGHT L343 L45 HEMILAMINECTOMY MEDIAL FACETECTOMY) on 1/20/21--we do not have an order in place for this.     Order for COVID test pended, please sign if appropriate?   Thank you!

## 2021-06-14 NOTE — TELEPHONE ENCOUNTER
Patient had surgery with Dr. Lopez. FMLA/short term disability says patient will be off until 3/1/21. Patient has a physical job and definitely won't be asking to be released back early. I scheduled his 6 wk post op appt for 3/5/20. Would you please update the disability form that was scanned to the chart that he will be off through 3/5/20 as he has an appt w our office on that day and we will re-assess RTW date at that time.

## 2021-06-14 NOTE — ANESTHESIA CARE TRANSFER NOTE
Last vitals:   Vitals:    01/20/21 1910   BP: 179/86   Pulse: (!) 107   Resp: 11   Temp: 37  C (98.6  F)   SpO2: 100%     Patient's level of consciousness is drowsy  Spontaneous respirations: yes  Maintains airway independently: yes  Dentition unchanged: yes  Oropharynx: oropharynx clear of all foreign objects    QCDR Measures:  ASA# 20 - Surgical Safety Checklist: WHO surgical safety checklist completed prior to induction    PQRS# 430 - Adult PONV Prevention: 4558F - Pt received => 2 anti-emetic agents (different classes) preop & intraop  ASA# 8 - Peds PONV Prevention: NA - Not pediatric patient, not GA or 2 or more risk factors NOT present  PQRS# 424 - Chelsie-op Temp Management: 4559F - At least one body temp DOCUMENTED => 35.5C or 95.9F within required timeframe  PQRS# 426 - PACU Transfer Protocol: - Transfer of care checklist used  ASA# 14 - Acute Post-op Pain: ASA14B - Patient did NOT experience pain >= 7 out of 10

## 2021-06-14 NOTE — TELEPHONE ENCOUNTER
ORDER FROM: Dr. Lopez    PRE AUTHORIZATION: No PA required; Ok to schedule    METHOD OF PATIENT CONTACT: Spoke with patient over the phone; Best number to reach him: [phone]    PROCEDURE: Right L3-L4 hemilaminectomy and medial facetectomy and right redo L4-L5 hemilaminectomy and medial facetectomy    SURGICAL DATE: 01.20.21 @ 11:45 a.m. *[facility]*    READINESS VISIT: Please call    PCP, CLINIC, PHONE#: Dr. Shafer; [facility]; [phone]; Pre-op physical: 01.13.21 @ 4:20 p.m., with Harriett Gallardo NP    COVID-19 PRE-PROCEDURE TEST: 01.16.21 @ 10:20 a.m., [facility]    FILM INFO: Lumbar MRI: 12.12.20 @ ; XR Lumbar: 12.12.20 @ [facility]    SURGICAL LETTER: e-mailed 01.13.21

## 2021-06-14 NOTE — PATIENT INSTRUCTIONS - HE
"  Preparing for Your Surgery  Getting started  A surgery nurse will call you to review your health history and instructions. They will give you an arrival time based on your scheduled surgery time.  Please be ready to share the following:    Your doctor's clinic name and phone number    Your medical, surgical and anesthesia history    A list of allergies and sensitivities    A list of medicines, including herbal treatments and over-the-counter drugs    Whether the patient has a legal guardian (ask how to send us the papers in advance)  If your child is having surgery, please ask for a copy of Preparing for Your Child's Surgery.    Preparing for surgery    Within 30 days of surgery: Have an exam at your family clinic (primary care clinic), or go to a pre-operative clinic. This exam is called a \"History and Physical,\" or H&P.    At your H&P exam, talk to your care team about all medicines you take. If you need to stop any medicines before surgery, ask when to start taking them again.  ? We do this for your safety. Many medicines can make you bleed too much during surgery. Some change how well surgery (anesthesia) drugs work.    Call your insurance company to see what it will and won't pay for. Ask if they need to pre-approve the surgery. (If no insurance, call [phone].)    Call your surgeon's clinic if there's any change in your health. This includes signs of a cold or flu (sore throat, runny nose, cough, rash, fever). It also includes a scrape or scratch near the surgery site.    If you have questions on the day of surgery, call your surgery center.  Eating and drinking guidelines  For your safety: Unless your surgeon tells you otherwise, follow the guidelines below.    Eat and drink as usual until 8 hours before surgery. After that, no food or milk.    Drink clear liquids until 2 hours before surgery. These are liquids you can see through, like water, Gatorade and Propel Water. You may also have black coffee " and tea (no cream or milk).    Nothing by mouth within 2 hours of surgery. This includes gum, candy and breath mints.    Stop alcohol the midnight before surgery.    If your family clinic tells you to take medicine on the morning of surgery, it's okay to take it with a sip of water.  Preventing infection    Shower or bathe the night before and morning of your surgery. Follow the instructions your clinic gave you. (If no instructions, use regular soap.)    Don't shave or clip hair near your surgery site. This can lead to skin infection.    Don't smoke the morning of surgery. Smoking increases the risk of infection. You may chew nicotine gum up to 2 hours before surgery. A nicotine patch is okay.  ? Note: Some surgeries require you to completely quit smoking and nicotine. Check with your surgeon.    Your care team will make every effort to keep you safe from infection. We will:  ? Clean our hands often with soap and water (or an alcohol-based hand rub).  ? Clean the skin at your surgery site with a special soap that kills germs. We'll also remove hair from the site as needed.  ? Wear special hair covers, masks, gowns and gloves during surgery.  ? Give antibiotic medicine, if prescribed. Not all surgeries need antibiotics.  What to bring on the day of surgery    Photo ID and insurance card    Copy of your health care directive, if you have one    Glasses and hearing aides (bring cases)  ? You can't wear contacts during surgery    Inhaler and eye drops, if you use them (tell us about these when you arrive)    CPAP machine or breathing device, if you use them    A few personal items, if spending the night    If you have . . .  ? A pacemaker or ICD (cardiac defibrillator): Bring the ID card.  ? An implanted stimulator: Bring the remote control.  ? A legal guardian: Bring a copy of the certified (court-stamped) guardianship papers.  Please remove any jewelry, including body piercings. Leave jewelry and other valuables at  home.  If you're going home the day of surgery  Important: If you don't follow the rules below, we must cancel your surgery.     Arrange for someone to drive you home after surgery. You may not drive, take a taxi or take public transportation by yourself (unless you'll have local anesthesia only).    Arrange for a responsible adult to stay with you overnight. If you don't, we may keep you in the hospital overnight, and you may need to pay the costs yourself.  Questions?   If you have any questions for your care team, list them here: _________________________________________________________________________________________________________________________________________________________________________________________________________________________________________________________________________________________________________________________  For informational purposes only. Not to replace the advice of your health care provider. Copyright   4302-1007 [employer]. All rights reserved. Clinically reviewed by Danay Varner MD. SMARTworks 612770 - REV 07/19.      Before Your Procedure or Hospital Admission  Testing for COVID-19 (Coronavirus)  Thank you for choosing [facility] for your health care needs. The COVID-19 pandemic is a very challenging time for everyone.   Our goal is to keep you and our team here at [facility] safe and healthy. We've taken many steps to make this happen. For example:    We test and screen our staff, care teams and patients for COVID-19.    Everyone at [facility] must wear a mask and stay 6 feet apart.    We are limiting hospital and clinic visitors.  Before you come in  All patients must get tested for COVID-19. Your test needs to happen 3 to 4 days before you check in to the hospital or surgery site.   A clinic scheduler will call about a week in advance to set up a testing time at one of our labs. We'll take a swab of your nose or throat.  Note: If you go to  "a clinic or pharmacy like [facility] or [facility] for your test, make sure you get a test deep inside the nose. This is called a   naso-pharyngeal (NP) RT-PCR lab test. Don't get a \"rapid\" test or a saliva (spit) test. See \"Questions and Answers\" on the next page.  After the test, please stay at home and stay out of contact with other people. It will be harder for you to recover if you get COVID-19 before your treatment.  Please follow all current safety guidelines, including:    Limit trips outside your home.    Limit the number of people you see.    Always wear a mask outside your home.    Use social distancing. Stay 6 feet away from others whenever you can.    Wash your hands often.  If your test shows you have COVID-19  If your test is positive, we'll let you know. A positive test means that you have the virus.   We'll probably have to postpone your admission, surgery or procedure. Your doctor will discuss this with you. After that, we'll let you know what to do and when you can re-schedule.   We may need to cancel your treatment on short notice for other reasons, too.  If your test shows you DON'T have COVID-19  Even if your test is negative, you can still get COVID-19. It's rare but, sometimes, the test result is wrong. You could also catch the virus after taking the test.   There's a very small chance that you could catch COVID-19 in the hospital or surgery center. [facility] has taken many steps to prevent this from happening.   Day of your surgery or procedure    Please come wearing a face covering that covers both your nose and mouth.    When you arrive, we'll ask you some questions to find out if you have any signs or symptoms of COVID-19.    Ask your care team if you can have visitors. All visitors must wear face coverings and will be screened for signs of COVID-19.  ? Even if no visitors are allowed, you can still have with you:    Your legal guardian or legal decision maker    A parent and one other " "visitor, if you are younger than 18 years old    A partner and a , if you are in labor  ? We might need to teach you about taking care of yourself after surgery. If so, a visitor can come into the hospital to learn about it, too.  ? The rules for visitors change often, depending on how much the virus is spreading. To learn more, see Visiting a Loved One in the Hospital during the COVID-19 Outbreak.  Please call your care team, hospital or surgery center if you have any questions. We thank you for your understanding and for choosing [facility] for your care.   Questions and Answers  Does it matter where I get tested for COVID-19?  Yes. We urge you to get tested at one of our [facility] COVID-19 testing sites. We process these tests in our lab and can get the results quickly. Your [facility] care team needs to get your results before you check in.  What should I do if I can't get tested at [facility]?  You can get tested somewhere else, but you'll need to take these extra steps:   1. Contact your family doctor or clinic to arrange your test.  2. Take the test within 4 days of your surgery or procedure. We can't accept tests older than 4 days.  3. Make sure you're getting a test deep inside the nose (a naso-pharyngeal, or NP, RT-PCR lab test). Many pharmacies use \"rapid\" tests or saliva (spit) tests. We don't accept those tests before surgery or procedures. For adults, tests deep inside the nose are the most accurate tests.  4. Make sure your doctor or clinic faxes your results to [facility] at [phone].  If we don't get your results in time, we may have to delay or cancel your treatment.  For informational purposes only. Not to replace the advice of your health care provider. Copyright   2020 [facility] [employer]. All rights reserved. Clinically reviewed by Infection Prevention and the [facility] COVID-19 Clinical Team. SMARTworks 902048 - Rev 12/16/20.    How to " Take Your Medication Before Surgery  - ACE/ARB: May be continued on the day of surgery.    - Diuretics: HOLD on the day of surgery.   - Statins: Continue taking on the day of surgery.    - Hold Aspirin 7  Days before surgery   - LONG ACTING INSULIN (e.g. glargine, detemir): Take 60% of the usual evening or morning dose before surgery.     How to Manage Your Diabetes Before Surgery  If you use insulin for your diabetes, follow these steps to keep your blood sugar in a safe range before surgery, when you ve been told not to eat or drink:     Check your blood sugar every 4 hours     If your blood sugar is high, take a corrective dose (not a meal dose) of sliding-scale insulin, if that is what you re used to doing    If your blood sugar is below 100, or you have symptoms of hypoglycemia, follow these steps:   1) Have 1 item from this list:  - 4 oz (1/2 cup) of fruit juice without pulp    - 4 oz (1/2 cup) of regular soda (not diet soda)    - 3 glucose gels    - 5 sugar cubes or sugar packets   2) Check your blood sugar again after 15 minutes  3) Repeat steps 1 and 2 again until your blood sugar is greater than 100

## 2021-06-14 NOTE — PATIENT INSTRUCTIONS - HE
Keep a dressing on the wound until the staples are removed. You may use an extra large band-aid or 4x4 and tape.  Both can be purchased at your pharmacy.    Staples are usually removed in 1-2 weeks.  They are sometimes left longer.    Wash your hands before changing the dressing or touching the wound. If someone is helping you change the dressing, ensure that the person washes his/her hands.  Good handwashing can decrease the risk of infection.    If you are unable to see the wound, have someone check the wound daily for redness, swelling or drainage.  A small amount of drainage is normal.    You may shower  with the wound covered with a Tegaderm.  You will need to cover with a  water proof dressing until the staples are removed.  Do not allow the shower water to beat directly on the wound. After your shower, pat the wound dry. Do not rub.    No tub baths for 3-4 weeks, or until the wound is completely healed.    If you develop redness, swelling, drainage, or temp 101 or greater, please call our office at [phone].      * No lifting, pushing or pulling greater than 5-10 pound (this is about a gallon of milk) for the first 6 weeks after surgery .  *No repetitive bending, twisting, or jarring activities for 6 weeks.  *No overhead work  *No aerobic or strenuous activity  *No activities with increased risk of falls  *You may move about your home as tolerated  *You may walk up and down stairs as tolerated  *You may increase your activity slowly over the next 6 weeks    WALKING PROGRAM: As you can tolerate, walk daily-start with 5-10 minutes of continuous walking. This is in addition to the walking that you do as part of your daily activities. Increase the time that you walk by 5 minutes every couple of days. Do not exceed 30-45 minutes of continuous walking until seen in follow-up. Walking is the best exercise after surgery.  **Listen to your body, if you find that you are more painful or fatigued, you may need to  proceed more slowly.    **Do not smoke or expose yourself to second hand smoke. Cigarette smoke can delay healing and cause complications.     DRIVING:  We recommend that you do not drive while taking medications for pain or muscle spasms. Always read and follow the advice on your prescription bottle. If you have questions, speak with your pharmacist.  We recommend that you do not drive while wearing a brace, as it could limit your range of motion.    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

## 2021-06-14 NOTE — TELEPHONE ENCOUNTER
Refill Approved    Rx renewed per Medication Renewal Policy. Medication was last renewed on 2/4/20.    Nuvia [place], [facility] Triage/Med Refill 1/22/2021     Requested Prescriptions   Pending Prescriptions Disp Refills     simvastatin (ZOCOR) 40 MG tablet [Pharmacy Med Name: SIMVASTATIN 40 MG TABLET] 90 tablet 3     Sig: TAKE 1 TABLET BY MOUTH EVERYDAY AT BEDTIME       Statins Refill Protocol (Hmg CoA Reductase Inhibitors) Passed - 1/22/2021  2:44 AM        Passed - PCP or prescribing provider visit in past 12 months      Last office visit with prescriber/PCP: 7/8/2020 Josh Shafer MD OR same dept: 7/8/2020 Josh Shafer MD OR same specialty: 7/8/2020 Josh Shafer MD  Last physical: 11/12/2020 Last MTM visit: Visit date not found   Next visit within 3 mo: Visit date not found  Next physical within 3 mo: Visit date not found  Prescriber OR PCP: Josh Shafer MD  Last diagnosis associated with med order: 1. Lumbar radiculitis  - simvastatin (ZOCOR) 40 MG tablet [Pharmacy Med Name: SIMVASTATIN 40 MG TABLET]; TAKE 1 TABLET BY MOUTH EVERYDAY AT BEDTIME  Dispense: 90 tablet; Refill: 3    If protocol passes may refill for 12 months if within 3 months of last provider visit (or a total of 15 months).

## 2021-06-14 NOTE — TELEPHONE ENCOUNTER
Please inform patient that an order is placed for him to have a Pneumovax 23 pneumonia vaccine.  Whenever he can get this scheduled it can be administered.  He could also have this obtained at a pharmacy if he desires.

## 2021-06-14 NOTE — ANESTHESIA PREPROCEDURE EVALUATION
Anesthesia Evaluation      Patient summary reviewed   No history of anesthetic complications     Airway   Mallampati: II  Neck ROM: full   Pulmonary - normal exam   (+) asthma                           Cardiovascular - normal exam  (+) hypertension, ,      Neuro/Psych    (+) neuromuscular disease,      Endo/Other    (+) diabetes mellitus, obesity,      GI/Hepatic/Renal            Dental - normal exam                        Anesthesia Plan  Planned anesthetic: general endotracheal    ASA 3   Induction: intravenous   Anesthetic plan and risks discussed with: patient  Anesthesia plan special considerations: antiemetics,   Post-op plan: routine recovery

## 2021-06-15 NOTE — PROGRESS NOTES
"Assessment: Leandro is here for a follow up on diabetes management.  His October a1c improved to 7.2 from 8.8.  He is taking metformin xr 1000mg, trulicity 1.5mg, & tresiba 35 units.  HIs morning numbers range from 150-210 on average.  His evening blood sugars are slightly lower from 120-160.  He self titrated up to the 35 units and then decided to stop at that dose to see what his blood sugars did.  When he entered the holiday season (Thanksgiving), his blood sugars started to creep up from 100-140.  He used to be known as \"Mr. Ch\", but he has definitely toned down that act and the trulicity has helped him.  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  He has been going to the [place] 2[place]3x/week for swimming.    Diet:  Marc still loves his pasta and numerous birthdays have been occurring in January causing him to eat more cake.  Each night that he eats cake, his morning blood sugar is >200.  We discussed switching out his pasta with veggie noodles like zucchini or edamame which have much more fiber and less carbs to help reduce his blod sugars.    Plan: I recommend that Leandro increase his tresiba to 40 units and titrate by 2 units every few days until his morning blood sugars are more consistently >80 & <130.  He will try to find other pasta alternatives that we discussed to reduce his carb intake.  He will continue to exercise at the [place] to help reduce his need for more insulin & medications.  We will follow up in 4 months to review his blood sugars and a1c.  He will follow up with Dr. Shafer in February.    Subjective and Objective:      Leandro Brewer is referred by Dr. Shafer for Diabetes Education.     Lab Results   Component Value Date    HGBA1C 7.2 (H) 10/25/2017         Current diabetes medications:  Metformin xr 1000mg, trulicity 1.5mg, tresiba 35 units    Goals       A1C < 8.0            9/19/17:  Leadnro will " reduce his a1c <8.0 to lower his risk for complications from diabetes.        Monitoring            9/19/17:  Leandro will check his blood sugars 2x/daily before breakfast and in the evening.            Follow up:   Primary care visit  CDE (certified diabetic educator)      Education:     Monitoring   Meter (per above goals): Assessed and Discussed  Monitoring: Assessed and Discussed  BG goals: Assessed and Discussed    Nutrition Management  Nutrition Management: Assessed and Discussed  Weight: Assessed and Discussed  Portions/Balance: Assessed and Discussed  Carb ID/Count: Assessed and Discussed  Label Reading: Assessed and Discussed  Heart Healthy Fats: Assessed and Discussed  Menu Planning: Assessed and Discussed  Dining Out: Assessed and Discussed  Physical Activity: Assessed and Discussed  Medications: Assessed and Discussed  Orals: Assessed and Discussed  Injected Medications: Assessed and Discussed   Storage/Exp:Not addressed   Site Rotation: Not addressed   Sites Assessed: no    Diabetes Disease Process: Assessed and Discussed    Acute Complications: Prevent, Detect, Treat:  Hypoglycemia: Assessed and Discussed  Hyperglycemia: Assessed and Discussed  Sick Days: Not addressed  Driving: Not addressed    Chronic Complications  Goal Setting and Problem Solving: Assessed and Discussed  Barriers: Assessed and Discussed  Psychosocial Adjustments: Assessed and Discussed      Time spent with the patient: 60 minutes for diabetes education and counseling.   Previous Education: yes  Visit Type:KYM Montemayor  1/30/2018

## 2021-06-15 NOTE — TELEPHONE ENCOUNTER
RN cannot approve Refill Request    RN can NOT refill this medication PCP messaged that patient is overdue for Labs. Last office visit: 6/5/2019 Harriett Gallardo CNP Last Physical: 1/13/2021 Last MTM visit: Visit date not found Last visit same specialty: 7/8/2020 Josh Shafer MD.  Next visit within 3 mo: Visit date not found  Next physical within 3 mo: Visit date not found      Rajani Garcia, [facility] Triage/Med Refill 2/24/2021    Requested Prescriptions   Pending Prescriptions Disp Refills     insulin glargine (LANTUS SOLOSTAR U-100 INSULIN) 100 unit/mL (3 mL) pen 45 mL 1     Sig: Inject 40 Units under the skin every morning.       Insulin/GLP-1 Refill Protocol Failed - 2/24/2021  2:13 PM        Failed - Microalbumin in last year     Microalbumin, Random Urine   Date Value Ref Range Status   01/02/2020 <0.50 0.00 - 1.99 mg/dL Final                  Passed - Visit with PCP or prescribing provider visit in last 6 months     Last office visit with prescriber/PCP: Visit date not found OR same dept: 7/8/2020 Josh Shafer MD OR same specialty: 7/8/2020 Josh Shafer MD Last physical: 1/13/2021 Last MTM visit: Visit date not found     Next appt within 3 mo: Visit date not found  Next physical within 3 mo: Visit date not found  Prescriber OR PCP: Harriett Gallardo CNP  Last diagnosis associated with med order: 1. Uncontrolled type 2 diabetes mellitus with hyperglycemia, with long-term current use of insulin (H)  - insulin glargine (LANTUS SOLOSTAR U-100 INSULIN) 100 unit/mL (3 mL) pen; Inject 40 Units under the skin every morning.  Dispense: 45 mL; Refill: 1    If protocol passes may refill for 6 months if within 3 months of last provider visit (or a total of 9 months).              Passed - A1C in last 6 months     Hemoglobin A1c   Date Value Ref Range Status   11/12/2020 7.6 (H) <=5.6 % Final     Comment:     Normal <5.7% Prediabete 5.7-6.4% Diabletes 6.5% or higher - adopted from ADA consensus  guidelines               Passed - Blood pressure in last year     BP Readings from Last 1 Encounters:   02/05/21 130/84             Passed - Creatinine done in last year     Creatinine   Date Value Ref Range Status   01/22/2021 0.73 0.70 - 1.30 mg/dL Final

## 2021-06-15 NOTE — PATIENT INSTRUCTIONS - HE
A dressing is not required.    Keep the wound clean.    Wash your hands before touching the wound.  Ensure that anyone assisting you in the care of your wound washes her/his hands before touching the wound. Good handwashing can decrease the risk of serious infection.    If you are unable to see your wound, have someone check the wound daily for redness, swelling,or drainage. A small amount of drainage is normal.    You may shower.  Pat the wound dry. Do not rub.    No tub baths until the wound is well healed.  Usually 5-6 weeks.     If you develop redness, swelling, drainage, or temp 101 or greater, call our office at [phone].        * No lifting, pushing or pulling greater than 5-10 pound (this is about a gallon of milk) for the first 6 weeks after surgery .  *No repetitive bending, twisting, or jarring activities for 6 weeks.  *No overhead work  *No aerobic or strenuous activity  *No activities with increased risk of falls  *You may move about your home as tolerated  *You may walk up and down stairs as tolerated  *You may increase your activity slowly over the next 6 weeks    WALKING PROGRAM: As you can tolerate, walk daily-start with 5-10 minutes of continuous walking. This is in addition to the walking that you do as part of your daily activities. Increase the time that you walk by 5 minutes every couple of days. Do not exceed 30-45 minutes of continuous walking until seen in follow-up. Walking is the best exercise after surgery.  **Listen to your body, if you find that you are more painful or fatigued, you may need to proceed more slowly.    **Do not smoke or expose yourself to second hand smoke. Cigarette smoke can delay healing and cause complications.     DRIVING:  We recommend that you do not drive while taking medications for pain or muscle spasms. Always read and follow the advice on your prescription bottle. If you have questions, speak with your pharmacist.  We recommend that you do not drive  while wearing a brace, as it could limit your range of motion.    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

## 2021-06-15 NOTE — PATIENT INSTRUCTIONS - HE
You can take muscle relaxants as needed for your pain. Aleve or advil is fine at this point as well. We can start you on physical therapy but I would like to get another picture of your back to see how it looks. After your MRI, you can follow up with NP or with Dr. Lopez to see what our next steps.      I would heavy lifting until we see your pictures. If you need adjustments to your paperwork, please just let us know.     I wouldn't change anything about your nerve medications at this time.

## 2021-06-15 NOTE — PROGRESS NOTES
CHART NOTE     DATE OF SERVICE:  3/5/2021     : 1955   65 y.o.     ASSESSMENT :   S/p redo R. L3-4/L4-5 Hemilaminectomy, medial facetectomy, Marc reports that initially right after surgery his leg pain has resolved and he maybe had a few days of relief but since that time the back and right leg pain have returned to their pre-op pain. He does not feel like he did any activity that aggravated this pain. Pain is in the right anterolateral thigh and intermittently into the shin. His right foot is numb in the toes. He feels subjective weakness that is the same as prior - for example he does not feel he can lead with his right leg going up the stairs.     I reviewed Dr. Lopez's op note with marc, appears as though he had extensive scar tissue formation on the right L5 nerve root. I reviewed that Dr. Lopez felt no compression on the nerve root at the completion of procedure. Unclear if he is a big scar former or something else continuing to cause this issue. We discussed that he can start physical therapy and we could consider injections again if he would like. If he feels relief, then great nothing more to do. But we will have more information after his repeat imaging is completed. Marc is in agreement with the plan.      PLAN:   1. MRI with and without contrast, lumbar flex/ex  2. Okay to start physical therapy  3. I offered for marc to remain off work, he would like to go back and I wrote return to work with restriction of sitting for 15 minutes every four hours as well as a lifting restriction of 15-20lbs.   4. Follow up with NP or Dr. Lopez    HPI:  Leandro Brewer is status post right redo L3-4 hemilaminectomy and medial fecetectomy, redo right L4-5 hemilaminectomy with Dr. Lopez on 2021. Initially felt relief of his symptoms in the leg but since discharge these have progressively returned. He has back pain that radiates to the right lateral thigh, intermittently in to the knee and shin. He has numbness in  "his right toes.        PAST MEDICAL HISTORY, SURGICAL HISTORY, REVIEW OF SYMPTOMS, MEDICATIONS AND ALLERGIES:  Past medical history, surgical history, ROS, medications and allergies reviewed with patient and remain unchanged from previous visit.    Past Medical History:   Diagnosis Date     Anemia      Arthritis      Diabetes mellitus, type II (H)      Hypertension      Obesity      Plantar fasciitis        PHYSICAL EXAM:    /76   Pulse 80   Resp 18   Ht 5' 9\" (1.753 m)   Wt (!) 260 lb (117.9 kg)   SpO2 96%   BMI 38.40 kg/m      Neurological exam reveals:  Respirations easy, non-labored.   Skin: W/D/I. No rashes, lesions or breaks in integrity.   Recent and remote memory intact, fund of knowledge wnl.    Alert and oriented x3, speech fluent and appropriate.   Comprehension intact with 2 step crossover command.   Finger nose finger smooth and accurate  Face symmetric, tongue midline, Uvula midline,  palate rises with phonation   Shoulder shrug equal  Arm strength bilateral grasp, biceps, triceps, and deltoids 5/5   Leg strength bilateral dorsiflexion, plantar flexion, and hip flexion 5/5, subjective weakness of right HF, right KF 4/5   Muscle Bulk and tone wnl.   Reflexes: No pathological reflexes   Gait and station:Antalgic  Incision: well healed     RADIOGRAPHIC IMAGING: None new.     Ilene Amanda CNP  [facility]  O: [phone]  P: [phone]    "

## 2021-06-15 NOTE — TELEPHONE ENCOUNTER
Last appointment: 12/01/2020  Next appointment: None    Notes/Comments:      Rx request(s) has been reviewed. Rx(s) cued up for auth, to be e-scribed to pharm. Thanks.

## 2021-06-15 NOTE — TELEPHONE ENCOUNTER
[facility] fax over refill request    Pharmacy sent refill request for tizanidine   --Med last Rx 12/1/20 #90, 0 refill   --Last OV 12/1/20   --Future appt: none  --Please advise

## 2021-06-15 NOTE — TELEPHONE ENCOUNTER
RN cannot approve Refill Request    RN can NOT refill this medication Protocol failed and NO refill given. Last office visit: 7/8/2020 Josh Shafer MD Last Physical: 11/12/2020 Last MTM visit: Visit date not found Last visit same specialty: 7/8/2020 Josh Shafer MD.  Next visit within 3 mo: Visit date not found  Next physical within 3 mo: Visit date not found      Fantasma NOEMYMichaela Pizarro, [facility] Triage/Med Refill 2/16/2021    Requested Prescriptions   Pending Prescriptions Disp Refills     metFORMIN (GLUCOPHAGE-XR) 500 MG 24 hr tablet [Pharmacy Med Name: METFORMIN HCL  MG TABLET] 360 tablet 2     Sig: TAKE 2 TABLETS BY MOUTH TWICE A DAY WITH MEALS       Metformin Refill Protocol Failed - 2/16/2021 12:33 AM        Failed - LFT or AST or ALT in last 12 months     Albumin   Date Value Ref Range Status   01/02/2020 4.0 3.5 - 5.0 g/dL Final     Bilirubin, Total   Date Value Ref Range Status   01/02/2020 0.3 0.0 - 1.0 mg/dL Final     Bilirubin, Direct   Date Value Ref Range Status   09/09/2019 0.2 <=0.5 mg/dL Final     Alkaline Phosphatase   Date Value Ref Range Status   01/02/2020 98 45 - 120 U/L Final     AST   Date Value Ref Range Status   01/02/2020 18 0 - 40 U/L Final     ALT   Date Value Ref Range Status   01/02/2020 28 0 - 45 U/L Final     Protein, Total   Date Value Ref Range Status   01/02/2020 7.2 6.0 - 8.0 g/dL Final                Failed - Microalbumin in last year      Microalbumin, Random Urine   Date Value Ref Range Status   01/02/2020 <0.50 0.00 - 1.99 mg/dL Final                  Passed - Blood pressure in last 12 months     BP Readings from Last 1 Encounters:   02/05/21 130/84             Passed - GFR or Serum Creatinine in last 6 months     GFR MDRD Non Af Amer   Date Value Ref Range Status   01/22/2021 >60 >60 mL/min/1.73m2 Final     GFR MDRD Af Amer   Date Value Ref Range Status   01/22/2021 >60 >60 mL/min/1.73m2 Final             Passed - Visit with PCP or prescribing provider visit in  last 6 months or next 3 months     Last office visit with prescriber/PCP: Visit date not found OR same dept: 7/8/2020 Josh Shafer MD OR same specialty: 7/8/2020 Josh Shafer MD Last physical: 11/12/2020 Last MTM visit: Visit date not found         Next appt within 3 mo: Visit date not found  Next physical within 3 mo: Visit date not found  Prescriber OR PCP: Josh Shafer MD  Last diagnosis associated with med order: 1. Lymphedema  - furosemide (LASIX) 40 MG tablet; TAKE 0.5 TABLETS (20 MG TOTAL) BY MOUTH DAILY.  Dispense: 45 tablet; Refill: 3    2. Uncontrolled type 2 diabetes mellitus with hyperglycemia, with long-term current use of insulin (H)  - metFORMIN (GLUCOPHAGE-XR) 500 MG 24 hr tablet [Pharmacy Med Name: METFORMIN HCL  MG TABLET]; TAKE 2 TABLETS BY MOUTH TWICE A DAY WITH MEALS  Dispense: 360 tablet; Refill: 2     If protocol passes may refill for 12 months if within 3 months of last provider visit (or a total of 15 months).           Passed - A1C in last 6 months     Hemoglobin A1c   Date Value Ref Range Status   11/12/2020 7.6 (H) <=5.6 % Final     Comment:     Normal <5.7% Prediabete 5.7-6.4% Diabletes 6.5% or higher - adopted from ADA consensus guidelines                Signed Prescriptions Disp Refills    furosemide (LASIX) 40 MG tablet 45 tablet 3     Sig: TAKE 0.5 TABLETS (20 MG TOTAL) BY MOUTH DAILY.       Diuretics/Combination Diuretics Refill Protocol  Passed - 2/16/2021 12:33 AM        Passed - Visit with PCP or prescribing provider visit in past 12 months     Last office visit with prescriber/PCP: 7/8/2020 Josh Shafer MD OR same dept: 7/8/2020 Josh Shafer MD OR same specialty: 7/8/2020 Josh Shafer MD  Last physical: 11/12/2020 Last MTM visit: Visit date not found   Next visit within 3 mo: Visit date not found  Next physical within 3 mo: Visit date not found  Prescriber OR PCP: Josh Shafer MD  Last diagnosis associated with med order: 1. Lymphedema  -  furosemide (LASIX) 40 MG tablet; TAKE 0.5 TABLETS (20 MG TOTAL) BY MOUTH DAILY.  Dispense: 45 tablet; Refill: 3    2. Uncontrolled type 2 diabetes mellitus with hyperglycemia, with long-term current use of insulin (H)  - metFORMIN (GLUCOPHAGE-XR) 500 MG 24 hr tablet [Pharmacy Med Name: METFORMIN HCL  MG TABLET]; TAKE 2 TABLETS BY MOUTH TWICE A DAY WITH MEALS  Dispense: 360 tablet; Refill: 2    If protocol passes may refill for 12 months if within 3 months of last provider visit (or a total of 15 months).             Passed - Serum Potassium in past 12 months      Lab Results   Component Value Date    Potassium 4.3 01/22/2021             Passed - Serum Sodium in past 12 months      Lab Results   Component Value Date    Sodium 137 01/22/2021             Passed - Blood pressure on file in past 12 months     BP Readings from Last 1 Encounters:   02/05/21 130/84             Passed - Serum Creatinine in past 12 months      Creatinine   Date Value Ref Range Status   01/22/2021 0.73 0.70 - 1.30 mg/dL Final

## 2021-06-15 NOTE — PROGRESS NOTES
"ASSESSMENT: Left plantar fasciitis, diabetes    PLAN: Cortisone injection in the left heel today.  He was given 4 mg of dexamethasone sodium phosphate.  He tolerated that well.  Continue with custom orthotics and stretching and icing and anti-inflammatory medication at home.  Return to clinic if pain persists.      SUBJECTIVE: Return visit at the [facility] regarding left heel pain.  He was diagnosed with plantar fasciitis about a month ago.  50% relief with cortisone injection and custom orthotics.  He describes post static pain.  He stands all day at work.  Previous episode of plantar fasciitis about 15 years ago that responded to cortisone injections and orthotics.  He is diabetic.    PHYSICAL EXAM:  Pulse 93  Resp 16  Ht 5' 9\" (1.753 m)  Wt (!) 252 lb (114.3 kg)  SpO2 98%  BMI 37.21 kg/m2  General: Pleasant 62 y.o. male in no acute distress.  Vascular: DP pulses are palpable. PT pulses are palpable. Pedal hair is present. Feet are warm to the touch.  Cardiac: Pulse is regular.  Lymphatic: No edema at the ankles.  Neuro: Sensation in the feet is grossly intact to light touch.  Derm: Bilateral great toenails are thickened and dystrophic with discoloration and subungual debris.  Musculoskeletal: Tenderness with palpation around the left plantar fascial insertion.    "

## 2021-06-15 NOTE — TELEPHONE ENCOUNTER
RN cannot approve Refill Request    RN can NOT refill this medication med is not covered by policy/route to provider. Last office visit: 7/8/2020 Josh Shafer MD Last Physical: 11/12/2020 Last MTM visit: Visit date not found Last visit same specialty: 7/8/2020 Josh Shafer MD.  Next visit within 3 mo: Visit date not found  Next physical within 3 mo: Visit date not found      Rajani Garcia, [facility] Triage/Med Refill 2/24/2021    Requested Prescriptions   Pending Prescriptions Disp Refills     QVAR REDIHALER 80 mcg/actuation HFAB inhaler [Pharmacy Med Name: QVAR REDIHALER 80 MCG] 31.8 g 3     Sig: INHALE 2 PUFFS BY MOUTH TWICE A DAY - RINSE MOUTH AFTER USE-DO NOT SHAKE UNIT       There is no refill protocol information for this order

## 2021-06-15 NOTE — PROGRESS NOTES
Leandro Brewer is status post Right L3-4 hemilaminectomy with medial facetectomy; Redo right L4-5 hemilaminectomy with medial facetectomy on 01/20/2021 with Dr. Lopez.  Preoperatively presented with history of progressively worsening right lumbar radiculopathy radiating down the thigh and into the anterior shin.  Last seen on 02/01/2021 for wound check.  Today he returns in follow up for staples removal. He is doing reasonably well  - reports intermittent back and right thigh pain associated with walking. Denies numbness or tingling in LE. Uses a cane for safety. Gait and balance are stable. Takes tylenol, Flexeril, Gabapentin.      Surgical wound WNL - CDI, no signs of infection or skin breakdown.  Incision well-healed: good skin approximation, no redness or visible/palpable edema, no tenderness to palpation.  PT. AF, denies fever, chills or sweats.  Pt. reports that the symptoms are improved from pre-op.    Staples - intact removed without difficulty. Wound prepped with Betadine before and after removal.  Surrounding skin has no signs of breakdown.  Verbal instructions regarding incision care are given.  Pt. advised to call us if any s/s of infection noted - all discussed in details.      Olivia Mccall, RN, CNRN

## 2021-06-15 NOTE — TELEPHONE ENCOUNTER
Refill Approved    Rx renewed per Medication Renewal Policy. Medication was last renewed on 2/19/20.    Fantasma Pizarro, [facility] Triage/Med Refill 2/16/2021     Requested Prescriptions   Pending Prescriptions Disp Refills     furosemide (LASIX) 40 MG tablet [Pharmacy Med Name: FUROSEMIDE 40 MG TABLET] 45 tablet 3     Sig: TAKE 0.5 TABLETS (20 MG TOTAL) BY MOUTH DAILY.       Diuretics/Combination Diuretics Refill Protocol  Passed - 2/16/2021 12:33 AM        Passed - Visit with PCP or prescribing provider visit in past 12 months     Last office visit with prescriber/PCP: 7/8/2020 Josh Shafer MD OR same dept: 7/8/2020 Josh Shafer MD OR same specialty: 7/8/2020 Josh Shafer MD  Last physical: 11/12/2020 Last MTM visit: Visit date not found   Next visit within 3 mo: Visit date not found  Next physical within 3 mo: Visit date not found  Prescriber OR PCP: Josh Shafer MD  Last diagnosis associated with med order: 1. Lymphedema  - furosemide (LASIX) 40 MG tablet [Pharmacy Med Name: FUROSEMIDE 40 MG TABLET]; TAKE 0.5 TABLETS (20 MG TOTAL) BY MOUTH DAILY.  Dispense: 45 tablet; Refill: 3    2. Uncontrolled type 2 diabetes mellitus with hyperglycemia, with long-term current use of insulin (H)  - metFORMIN (GLUCOPHAGE-XR) 500 MG 24 hr tablet [Pharmacy Med Name: METFORMIN HCL  MG TABLET]; TAKE 2 TABLETS BY MOUTH TWICE A DAY WITH MEALS.  Dispense: 360 tablet; Refill: 2    If protocol passes may refill for 12 months if within 3 months of last provider visit (or a total of 15 months).             Passed - Serum Potassium in past 12 months      Lab Results   Component Value Date    Potassium 4.3 01/22/2021             Passed - Serum Sodium in past 12 months      Lab Results   Component Value Date    Sodium 137 01/22/2021             Passed - Blood pressure on file in past 12 months     BP Readings from Last 1 Encounters:   02/05/21 130/84             Passed - Serum Creatinine in past 12 months       Creatinine   Date Value Ref Range Status   01/22/2021 0.73 0.70 - 1.30 mg/dL Final                metFORMIN (GLUCOPHAGE-XR) 500 MG 24 hr tablet [Pharmacy Med Name: METFORMIN HCL  MG TABLET] 360 tablet 2     Sig: TAKE 2 TABLETS BY MOUTH TWICE A DAY WITH MEALS       Metformin Refill Protocol Failed - 2/16/2021 12:33 AM        Failed - LFT or AST or ALT in last 12 months     Albumin   Date Value Ref Range Status   01/02/2020 4.0 3.5 - 5.0 g/dL Final     Bilirubin, Total   Date Value Ref Range Status   01/02/2020 0.3 0.0 - 1.0 mg/dL Final     Bilirubin, Direct   Date Value Ref Range Status   09/09/2019 0.2 <=0.5 mg/dL Final     Alkaline Phosphatase   Date Value Ref Range Status   01/02/2020 98 45 - 120 U/L Final     AST   Date Value Ref Range Status   01/02/2020 18 0 - 40 U/L Final     ALT   Date Value Ref Range Status   01/02/2020 28 0 - 45 U/L Final     Protein, Total   Date Value Ref Range Status   01/02/2020 7.2 6.0 - 8.0 g/dL Final                Failed - Microalbumin in last year      Microalbumin, Random Urine   Date Value Ref Range Status   01/02/2020 <0.50 0.00 - 1.99 mg/dL Final                  Passed - Blood pressure in last 12 months     BP Readings from Last 1 Encounters:   02/05/21 130/84             Passed - GFR or Serum Creatinine in last 6 months     GFR MDRD Non Af Amer   Date Value Ref Range Status   01/22/2021 >60 >60 mL/min/1.73m2 Final     GFR MDRD Af Amer   Date Value Ref Range Status   01/22/2021 >60 >60 mL/min/1.73m2 Final             Passed - Visit with PCP or prescribing provider visit in last 6 months or next 3 months     Last office visit with prescriber/PCP: Visit date not found OR same dept: 7/8/2020 Josh Shafer MD OR same specialty: 7/8/2020 Josh Shafer MD Last physical: 11/12/2020 Last MTM visit: Visit date not found         Next appt within 3 mo: Visit date not found  Next physical within 3 mo: Visit date not found  Prescriber OR PCP: Josh Shafer MD  Last  diagnosis associated with med order: 1. Lymphedema  - furosemide (LASIX) 40 MG tablet [Pharmacy Med Name: FUROSEMIDE 40 MG TABLET]; TAKE 0.5 TABLETS (20 MG TOTAL) BY MOUTH DAILY.  Dispense: 45 tablet; Refill: 3    2. Uncontrolled type 2 diabetes mellitus with hyperglycemia, with long-term current use of insulin (H)  - metFORMIN (GLUCOPHAGE-XR) 500 MG 24 hr tablet [Pharmacy Med Name: METFORMIN HCL  MG TABLET]; TAKE 2 TABLETS BY MOUTH TWICE A DAY WITH MEALS.  Dispense: 360 tablet; Refill: 2     If protocol passes may refill for 12 months if within 3 months of last provider visit (or a total of 15 months).           Passed - A1C in last 6 months     Hemoglobin A1c   Date Value Ref Range Status   11/12/2020 7.6 (H) <=5.6 % Final     Comment:     Normal <5.7% Prediabete 5.7-6.4% Diabletes 6.5% or higher - adopted from ADA consensus guidelines

## 2021-06-15 NOTE — PROGRESS NOTES
Patient is here for a 6 week post op. He states that he is still having pain, the same pain as before surgery.  Tracey DARIO Mario,[facility],8:53 AM     Modified Oswestry Low back Pain Disability Questionnaire    1. PAIN INTENSITY  3- Pain medication provides me with moderate relief from pain  2. PERSONAL CARE  1- I can take care of myself normally, but it increases my pain.   3. LIFTING  4- I can only lift very light weights  4. WALKING  3- Pain prevents me from walking more than   mile.  5. SITTING  0- I can sit in any chair as long as I like.  6. STANDING  1- I can stand as long as I want, but it increases my pain.  7. SLEEPING  4- Even when I take medication, I sleep less than 2 hours.  8. SOCIAL LIFE  1- My social life is normal, but it increases my level of pain.             9. TRAVELING  2- My pain restricts my travel over 2 hours.  10. EMPLOYMENT/HOMEMAKING  1- My normal homemaking/job activities increase my pain, but I can still perform all that is required of me.    SCORE:20x2=40%

## 2021-06-16 PROBLEM — E11.65 UNCONTROLLED TYPE 2 DIABETES MELLITUS WITH HYPERGLYCEMIA, WITH LONG-TERM CURRENT USE OF INSULIN (H): Status: ACTIVE | Noted: 2019-02-21

## 2021-06-16 PROBLEM — M48.061 SPINAL STENOSIS OF LUMBAR REGION, UNSPECIFIED WHETHER NEUROGENIC CLAUDICATION PRESENT: Status: ACTIVE | Noted: 2019-01-08

## 2021-06-16 PROBLEM — E11.40 DIABETIC NEUROPATHY ASSOCIATED WITH TYPE 2 DIABETES MELLITUS (H): Status: ACTIVE | Noted: 2019-09-09

## 2021-06-16 PROBLEM — Z86.14 HISTORY OF MRSA INFECTION: Status: ACTIVE | Noted: 2019-10-01

## 2021-06-16 PROBLEM — B35.6 TINEA CRURIS: Status: ACTIVE | Noted: 2019-01-08

## 2021-06-16 PROBLEM — Z87.2 HISTORY OF CELLULITIS: Status: ACTIVE | Noted: 2019-10-01

## 2021-06-16 PROBLEM — M54.16 LUMBAR RADICULOPATHY: Status: ACTIVE | Noted: 2021-01-20

## 2021-06-16 PROBLEM — Z79.4 UNCONTROLLED TYPE 2 DIABETES MELLITUS WITH HYPERGLYCEMIA, WITH LONG-TERM CURRENT USE OF INSULIN (H): Status: ACTIVE | Noted: 2019-02-21

## 2021-06-16 PROBLEM — M43.16 SPONDYLOLISTHESIS OF LUMBAR REGION: Status: ACTIVE | Noted: 2021-01-14

## 2021-06-16 PROBLEM — M16.11 PRIMARY OSTEOARTHRITIS OF RIGHT HIP: Status: ACTIVE | Noted: 2019-02-21

## 2021-06-16 PROBLEM — J45.30 MILD PERSISTENT ASTHMA WITHOUT COMPLICATION: Status: ACTIVE | Noted: 2018-06-19

## 2021-06-16 PROBLEM — M47.26 OSTEOARTHRITIS OF SPINE WITH RADICULOPATHY, LUMBAR REGION: Status: ACTIVE | Noted: 2021-01-14

## 2021-06-16 NOTE — TELEPHONE ENCOUNTER
Pt is unable to work.  If he is just sitting around and icing it is tolerable.  Pt wondering if he should go to ortho quick today.  Please advise.

## 2021-06-16 NOTE — TELEPHONE ENCOUNTER
After discussion with Dr. Lopez, called [facility] and recommended a Right L4-L5 and L5-S1 TFESI  - he agreed to it.  Discussed how to use adjuncts such as Lidocaine patches, IcyHot, Biofreeze, Salonpas, Turmeric, Tiger Balm, CBD oil, etc. Reminded about ice/heat, positioning, advised to slow down activities.   He will f/u after [facility]AYDEN and EMG on 03/31/2021.  Olivia Mccall RN, CNRN

## 2021-06-16 NOTE — PROGRESS NOTES
"Assessment/Plan:    1. Chronic pain of right knee  Chronic right knee pain with evidence of osteoarthritis medial greater than lateral compartment.  Corticosteroid injection completed.  X-rays reviewed and will have radiologist confirm.  If persistent issues orthopedic referral anticipated.  Lab assessment to ensure no evidence for acute gouty arthritis etc.  - XR Knee Right 1 or 2 VWS; Future  - HM2(CBC w/o Differential)  - Uric Acid  - C-Reactive Protein  - methylPREDNISolone acetate injection 80 mg (DEPO-MEDROL)  - Right knee Injection    2. Low back pain radiating to right leg  Lower back pain with described improvement since last Wednesday.  Will notify if further concern or recurrent issues.  Following with spine care clinic.  Recommendation for TENS unit utilization for history of described benefit with order for TENS unit to be completed.    3. Primary osteoarthritis of right knee  As noted, medial greater than lateral knee osteoarthritis noted.  If persistent concerns would complete orthopedic referral.        Subjective:    Leandro Brewer is seen today for right knee pain.  Persistent concerns.  Intermittent over the past couple years getting worse.  Using a knee brace without significant benefit.  No significant swelling.  No injury or trauma.  Left knee bothers him at times.  No calf tenderness.  Prior surgery as noted below January 20, 2021.  Follows with Dr. Martinez spine care clinic for recent L4-5 and L5-S1 injections.  No fevers.  No cough or shortness of breath.  Denies known gout attack..  H/O right Lumbar 3 - Lumbar 4 hemilaminectomy and medial facetectomy and Right redo Lumbar 4 -Lumbar 5 hemilaminectomy and medial facetectomy 1/20/21 (Dr. Lopez).  Patient is interested in receiving a TENS unit which has been beneficial previously for lower back pain management associated with current concerns.       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 Thompson); right inguinal hernia age 6 months; right total hip arthroplasty at [facility] 2019 with Dr. Evans.   Hospitalizations: early 20s \"infected gallbladder\" WITHOUT surgery... ; cellulitis in legs (hospitalized twice); abdominal wall cyst requiring IV antibiotics x 4 days... ; right Lumbar 3 - Lumbar 4 hemilaminectomy and medial facetectomy and Right redo Lumbar 4 -Lumbar 5 hemilaminectomy and medial facetectomy 21 (Dr. Lopez)     19-19 [facility] for right LE cellulitis/sepsis; right LE cellulitis 19-19 ([facility]); 10/15/19 s/p left MELITA (Dr. Evans)   Would like a 90 day supply on all meds.   [facility] vision for eye exams, will schedule at 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 Villarreal      HERNIA REPAIR Right     inguinal; child     HI HARE W/O FACETEC FORAMOT/[facility]  VRT SEG, LUMBAR Right 2021    Procedure: Right Lumbar 3 - Lumbar 4 hemilaminectomy and medial facetectomy and Right redo Lumbar 4 -Lumbar 5 hemilaminectomy and medial facetectomy;  Surgeon: Julissa Lopez MD;  Location: [facility];  Service: Spine     TOTAL HIP ARTHROPLASTY Bilateral 2019    2019 (right) and 2019 (left)        Family History   Problem Relation Age of Onset     Heart attack Mother      Obesity Mother      Heart disease Father      Cancer Father         throat     Heart attack Father      Diabetes Sister      Diabetes Brother         Past Medical History: "   Diagnosis Date     Anemia      Arthritis      Diabetes mellitus, type II (H)      Hypertension      Obesity      Plantar fasciitis         Social History     Tobacco Use     Smoking status: Former Smoker     Smokeless tobacco: Never Used   Substance Use Topics     Alcohol use: Yes     Alcohol/week: 1.0 standard drinks     Types: 1 Shots of liquor per week     Comment: occasional     Drug use: No        Current Outpatient Medications   Medication Sig Dispense Refill     acetaminophen (TYLENOL) 500 MG tablet Take 1,000 mg by mouth 3 (three) times a day.        albuterol (VENTOLIN HFA) 90 mcg/actuation inhaler Inhale 2 puffs 4 (four) times a day as needed for wheezing. 18 g 6     ascorbic acid, vitamin C, (VITAMIN C) 500 MG tablet Take 500 mg by mouth daily.       b complex vitamins (VITAMINS B COMPLEX) capsule Take 1 capsule by mouth.       DULoxetine (CYMBALTA) 30 MG capsule TAKE 1 CAPSULE BY MOUTH EVERY DAY 30 capsule 1     furosemide (LASIX) 40 MG tablet TAKE 0.5 TABLETS (20 MG TOTAL) BY MOUTH DAILY. 45 tablet 3     gabapentin (NEURONTIN) 300 MG capsule Take 3 capsules (900 mg total) by mouth 3 (three) times a day. 270 capsule 11     insulin glargine (LANTUS SOLOSTAR U-100 INSULIN) 100 unit/mL (3 mL) pen Inject 40 Units under the skin every morning. 45 mL 1     LANCETS MISC [employer] Microlet MISC  Check blood sugar 2 times per day and as needed       lisinopriL (PRINIVIL,ZESTRIL) 20 MG tablet TAKE 1 TABLET BY MOUTH EVERY DAY 90 tablet 3     magnesium oxide 500 mg Tab Take 500 mg by mouth daily.              metFORMIN (GLUCOPHAGE-XR) 500 MG 24 hr tablet TAKE 2 TABLETS BY MOUTH TWICE A DAY WITH MEALS 360 tablet 2     methocarbamoL (ROBAXIN) 750 MG tablet TAKE 1 TABLET (750 MG TOTAL) BY MOUTH EVERY 6 (SIX) HOURS AS NEEDED. 56 tablet 0     multivitamin (MULTIPLE VITAMINS DAILY) per tablet Take 1 tablet by mouth daily.       oxyCODONE (ROXICODONE) 5 MG immediate release tablet Take 1-2 tablets (5-10 mg total) by mouth  "every 4 (four) hours as needed (For pain score 5-7 give 5 mg.  For pain score 8-10 give 10 mg.). 12 tablet 0     pen needle, diabetic (BD ULTRA-FINE BRANDON PEN NEEDLE) 32 gauge x 5/32\" Ndle Use 1 each As Directed daily. 100 each 3     potassium 99 mg Tab Take 1 tablet by mouth 2 (two) times a day.              QVAR REDIHALER 80 mcg/actuation HFAB inhaler INHALE 2 PUFFS BY MOUTH TWICE A DAY - RINSE MOUTH AFTER USE-DO NOT SHAKE UNIT 31.8 g 3     senna-docusate (PERICOLACE) 8.6-50 mg tablet Take 1 tablet by mouth 2 (two) times a day.  0     sildenafil (REVATIO) 20 mg tablet Take 2-3 tablets (40-60 mg total) by mouth daily as needed. 90 tablet 2     simvastatin (ZOCOR) 40 MG tablet TAKE 1 TABLET BY MOUTH EVERYDAY AT BEDTIME 90 tablet 3     TRULICITY 1.5 mg/0.5 mL PnIj INJECT 0.5ML SUBCUTANEOUSLY EVERY 7 DAYS. 6 Syringe 4     methylPREDNISolone (MEDROL DOSEPACK) 4 mg tablet Follow package directions 21 tablet 0     No current facility-administered medications for this visit.           Objective:    Vitals:    04/20/21 1040   BP: 130/80   Pulse: (!) 102   SpO2: 96%   Weight: (!) 264 lb (119.7 kg)      Body mass index is 38.99 kg/m .    Alert.  No apparent distress.  Does ambulate with a limp secondary to right knee pain.  No joint effusion.  Corticosteroid injection was performed and tolerated well.  Please see procedure note.      This note has been dictated using voice recognition software and as a result may contain minor grammatical errors and unintended word substitutions.   "

## 2021-06-16 NOTE — PATIENT INSTRUCTIONS - HE
Please follow up with Dr Lopez two weeks post procedure to evaluate your plan of care.       DISCHARGE INSTRUCTIONS    During office hours (8:00 a.m.- 4:00 p.m.) questions or concerns may be answered  by calling [facility] Center Navigation Nurses at  [phone].  Messages received after hours will be returned the following business day.      In the case of an emergency, please dial 911 or seek assistance at the nearest Emergency Room/Urgent Care facility.     All Patients:    ? You may experience an increase in your symptoms for the first 2 days (It may take anywhere between 2 days- 2 weeks for the steroid to have maximum effect).    ? You may use ice on the injection site, as frequently as 20 minutes each hour if needed.    ? You may take your pain medicine.    ? You may continue taking your regular medication after your injection. If you have had a Medial Branch Block you may resume pain medication once your pain diary is completed.    ? You may shower. No swimming, tub bath or hot tub for 48 hours.  You may remove your bandaid/bandage as soon as you are home.    ? You may resume light activities, as tolerated.    ? Resume your usual diet as tolerated.    ? It is strongly advised that you do not drive for 1-3 hours post injection.    ? If you have had oral sedation:  Do not drive for 8 hours post injection.      ? If you have had IV sedation:  Do not drive for 24 hours post injection.  Do not operate hazardous machinery or make important personal/business decisions for 24 hours.      POSSIBLE STEROID SIDE EFFECTS (If steroid/cortisone was used for your procedure)    -If you experience these symptoms, it should only last for a short period      Swelling of the legs                Skin redness (flushing)       Mouth (oral) irritation     Blood sugar (glucose) levels              Sweats                      Mood changes    Headache    Sleeplessness    Weakened immune system for up to 14 days, which could increase the  risk of marshall the COVID-19 virus and/or experiencing more severe symptoms of the disease, if exposed.    Decreased effectiveness of the flu vaccine if given within 2 weeks of the steroid.         POSSIBLE PROCEDURE SIDE EFFECTS  -Call the Spine Center if you are concerned    Increased Pain             Increased numbness/tingling        Nausea/Vomiting            Bruising/bleeding at site        Redness or swelling                                                Difficulty walking        Weakness             Fever greater than 100.5    *In the event of a severe headache after an epidural steroid injection that is relieved by lying down, please call the [facility] to speak with a clinical staff member*

## 2021-06-16 NOTE — PROGRESS NOTES
ASSESSMENT: Left plantar fasciitis, diabetes    PLAN: Cortisone injection #3 in the left heel today.  He was given 4 mg of dexamethasone sodium phosphate.  He tolerated that well.  Continue with custom orthotics and stretching and icing and anti-inflammatory medication at home.  He will talk informally with a physical therapist he knows about other options.  Return to clinic if pain persists.      SUBJECTIVE: Return visit at the [facility] regarding left heel pain.  He was diagnosed with plantar fasciitis in December.  50% relief with cortisone injection injections in December and January, and custom orthotics.  He describes post static pain.  He stands all day at work.  Previous episode of plantar fasciitis about 15 years ago that responded to cortisone injections and orthotics.  He is diabetic.    PHYSICAL EXAM:  General: Pleasant 62 y.o. male in no acute distress.  Vascular: DP pulses are palpable. PT pulses are palpable. Pedal hair is present. Feet are warm to the touch.  Cardiac: Pulse is regular.  Lymphatic: No edema at the ankles.  Neuro: Sensation in the feet is grossly intact to light touch.  Derm: Bilateral great toenails are thickened and dystrophic with discoloration and subungual debris.  Musculoskeletal: Tenderness with palpation around the left plantar fascial insertion.

## 2021-06-16 NOTE — PROGRESS NOTES
Patient presents at the request of Dr. Julissa Lopez for right lower extremity EMG.  He has right-sided low back pain with right lower extremity pain and paresthesias to the anterior ankle with intermittent weakness.  History of lumbar decompression 10 weeks ago.  On exam he has normal sensation to light touch in the lower extremities with normal strength.  Absent bilateral Achilles reflexes.    EMG/NCS  results: Please see scanned full report.    Comment NCS: Borderline study  1.  Low amplitude bilateral sural SNAPs.  Can be normal for patient's age  2.  Absent bilateral tibial H reflexes can be normal for patient's age.  3.  Normal bilateral peroneal CMAP's and right tibial CMAP.    Comment EMG: Normal study  1.  Normal needle EMG right lower extremity.    Interpretation: Essentially normal study: There is electrodiagnostic evidence of:    1.  Low amplitude bilateral sural sensory studies and absent tibial H reflexes.  This can be normal for patient's age or may represent a sensory or sensorimotor peripheral polyneuropathy.      2. There is no electrodiagnostic evidence of lumbosacral radiculopathy, lumbosacral plexopathy, or focal neuropathy in the right lower extremity.  Specifically, there is no electrodiagnostic evidence of a right L4 and/or L5 radiculopathy.    The testing was completed in its entirety by the physician.      It was our pleasure caring for your patient today, if there any questions or concerns please do not hesitate to contact us.

## 2021-06-16 NOTE — TELEPHONE ENCOUNTER
RN cannot approve Refill Request    RN can NOT refill this medication historical medication requested. Last office visit: 3/10/2021 Josh Shafer MD Last Physical: 11/12/2020 Last MTM visit: Visit date not found Last visit same specialty: 3/10/2021 Josh Shafer MD.  Next visit within 3 mo: Visit date not found  Next physical within 3 mo: Visit date not found      Yvonne Ariraza, [facility] Triage/Med Refill 4/17/2021    Requested Prescriptions   Pending Prescriptions Disp Refills     gabapentin (NEURONTIN) 300 MG capsule [Pharmacy Med Name: GABAPENTIN 300 MG CAPSULE] 270 capsule 11     Sig: PLEASE SEE ATTACHED FOR DETAILED DIRECTIONS       Gabapentin/Levetiracetam/Tiagabine Refill Protocol  Passed - 4/16/2021  1:44 AM        Passed - PCP or prescribing provider visit in past 12 months or next 3 months     Last office visit with prescriber/PCP: 3/10/2021 Josh Shafer MD OR same dept: 3/10/2021 Josh Shafer MD OR same specialty: 3/10/2021 Josh Shafer MD  Last physical: 11/12/2020 Last MTM visit: Visit date not found   Next visit within 3 mo: Visit date not found  Next physical within 3 mo: Visit date not found  Prescriber OR PCP: Josh Shafer MD  Last diagnosis associated with med order: 1. Lumbar radiculitis  - gabapentin (NEURONTIN) 300 MG capsule [Pharmacy Med Name: GABAPENTIN 300 MG CAPSULE]; PLEASE SEE ATTACHED FOR DETAILED DIRECTIONS  Dispense: 270 capsule; Refill: 11    If protocol passes may refill for 12 months if within 3 months of last provider visit (or a total of 15 months).

## 2021-06-16 NOTE — PROGRESS NOTES
Right knee Injection    Date/Time: 4/20/2021 11:37 AM  Performed by: Josh Shafer MD  Authorized by: Josh Shafer MD       Universal Protocol    Site marked: Yes    Prior images obtained and reviewed: Yes    Required items: required blood products, implants, devices, and special equipment available    Patient identity confirmed: verbally with patient    Reevaluation: Patient was reevaluated immediately before administering moderate or deep sedation or anesthesia    Confirmation checklist: patient's identity using two indicators    Time out: Immediately prior to procedure a time out was called to verify the correct patient, procedure, equipment, support staff and site/side marked as required    Universal Protocol: Joint Commission Universal Protocol was followed    Preparation: Patient was prepped and draped in the usual sterile fashion    Indications  Indications: pain     Location  Body area: knee  Joint: right knee      Anesthesia    Local anesthesia used?: Yes    Anesthesia: local infiltration    Local anesthetic: lidocaine 1% without epinephrine    Anesthetic total (mL): 2    Sedation  Patient sedation: No    Procedure Details  Needle size: 22 G  Ultrasound guidance: no  Approach: superior  Methylprednisolone amount: 80 mg  Lidocaine 1% amount: 3 mL      Post-procedure    Patient tolerance: patient tolerated the procedure well with no immediate complications   Length of time physician present for 1:1 monitoring during sedation: 0

## 2021-06-16 NOTE — TELEPHONE ENCOUNTER
Patient called wanting to speak with Sherice or Dr. Shafer regarding his knee and an injection he received on Tuesday. Pt stated that his knee feels worse. Please call Pt back and discuss symptoms.

## 2021-06-16 NOTE — PROGRESS NOTES
Assessment/Plan:     1. Routine general medical examination at a health care facility  Routine healthcare maintenance.  Preventative care is reviewed.  Immunizations reviewed and up-to-date and will defer Zostavax immunization until Shingrix immunization available.  Annual physical exams to continue.  Routine hepatitis C and PSA screen per age recommendations.  - Hepatitis C Antibody (Anti-HCV)  - PSA, Annual Screen (Prostatic-Specific Antigen)    2. Class 2 obesity due to excess calories with serious comorbidity and body mass index (BMI) of 37.0 to 37.9 in adult  Dietary and exercise modifications reviewed for weight goal less than 240 pounds initially, less than 220 pounds ideally.    3. Type 2 diabetes mellitus  A1c relatively stable with A1c increased from 7.2% up to 7.5% today.  Continues [place], Tresiba and Metformin extended release as noted.  Diabetes follow-up no later than 4 months.  Diabetic eye exam annually.  Recent microalbumin screen October 25, 2017 normal.  Monofilament testing today with mild decrease at great toe primarily otherwise peripheral circulation intact.  Compression stockings being worn.  - Glycosylated Hemoglobin A1c  - Basic Metabolic Panel    4. Essential hypertension  Refill on lisinopril 20 mg daily.  Blood pressure stable.  Check basic metabolic panel for medication monitoring.  - lisinopril (PRINIVIL,ZESTRIL) 20 MG tablet; Take 1 tablet (20 mg total) by mouth daily.  Dispense: 90 tablet; Refill: 3  - Basic Metabolic Panel    5. Hyperlipidemia, unspecified hyperlipidemia type  Simvastatin 40 mg at bedtime.  Check lipid cascade today.  - Lipid Cascade    6. Mild persistent asthma without complication  Asthma control test 24 out of 25.  Continue Qvar 80 mcg using 2 puffs twice daily plus Ventolin metered-dose inhaler as needed.    7. Male erectile dysfunction  Due to cost savings did provide generic sildenafil 20 mg use 2-3 tablets by mouth daily as needed for male erectile  "dysfunction.  - sildenafil, antihypertensive, (REVATIO) 20 mg tablet; Take 2-3 tablets (40-60 mg total) by mouth daily as needed.  Dispense: 30 tablet; Refill: 5    8. Lymphedema  Rx provided for compression stockings for lymphedema management.    9. Benign neoplasm of colon, unspecified part of colon  History of tubular adenoma on prior colonoscopy 2014.  Repeat 5 year interval (2019 anticipated).      I have had an Advance Directives discussion with the patient.  The following high BMI interventions were performed this visit: encouragement to exercise, weight monitoring, weight loss from baseline weight and lifestyle education regarding diet.  Ensure ongoing efforts to achieve weight goal < 240 pounds initially, < 220 pounds ideally.       Subjective:      Leandro Brewer is a 62 y.o. male who presents for an annual exam.  In general doing well.  Noted obesity.  Swims at the [employer] 3 days a week.  Type 2 diabetes.  Continues [place] 1.5 mg every 7 days, Tresiba 40-44 units daily and Metformin extended release 500 mg using 2 tablets twice daily.  Lisinopril 20 mg daily for hypertension.  Simvastatin 40 mg at bedtime for lipid management.  Remains on Qvar 80 mcg 2 puffs twice daily plus Ventolin metered-dose inhaler as needed.  Needs prescription for compression stockings.  Wondering about Zostavax immunization recommendation.  Prior tubular adenoma on colonoscopy 2014.  Receives annual diabetic eye exams.  Prior microalbumin screen 2017 normal.  Comprehensive review of systems as above otherwise all negative.     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 Thompson); right inguinal " "hernia age 6 months   Hospitalizations: early 20s \"infected gallbladder\" WITHOUT surgery... ; cellulitis in legs (hospitalized twice); abdominal wall cyst requiring IV antibiotics x 4 days... ;   Would like a 90 day supply on all meds.   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 (look for further cause of anemia if Hb < 11.0)    Healthy Habits:   Regular Exercise: Yes and [employer] 2-3 times per week (swimming)  Healthy Diet: Yes  Dental Visits Regularly: Yes  Seat Belt: Yes   Sexually active: Yes  Colonoscopy: Yes  Lipid Profile: Yes and 4/1/14 (tubular adenoma - repeat in 5 years)  Glucose Screen: Yes    Immunization History   Administered Date(s) Administered     DT (pediatric) 01/01/1998     Influenza U6v8-50, 02/25/2010     Influenza, inj, historic,unspecified 12/05/2007, 10/22/2008, 11/09/2017     Influenza, seasonal,quad inj 6-35 mos 09/24/2009, 09/17/2010     Influenza,seasonal quad, PF, 36+MOS 11/12/2015     Pneumo Polysac 23-V 12/05/2007     Td, Adult, Absorbed 07/11/2017     Td,adult,historic,unspecified 12/05/2007     Tdap 12/05/2007     Immunization status: up to date and documented.  Vision Screening:both eyes and glasses  Hearing: PASS     Current Outpatient Prescriptions   Medication Sig Dispense Refill     ascorbic acid (ASCORBIC ACID WITH EVAN HIPS) 500 MG tablet Take 500 mg by mouth 2 (two) times a day.       aspirin 81 mg chewable tablet Chew 81 mg daily.       blood-glucose meter kit [employer] Contour Monitor KIT  Use as Directed       CONTOUR NEXT EZ METER Misc Use daily for blood sugar testing 1 each 5     CONTOUR NEXT STRIPS strips Test 3 times daily [a/s:173-346] 300 strip 3     dulaglutide (TRULICITY) 1.5 mg/0.5 mL PnIj Inject 1.5 mg under the skin every 7 days. 2 mL 1     hydrocortisone valerate (WEST-DONNELL) 0.2 % ointment Apply to affected area 2-3 times " "daily 60 g 2     insulin degludec (TRESIBA FLEXTOUCH U-100) 100 unit/mL (3 mL) InPn Inject 40-44 Units under the skin daily. 45 mL 3     LANCETS MISC [employer] Microlet MISC  Check blood sugar 2 times per day and as needed       lisinopril (PRINIVIL,ZESTRIL) 20 MG tablet Take 1 tablet (20 mg total) by mouth daily. 90 tablet 3     metFORMIN (GLUCOPHAGE-XR) 500 MG 24 hr tablet Take 2 tablets (1,000 mg total) by mouth 2 (two) times a day. 360 tablet 2     multivitamin (MULTIPLE VITAMINS DAILY) per tablet Take 1 tablet by mouth daily.       naftifine 2 % Crea Apply to affected area topically once daily for 2 weeks [yessi:391-561] 60 g 1     pen needle, diabetic (BD ULTRA-FINE BRANDON PEN NEEDLES) 32 gauge x 5/32\" Ndle Use 1 per day. 50 each 4     potassium 99 mg Tab Take by mouth. Take 1 Tablet Twice Daily       QVAR 80 mcg/actuation inhaler Inhale 2 puffs 2 times daily -rinse mouth after use -shake well before using 1 Inhaler 9     simvastatin (ZOCOR) 40 MG tablet 1 tablet daily at bedtime -[ez-open-cap] 90 tablet 3     VENTOLIN HFA 90 mcg/actuation inhaler Inhale 2 puffs 4 times a day if needed -shake well before using 18 g 0     clotrimazole (LOTRIMIN) 1 % cream Apply sparingly to the affected area twice daily for 2 wks and then as needed 60 g 0     FLUCELVAX QUAD 2067-7869, PF, 60 mcg (15 mcg x 4)/0.5 mL Syrg TO BE ADMINISTERED BY PHARMACIST FOR IMMUNIZATION  0     sildenafil, antihypertensive, (REVATIO) 20 mg tablet Take 2-3 tablets (40-60 mg total) by mouth daily as needed. 30 tablet 5     No current facility-administered medications for this visit.      Past Medical History:   Diagnosis Date     Diabetes mellitus      Plantar fasciitis      Past Surgical History:   Procedure Laterality Date     BACK SURGERY       HERNIA REPAIR Right     inguinal; child     Review of patient's allergies indicates no known allergies.  Family History   Problem Relation Age of Onset     Heart attack Mother      Obesity Mother      Heart " "disease Father      Social History     Social History     Marital status:      Spouse name: N/A     Number of children: N/A     Years of education: N/A     Occupational History     Manager [employer]     Social History Main Topics     Smoking status: Former Smoker     Smokeless tobacco: Never Used     Alcohol use Yes     Drug use: No     Sexual activity: Not on file     Other Topics Concern     Not on file     Social History Narrative       Review of Systems  Comprehensive ROS: as above, otherwise all negative.           Objective:     /70  Pulse 80  Ht 5' 9\" (1.753 m)  Wt (!) 255 lb (115.7 kg)  BMI 37.66 kg/m2  Body mass index is 37.66 kg/(m^2).    Physical    General Appearance:    Alert, cooperative, no distress, appears stated age.  Obesity.   Head:    Normocephalic, without obvious abnormality, atraumatic   Eyes:    PERRL, conjunctiva/corneas clear, EOM's intact, fundi     benign, both eyes.  Glasses.        Ears:    Normal TM's and external ear canals, both ears   Nose:   Nares normal, septum midline, mucosa normal, no drainage    or sinus tenderness   Throat:   Lips, mucosa, and tongue normal; teeth and gums normal   Neck:   Supple, symmetrical, trachea midline, no adenopathy;        thyroid:  No enlargement/tenderness/nodules; no carotid    bruit or JVD   Back:     Symmetric, no curvature, ROM normal, no CVA tenderness   Lungs:     Clear to auscultation bilaterally, respirations unlabored   Chest wall:    No tenderness or deformity   Heart:    Regular rate and rhythm, S1 and S2 normal, no murmur, rub   or gallop   Abdomen:     Soft, non-tender, bowel sounds active all four quadrants,     no masses, no organomegaly.     Genitalia:    Normal male without lesion, discharge or tenderness.  No inguinal hernia noted.     Rectal:    Normal tone.  Prostate mildly enlarged/symmetric, no masses or tenderness.   Extremities:   Extremities normal, atraumatic, no cyanosis or edema   Pulses:   " 2+ and symmetric all extremities   Skin:   Skin color, texture, turgor normal, no rashes or lesions   Lymph nodes:   Cervical, supraclavicular, and axillary nodes normal   Neurologic:   CNII-XII intact. Normal strength, sensation and reflexes       throughout

## 2021-06-16 NOTE — TELEPHONE ENCOUNTER
Telephone Encounter by Melvi Joaquin at 1/24/2019  9:57 AM     Author: Melvi Joaquin Service: -- Author Type: --    Filed: 1/24/2019  9:58 AM Encounter Date: 1/23/2019 Status: Signed    : Melvi Joaquin

## 2021-06-16 NOTE — TELEPHONE ENCOUNTER
[facility] needs clarification of the prescription for Gabapentin 300 mg cap. They are requesting: sig code: what are the directions? Please send updated Rx with requested information.     Thank you!

## 2021-06-16 NOTE — TELEPHONE ENCOUNTER
Called Marc, discussed his current pain management options. He voted to check with his PCP in regard to obtaining Oxycodone refill. He will f/u in clinic after HAYDEN on 4/19/2021.  Olivia Mccall RN, CNRN

## 2021-06-16 NOTE — TELEPHONE ENCOUNTER
Last appointment: 12/01/2020 (04/19/2021 inj)  Next appointment: None    Notes/Comments:      Rx request(s) has been reviewed. Rx(s) cued up for auth, to be e-scribed to pharm. Thanks.

## 2021-06-16 NOTE — TELEPHONE ENCOUNTER
Marc called to report pain in his right leg/shin. The pain has gone to 9/10 in the last 2 weeks. In one instance he was walking and his leg pain was so severe he thought his leg would give out on him. He is post op lumbar laminectomy with Dr. Lopez on 1/20/21. Please call Marc at [phone]

## 2021-06-16 NOTE — TELEPHONE ENCOUNTER
"Patient calling to find out when his surgeon would be calling him with EMG results. Chart reviewed. Per note of 3/24/31, patient to follow up after EMG and HAYDEN. He could not schedule his HAYDEN until 4/19 as he had his 2nd COVID vaccine on 4/3.     Please call and speak with patient re: results and pain he continues to have despite \"doing everything she talked to me about.\"  "

## 2021-06-17 NOTE — TELEPHONE ENCOUNTER
Spoke with patient who is requesting another prescription for oxycodone for post surgical pain - patient previously had surgery on his back: L4-L5 and L5-S1    Patient reports worsening pain since having back surgery.  He contacted his surgeon in regards to this and is going to be set up for an MRI but was advised to contact his PCP for a refill on oxycodone.    rx pended for approval if appropriate  Last received 12 tablets on 4/6/2021    Phone number: [phone]- [phone]

## 2021-06-17 NOTE — TELEPHONE ENCOUNTER
Neurosurgery telephone encounter:    Marc paged on-call NP to report ongoing severe R leg pain radiating into the thigh and anterolateral shin with assoc tingling. Severe since last wk, previous telephone call with our clinic reviewed. Feels unable to put pressure on the leg, walking with a walker as leg feels painful/weak. Took oxycodone 5mg 2 or 3 times yesterday. Was started on medrol pack last thurs. Gabapentin 900mg three times a day and cymbalta. No improvement.   Denies change in bowel or bladder control. MRI lumbar spine scheduled for this upcoming Wednesday, appt with Dr Lopez 6/1.    Suggested oxycodone 5-10mg Q4hrs today and adding previously prescribed robaxin. If pain control unable to be achieved by this afternoon, he will come into ED.     Jasmyn MAC-C  [facility] Neurosurgery  O. [phone]

## 2021-06-17 NOTE — TELEPHONE ENCOUNTER
"Pt reports his pain has \"leveled off\" but he took a \"turn for the worst\" after 5/19 when he began the steroids.     Pt's current symptoms are centered around RLE pain. He reported \"no numbness or anything\" but has extreme pain when walking. Moving in a chair causes shooting pain up and down the right leg.    Denies bowel or bladder incontinence. States weakness is more related to \"guarding\" his RLE than it is actual weakness. Pt using walker for stability at home.      Pt has not picked up the refill of pain meds, will do that today.     MRI rescheduled for tonight. Will change treatment plan as needed based on the results.     Laquita Savage RN    "

## 2021-06-17 NOTE — TELEPHONE ENCOUNTER
In review of chart I see that patient has also been working with neurosurgery clinic.  Has he been in contact with neurosurgery clinic yet regarding his current symptoms?

## 2021-06-18 NOTE — PROGRESS NOTES
Assessment/Plan:    1. Diabetes mellitus, type 2 (H)  Type 2 diabetes, improved control with A1c decreasing from 7.5% down to 6.5%.  Formulary change from metformin extended release instead to regular metformin 500 mg continue 2 pills twice daily.  Continues to elicit he every 7 days as well as Tresiba 45 units daily.  Diabetes follow-up in this office no later than 4 months.  - Glycosylated Hemoglobin A1c  - Basic Metabolic Panel  - metFORMIN (GLUCOPHAGE) 500 MG tablet; Take 2 tablets (1,000 mg total) by mouth 2 (two) times a day with meals.  Dispense: 120 tablet; Refill: 5    2. Essential hypertension  Hypertension stable continues lisinopril 20 mg daily with the recent microalbumin screen October 25, 2017 normal.  Check basic metabolic panel for medication monitoring.  - Basic Metabolic Panel    3. Hyperlipidemia, unspecified hyperlipidemia type  History of hyperlipidemia continue simvastatin 40 mg at bedtime.  Dietary and exercise modifications to continue with weight goal less than 220 pounds ideally.    4. Mild persistent asthma without complication  Mild persistent asthma, stable.    5. Class 2 obesity due to excess calories with serious comorbidity and body mass index (BMI) of 36.0 to 36.9 in adult  7 pound weight loss since February 21, 2018.  Weight goal less than 240 pounds initially, less than 220 pounds ideally.    6. Plantar fasciitis  Workman Dr. Checo Delacruz regarding inserts for plantar fasciitis treatment which have improved significantly.    7. Trochanteric bursitis of both hips  Described trochanteric bursitis bilateral.  Naprosyn 500 mg twice daily as needed sparingly with avoidance of exacerbating triggers otherwise corticosteroid injection bilateral hips 2 weeks if persistent concerns.  - naproxen (NAPROSYN) 500 MG tablet; Take 1 tablet (500 mg total) by mouth 2 (two) times a day with meals.  Dispense: 60 tablet; Refill: 0      The following high BMI interventions were performed this visit:  "encouragement to exercise, weight monitoring, weight loss from baseline weight and lifestyle education regarding diet.  Ensure ongoing efforts to achieve weight goal < 240 pounds initially, < 220 pounds ideally.         Subjective:    Leandro Brewer is seen today for follow-up evaluation.  Type 2 diabetes.  Using 45 units of Tresiba daily plus [place] every 7 days and Metformin extended release 500 mg using 2 tablets twice daily.  Parent formulary change were metformin extended release may not be covered.  Using Qvar 80 mcg using 2 puffs twice daily for asthma which is stable.  Simvastatin 40 mg at bedtime for lipid management.  Lisinopril 20 mg daily for hypertension.  Prior hepatitis C screen negative at physical 2018.  Plantar fasciitis improved with inserts and is awaiting approval of replacement pair with Dr. Checo Delacruz filled through [facility].  Comprehensive review of systems as above otherwise all negative.     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 Thompson); right inguinal hernia age 6 months   Hospitalizations: early 20s \"infected gallbladder\" WITHOUT surgery... ; cellulitis in legs (hospitalized twice); abdominal wall cyst requiring IV antibiotics x 4 days... ;   Would like a 90 day supply on all meds.   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 (look for further cause of anemia if Hb < 11.0)    Past Surgical History:   Procedure Laterality Date     BACK SURGERY       HERNIA REPAIR Right     inguinal; child        Family History   Problem Relation Age " "of Onset     Heart attack Mother      Obesity Mother      Heart disease Father         Past Medical History:   Diagnosis Date     Diabetes mellitus (H)      Plantar fasciitis         Social History   Substance Use Topics     Smoking status: Former Smoker     Smokeless tobacco: Never Used     Alcohol use Yes        Current Outpatient Prescriptions   Medication Sig Dispense Refill     albuterol (VENTOLIN HFA) 90 mcg/actuation inhaler Inhale 2 puffs 4 (four) times a day as needed for wheezing. 18 g 6     ascorbic acid (ASCORBIC ACID WITH EVAN HIPS) 500 MG tablet Take 500 mg by mouth 2 (two) times a day.       aspirin 81 mg chewable tablet Chew 81 mg daily.       blood-glucose meter kit [employer] Contour Monitor KIT  Use as Directed       clotrimazole (LOTRIMIN) 1 % cream Apply sparingly to the affected area twice daily for 2 wks and then as needed 60 g 0     CONTOUR NEXT EZ METER Misc Use daily for blood sugar testing 1 each 5     CONTOUR NEXT STRIPS strips Test 3 times daily [a/s:173-346] 300 strip 3     dulaglutide (TRULICITY) 1.5 mg/0.5 mL PnIj Inject 1.5 mg under the skin every 7 days. 2 mL 1     hydrocortisone valerate (WEST-DONNELL) 0.2 % ointment Apply to affected area 2-3 times daily 60 g 3     insulin degludec (TRESIBA FLEXTOUCH U-100) 100 unit/mL (3 mL) InPn Inject 40-44 Units under the skin daily. 45 mL 3     LANCETS MISC [employer] Microlet MISC  Check blood sugar 2 times per day and as needed       lisinopril (PRINIVIL,ZESTRIL) 20 MG tablet Take 1 tablet (20 mg total) by mouth daily. 90 tablet 3     multivitamin (MULTIPLE VITAMINS DAILY) per tablet Take 1 tablet by mouth daily.       naftifine 2 % Crea Apply to affected area topically once daily for 2 weeks [yessi:391-561] 45 g 2     pen needle, diabetic (BD ULTRA-FINE BRANDON PEN NEEDLES) 32 gauge x 5/32\" Ndle Use 1 per day. 50 each 4     potassium 99 mg Tab Take by mouth. Take 1 Tablet Twice Daily       QVAR 80 mcg/actuation inhaler Inhale 2 puffs 2 times daily " -rinse mouth after use -shake well before using 1 Inhaler 9     sildenafil, antihypertensive, (REVATIO) 20 mg tablet Take 2-3 tablets (40-60 mg total) by mouth daily as needed. 30 tablet 5     simvastatin (ZOCOR) 40 MG tablet 1 tablet daily at bedtime -[ez-open-cap] 90 tablet 3     TRULICITY 1.5 mg/0.5 mL PnIj Inject 1.5 mg under the skin every 7 days. 2 mL 0     metFORMIN (GLUCOPHAGE) 500 MG tablet Take 2 tablets (1,000 mg total) by mouth 2 (two) times a day with meals. 120 tablet 5     naproxen (NAPROSYN) 500 MG tablet Take 1 tablet (500 mg total) by mouth 2 (two) times a day with meals. 60 tablet 0     No current facility-administered medications for this visit.           Objective:    Vitals:    06/19/18 0807 06/19/18 0825   BP: 120/60 112/70   Pulse: 94    SpO2: 96%    Weight: (!) 248 lb (112.5 kg)       Body mass index is 36.62 kg/(m^2).    Alert.  No apparent distress.  Bilateral trochanteric bursa tenderness on exam.  Blood pressure 112/70 on recheck.  Chest clear without expiratory wheeze.      This note has been dictated using voice recognition software and as a result may contain minor grammatical errors and unintended word substitutions.

## 2021-06-19 NOTE — LETTER
Letter by Josh Shafer MD at      Author: Josh Shafer MD Service: -- Author Type: --    Filed:  Encounter Date: 10/1/2019 Status: Signed         October 1, 2019     Patient: Leandro Brewer   YOB: 1955   Date of Visit: 10/1/2019       To Whom it May Concern:    Leandro Brewer was seen in my clinic on 10/1/2019.  He is followed for right leg cellulitis requiring recent hospitalization 9/8/19-9/11/19.  Please excuse absence from [facility] from 9/9/19 through 9/20/19.  Please credit patient's account for time missed during recent treatment and recovery.    If you have any questions or concerns, please don't hesitate to call.    Sincerely,         Electronically signed by Josh Shafer MD

## 2021-06-19 NOTE — PROGRESS NOTES
Left trochanteric bursa injection  Date/Time: 7/19/2018 4:36 PM  Performed by: BREA HERNDON  Authorized by: BREA HERNDON   Indications: pain   Body area: hip  Joint: left hip  Local anesthesia used: yes  Anesthesia: local infiltration    Anesthesia:  Local anesthesia used: yes  Local Anesthetic: lidocaine 1% without epinephrine  Anesthetic total: 2 mL    Sedation:  Patient sedated: no  Preparation: Patient was prepped and draped in the usual sterile fashion.  Needle size: 22 G  Approach: lateral  Methylprednisolone amount: 80 mg  Lidocaine 1% amount: 2 mL  Patient tolerance: Patient tolerated the procedure well with no immediate complications

## 2021-06-19 NOTE — PROGRESS NOTES
Assessment/Plan:     1. Cellulitis of right anterior lower leg in the setting of diet controlled type 2 diabetes and chronic lymphedema  Will initiate cephalexin 4 times daily for 10 days which has worked successfully for him in the past.  I encouraged him to discuss with Dr. Shafer whether it would be beneficial for him to have a prescription on hand that he may initiate in the event of recurrence of his cellulitis.  He will monitor closely for improvement over the next 2 days, will notify with worsening.  He has an appointment in 2 days with Dr. Shafer, will follow-up of his cellulitis at that time.      Subjective:      Leandro Brewer is a 62 y.o. male presented to clinic today for evaluation of right leg pain, redness, and associated chills and body aches.  Past medical history notable for type 2 diabetes that is currently controlled on Trulicity and oral agents, also for chronic lymphedema.  3 recurrent cellulitis.  The chills awoke him from sleep in the early morning hours.  Noted leg redness in the right side when he awoke.  Denies fevers.  Has had some body aches develop throughout the day.  History of recurrent cellulitis in the past, most recent was November 2016.  Chronic history of lymphedema, wears a compression stocking in management of this.  Has not noted any sudden fluctuations in his blood sugar.  Denies any new injuries.  He did several weeks ago developed a sore underneath his right fourth toe from thread on a sock, has been keeping an eye on this and not noting any redness, warmth, or drainage from it.  He has perhaps some mild neuropathy in his foot which he believes is due to previous lumbar disc disease, but denies any loss of sensation in his foot.  He feels his edema is stable.    Current Outpatient Prescriptions   Medication Sig Dispense Refill     albuterol (VENTOLIN HFA) 90 mcg/actuation inhaler Inhale 2 puffs 4 (four) times a day as needed for wheezing. 18 g 6     ascorbic acid  "(ASCORBIC ACID WITH EVAN HIPS) 500 MG tablet Take 500 mg by mouth 2 (two) times a day.       aspirin 81 mg chewable tablet Chew 81 mg daily.       blood-glucose meter kit [email or web address] Contour Monitor KIT  Use as Directed       clotrimazole (LOTRIMIN) 1 % cream Apply sparingly to the affected area twice daily for 2 wks and then as needed 60 g 0     CONTOUR NEXT EZ METER Misc Use daily for blood sugar testing 1 each 5     CONTOUR NEXT STRIPS strips Test 3 times daily [a/s:173-346] 300 strip 3     dulaglutide (TRULICITY) 1.5 mg/0.5 mL PnIj Inject 1.5 mg under the skin every 7 days. 6 mL 1     hydrocortisone valerate (WEST-DONNELL) 0.2 % ointment Apply to affected area 2-3 times daily 60 g 2     insulin degludec (TRESIBA FLEXTOUCH U-100) 100 unit/mL (3 mL) InPn Inject 40-44 Units under the skin daily. 45 mL 3     LANCETS MISC [employer] Microlet MISC  Check blood sugar 2 times per day and as needed       lisinopril (PRINIVIL,ZESTRIL) 20 MG tablet Take 1 tablet (20 mg total) by mouth daily. 90 tablet 3     metFORMIN (GLUCOPHAGE-XR) 500 MG 24 hr tablet Take 2 tablets (1,000 mg total) by mouth 2 (two) times a day with meals. 360 tablet 3     multivitamin (MULTIPLE VITAMINS DAILY) per tablet Take 1 tablet by mouth daily.       naftifine 2 % Crea Apply to affected area topically once daily for 2 weeks [yessi:391-561] 45 g 2     naproxen (NAPROSYN) 500 MG tablet Take 1 tablet (500 mg total) by mouth 2 (two) times a day with meals. 60 tablet 0     pen needle, diabetic (BD ULTRA-FINE BRANDON PEN NEEDLES) 32 gauge x 5/32\" Ndle Use 1 per day. 50 each 4     potassium 99 mg Tab Take by mouth. Take 1 Tablet Twice Daily       QVAR 80 mcg/actuation inhaler Inhale 2 puffs 2 times daily -rinse mouth after use -shake well before using 1 Inhaler 9     sildenafil, antihypertensive, (REVATIO) 20 mg tablet Take 2-3 tablets (40-60 mg total) by mouth daily as needed. 30 tablet 5     simvastatin (ZOCOR) 40 MG tablet 1 tablet daily at bedtime -[ez-open-cap] " 90 tablet 3     cephalexin (KEFLEX) 500 MG capsule Take 1 capsule (500 mg total) by mouth 4 (four) times a day for 10 days. 40 capsule 0     No current facility-administered medications for this visit.        Past Medical History, Family History, and Social History reviewed.  Past Medical History:   Diagnosis Date     Diabetes mellitus (H)      Plantar fasciitis      Past Surgical History:   Procedure Laterality Date     BACK SURGERY       HERNIA REPAIR Right     inguinal; child     Review of patient's allergies indicates no known allergies.  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    Objective:     Vitals:    07/03/18 1426   BP: 100/60   Patient Site: Right Arm   Patient Position: Sitting   Cuff Size: Adult Large   Pulse: 72   Resp: 20   Temp: 97.9  F (36.6  C)   TempSrc: Oral   Weight: (!) 249 lb 12.8 oz (113.3 kg)    Body mass index is 36.89 kg/(m^2).    Alert male.  Right lower extremity is with mild erythema and warmth present from approximately the knee to the ankle anteriorly only.  Edema is very mild, 1+ at most.  There is no fluctuance or open sores noted.  Underneath his right fifth toe where the toe meets his foot, there is a linear defect in his skin.  There is no evidence of chronic ulceration or infection in that site, and his foot itself appears healthy without cellulitis.      This  note has been dictated using voice recognition software. Any grammatical or context distortions are unintentional and inherent to the the software.

## 2021-06-19 NOTE — PROGRESS NOTES
Right trochanteric bursa injection  Date/Time: 7/19/2018 4:35 PM  Performed by: BREA HERNDON  Authorized by: BREA HERNDON   Indications: pain   Body area: hip  Joint: right hip  Local anesthesia used: yes  Anesthesia: local infiltration    Anesthesia:  Local anesthesia used: yes  Local Anesthetic: lidocaine 1% without epinephrine  Anesthetic total: 2 mL    Sedation:  Patient sedated: no  Preparation: Patient was prepped and draped in the usual sterile fashion.  Needle size: 22 G  Ultrasound guidance: no  Approach: lateral  Methylprednisolone amount: 80 mg  Lidocaine 1% amount: 2 mL  Patient tolerance: Patient tolerated the procedure well with no immediate complications

## 2021-06-19 NOTE — LETTER
Letter by Josh Shafer MD at      Author: Josh Shafer MD Service: -- Author Type: --    Filed:  Encounter Date: 4/24/2019 Status: (Other)         April 24, 2019     Patient: Leandro Brewer   YOB: 1955           Order: Diabetic shoes and inserts  Diagnosis: Type 2 diabetes mellitus with hyperglycemia (E11.65, Z79.4); hammer toes(s) (M20.41), right foot; plantar fascial fibromatosis (M72.2)       Josh Shafer MD:____________________________________________ Date: ______________________  NPI: [id number]

## 2021-06-19 NOTE — PROGRESS NOTES
"Assessment/Plan:    1. Trochanteric bursitis of both hips  Patient with bilateral trochanteric bursitis.  Prior corticosteroid injection history with benefits however recurrent issues identified.  Patient likes to continue with repeat trochanteric bursa injections bilaterally.  Please see procedure notes.  Tolerated well.  Reassess at follow-up diabetes visit as scheduled per    2. Hordeolum externum of right lower eyelid  Right lower eyelid external hordeolum, mild without signs of significant surrounding inflammation.  Warm compresses recommended 3 times daily.  Notify persistent or recurrent concerns identified.        Subjective:    Leandro Brewer is seen today for bilateral hip pain.  Lateral aspect.  History of trochanteric bursitis.  Has had corticosteroid injections previously with benefit.  Would like to have repeat treatment due to recurrent concerns.  No sciatica.  No lower back pain.  Does have right lower eyelid swelling and redness.  History of stye.  No drainage.  No vision change.  Comprehensive review of systems as above otherwise all negative.     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 Thompson); right inguinal hernia age 6 months   Hospitalizations: early 20s \"infected gallbladder\" WITHOUT surgery... ; cellulitis in legs (hospitalized twice); abdominal wall cyst requiring IV antibiotics x 4 days... ;   Would like a 90 day supply on all meds.   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 (look for further cause of anemia if Hb < 11.0)    Past Surgical History:   Procedure " Laterality Date     BACK SURGERY       HERNIA REPAIR Right     inguinal; child        Family History   Problem Relation Age of Onset     Heart attack Mother      Obesity Mother      Heart disease Father         Past Medical History:   Diagnosis Date     Diabetes mellitus (H)      Plantar fasciitis         Social History   Substance Use Topics     Smoking status: Former Smoker     Smokeless tobacco: Never Used     Alcohol use Yes        Current Outpatient Prescriptions   Medication Sig Dispense Refill     albuterol (VENTOLIN HFA) 90 mcg/actuation inhaler Inhale 2 puffs 4 (four) times a day as needed for wheezing. 18 g 6     ascorbic acid (ASCORBIC ACID WITH EVAN HIPS) 500 MG tablet Take 500 mg by mouth 2 (two) times a day.       aspirin 81 mg chewable tablet Chew 81 mg daily.       blood-glucose meter kit [employer] Contour Monitor KIT  Use as Directed       clotrimazole (LOTRIMIN) 1 % cream Apply sparingly to the affected area twice daily for 2 wks and then as needed 60 g 0     CONTOUR NEXT EZ METER Misc Use daily for blood sugar testing 1 each 5     CONTOUR NEXT STRIPS strips Test 3 times daily [a/s:173-346] 300 strip 3     dulaglutide (TRULICITY) 1.5 mg/0.5 mL PnIj Inject 1.5 mg under the skin every 7 days. 6 mL 1     hydrocortisone valerate (WEST-DONNELL) 0.2 % ointment Apply to affected area 2-3 times daily 60 g 2     insulin degludec (TRESIBA FLEXTOUCH U-100) 100 unit/mL (3 mL) InPn Inject 40-44 Units under the skin daily. 45 mL 3     LANCETS MISC [employer] Microlet MISC  Check blood sugar 2 times per day and as needed       lisinopril (PRINIVIL,ZESTRIL) 20 MG tablet Take 1 tablet (20 mg total) by mouth daily. 90 tablet 3     metFORMIN (GLUCOPHAGE-XR) 500 MG 24 hr tablet Take 2 tablets (1,000 mg total) by mouth 2 (two) times a day with meals. 360 tablet 3     multivitamin (MULTIPLE VITAMINS DAILY) per tablet Take 1 tablet by mouth daily.       naftifine 2 % Crea Apply to affected area topically once daily for 2 weeks  "[yessi:[id number]] 45 g 2     naproxen (NAPROSYN) 500 MG tablet Take 1 tablet (500 mg total) by mouth 2 (two) times a day with meals. 60 tablet 0     pen needle, diabetic (BD ULTRA-FINE BRANDON PEN NEEDLES) 32 gauge x 5/32\" Ndle Use 1 per day. 50 each 4     potassium 99 mg Tab Take by mouth. Take 1 Tablet Twice Daily       QVAR 80 mcg/actuation inhaler Inhale 2 puffs 2 times daily -rinse mouth after use -shake well before using 1 Inhaler 9     sildenafil, antihypertensive, (REVATIO) 20 mg tablet Take 2-3 tablets (40-60 mg total) by mouth daily as needed. 30 tablet 5     simvastatin (ZOCOR) 40 MG tablet 1 tablet daily at bedtime -[ez-open-cap] 90 tablet 3     No current facility-administered medications for this visit.           Objective:    Vitals:    07/19/18 1606   BP: 100/60   Pulse: 90   SpO2: 98%   Weight: (!) 247 lb (112 kg)      Body mass index is 36.48 kg/(m^2).    Alert.  No apparent distress.  Right lower eyelid stye, external without drainage.  Conjunctiva without significant injection.  No change in visual acuity.  Musculoskeletal exam with bilateral hip pain lateral aspect of her trochanteric bursa.  No bruising.  No rash.  Lower extremity CMS appears intact.      This note has been dictated using voice recognition software and as a result may contain minor grammatical errors and unintended word substitutions.   "

## 2021-06-20 ENCOUNTER — HEALTH MAINTENANCE LETTER (OUTPATIENT)
Age: 66
End: 2021-06-20

## 2021-06-20 NOTE — PROGRESS NOTES
Assessment/Plan:    1. Diabetes mellitus, type 2 (H)  Type 2 diabetes, stable with A1c 6.8%.  Continues Trulicity 1.5 mg weekly, Tresiba 45 units daily and metformin extended release 500 mg using 2 capsules twice daily.  Check microalbumin screen.  Reassess at physical exam in 4 months.  - Glycosylated Hemoglobin A1c  - Microalbumin, Random Urine  - Comprehensive Metabolic Panel    2. Essential hypertension  Hypertension, stable and remains on lisinopril 20 mg daily.  Ensure stable renal function and electrolytes.  - Comprehensive Metabolic Panel    3. Hyperlipidemia, unspecified hyperlipidemia type  Check lipid cascade while on simvastatin 40 mg at bedtime.  - Lipid Cascade  - Comprehensive Metabolic Panel    4. Mild persistent asthma without complication  Mild persistent asthma history with asthma control test 24 out of 25.  Continues Qvar.  Will receive flu shot later this month paid for by employer.  Immunizations otherwise up-to-date.    5. Spinal stenosis of lumbar region, unspecified whether neurogenic claudication present  Patient will see Dr. Thompson's assistant later today regarding noted spinal stenosis L3-L4.  Has completed prednisone 40 mg daily times 5 days.  No longer on Percocet.  Using gabapentin 300 mg 3 times daily times 7 days as prescribed through emergency department.      The following high BMI interventions were performed this visit: encouragement to exercise, weight monitoring, weight loss from baseline weight and lifestyle education regarding diet.  Ensure ongoing efforts to achieve weight goal < 230 pounds initially, < 220 pounds ideally.         Subjective:    Leandro AISHA Brewer is seen today for follow-up evaluation.  Type 2 diabetes.  Uses Trulicity 1.5 mg weekly, Tresiba 45 units daily and metformin extended release 500 mg using 2 capsules twice daily.  Remains on lisinopril 20 mg daily for hypertension and renal protective benefits.  Qvar 80 mcg using 2 puffs twice daily for asthma.   "Simvastatin 40 mg at bedtime for lipid management.  Recent concerns for significant bilateral hip osteoarthritis.  Had a hip corticosteroid injection performed.  Subsequently had increasing pain when crossing legs and a popping sensation occurring described as right lower back location.  Seen through emergency department 2018 with spinal stenosis noted L3-L4, severe.  Prescribed gabapentin 300 mg 3 times daily, given Dilaudid and then discharged with Percocet, and told to finish prednisone 40 mg daily times 5 days.  Asthma is been stable.  Lower back pain has improved.  We will see Dr. Thompson's assistance later today and has had a prior laminectomy 2008.     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 Thompson); right inguinal hernia age 6 months   Hospitalizations: early 20s \"infected gallbladder\" WITHOUT surgery... ; cellulitis in legs (hospitalized twice); abdominal wall cyst requiring IV antibiotics x 4 days... ;   Would like a 90 day supply on all meds.   [facility] for eye exams, will schedule at 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 (look for further cause of anemia if Hb < 11.0)    Past Surgical History:   Procedure Laterality Date     BACK SURGERY       HERNIA REPAIR Right     inguinal; child        Family History   Problem Relation Age of Onset     Heart attack Mother      Obesity Mother      Heart disease Father         Past Medical History:   Diagnosis Date     Anemia      Arthritis      Diabetes mellitus (H)      Diabetes mellitus, type II (H)      Hypertension      Obesity      Plantar fasciitis  "        Social History   Substance Use Topics     Smoking status: Former Smoker     Smokeless tobacco: Never Used     Alcohol use Yes        Current Outpatient Prescriptions   Medication Sig Dispense Refill     albuterol (VENTOLIN HFA) 90 mcg/actuation inhaler Inhale 2 puffs 4 (four) times a day as needed for wheezing. 18 g 6     ascorbic acid (ASCORBIC ACID WITH EVAN HIPS) 500 MG tablet Take 500 mg by mouth 2 (two) times a day.       aspirin 81 mg chewable tablet Chew 81 mg daily.       blood-glucose meter kit [facility] [employer] Contour Monitor KIT  Use as Directed       clotrimazole (LOTRIMIN) 1 % cream Apply sparingly to the affected area twice daily for 2 wks and then as needed 60 g 0     CONTOUR NEXT EZ METER Misc Use daily for blood sugar testing 1 each 5     CONTOUR NEXT TEST STRIPS strips TEST 3 TIMES DAILY 300 strip 3     dulaglutide (TRULICITY) 1.5 mg/0.5 mL PnIj Inject 1.5 mg under the skin every 7 days. 6 mL 1     gabapentin (NEURONTIN) 300 MG capsule Take 1 capsule (300 mg total) by mouth 3 (three) times a day. 21 capsule 0     hydrocortisone valerate (WEST-DONNELL) 0.2 % ointment APPLY TO AFFECTED AREA 2-3 TIMES DAILY 60 g 5     insulin degludec (TRESIBA FLEXTOUCH U-100) 100 unit/mL (3 mL) InPn Inject 40-44 Units under the skin daily. 45 mL 3     LANCETS MISC [employer] Microlet MISC  Check blood sugar 2 times per day and as needed       lisinopril (PRINIVIL,ZESTRIL) 20 MG tablet Take 1 tablet (20 mg total) by mouth daily. 90 tablet 3     metFORMIN (GLUCOPHAGE-XR) 500 MG 24 hr tablet Take 2 tablets (1,000 mg total) by mouth 2 (two) times a day with meals. 360 tablet 3     multivitamin (MULTIPLE VITAMINS DAILY) per tablet Take 1 tablet by mouth daily.       naftifine 2 % Crea APPLY TO AFFECTED AREA TOPICALLY ONCE DAILY FOR 2 WEEKS 45 g 2     naproxen (NAPROSYN) 500 MG tablet Take 1 tablet (500 mg total) by mouth 2 (two) times a day with meals. 60 tablet 0     oxyCODONE-acetaminophen (PERCOCET/ENDOCET) 5-325 mg per  "tablet Take 1-2 tablets by mouth every 4 (four) hours as needed for pain. 13 tablet 0     pen needle, diabetic (BD ULTRA-FINE BRANDON PEN NEEDLES) 32 gauge x 5/32\" Ndle Use 1 per day. 50 each 4     potassium 99 mg Tab Take by mouth. Take 1 Tablet Twice Daily       predniSONE (DELTASONE) 20 MG tablet Take 2 tablets (40 mg total) by mouth daily for 5 days. 10 tablet 0     QVAR 80 mcg/actuation inhaler Inhale 2 puffs 2 times daily -rinse mouth after use -shake well before using 1 Inhaler 9     sildenafil, antihypertensive, (REVATIO) 20 mg tablet Take 2-3 tablets (40-60 mg total) by mouth daily as needed. 30 tablet 5     simvastatin (ZOCOR) 40 MG tablet 1 tablet daily at bedtime -[ez-open-cap] 90 tablet 3     No current facility-administered medications for this visit.           Objective:    Vitals:    10/02/18 0723   BP: 130/80   Pulse: 90   SpO2: 98%   Weight: (!) 244 lb (110.7 kg)      Body mass index is 36.03 kg/(m^2).    Alert.  No apparent distress.  Chest clear.  Cardiac exam regular.  Abdomen benign, obese.  Has lost 6 pounds since September 5, 2018.  Extremities warm and dry.        [facility]  MR LUMBAR SPINE WO CONTRAST  9/27/2018 11:47 PM     INDICATION: Intractable low back pain, unable to ambulate, remote history of laminectomy 10 years ago intractable low back pain, unable to ambulate, remote history of laminectomy 10 years ago  TECHNIQUE: MRI of the lumbar spine without contrast.  COMPARISON: 01/23/2008.     FINDINGS: Nomenclature is based on 5 lumbar type vertebral bodies. There is good anatomic alignment of the lumbar spine with no evidence of subluxation. The vertebral body heights are well-maintained throughout and have appropriate signal characteristics   for the patient's age. No pars defect. The conus tip is identified at T12-L1. There is no abnormal signal or change in size of the conus medullaris. There is no evidence of an intracanal mass or hemorrhage.. Grossly normal paraspinal soft " tissues.   Normal aortic diameter. The visualized bony pelvis and proximal femora are grossly normal.     The T12-L1 and the L1-L2 disc space levels have a mild loss of disc space height and signal. They have shallow right paracentral disc protrusions with no evidence of canal compromise. There is mild facet arthropathy but the lateral recesses and the   neural foramen are patent bilaterally.     L2-L3: There is a normal disc space height and signal. There is no significant disc bulge or herniation to lead to canal compromise. There is mild bilateral lateral recess narrowing and the neural foramen are patent bilaterally.     L3-L4: There is a mild loss of disc space height and signal. There is a moderate diffuse annular bulge more prominent to the posterior lateral regions. This along with the facet arthropathy leads to severe canal compromise with mild bilateral lateral   recess narrowing and minimal bilateral neural foraminal narrowing. This is mildly progressed in the interval.     L4-L5: There is a mild loss of disc space height and signal. The patient is status post a partial laminectomy. Postsurgical changes are noted. There is no significant disc bulge or herniation to lead to canal compromise. There is though prominent facet   arthropathy leading to mild canal compromise. The lateral recesses and the neural foramen are patent bilaterally.     L5-S1: There is a mild loss of disc space height and signal. There is a mild diffuse annular bulge which is just abutting the exiting right S1 nerve root. There is no significant canal compromise. There is facet arthropathy leading to mild right lateral   recess narrowing. The neural foramen are patent bilaterally.     IMPRESSION:   CONCLUSION:  1.  Vertebral body heights and alignment are maintained.     2.  At the L2-L3 disc space level there is mild bilateral lateral recess narrowing.     3.  At the L3-L4 disc space level there is progression to severe canal  compromise with bilateral lateral recess narrowing and minimal bilateral neural foraminal narrowing.     4.  At the L4-L5 disc space level the patient is status post a partial laminectomy. There is mild canal compromise secondary to facet arthropathy.     5.  At the L5-S1 disc space level there is mild right lateral recess narrowing.          This note has been dictated using voice recognition software and as a result may contain minor grammatical errors and unintended word substitutions.

## 2021-06-20 NOTE — PROGRESS NOTES
"Assessment/Plan:    1. Cellulitis of left leg  Cellulitis of left leg, recurrent.  Has had issues historically with recurrent cellulitis.  Cephalexin 500 mg 4 times daily ×10 days was prescribed up to 14 days max.  Refills provided.  Avoidance of exacerbating triggers.  Compression stocking to be utilized.  Notify of fever or chills or worsening.  - cephalexin (KEFLEX) 500 MG capsule; Take 1 capsule (500 mg total) by mouth 4 (four) times a day for 10 days.  Dispense: 56 capsule; Refill: 2    2. Type 2 diabetes mellitus without complication, without long-term current use of insulin  Type 2 diabetes, stable with recent A1c 6.5% 2018 and does have diabetic follow-up 2018.  No history of peripheral neuropathy.    3. Trochanteric bursitis of both hips  Persistent concerns of trochanteric bursitis despite prior corticosteroid injections.  Referred to orthopedic specialty for further evaluation treatment options.  - Ambulatory referral to Orthopedic Surgery        Subjective:    Leandro Brewer is seen today for recurrent concerns for presumed cellulitis.  Left leg redness more over the shin as well as some associated swelling.  No calf tenderness.  [place] not recall any specific injury to leg or body.  Did form a blister however this seems to be improved.  No history of peripheral neuropathy.  Does have type 2 diabetes is noted.  No other rash.  Bilateral hip pain lateral aspect despite prior corticosteroid injection.  Wondering about other treatment possibilities.  Comprehensive review of systems as above otherwise all negative.     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 Thompson); right inguinal hernia age 6 months   Hospitalizations: early 20s \"infected gallbladder\" WITHOUT surgery... ; cellulitis in legs (hospitalized twice); abdominal wall cyst requiring IV antibiotics x 4 days... ;   Would like a 90 day supply on all meds.   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 (look for further cause of anemia if Hb < 11.0)    Past Surgical History:   Procedure Laterality Date     BACK SURGERY       HERNIA REPAIR Right     inguinal; child        Family History   Problem Relation Age of Onset     Heart attack Mother      Obesity Mother      Heart disease Father         Past Medical History:   Diagnosis Date     Diabetes mellitus (H)      Plantar fasciitis         Social History   Substance Use Topics     Smoking status: Former Smoker     Smokeless tobacco: Never Used     Alcohol use Yes        Current Outpatient Prescriptions   Medication Sig Dispense Refill     albuterol (VENTOLIN HFA) 90 mcg/actuation inhaler Inhale 2 puffs 4 (four) times a day as needed for wheezing. 18 g 6     ascorbic acid (ASCORBIC ACID WITH EVAN HIPS) 500 MG tablet Take 500 mg by mouth 2 (two) times a day.       aspirin 81 mg chewable tablet Chew 81 mg daily.       blood-glucose meter kit Ascensia Contour Monitor KIT  Use as Directed       clotrimazole (LOTRIMIN) 1 % cream Apply sparingly to the affected area twice daily for 2 wks and then as needed 60 g 0     CONTOUR NEXT EZ METER Misc Use daily for blood sugar testing 1 each 5     CONTOUR NEXT TEST STRIPS strips TEST 3 TIMES DAILY 300 strip 3     dulaglutide (TRULICITY) 1.5 mg/0.5 mL PnIj Inject 1.5 mg under the skin every 7 days. 6 mL 1     hydrocortisone valerate (WEST-DONNELL) 0.2 % ointment APPLY TO AFFECTED AREA 2-3 TIMES DAILY 60 g 5     insulin degludec (TRESIBA FLEXTOUCH U-100) 100 unit/mL (3 mL) InPn Inject 40-44 Units under " "the skin daily. 45 mL 3     LANCETS MISC Ascensia Microlet MISC  Check blood sugar 2 times per day and as needed       lisinopril (PRINIVIL,ZESTRIL) 20 MG tablet Take 1 tablet (20 mg total) by mouth daily. 90 tablet 3     metFORMIN (GLUCOPHAGE-XR) 500 MG 24 hr tablet Take 2 tablets (1,000 mg total) by mouth 2 (two) times a day with meals. 360 tablet 3     multivitamin (MULTIPLE VITAMINS DAILY) per tablet Take 1 tablet by mouth daily.       naftifine 2 % Crea APPLY TO AFFECTED AREA TOPICALLY ONCE DAILY FOR 2 WEEKS 45 g 2     naproxen (NAPROSYN) 500 MG tablet Take 1 tablet (500 mg total) by mouth 2 (two) times a day with meals. 60 tablet 0     pen needle, diabetic (BD ULTRA-FINE BRANDON PEN NEEDLES) 32 gauge x 5/32\" Ndle Use 1 per day. 50 each 4     potassium 99 mg Tab Take by mouth. Take 1 Tablet Twice Daily       QVAR 80 mcg/actuation inhaler Inhale 2 puffs 2 times daily -rinse mouth after use -shake well before using 1 Inhaler 9     sildenafil, antihypertensive, (REVATIO) 20 mg tablet Take 2-3 tablets (40-60 mg total) by mouth daily as needed. 30 tablet 5     simvastatin (ZOCOR) 40 MG tablet 1 tablet daily at bedtime -[ez-open-cap] 90 tablet 3     cephalexin (KEFLEX) 500 MG capsule Take 1 capsule (500 mg total) by mouth 4 (four) times a day for 10 days. 56 capsule 2     No current facility-administered medications for this visit.           Objective:    Vitals:    09/05/18 1326   BP: 120/70   Pulse: 88   Weight: (!) 250 lb (113.4 kg)      Body mass index is 36.92 kg/(m^2).    Alert.  No apparent distress.  Left anterior shin with erythema and local swelling.  No calf tenderness.  No history of peripheral neuropathy with monofilament testing normal.  Chest clear.  Cardiac exam regular.    Lab Results   Component Value Date    HGBA1C 6.5 (H) 06/19/2018          This note has been dictated using voice recognition software and as a result may contain minor grammatical errors and unintended word substitutions.   "

## 2021-06-21 NOTE — LETTER
Letter by Julissa Lopez MD at      Author: Julissa Lopez MD Service: -- Author Type: --    Filed:  Encounter Date: 2/1/2021 Status: (Other)         February 1, 2021     Patient: Leandro Brewer   YOB: 1955   Date of Visit: 2/1/2021       To Whom It May Concern:    Leandro Brewer has undergone back surgery on 01/20/2021.    It is my medical opinion that Leandro Brewer should remain out of work until his post-operative evaluation on 03/05/2021.    If you have any questions or concerns, please don't hesitate to call.    Sincerely,        Electronically signed by Julissa Lopez MD

## 2021-06-21 NOTE — LETTER
Letter by Julissa Lopez MD at      Author: Julissa Lopez MD Service: -- Author Type: --    Filed:  Encounter Date: 1/13/2021 Status: (Other)       Dear Mr. Brewer,    This letter will help in preparation for your upcoming surgery. Please contact us with any additional questions you may have regarding your surgery. Contact information for your surgery scheduler:   Calos Bazzi and [place]: [phone] ~ Sugar Mckeon and Jessica: [phone] ~ [place]    You are scheduled for: Lumbar Hemilaminectomy  With: Julissa Lopez M.D.  Date/Time: Wednesday, January 20, 2021 at 11:45 a.m. (time subject to change)  Location: [place] [address]    Check in at the Welcome Desk inside the main doors of the hospital. An escort will take you to the surgery waiting area. A nurse from [facility] (surgery admit unit) at the hospital will call you with your exact arrival time to the hospital - typically one-and-a-half to two-and-a-half hours prior to your scheduled surgery time.     In the event of an emergency surgery case, there may be an adjustment to your start time for surgery.     PREPARING FOR YOUR SURGERY    *Pre-op Physical: 01/13/2021 at 4:20 p.m., with Harriett Gallardo NP, at [facility] (formerly known as [facility]).      *Please discuss the necessity of receiving a pneumococcal vaccine prior to surgery at your pre-op physical. Recommended for all patients over the age of 65, or based on certain medical conditions.     *After the pre-op physical is complete, please have your clinic fax the visit note to your surgery scheduler at [phone].    *Pre-op Lab Work: Please have labs completed at your pre-op physical. Dr. Lopez has placed the lab orders in your chart for them to see.     *Covid-19 Pre-procedure Test: 01/16/2021 at 10:20 a.m., at our [place] location. [address] [place] [address]. Once in the parking lot,  follow the signs to curbside testing.     *Readiness Visit: Dr. Lopez's nurse will call you prior to the day of surgery to go over the results of your pre-op physical/lab results/Covid result, along with additional information you will need for the day of surgery.     *Ensure that you have completed your pre-op physical, along with any other necessary tests/appointments (listed above), prior to your Readiness Visit.         ADDITIONAL INFORMATION REGARDING YOUR SURGERY    Medications    Bring a list ALL of your medications, including any over-the-counter vitamins and herbal supplements to your Readiness Visit, and on the day of surgery.    DO NOT bring your medications with you the day of surgery.    Please see attached third sheet for more details on medications/vitamins/herbal supplements that should be discontinued prior to your surgery date.     If you are unsure if you should discontinue a medication/ vitamin/herbal supplement, please call our office and discuss with a nurse.    Continue taking your medications/vitamins/herbal supplements unless they are on the attached list.     Failure to follow the instructions regarding medications/vitamins/herbal supplements will result in cancellation of your surgery.    Day BEFORE Surgery    DO NOT shave near your surgical site. This can cause irritation of the skin    Using a washcloth and provided bottle of Hibiclens, shower the night BEFORE surgery, using a half bottle of Hibiclens to wash your body, avoiding face and genitals. The morning OF surgery, shower and use the second half of the bottle to wash your body, avoiding face and genitals. If you are unable to take a shower the morning of surgery, please discuss your options with the nurse at your readiness visit.     NOTHING  to eat after 11:00 p.m. the night prior to your procedure    CLEAR LIQUIDS: May have the following liquids up to two (2) hours before your arrival time at the hospital: water, plain black  coffee (no cream or milk), plain black tea or plain green tea (no cream or milk), Gatorade or Propel Water.    SMOKING: Stop smoking as far before surgery as possible, or as directed by your surgeon. NO tobacco products of any kind (cigarettes, e-cigarettes, chewing tobacco) beginning at 11:00 p.m. the night prior to your procedure.     ALCOHOL: You should stop drinking alcohol beginning at 11:00 p.m. the night prior to your procedure    Contact our office if you have symptoms of illness such as a fever of 101 or greater, chills, cough, sore throat, or if you develop a rash or any open sore    Day OF Surgery    If youve been instructed to take a medication(s) on the morning of surgery, please take with a very small sip of water.    Wear loose & comfortable clothing and flat shoes, Leave jewelry/valuables at home. If you wear contact lenses, remove them at home and wear glasses. Remove any body piercings. Remove nail polish.     Planning for Discharge    Start planning for your care after discharge as soon as you receive this letter.    If you have not made arrangements to have someone take you home and stay with you for the first 48 hours after discharge, your surgery will be cancelled.        PRE-OPERATIVE MEDICATION INSTRUCTIONS  Review this information with your primary care physician prior to discontinuing any of the medications listed below.  Notify your primary care physician that you have been instructed to discontinue these medications.    TEN (10) Days Prior to Surgery, STOP the Following Medications   Mariela-Corning  Anacin  Aspirin  Excedrin  Pepto Bismol    **Before taking ANY over-the-counter medications, check the label for Aspirin   Non-steroidal   Anti-inflammatory Medications (NSAIDS)    Celebrex  Diclofenac (Cataflam)  Etodolac (Iodine)  Fenoprofen (Nalfon)  Ibuprofen (Advil, Motrin, Nuprin)  Indomethacin (Indocin)  Ketoprofen  Ketorolac (Toradol)  Melaxicam (Mobic)  Naproxen (AnaProx, Aleve,  Naprosyn)  Relafen (Nabumetone)   Herbal Supplements (this is a partial list of herbals to be discontinued)    Carla Gonzalez Quciera  Ephedra  Feverfew  Fish Oil  Flaxseed Oil  Garlic  Genesis  Gingko  Ginseng  Goldenseal  Imitrex (Sumatriptan)  Kava  Krill Oil  Licorice  Multi Vitamins  [facility]  Valerian  Vitamin E  Yohimbe   CHECK WITH YOUR PRESCRIBING DOCTOR BEFORE STOPPING ANY BLOOD THINNERS (approximately 7 days prior to surgery)  (Coumadin, Plavix, Platel, Aggrenox, Effient (Prasugrel), Ticlid), Xarelto, and Pradaxa      ALWAYS CHECK WITH YOUR PRESCRIBING DOCTOR REGARDING THE MEDICATIONS LISTED BELOW; RECOMMENDED STOP TIME IS ALSO LISTED      If you are taking Lovenox, discontinue 24 HOURS prior to surgery    If you are taking weight loss medication, discontinue 7 days prior to surgery    If you are taking Metformin or Simvastatin, check with your primary care physician (or whoever has prescribed you this medication) regarding when to discontinue prior to surgery

## 2021-06-22 NOTE — PROGRESS NOTES
Preoperative Exam    Scheduled Procedure:  right hip MELITA  Surgery Date:  02/05/2019  Surgery Location: [facility]. - fax    Surgeon:  Dr. Evans    Assessment/Plan:     1. Pre-operative cardiovascular examination  Preoperative cardiovascular examination completed.  No contraindication to scheduled surgery identified.  Electrocardiogram normal.  CBC pending.  - Electrocardiogram Perform and Read  - 2(CBC w/o Differential)    2. Primary osteoarthritis of right hip  Advanced osteoarthritis bilateral hips.  Scheduled right total hip arthroplasty noted.    3. Type 2 diabetes mellitus with complication, with long-term current use of insulin (H)  Known history of type 2 diabetes.  Continues Trulicity 1.5 mg weekly, Tresiba 40-45 units daily and metformin extended release 500 mg using 2 tablets twice daily.  Will hold metformin a.m. of surgery.  Basic metabolic panel pending.  His diabetes follow-up February 21, 2019 with most recent A1c of 6.8% October 2, 2018.  - Basic Metabolic Panel    4. Essential hypertension  Hypertension, well controlled continues lisinopril 20 mg daily.  - Basic Metabolic Panel    5. Hyperlipidemia, unspecified hyperlipidemia type  History of hyperlipidemia.  Simvastatin 40 mg at bedtime.  Lipids October 2, 2018 near goal.    6. Mild persistent asthma without complication  Asthma stable with asthma control test 24 out of 25 recently.  Continues Qvar for mild persistent asthma management.    7. Spinal stenosis of lumbar region, unspecified whether neurogenic claudication present  History of spinal stenosis however mild concerns with orthopedic specialist believing osteoarthritis etiology for current discomfort involving right hip.    8. Class 2 severe obesity due to excess calories with serious comorbidity and body mass index (BMI) of 37.0 to 37.9 in adult (H)  Dietary and exercise modification for weight goal less than 240 pounds initially, less than 220 pounds ideally.    9. Tinea  "cruris  History of tinea cruris.  Recently completed terbinafine 250 mg daily times 14 days.  Naftifine 2% cream as directed.        Surgical Procedure Risk: Vascular/High (reported cardiac risk often > 5%)  Have you had prior anesthesia?: Yes  Have you or any family members had a previous anesthesia reaction:  No  Do you or any family members have a history of a clotting or bleeding disorder?: No  Cardiac Risk Assessment: no increased risk for major cardiac complications    Patient approved for surgery with general or local anesthesia.    Please Note:  Patient is taking medications for Chronic Pain.    Functional Status: Independent  Patient plans to recover at home with family.     Subjective:      Leandro Brewer is a 63 y.o. male who presents for a preoperative consultation.  Bilateral hip osteoarthritis noted.  Scheduled right total hip arthroplasty February 5, 2019.  No recent illness.  Type 2 diabetes is been stable with A1c of 6.8 October 2, 2018.  Continues Trulicity 1.5 mg/week, Tresiba 40-45 units daily with metformin extended release 500 mg using 2 capsules twice daily.  Lisinopril 20 mg daily for hypertension.  Simvastatin 40 mg at bedtime for lipid management.  Remains on Qvar for asthma management which has remained stable.  No recent upper respiratory symptoms.  Most recent asthma control test 24 oh to 25.  Not getting consistent exercise due to hips.  Needs disability parking certification paperwork completed prior to initial right total hip arthroplasty with subsequent left total hip arthroplasty anticipated later this year.  Had been given prednisone previously for lumbar spinal stenosis issues with Dr. Bernstein however review of MRI did not show significant pathology requiring further intervention.  No longer on gabapentin.    All other systems reviewed and are negative, other than those listed in the HPI.     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 Thompson); right inguinal hernia age 6 months   Hospitalizations: early 20s \"infected gallbladder\" WITHOUT surgery... ; cellulitis in legs (hospitalized twice); abdominal wall cyst requiring IV antibiotics x 4 days... ;   Would like a 90 day supply on all meds.   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    Current Outpatient Medications   Medication Sig Dispense Refill     albuterol (VENTOLIN HFA) 90 mcg/actuation inhaler Inhale 2 puffs 4 (four) times a day as needed for wheezing. 18 g 6     ascorbic acid (ASCORBIC ACID WITH EVAN HIPS) 500 MG tablet Take 500 mg by mouth 2 (two) times a day.       aspirin 81 mg chewable tablet Chew 81 mg daily.       blood-glucose meter kit [employer] Contour Monitor KIT  Use as Directed       clotrimazole (LOTRIMIN) 1 % cream Apply sparingly to the affected area twice daily for 2 wks and then as needed 60 g 0     CONTOUR NEXT EZ METER Misc Use daily for blood sugar testing 1 each 5     CONTOUR NEXT TEST STRIPS strips TEST 3 TIMES DAILY 300 strip 3     dulaglutide (TRULICITY) 1.5 mg/0.5 mL " "PnIj Inject 1.5 mg under the skin every 7 days. 6 mL 1     hydrocortisone valerate (WEST-DONNELL) 0.2 % ointment APPLY TO AFFECTED AREA 2-3 TIMES DAILY 60 g 5     insulin degludec (TRESIBA FLEXTOUCH U-100) 100 unit/mL (3 mL) InPn Inject 40-44 Units under the skin daily. 45 mL 3     LANCETS MISC [employer] Microlet MISC  Check blood sugar 2 times per day and as needed       lisinopril (PRINIVIL,ZESTRIL) 20 MG tablet Take 1 tablet (20 mg total) by mouth daily. 90 tablet 3     metFORMIN (GLUCOPHAGE-XR) 500 MG 24 hr tablet Take 2 tablets (1,000 mg total) by mouth 2 (two) times a day with meals. 360 tablet 3     multivitamin (MULTIPLE VITAMINS DAILY) per tablet Take 1 tablet by mouth daily.       naftifine 2 % Crea APPLY TO AFFECTED AREA TOPICALLY ONCE DAILY FOR 2 WEEKS 45 g 2     naproxen (NAPROSYN) 500 MG tablet Take 1 tablet (500 mg total) by mouth 2 (two) times a day with meals. 60 tablet 0     oxyCODONE-acetaminophen (PERCOCET/ENDOCET) 5-325 mg per tablet Take 1-2 tablets by mouth every 4 (four) hours as needed for pain. 13 tablet 0     pen needle, diabetic (BD ULTRA-FINE BRANDON PEN NEEDLES) 32 gauge x 5/32\" Ndle Use 1 per day. 50 each 4     potassium 99 mg Tab Take by mouth. Take 1 Tablet Twice Daily       QVAR 80 mcg/actuation inhaler Inhale 2 puffs 2 times daily -rinse mouth after use -shake well before using 1 Inhaler 9     sildenafil, antihypertensive, (REVATIO) 20 mg tablet Take 2-3 tablets (40-60 mg total) by mouth daily as needed. 30 tablet 5     simvastatin (ZOCOR) 40 MG tablet 1 TABLET DAILY AT BEDTIME -[EZ-OPEN-CAP] 90 tablet 3     No current facility-administered medications for this visit.         No Known Allergies    Patient Active Problem List   Diagnosis     Diverticulosis     Anemia     Asthma     Hammer Toe Of The Right Second Toe     Dermatitis     Medial Epicondylitis     Plantar Fasciitis     Benign Tubular Adenoma Of The Large Intestine     Type 2 diabetes mellitus with complication, with long-term " "current use of insulin (H)     Class 2 severe obesity due to excess calories with serious comorbidity and body mass index (BMI) of 37.0 to 37.9 in adult (H)     Carpal Tunnel Syndrome     Essential Hypertension     Arthralgias In Multiple Sites     Hyperlipidemia     Male Erectile Disorder     Edema     Lymphedema     Methicillin Resistant Staphylococcus Aureus Infection     Trochanteric bursitis of both hips     Plantar fasciitis     Mild persistent asthma without complication     Tinea cruris     Spinal stenosis of lumbar region, unspecified whether neurogenic claudication present       Past Medical History:   Diagnosis Date     Anemia      Arthritis      Diabetes mellitus (H)      Diabetes mellitus, type II (H)      Hypertension      Obesity      Plantar fasciitis        Past Surgical History:   Procedure Laterality Date     BACK SURGERY       HERNIA REPAIR Right     inguinal; child       Social History     Socioeconomic History     Marital status:      Spouse name: Not on file     Number of children: Not on file     Years of education: Not on file     Highest education level: Not on file   Social Needs     Financial resource strain: Not on file     Food insecurity - worry: Not on file     Food insecurity - inability: Not on file     Transportation needs - medical: Not on file     Transportation needs - non-medical: Not on file   Occupational History     Occupation: Manager     Employer: [employer]   Tobacco Use     Smoking status: Former Smoker     Smokeless tobacco: Never Used   Substance and Sexual Activity     Alcohol use: Yes     Drug use: No     Sexual activity: Not on file   Other Topics Concern     Not on file   Social History Narrative     Not on file       Patient Care Team:  Josh Shafer MD as PCP - General          Objective:     Vitals:    01/08/19 1444   BP: 120/70   Pulse: 78   SpO2: 98%   Weight: (!) 252 lb (114.3 kg)   Height: 5' 9\" (1.753 m)         Physical " Exam:  Physical Exam     General Appearance:    Alert, cooperative, no distress, appears stated age.  Obesity.   Head:    Normocephalic, without obvious abnormality, atraumatic   Eyes:    PERRL, conjunctiva/corneas clear, EOM's intact, fundi     benign, both eyes.  Glasses.        Ears:    Normal TM's and external ear canals, both ears   Nose:   Nares normal, septum midline, mucosa normal, no drainage    or sinus tenderness   Throat:   Lips, mucosa, and tongue normal; teeth and gums normal   Neck:   Supple, symmetrical, trachea midline, no adenopathy;        thyroid:  No enlargement/tenderness/nodules; no carotid    bruit or JVD   Back:     Symmetric, no curvature, ROM normal, no CVA tenderness   Lungs:     Clear to auscultation bilaterally, respirations unlabored   Chest wall:    No tenderness or deformity   Heart:    Regular rate and rhythm, S1 and S2 normal, no murmur, rub   or gallop   Abdomen:     Soft, non-tender, bowel sounds active all four quadrants,     no masses, no organomegaly.     Genitalia:    deferred   Rectal:    deferred   Extremities:   Extremities normal, atraumatic, no cyanosis or edema.  Bilateral hip osteoarthritis.   Pulses:   2+ and symmetric all extremities   Skin:   Skin color, texture, turgor normal, no rashes or lesions   Lymph nodes:   Cervical, supraclavicular, and axillary nodes normal   Neurologic:   CNII-XII intact. Normal strength, sensation and reflexes       throughout        There are no Patient Instructions on file for this visit.    EKG:    NSR  Normal ECG with v-rate 79 bpm    Labs:  Recent Results (from the past 24 hour(s))   Electrocardiogram Perform and Read    Collection Time: 01/08/19  2:52 PM   Result Value Ref Range    SYSTOLIC BLOOD PRESSURE  mmHg    DIASTOLIC BLOOD PRESSURE  mmHg    VENTRICULAR RATE 79 BPM    ATRIAL RATE 79 BPM    P-R INTERVAL 152 ms    QRS DURATION 96 ms    Q-T INTERVAL 360 ms    QTC CALCULATION (BEZET) 412 ms    P Axis 54 degrees    R AXIS 25  degrees    T AXIS 41 degrees    MUSE DIAGNOSIS       Normal sinus rhythm  Normal ECG  No previous ECGs available         Immunization History   Administered Date(s) Administered     DT (pediatric) 01/01/1998     Influenza H2k4-30, 02/25/2010     Influenza, inj, historic,unspecified 12/05/2007, 10/22/2008, 11/09/2017     Influenza, seasonal,quad inj 6-35 mos 09/24/2009, 09/17/2010     Influenza,seasonal quad, PF, 36+MOS 11/12/2015     Pneumo Polysac 23-V 12/05/2007     Td, Adult, Absorbed 07/11/2017     Td,adult,historic,unspecified 12/05/2007     Tdap 12/05/2007           Electronically signed by Josh Shafer MD 01/08/19 2:46 PM

## 2021-06-23 ENCOUNTER — HOSPITAL ENCOUNTER (OUTPATIENT)
Dept: PHYSICAL MEDICINE AND REHAB | Facility: CLINIC | Age: 66
Discharge: HOME | End: 2021-06-23
Attending: NEUROLOGICAL SURGERY
Payer: COMMERCIAL

## 2021-06-23 ENCOUNTER — RECORDS - HEALTHEAST (OUTPATIENT)
Dept: ADMINISTRATIVE | Facility: OTHER | Age: 66
End: 2021-06-23

## 2021-06-23 DIAGNOSIS — M54.16 LUMBAR RADICULOPATHY: ICD-10-CM

## 2021-06-23 NOTE — TELEPHONE ENCOUNTER
RN cannot approve Refill Request    RN can NOT refill this medication med is not covered by policy/route to provider. Last office visit: 10/2/2018 Josh Shafer MD Last Physical: 1/8/2019 Last MTM visit: Visit date not found Last visit same specialty: 10/2/2018 Josh Shafer MD.  Next visit within 3 mo: Visit date not found  Next physical within 3 mo: Visit date not found      Lexie Castillo, [facility] Triage/Med Refill 1/17/2019    Requested Prescriptions   Pending Prescriptions Disp Refills     naftifine 2 % Crea [Pharmacy Med Name: NAFTIFINE HYDROCHLORIDE 2% TOPICAL CREAM] 45 g 2     Sig: APPLY TO AFFECTED AREA TOPICALLY ONCE DAILY FOR 2 WEEKS    There is no refill protocol information for this order

## 2021-06-23 NOTE — TELEPHONE ENCOUNTER
Prior Authorization Request  Who s requesting:  Patient  Pharmacy Name and Location: [facility] [place]  Medication Name: Tresiba Flextouch U-100  Insurance Plan: [employer] cross [facility]   Insurance Member ID Number:  [id number]  Informed patient that prior authorizations can take up to 10 business days for response:   Yes  Okay to leave a detailed message: No - patient sent a Phizzlehart message therefore uncertain if it is ok to leave a detailed message on VM

## 2021-06-23 NOTE — TELEPHONE ENCOUNTER
Dr Shafer     Fax received from [facility] , [place] does not [place] Hydrocortisone Valerate 0.2% ointment     Current RX: hydrocortisone valerate (WEST-DONNELL) 0.2 % ointment    Insurance plan covers the following alternatives: mometasone or Triamcinolone     Please send new RX for alternative    Thank you

## 2021-06-23 NOTE — TELEPHONE ENCOUNTER
Notify patient to switch from Tresiba insulin to Lantus insulin 45 units daily due to formulary change.  New prescription was sent to [facility].

## 2021-06-23 NOTE — TELEPHONE ENCOUNTER
Please send alternative to pharmacy if appropriate  It doesn't appear patient has used any of the covered alternatives in the past (lantus, levemir, toujeo)

## 2021-06-23 NOTE — TELEPHONE ENCOUNTER
Dr Shafer     Fax received from [facility]  , [place] not [place] Tresiba Flextouch    Current RX: Tresiba Flextouch     Insurance plan covers the following alternatives: Lantus, Toujeo, Levemir.     Please send new RX for alternative    Thank you

## 2021-06-23 NOTE — TELEPHONE ENCOUNTER
Central PA team  [phone]    PA has been initiated.     PA completed over the phone with [facility] [phone]    PA was approved over the phone   Dates are 01/24/2019-01/24/2020  Pharmacy has been notified.

## 2021-06-23 NOTE — TELEPHONE ENCOUNTER
Fax received from [facility] pharmacy requesting Prior Authorization    Medication Name: Naftifine 2% cream    Insurance Plan:    [facility] Phone Number: [phone]   Patient ID Number: [id number]    Please start PA process

## 2021-06-23 NOTE — TELEPHONE ENCOUNTER
Patients insurance termed 1/1/19. Called and left message with patient to get updated insurance information. Unable to process PA request at this time.

## 2021-06-24 NOTE — PROGRESS NOTES
"     Assessment/Plan:      1. Routine general medical examination at a health care facility  Routine healthcare maintenance.  Preventative cares reviewed.  Immunizations reviewed and up-to-date.  PSA based on age criteria.  Prior history of hepatitis C screen negative.  Annual physical exams to continue.  - PSA (Prostatic-Specific Antigen), Annual Screen    2. Uncontrolled type 2 diabetes mellitus with hyperglycemia, with long-term current use of insulin (H)  Type 2 diabetes with further improvement with A1c decreasing from 6.8% down to 6.0%.  Did discuss decreasing Lantus from 45 units down to 40 units daily while continuing Trulicity 1.5 mg weekly and metformin extended release 500 mg using 2 tablets twice daily.  Reassess at diabetic follow-up in 4 months.  - Glycosylated Hemoglobin A1c  - insulin glargine (LANTUS SOLOSTAR U-100 INSULIN) 100 unit/mL (3 mL) pen; Inject 45 Units under the skin daily. Do not mix Lantus with any other insulin  Dispense: 12 adj dose pen; Refill: 3  - pen needle, diabetic (BD ULTRA-FINE BRANDON PEN NEEDLE) 32 gauge x 5/32\" Ndle; Use 1 per day.  Dispense: 100 each; Refill: 3  - Basic Metabolic Panel    3. Class 2 severe obesity due to excess calories with serious comorbidity and body mass index (BMI) of 36.0 to 36.9 in adult (H)  Class II obesity.  Dietary and exercise modifications reviewed for weight goal less than 240 pounds initially, less than 220 pounds ideally.    4. Essential hypertension  Hypertension, stable and remains on lisinopril 20 mg daily.  - Basic Metabolic Panel    5. Hyperlipidemia, unspecified hyperlipidemia type  History of hyperlipidemia.  Remains on simvastatin 40 mg at bedtime.  Recent lipid cascade near goal October 2, 2018.  Therapeutic lifestyle changes reviewed as noted above.    6. Mild persistent asthma without complication  Mild persistent asthma history.  Qvar continues on a consistent basis with albuterol metered-dose inhaler available as needed however " not requiring.    7. Primary osteoarthritis of right hip  Status post right total hip arthroplasty at [facility] October 5, 2019 with Dr. Evans.  Postoperative cares reviewed.  Physical therapy 2 times a week currently.  Incision site seems to be appropriate with proximal hematoma likely without fluctuance or significant tenderness.  Anticipate self-limited.    8. Tubular adenoma of colon  History of tubular adenoma with prior colonoscopy April 1, 2014 and will repeat at 5-year interval.    9. Microcytic anemia  Microcytic anemia history with acute on chronic blood loss anemia following right total hip arthroplasty.  Hemoglobin Tracey trait noted.  Check CBC.  - HM2(CBC w/o Differential)    10. MRSA colonization  History of MRSA colonization.  Will use Bactroban nasal ointment half tube each nostril twice daily times 5 days.  - mupirocin (BACTROBAN NASAL) 2 % nasal ointment; Use one-half of tube in each nostril twice daily for five (5) days. After application, press sides of nose together and gently massage.  Dispense: 10 g; Refill: 1    11. Onychomycosis  Onychomycosis concerns with the right great toe paronychia.  Toenail trimming performed for dystrophic nails.  Tinea pedis noted.  Antifungal treatments discussed.  Will avoid oral terbinafine and continue to provide nail trimming etc. at follow-up visits.       I have had an Advance Directives discussion with the patient.  The following high BMI interventions were performed this visit: encouragement to exercise, weight monitoring, weight loss from baseline weight and lifestyle education regarding diet.  Ensure ongoing efforts to achieve weight goal < 240 pounds initially, < 220 pounds ideally.         Subjective:      Leandro Brewer is a 63 y.o. male who presents for an annual exam.  Status post right total hip arthroplasty February 5, 2019 at [facility] with Dr. Evans.  Has been doing well with slow improvement.  Still needs to be  "careful getting in and out of vehicles etc.  Has right hip weakness with hip flexors primarily.  Able to extend knee.  No distal CMS concerns otherwise.  Prior history of hepatitis C screen negative.  History of type 2 diabetes.  Using Lantus in place of Tresiba currently 45 units daily.  No hypoglycemic symptoms.  Blood sugars 120-140 when checking perhaps once daily.  Trulicity 1.5 mg/week with metformin extended release 500 mg using 2 tablets twice daily.  Lisinopril 20 mg daily for hypertension.  Simvastatin 40 mg at bedtime for lipid management.  Continues Qvar as directed for mild persistent asthma otherwise albuterol metered-dose inhaler available on as-needed basis.  History of tubular adenoma.  Right great toe discomfort with nail thickening.  Needs assistance with nail trimming etc.  No chest pain or shortness of breath.  No fevers.  Had flu shot earlier this year in.  Comprehensive review of systems as above otherwise all negative.     \"Brandie\" x    2 daughters (Ryann, Laurita)   3 sons (Anil, Michael, Josh)   Mom - dec MI, kidney surgery   Dad -  age 83 (PVD s/p bipass, AKA with sepsis), h/o esophageal stricture, pacemaker/defib   Manager - [employer]   Smoke cigars x 3/day (quit 09) Chantix   Surgeries: 08 back surgery (Dr. Thompson); right inguinal hernia age 6 months   Hospitalizations: early 20s \"infected gallbladder\" WITHOUT surgery... ; cellulitis in legs (hospitalized twice); abdominal wall cyst requiring IV antibiotics x 4 days... ;   Would like a 90 day supply on all meds.   Ruth vision for eye exams, will schedule at [facility]   FYI: see lab result \"Wild Chemistry\" from 11 re: hemoglobin Tracey trait resulting in mild microcyctic anemia (look for further cause of anemia if Hb < 11.0)    Healthy Habits:   Regular Exercise: No  Healthy Diet: No  Dental Visits Regularly: Yes  Seat Belt: Yes   Sexually active: Yes  Colonoscopy: Yes and 14 " (tubular adenoma - repeat in 5 years)  Lipid Profile: Yes  Glucose Screen: Yes    Immunization History   Administered Date(s) Administered     DT (pediatric) 01/01/1998     Influenza [id number], 02/25/2010     Influenza, inj, historic,unspecified 12/05/2007, 10/22/2008, 12/15/2018     Influenza, seasonal,quad inj 6-35 mos 09/24/2009, 09/17/2010     Influenza,seasonal quad, PF, 36+MOS 11/12/2015     Pneumo Polysac 23-V 12/05/2007     Td, Adult, Absorbed 07/11/2017     Td,adult,historic,unspecified 12/05/2007     Tdap 12/05/2007     Immunization status: up to date and documented.  Vision Screening:both eyes and glasses  Hearing: PASS     Current Outpatient Medications   Medication Sig Dispense Refill     albuterol (VENTOLIN HFA) 90 mcg/actuation inhaler Inhale 2 puffs 4 (four) times a day as needed for wheezing. 18 g 6     ascorbic acid (ASCORBIC ACID WITH EAVN HIPS) 500 MG tablet Take 500 mg by mouth 2 (two) times a day.       aspirin 81 mg chewable tablet Chew 81 mg daily.       blood-glucose meter kit [employer] Contour Monitor KIT  Use as Directed       clotrimazole (LOTRIMIN) 1 % cream Apply sparingly to the affected area twice daily for 2 wks and then as needed 60 g 0     CONTOUR NEXT EZ METER Misc Use daily for blood sugar testing 1 each 5     CONTOUR NEXT TEST STRIPS strips TEST 3 TIMES DAILY 300 strip 3     dulaglutide (TRULICITY) 1.5 mg/0.5 mL PnIj Inject 1.5 mg under the skin every 7 days. 6 mL 1     insulin glargine (LANTUS SOLOSTAR U-100 INSULIN) 100 unit/mL (3 mL) pen Inject 45 Units under the skin daily. Do not mix Lantus with any other insulin 12 adj dose pen 3     LANCETS MISC [employer] Microlet MISC  Check blood sugar 2 times per day and as needed       lisinopril (PRINIVIL,ZESTRIL) 20 MG tablet Take 1 tablet (20 mg total) by mouth daily. 90 tablet 3     metFORMIN (GLUCOPHAGE-XR) 500 MG 24 hr tablet Take 2 tablets (1,000 mg total) by mouth 2 (two) times a day with meals. 360 tablet 3     multivitamin  "(MULTIPLE VITAMINS DAILY) per tablet Take 1 tablet by mouth daily.       naftifine 2 % Crea APPLY TO AFFECTED AREA TOPICALLY ONCE DAILY FOR 2 WEEKS 45 g 2     naproxen (NAPROSYN) 500 MG tablet Take 1 tablet (500 mg total) by mouth 2 (two) times a day with meals. 60 tablet 0     oxyCODONE-acetaminophen (PERCOCET/ENDOCET) 5-325 mg per tablet Take 1-2 tablets by mouth every 4 (four) hours as needed for pain. 13 tablet 0     pen needle, diabetic (BD ULTRA-FINE BRANDON PEN NEEDLE) 32 gauge x 5/32\" Ndle Use 1 per day. 100 each 3     potassium 99 mg Tab Take by mouth. Take 1 Tablet Twice Daily       QVAR 80 mcg/actuation inhaler Inhale 2 puffs 2 times daily -rinse mouth after use -shake well before using 1 Inhaler 9     sildenafil, antihypertensive, (REVATIO) 20 mg tablet Take 2-3 tablets (40-60 mg total) by mouth daily as needed. 30 tablet 5     simvastatin (ZOCOR) 40 MG tablet 1 TABLET DAILY AT BEDTIME -[EZ-OPEN-CAP] 90 tablet 3     triamcinolone (KENALOG) 0.1 % ointment apply topically to affected areas twice daily as needed 80 g 2     mupirocin (BACTROBAN NASAL) 2 % nasal ointment Use one-half of tube in each nostril twice daily for five (5) days. After application, press sides of nose together and gently massage. 10 g 1     No current facility-administered medications for this visit.      Past Medical History:   Diagnosis Date     Anemia      Arthritis      Diabetes mellitus (H)      Diabetes mellitus, type II (H)      Hypertension      Obesity      Plantar fasciitis      Past Surgical History:   Procedure Laterality Date     BACK SURGERY       HERNIA REPAIR Right     inguinal; child     Patient has no known allergies.  Family History   Problem Relation Age of Onset     Heart attack Mother      Obesity Mother      Heart disease Father      Social History     Socioeconomic History     Marital status:      Spouse name: Not on file     Number of children: Not on file     Years of education: Not on file     Highest " "education level: Not on file   Occupational History     Occupation: Manager     Employer: [employer]   Social Needs     Financial resource strain: Not on file     Food insecurity:     Worry: Not on file     Inability: Not on file     Transportation needs:     Medical: Not on file     Non-medical: Not on file   Tobacco Use     Smoking status: Former Smoker     Smokeless tobacco: Never Used   Substance and Sexual Activity     Alcohol use: Yes     Drug use: No     Sexual activity: Not on file   Lifestyle     Physical activity:     Days per week: Not on file     Minutes per session: Not on file     Stress: Not on file   Relationships     Social connections:     Talks on phone: Not on file     Gets together: Not on file     Attends Restoration service: Not on file     Active member of club or organization: Not on file     Attends meetings of clubs or organizations: Not on file     Relationship status: Not on file     Intimate partner violence:     Fear of current or ex partner: Not on file     Emotionally abused: Not on file     Physically abused: Not on file     Forced sexual activity: Not on file   Other Topics Concern     Not on file   Social History Narrative     Not on file       Review of Systems  Comprehensive ROS: as above, otherwise all negative.           Objective:     /70   Pulse (!) 104   Ht 5' 9\" (1.753 m)   Wt (!) 250 lb (113.4 kg)   SpO2 97%   BMI 36.92 kg/m    Body mass index is 36.92 kg/m .    Physical    General Appearance:    Alert, cooperative, no distress, appears stated age.  Obesity.  Transfers slowly with cane following recent right MELITA.   Head:    Normocephalic, without obvious abnormality, atraumatic   Eyes:    PERRL, conjunctiva/corneas clear, EOM's intact, fundi     benign, both eyes.  Glasses.        Ears:    Normal TM's and external ear canals, both ears   Nose:   Nares normal, septum midline, mucosa normal, no drainage    or sinus tenderness   Throat:   Lips, mucosa, " and tongue normal; teeth and gums normal   Neck:   Supple, symmetrical, trachea midline, no adenopathy;        thyroid:  No enlargement/tenderness/nodules; no carotid    bruit or JVD   Back:     Symmetric, no curvature, ROM normal, no CVA tenderness   Lungs:     Clear to auscultation bilaterally, respirations unlabored   Chest wall:    No tenderness or deformity   Heart:    Regular rate and rhythm, S1 and S2 normal, no murmur, rub   or gallop   Abdomen:     Soft, non-tender, bowel sounds active all four quadrants,     no masses, no organomegaly.     Genitalia:    Normal male without lesion, discharge or tenderness.  No inguinal hernia noted.     Rectal:    Normal tone.  Prostate normal/symmetric, no masses or tenderness.   Extremities:   Extremities normal, atraumatic, no cyanosis or edema.  Dystrophic nails bilateral feet with mild paronychia involving right great toe secondary to dystrophic nail.  Nail trimming x10 performed today in office.  Right lateral hip incision site from recent right total hip arthroplasty appears to be healing well without wound dehiscence or signs of secondary infection with small proximal incision site hematoma subcutaneous without fluctuance or tenderness.   Pulses:   2+ and symmetric all extremities   Skin:   Skin color, texture, turgor normal, no rashes or lesions   Lymph nodes:   Cervical, supraclavicular, and axillary nodes normal   Neurologic:   CNII-XII intact. Normal strength, sensation and reflexes       throughout.  Monofilament testing appropriate.                This note has been dictated using voice recognition software and as a result may contain minor grammatical errors and unintended word substitutions.

## 2021-06-24 NOTE — TELEPHONE ENCOUNTER
Refill Approved    Rx renewed per Medication Renewal Policy. Medication was last renewed on 18.    Nuvia Milan, [facility] Triage/Med Refill 2019     Requested Prescriptions   Pending Prescriptions Disp Refills     lisinopril (PRINIVIL,ZESTRIL) 20 MG tablet [Pharmacy Med Name: LISINOPRIL 20MG ORAL TABLET] 90 tablet 3     Si TABLET DAILY    Ace Inhibitors Refill Protocol Passed - 2019 10:57 AM       Passed - PCP or prescribing provider visit in past 12 months      Last office visit with prescriber/PCP: 10/2/2018 Josh Shafer MD OR same dept: 10/2/2018 Josh Shafer MD OR same specialty: 10/2/2018 Josh Shafer MD  Last physical: 2019 Last MTM visit: Visit date not found   Next visit within 3 mo: Visit date not found  Next physical within 3 mo: Visit date not found  Prescriber OR PCP: Josh Shafer MD  Last diagnosis associated with med order: 1. Essential hypertension  - lisinopril (PRINIVIL,ZESTRIL) 20 MG tablet [Pharmacy Med Name: LISINOPRIL 20MG ORAL TABLET]; 1 TABLET DAILY  Dispense: 90 tablet; Refill: 3    If protocol passes may refill for 12 months if within 3 months of last provider visit (or a total of 15 months).            Passed - Serum Potassium in past 12 months    Lab Results   Component Value Date    Potassium 4.4 2019            Passed - Blood pressure filed in past 12 months    BP Readings from Last 1 Encounters:   19 120/70            Passed - Serum Creatinine in past 12 months    Creatinine   Date Value Ref Range Status   2019 0.82 0.70 - 1.30 mg/dL Final

## 2021-06-24 NOTE — TELEPHONE ENCOUNTER
Medication Question or Clarification  Who is calling: Patient  What medication are you calling about? (include dose and sig) Trulicity 1.5 mg / 0.5 mg PnIj  Who prescribed the medication?: Dr Shafer  What is your question/concern?: Patient is asking for 90 day fill  His insurance will only pay for the 90 day fill.  Please review.   Pharmacy: [facility]  Okay to leave a detailed message?: Yes  Site CMT - Please call the pharmacy to obtain any additional needed information.

## 2021-06-24 NOTE — TELEPHONE ENCOUNTER
RN cannot approve Refill Request    RN can NOT refill this medication ->> medication not on med list.   Last office visit: 10/2/2018   Appears to be new medication ....  Dr Shafer to kindly review ....    Rebecca Maravilla, [facility] Triage/Med Refill 2/12/2019    Requested Prescriptions   Pending Prescriptions Disp Refills     TRESIBA FLEXTOUCH U-100 100 unit/mL (3 mL) InPn [Pharmacy Med Name: TRESIBA FLEXTOUCH 100UNIT/ML INJECTABLE] 12 mL 11     Sig: INJECT 40-44 UNITS UNDER THE SKIN DAILY.    Insulin/GLP-1 Refill Protocol Passed - 2/9/2019  5:25 PM       Passed - Visit with PCP or prescribing provider visit in last 6 months    Last office visit with prescriber/PCP: 10/2/2018 OR same dept: 10/2/2018 Josh Shafer MD OR same specialty: 10/2/2018 Josh Shafer MD Last physical: 1/8/2019 Last MTM visit: Visit date not found     Next appt within 3 mo: Visit date not found  Next physical within 3 mo: Visit date not found  Prescriber OR PCP: Johs Shafer MD  Last diagnosis associated with med order: There are no diagnoses linked to this encounter.  If protocol passes may refill for 6 months if within 3 months of last provider visit (or a total of 9 months).             Passed - A1C in last 6 months    Hemoglobin A1c   Date Value Ref Range Status   10/02/2018 6.8 (H) 3.5 - 6.0 % Final              Passed - Microalbumin in last year    Microalbumin, Random Urine   Date Value Ref Range Status   10/02/2018 <0.50 0.00 - 1.99 mg/dL Final                 Passed - Blood pressure in last year    BP Readings from Last 1 Encounters:   01/08/19 120/70            Passed - Creatinine done in last year    Creatinine   Date Value Ref Range Status   01/08/2019 0.83 0.70 - 1.30 mg/dL Final

## 2021-06-24 NOTE — TELEPHONE ENCOUNTER
Refill encounter created from "[employer]t encounter.     Question about medications  3/12/2019 6:19 PM Reply    To: [facility] MEDICINE/OB SUPPORT POOL      From: Leandro Brewer      Created: 3/12/2019 6:19 PM        *-*-*This message has not been handled.*-*-*    I am in need of the following prescriptions being set up for 3 months at [facility] in [place].  My insurance will not allow medicine chest to carry the 3 month prescriptions any longer.  Lantus  Trulicity  Simvastatin  Lisprinol  Metformin  Any questions please call me at [phone] or I'll respond to any my chart message.    Thank you,    Marc Brewer

## 2021-06-24 NOTE — TELEPHONE ENCOUNTER
90 day supply request.     LOV 2/212019:  2. Uncontrolled type 2 diabetes mellitus with hyperglycemia, with long-term current use of insulin (H)  Type 2 diabetes with further improvement with A1c decreasing from 6.8% down to 6.0%.  Did discuss decreasing Lantus from 45 units down to 40 units daily while continuing Trulicity 1.5 mg weekly and metformin extended release 500 mg using 2 tablets twice daily.  Reassess at diabetic follow-up in 4 months.

## 2021-06-24 NOTE — TELEPHONE ENCOUNTER
Change in pharmacy:   Lantus last filled 2/21/2019 qty 12 pen refill 3  Lisinopril last filled 2/26/2019 qty 90 refill 3  Metformin 500mg 24hr tablet last filled 6/20/2018 qty 360 refill 3  Trulicity last filled 3/13/2019 qty 2mL refill 0   Simvastatin last filled 10/24/2018 qty 90 refill 3     Remaining refills sent to new pharmacy.    Eugenio Awan,RN [facility] Triage

## 2021-06-24 NOTE — TELEPHONE ENCOUNTER
Refill Approved    Rx renewed per Medication Renewal Policy. Medication was last renewed on 6/21/18.    Kristine House, [facility] Triage/Med Refill 3/13/2019     Requested Prescriptions   Pending Prescriptions Disp Refills     TRULICITY 1.5 mg/0.5 mL PnIj [Pharmacy Med Name: TRULICITY 1.5MG/0.5ML INJECTABLE] 2 mL      Sig: INJECT 1.5 MG UNDER THE SKIN EVERY 7 DAYS.    Insulin/GLP-1 Refill Protocol Passed - 3/7/2019  7:43 PM       Passed - Visit with PCP or prescribing provider visit in last 6 months    Last office visit with prescriber/PCP: 10/2/2018 OR same dept: 10/2/2018 Josh Shafer MD OR same specialty: 10/2/2018 Josh Shafer MD Last physical: 2/21/2019 Last MTM visit: Visit date not found     Next appt within 3 mo: Visit date not found  Next physical within 3 mo: Visit date not found  Prescriber OR PCP: Josh Shafer MD  Last diagnosis associated with med order: 1. Uncontrolled type 2 diabetes mellitus with hyperglycemia, with long-term current use of insulin (H)  - TRULICITY 1.5 mg/0.5 mL PnIj [Pharmacy Med Name: TRULICITY 1.5MG/0.5ML INJECTABLE]; INJECT 1.5 MG UNDER THE SKIN EVERY 7 DAYS.  Dispense: 2 mL    If protocol passes may refill for 6 months if within 3 months of last provider visit (or a total of 9 months).             Passed - A1C in last 6 months    Hemoglobin A1c   Date Value Ref Range Status   02/21/2019 6.0 3.5 - 6.0 % Final              Passed - Microalbumin in last year    Microalbumin, Random Urine   Date Value Ref Range Status   10/02/2018 <0.50 0.00 - 1.99 mg/dL Final                 Passed - Blood pressure in last year    BP Readings from Last 1 Encounters:   02/21/19 120/70            Passed - Creatinine done in last year    Creatinine   Date Value Ref Range Status   02/21/2019 0.82 0.70 - 1.30 mg/dL Final

## 2021-06-25 NOTE — TELEPHONE ENCOUNTER
RN cannot approve Refill Request    RN can NOT refill this medication   Patient request early refill. Medication last filled 8/7/20 for qty 90 refill 3. Provider to advise on request. . Last office visit: 4/20/2021 Josh Shafer MD Last Physical: 11/12/2020 Last MTM visit: Visit date not found Last visit same specialty: 4/20/2021 Josh Shafer MD.  Next visit within 3 mo: Visit date not found  Next physical within 3 mo: Visit date not found      Fantasma Pizarro, [facility] Triage/Med Refill 5/26/2021    Requested Prescriptions   Pending Prescriptions Disp Refills     lisinopriL (PRINIVIL,ZESTRIL) 20 MG tablet [Pharmacy Med Name: LISINOPRIL 20 MG TABLET] 90 tablet 3     Sig: TAKE 1 TABLET BY MOUTH EVERY DAY       Ace Inhibitors Refill Protocol Passed - 5/25/2021  7:00 PM        Passed - PCP or prescribing provider visit in past 12 months       Last office visit with prescriber/PCP: 4/20/2021 Josh Shafer MD OR same dept: 4/20/2021 oJsh Shafer MD OR same specialty: 4/20/2021 Josh Shafer MD  Last physical: 11/12/2020 Last MTM visit: Visit date not found   Next visit within 3 mo: Visit date not found  Next physical within 3 mo: Visit date not found  Prescriber OR PCP: Josh Shafer MD  Last diagnosis associated with med order: 1. Essential hypertension  - lisinopriL (PRINIVIL,ZESTRIL) 20 MG tablet [Pharmacy Med Name: LISINOPRIL 20 MG TABLET]; TAKE 1 TABLET BY MOUTH EVERY DAY  Dispense: 90 tablet; Refill: 3    If protocol passes may refill for 12 months if within 3 months of last provider visit (or a total of 15 months).             Passed - Serum Potassium in past 12 months     Lab Results   Component Value Date    Potassium 4.6 03/10/2021             Passed - Blood pressure filed in past 12 months     BP Readings from Last 1 Encounters:   05/07/21 126/63             Passed - Serum Creatinine in past 12 months     Creatinine   Date Value Ref Range Status   03/10/2021 0.94 0.70 - 1.30 mg/dL  Final

## 2021-06-25 NOTE — PROGRESS NOTES
Neurosurgery Progress Note  06/01/21    A/P: Leandro Brewer is a 65 y.o. male who is sp L3-4 hemilaminectomy, medial facetectomy and right redo L4-5 hemilaminectomy, medial facetectomy on 1/20/2021 who has ongoing right lumbar radicular pain; EMG negative    Repeat MRI appears stable with improving but persistent enhancement around the descending L5 nerve root.   Marc truly appeared to experience no significant benefit from my decompression surgery, so I noted that while we could perform a neurolysis surgery to try to liberate the nerve root from any tethering, I am not confident that this will offer him any benefit given that the first surgery that I performed did not.  Additionally, he is having more right hip and groin pain- he complains of proximal right leg weakness and feels like his hip is going to give out from underneath him- he has plans to see his orthopedic surgeon this week for further evaluation and I think this is a good idea. He does have signs of hip flexor weakness on the right but a (even progressive) L5 radiculopathy would not explain this weakness. I noted that if his ortho work-up is negative then I would recommend a repeat EMG. I did offer him second opinion regarding surgical intervention for his low back. I noted that in the future, spinal cord stimulator trial could be considered given his persistent pain     S:  Right leg feels like it is giving out more often- with buckling at the hip occurring with walking  Great deal of pain continues in the low back through the lateral thigh and then into the anterior shin. Is having occasional numbness and tingling which is still primarily in the foot.  There is also pain radiating into the right groin as well.     Average pain: 3-4/10 while on oxycodone- if he goes up to a 9-10/10 if he misses his medication doses  There are brief periods of freedom from the pain but any movement can trigger pain- while sitting here in the office  Due to his pain  and weakness on the right, he began using a walker on May 21st    Is taking oxycodone 3-4x/day      PMH: No interval change  PSH: No interval change    ROS:  Is getting some tingling in the left lateral thigh and some left low back pain which is much better than what he feels on the right. . A full 14 point review of systems was otherwise completed and is negative aside from that mentioned above in the HPI    O:  Vitals:    06/01/21 1409   BP: 126/70   Pulse: (!) 122   SpO2: 98%     Body mass index is 38.99 kg/m .    General: Awake, alert, NAD  HEENT: AT/NC, EOMI, face symmetric, oral mucosa moist and pink  Heart: RRR  Lungs: Nonlabored respirations without accessory muscle use.  Neuro: Awake, alert, speech is clear and content is appropriate. MAEW x 4 with full strength in b/l LE with the exception of 4-/5 right hip flexion weakness. Sensation is intact to temperature and LT. Vibratory sense is absent in the right great toe. Pt has diminished pinprick along the bilateral lateral thighs and in the RLE involving the anterior shin, lateral calf and dorsum of the foot  Coordination: Gait is antalgic  Reflexes: 2+ bilateral patellar reflexes, unable to elicit right achilles, left achilles 2+. No clonus. Toes downgoing bilaterally.  Musculoskeletal: Negative straight leg raise bl  Psych: Appropriate mood and affect, pleasant, cooperative, alert and oriented x 3  Skin: Incision C/D/I    I personally reviewed and visualized the following radiographic images:    Lumbar spine flexion/extension XR 5/24/2021: No evidence of spondylolisthesis, no dynamic instability    Lumbar spine MRI 5/24/2021: Stable MRI compared to 3/11/2021- right L5 nerve root with persistent contrast enhancement, but appears improved compared to march scan. No disc herniation at L4-5 or L5-S1 or other foraminal impingement at L5-S1    I spent greater than 30 minutes in this encounter for which >50% of the time was spent in face to face discussion  obtaining the relevant history, obtaining the exam, reviewing relevant images and discussing options for treatment/management.    Julissa Lopez MD

## 2021-06-25 NOTE — PROGRESS NOTES
Called to report his oxycodone refill ran out, his pharmacy wouldn't fill for the full 8 day supply.  reviewed. No discrepancies.     No new or worsening symptoms, still unable to bear weight on his right leg. Appt with Dr. Lopez tomorrow.     Ilene Amanda CNP  [facility]  O: [phone]  P: [phone]

## 2021-06-25 NOTE — PROGRESS NOTES
Marc is here to f/u on MRI results and discuss next steps. He states his leg pain has become worse. He has lateral right leg pain that goes into shin and numbness in right foot. He also states he gets a lot of pressure in his right hip when he walks.   Shama,CMA

## 2021-06-25 NOTE — TELEPHONE ENCOUNTER
Leandro called to enquire about next step of treatment for ongoing pain in his right leg. He saw his orthopod and a work up was negative.   He agreed to proceed with a Right LE EMG.   He requested another Oxycodone refill.  [facility] query completed.  Printed and scanned into chart.   Olivia Mccall RN, CNRN

## 2021-06-26 NOTE — PROGRESS NOTES
Patient presents at the request of Dr. Julissa Lopez for right lower extremity EMG.  His right-sided low back right lower extremity pain paresthesias and a sense of weakness of the hip with ambulation.  Lumbar decompression earlier this year.  On exam he has normal sensation to light touch of the lower extremities.  He has normal strength ankle plantar flexors ankle dorsiflexors knee flexors and extensors with giveaway weakness of the right hip flexors.  He has absent Achilles reflexes.    EMG/NCS  results: Please see scanned full report.    Comment NCS: Borderline study  1.  Low amplitude bilateral sural SNAPs.    2.  Borderline proximal amplitude of the right peroneal CMAP without amplitude drop or slowing across the fibular neck.  Likely normal.  3.  Normal right peroneal CMAP to the tibialis anterior.  4.  Normal right tibial CMAP  5.  Normal left peroneal CMAP to the EDB.    Comment EMG: Normal study  1.  Normal needle EMG right lower extremity.    Interpretation: Essentially normal study: There is electrodiagnostic evidence of:    1.  Mildly low amplitude bilateral sural sensory studies.  These findings can be normal for the patient's age or may represent a sensory or sensorimotor peripheral polyneuropathy which would be consistent with a history of diabetes mellitus.      2. There is no electrodiagnostic evidence of lumbosacral radiculopathy, lumbosacral plexopathy, or focal neuropathy in the right lower extremity.  Specifically, there is no electrodiagnostic evidence of a right L5 radiculopathy.      The testing was completed in its entirety by the physician.      It was our pleasure caring for your patient today, if there any questions or concerns please do not hesitate to contact us.

## 2021-06-26 NOTE — PATIENT INSTRUCTIONS - HE
Thank you for choosing the [facility] for your EMG testing.    The ordering provider will receive your final EMG results within the next few days.  Please follow up with your provider for the results and further treatment recommendations.

## 2021-07-01 ENCOUNTER — OFFICE VISIT - HEALTHEAST (OUTPATIENT)
Dept: FAMILY MEDICINE | Facility: CLINIC | Age: 66
End: 2021-07-01

## 2021-07-01 DIAGNOSIS — E11.65 UNCONTROLLED TYPE 2 DIABETES MELLITUS WITH HYPERGLYCEMIA, WITH LONG-TERM CURRENT USE OF INSULIN (H): ICD-10-CM

## 2021-07-01 DIAGNOSIS — I10 ESSENTIAL HYPERTENSION, BENIGN: ICD-10-CM

## 2021-07-01 DIAGNOSIS — E78.5 HYPERLIPIDEMIA, UNSPECIFIED HYPERLIPIDEMIA TYPE: ICD-10-CM

## 2021-07-01 DIAGNOSIS — M25.551 HIP PAIN, RIGHT: ICD-10-CM

## 2021-07-01 DIAGNOSIS — E11.9 TYPE 2 DIABETES MELLITUS WITHOUT COMPLICATION, WITH LONG-TERM CURRENT USE OF INSULIN (H): ICD-10-CM

## 2021-07-01 DIAGNOSIS — Z79.4 TYPE 2 DIABETES MELLITUS WITHOUT COMPLICATION, WITH LONG-TERM CURRENT USE OF INSULIN (H): ICD-10-CM

## 2021-07-01 DIAGNOSIS — M43.16 SPONDYLOLISTHESIS OF LUMBAR REGION: ICD-10-CM

## 2021-07-01 DIAGNOSIS — D50.9 MICROCYTIC ANEMIA: ICD-10-CM

## 2021-07-01 DIAGNOSIS — Z79.4 UNCONTROLLED TYPE 2 DIABETES MELLITUS WITH HYPERGLYCEMIA, WITH LONG-TERM CURRENT USE OF INSULIN (H): ICD-10-CM

## 2021-07-01 DIAGNOSIS — R06.83 SNORING: ICD-10-CM

## 2021-07-01 DIAGNOSIS — Z01.818 PREOP GENERAL PHYSICAL EXAM: ICD-10-CM

## 2021-07-01 LAB
ANION GAP SERPL CALCULATED.3IONS-SCNC: 13 MMOL/L (ref 5–18)
BUN SERPL-MCNC: 18 MG/DL (ref 8–22)
CALCIUM SERPL-MCNC: 10.3 MG/DL (ref 8.5–10.5)
CHLORIDE BLD-SCNC: 101 MMOL/L (ref 98–107)
CO2 SERPL-SCNC: 25 MMOL/L (ref 22–31)
CREAT SERPL-MCNC: 0.81 MG/DL (ref 0.7–1.3)
ERYTHROCYTE [DISTWIDTH] IN BLOOD BY AUTOMATED COUNT: 19.8 % (ref 11–14.5)
GFR SERPL CREATININE-BSD FRML MDRD: >60 ML/MIN/1.73M2
GLUCOSE BLD-MCNC: 128 MG/DL (ref 70–125)
HBA1C MFR BLD: 8.4 %
HCT VFR BLD AUTO: 34.6 % (ref 40–54)
HGB BLD-MCNC: 11 G/DL (ref 14–18)
MCH RBC QN AUTO: 20.6 PG (ref 27–34)
MCHC RBC AUTO-ENTMCNC: 31.8 G/DL (ref 32–36)
MCV RBC AUTO: 65 FL (ref 80–100)
PLATELET # BLD AUTO: 290 THOU/UL (ref 140–440)
PMV BLD AUTO: 9.3 FL (ref 7–10)
POTASSIUM BLD-SCNC: 4.8 MMOL/L (ref 3.5–5)
RBC # BLD AUTO: 5.34 MILL/UL (ref 4.4–6.2)
SODIUM SERPL-SCNC: 139 MMOL/L (ref 136–145)
WBC: 7.1 THOU/UL (ref 4–11)

## 2021-07-01 ASSESSMENT — MIFFLIN-ST. JEOR: SCORE: 1973.34

## 2021-07-03 ENCOUNTER — COMMUNICATION - HEALTHEAST (OUTPATIENT)
Dept: FAMILY MEDICINE | Facility: CLINIC | Age: 66
End: 2021-07-03

## 2021-07-03 DIAGNOSIS — M54.16 LUMBAR RADICULOPATHY: ICD-10-CM

## 2021-07-03 NOTE — ADDENDUM NOTE
Addendum Note by Brea Herndon MD at 1/19/2020  3:44 PM     Author: Brea Herndon MD Service: -- Author Type: Physician    Filed: 1/19/2020  3:44 PM Encounter Date: 1/15/2020 Status: Signed    : Brea Herndon MD (Physician)    Addended by: BREA HERNDON on: 1/19/2020 03:44 PM        Modules accepted: Orders

## 2021-07-03 NOTE — ADDENDUM NOTE
Addendum Note by Beth Viera at 1/2/2020  8:40 AM     Author: Beth Viera Service: -- Author Type:     Filed: 1/3/2020  7:33 AM Encounter Date: 1/2/2020 Status: Signed    : Beth Viera ()    Addended by: BETH VIERA on: 1/3/2020 07:33 AM        Modules accepted: Orders

## 2021-07-03 NOTE — ADDENDUM NOTE
Addendum Note by Vidhi Lazar MA at 12/19/2019  8:20 AM     Author: Vidhi Lazar MA Service: -- Author Type: Medical Assistant    Filed: 12/19/2019  9:00 AM Date of Service: 12/19/2019  8:20 AM Status: Signed    : Vidhi Lazar MA (Medical Assistant)    Encounter addended by: Vidhi Lazar MA on: 12/19/2019  9:00 AM      Actions taken: Vitals modified

## 2021-07-03 NOTE — ADDENDUM NOTE
Addendum Note by Brea Herndon MD at 5/9/2019  5:52 PM     Author: Brea Herndon MD Service: -- Author Type: Physician    Filed: 5/9/2019  5:52 PM Encounter Date: 5/8/2019 Status: Signed    : Brea Herndon MD (Physician)    Addended by: BREA HERNDON on: 5/9/2019 05:52 PM        Modules accepted: Orders

## 2021-07-04 NOTE — ADDENDUM NOTE
Addendum Note by Jasmyn Silva CNP at 5/23/2021 10:34 AM     Author: Jasmyn Silva CNP Service: -- Author Type: Nurse Practitioner    Filed: 5/23/2021 10:34 AM Encounter Date: 5/23/2021 Status: Signed    : Jasmyn Silva CNP (Nurse Practitioner)    Addended by: JASMYN SILVA on: 5/23/2021 10:34 AM        Modules accepted: Orders

## 2021-07-04 NOTE — PATIENT INSTRUCTIONS - HE
"Patient Instructions by Josh Shafer MD at 7/1/2021 10:10 AM     Author: Josh Shafer MD Service: -- Author Type: Physician    Filed: 7/1/2021 10:40 AM Encounter Date: 7/1/2021 Status: Signed    : Josh Shafer MD (Physician)         Preparing for Your Surgery  Getting started  A surgery nurse will call you to review your health history and instructions. They will give you an arrival time based on your scheduled surgery time.  Please be ready to share the following:    Your doctor's clinic name and phone number    Your medical, surgical and anesthesia history    A list of allergies and sensitivities    A list of medicines, including herbal treatments and over-the-counter drugs    Whether the patient has a legal guardian (ask how to send us the papers in advance)  If your child is having surgery, please ask for a copy of Preparing for Your Child's Surgery.    Preparing for surgery    Within 30 days of surgery: Have an exam at your family clinic (primary care clinic), or go to a pre-operative clinic. This exam is called a \"History and Physical,\" or H&P.    At your H&P exam, talk to your care team about all medicines you take. If you need to stop any medicines before surgery, ask when to start taking them again.  ? We do this for your safety. Many medicines can make you bleed too much during surgery. Some change how well surgery (anesthesia) drugs work.    Call your insurance company to see what it will and won't pay for. Ask if they need to pre-approve the surgery. (If no insurance, call [phone].)    Call your surgeon's clinic if there's any change in your health. This includes signs of a cold or flu (sore throat, runny nose, cough, rash, fever). It also includes a scrape or scratch near the surgery site.    If you have questions on the day of surgery, call your surgery center.  Eating and drinking guidelines  For your safety: Unless your surgeon tells you otherwise, follow the guidelines " below.    Eat and drink as usual until 8 hours before surgery. After that, no food or milk.    Drink clear liquids until 2 hours before surgery. These are liquids you can see through, like water, Gatorade and Propel Water. You may also have black coffee and tea (no cream or milk).    Nothing by mouth within 2 hours of surgery. This includes gum, candy and breath mints.    Stop alcohol the midnight before surgery.    If your family clinic tells you to take medicine on the morning of surgery, it's okay to take it with a sip of water.  Preventing infection    Shower or bathe the night before and morning of your surgery. Follow the instructions your clinic gave you. (If no instructions, use regular soap.)    Don't shave or clip hair near your surgery site. This can lead to skin infection.    Don't smoke the morning of surgery. Smoking increases the risk of infection. You may chew nicotine gum up to 2 hours before surgery. A nicotine patch is okay.  ? Note: Some surgeries require you to completely quit smoking and nicotine. Check with your surgeon.    Your care team will make every effort to keep you safe from infection. We will:  ? Clean our hands often with soap and water (or an alcohol-based hand rub).  ? Clean the skin at your surgery site with a special soap that kills germs. We'll also remove hair from the site as needed.  ? Wear special hair covers, masks, gowns and gloves during surgery.  ? Give antibiotic medicine, if prescribed. Not all surgeries need antibiotics.  What to bring on the day of surgery    Photo ID and insurance card    Copy of your health care directive, if you have one    Glasses and hearing aides (bring cases)  ? You can't wear contacts during surgery    Inhaler and eye drops, if you use them (tell us about these when you arrive)    CPAP machine or breathing device, if you use them    A few personal items, if spending the night    If you have . . .  ? A pacemaker or ICD (cardiac defibrillator):  Bring the ID card.  ? An implanted stimulator: Bring the remote control.  ? A legal guardian: Bring a copy of the certified (court-stamped) guardianship papers.  Please remove any jewelry, including body piercings. Leave jewelry and other valuables at home.  If you're going home the day of surgery  Important: If you don't follow the rules below, we must cancel your surgery.     Arrange for someone to drive you home after surgery. You may not drive, take a taxi or take public transportation by yourself (unless you'll have local anesthesia only).    Arrange for a responsible adult to stay with you overnight. If you don't, we may keep you in the hospital overnight, and you may need to pay the costs yourself.  Questions?   If you have any questions for your care team, list them here: _________________________________________________________________________________________________________________________________________________________________________________________________________________________________________________________________________________________________________________________  For informational purposes only. Not to replace the advice of your health care provider. Copyright   4516-0588 [employer] [facility]. All rights reserved. Clinically reviewed by Danay Varner MD. [employer] [id number] - REV 07/19.

## 2021-07-04 NOTE — PROGRESS NOTES
Progress Notes by Brea Shafer MD at 7/1/2021 10:10 AM     Author: Brea Shafer MD Service: -- Author Type: Physician    Filed: 7/1/2021 11:52 AM Encounter Date: 7/1/2021 Status: Signed    : Brea Shafer MD (Physician)       [facility]  [address] [facility] [address]  [facility] [address]  Dept: [phone]  Dept Fax: [phone]  Primary Provider: Brea Shafer MD  Pre-op Performing Provider: BREA SHAFER      PREOPERATIVE EVALUATION:  Today's date: 7/1/2021    Leandro Brewer is a 65 y.o. male who presents for a preoperative evaluation.    Surgical Information:  Surgery/Procedure: Right hip/femer revision  Surgery Location: [facility]  Surgeon: Dr Evans  Surgery Date: 7-13-21  Time of Surgery: unknown  Where patient plans to recover: At home with family  Fax number for surgical facility: [phone]    Type of Anesthesia Anticipated: to be determined    Assessment & Plan      The proposed surgical procedure is considered HIGH risk.    Preop general physical exam  Preoperative examination for right hip total hip arthroplasty revision scheduled with Dr. Ursula Haynes July 13, 2021.    Hip pain, right  Chronic right hip pain status post prior right total hip arthroplasty February 5, 2019 and now scheduled for hip revision surgery as noted above.    Essential hypertension, benign  Hypertension appears well controlled currently and remains on lisinopril 20 mg daily.  - Basic Metabolic Panel    Hyperlipidemia, unspecified hyperlipidemia type  Continued use of simvastatin 40 mg at bedtime.  Therapeutic lifestyle changes to continue.    Snoring  Snoring history without sleep study.  Monitor for apneic episodes.    Microcytic anemia  Chronic microcytic anemia with hemoglobin Tracey trait historically.  Hemoglobin appears stable today.  Okay for scheduled surgery.  - HM2(CBC w/o Differential)    Type 2 diabetes mellitus without complication, with long-term current  use of insulin (H)  Type 2 diabetes with some worsening recently with A1c increasing from 7.8% up to 8.4% having been on multiple rounds of prednisone due to lower back issues.  Lower back has improved and no longer on prednisone and will monitor with reassessment at physical exam in mid November 2021.  As above, A1c worsening with increased from 7.8% up to 8.4% today.  Patient will utilize 80% of normal 40 units of Lantus (take 32 units the morning of surgery) otherwise last dose of Trulicity will be the Friday prior to surgery.  Do not take Metformin the morning of surgery.  - Glycosylated Hemoglobin A1c  - Basic Metabolic Panel    Spondylolisthesis of lumbar region  Chronic lower back concerns with recent surgery as noted.  Had been on prednisone previously and remains off medicine currently.  Using cane to assist with ambulation both due to lower back as well as right hip pain.      40 minutes total time with patient, > 50% with chart review, counseling and coordination of cares, and documentation.     Possible Sleep Apnea: never had sleep study   No flowsheet data found.     Risks and Recommendations:  The patient has the following additional risks and recommendations for perioperative complications:  Diabetes:  - Patient is on insulin therapy; diabetic NPO guidelines provided and discussed.    Medication Instructions:  Patient is to take all scheduled medications on the day of surgery EXCEPT for modifications listed below:   - aspirin: Discontinue aspirin 7-10 days prior to procedure to reduce bleeding risk. It should be resumed postoperatively.    - LONG ACTING INSULIN (e.g. glargine, detemir): Take 80% of the usual evening or morning dose before surgery. Alternative dosing may be appropriate depending on patient circumstances.    - metformin: HOLD day of surgery.   - GLP-1 Injectable (exenitide, liraglutide, semaglutide, dulaglutide, etc.): HOLD day of surgery     RECOMMENDATION:  APPROVAL GIVEN to proceed  "with proposed procedure, without further diagnostic evaluation.    [id number]    Subjective     HPI related to upcoming procedure: Chronic right hip pain.  Status post right total hip arthroplasty 2019 and now scheduled for revision with Dr. Kee Mena on 2021 at [facility].  Patient has had chronic lower back issues as well status post recent surgery as noted with noted right L3-L4 hemilaminectomy and medial facetectomy with right redo lumbar L4-L5 hemilaminectomy and medial facetectomy 2021.  Denies recent illness.  No cough.  No nausea or vomiting.  Type 2 diabetes.  Glycemic control worsened with recent steroid use etc. however improved now that remains off prednisone.  History of hemoglobin Tracey trait with microcytic anemia noted.  Known history of hypertension hyperlipidemia present.  Continues use of Trulicity 1.5 mg weekly along with Metformin  mg using 2 tablets twice daily and Lantus insulin 40 units each morning.  Comprehensive review of systems as above otherwise all negative.     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 Thompson); right inguinal hernia age 6 months; right total hip arthroplasty at [facility] 2019 with Dr. Evans.   Hospitalizations: early 20s \"infected gallbladder\" WITHOUT surgery... ; cellulitis in legs (hospitalized twice); abdominal wall cyst requiring IV antibiotics x 4 days... ; right Lumbar 3 - Lumbar 4 hemilaminectomy and medial facetectomy and Right redo Lumbar 4 -Lumbar 5 hemilaminectomy and medial facetectomy 21 (Dr. Lopez)       Preop Questions 2021   Have you ever had a heart attack or stroke? No   Have " you ever had surgery on your heart or blood vessels, such as a stent placement, a coronary artery bypass, or surgery on an artery in your head, neck, heart, or legs? No   Do you have chest pain with activity? No   Do you have a history of  heart failure? No   Do you currently have a cold, bronchitis or symptoms of other infection? No   Do you have a cough, shortness of breath, or wheezing? YES - h/o asthma   Do you or anyone in your family have previous history of blood clots? No   Do you or does anyone in your family have a serious bleeding problem such as prolonged bleeding following surgeries or cuts? No   Have you ever had problems with anemia or been told to take iron pills? No   Have you had any abnormal blood loss such as black, tarry or bloody stools? No   Have you ever had a blood transfusion? No   Are you willing to have a blood transfusion if it is medically needed before, during, or after your surgery? Yes   Have you or any of your relatives ever had problems with anesthesia? No   Do you have sleep apnea, excessive snoring or daytime drowsiness? UNKNOWN - no history of sleep study   Do you have any artifical heart valves or other implanted medical devices like a pacemaker, defibrillator, or continuous glucose monitor? No   Do you have artificial joints? YES - right MELITA on 2/5/19   Are you allergic to latex? No         Health Care Directive:  Patient does not have a Health Care Directive or Living Will: Discussed advance care planning with patient; however, patient declined at this time.    Preoperative Review of :    reviewed - controlled substances reflected in medication list.    DIABETES - Patient has a longstanding history of DiabetesType Type II . Patient is being treated with oral agents and insulin injections and denies significant side effects. Control has been fair. Complicating factors include but are not limited to: hypertension and hyperlipidemia.     HYPERLIPIDEMIA - Patient has a long  history of significant Hyperlipidemia requiring medication for treatment with recent fair control. Patient reports no problems or side effects with the medication.     HYPERTENSION - Patient has longstanding history of HTN , currently denies any symptoms referable to elevated blood pressure. Specifically denies chest pain, palpitations, dyspnea, orthopnea, PND or peripheral edema. Blood pressure readings have been in normal range. Current medication regimen is as listed below. Patient denies any side effects of medication.       Review of Systems  CONSTITUTIONAL: NEGATIVE for fever, chills, change in weight  INTEGUMENTARY/SKIN: NEGATIVE for worrisome rashes, moles or lesions  EYES: NEGATIVE for vision changes or irritation  ENT/MOUTH: NEGATIVE for ear, mouth and throat problems  RESP: NEGATIVE for significant cough or SOB  BREAST: NEGATIVE for masses, tenderness or discharge  CV: NEGATIVE for chest pain, palpitations or peripheral edema  GI: NEGATIVE for nausea, abdominal pain, heartburn, or change in bowel habits  : NEGATIVE for frequency, dysuria, or hematuria  MUSCULOSKELETAL: NEGATIVE for significant arthralgias or myalgia  NEURO: NEGATIVE for weakness, dizziness or paresthesias  ENDOCRINE: NEGATIVE for temperature intolerance, skin/hair changes  HEME: NEGATIVE for bleeding problems  PSYCHIATRIC: NEGATIVE for changes in mood or affect    Patient Active Problem List    Diagnosis Date Noted   ? Lumbar radiculopathy 01/20/2021   ? Osteoarthritis of spine with radiculopathy, lumbar region 01/14/2021   ? Spondylolisthesis of lumbar region 01/14/2021   ? History of cellulitis 10/01/2019   ? History of MRSA infection 10/01/2019   ? Diabetic neuropathy associated with type 2 diabetes mellitus (H) 09/09/2019   ? Primary osteoarthritis of right hip 02/21/2019   ? Uncontrolled type 2 diabetes mellitus with hyperglycemia, with long-term current use of insulin (H) 02/21/2019   ? Tinea cruris 01/08/2019   ? Spinal stenosis  of lumbar region, unspecified whether neurogenic claudication present 01/08/2019   ? Mild persistent asthma without complication 06/19/2018   ? Lymphedema    ? Hyperlipidemia    ? Male Erectile Disorder    ? Diverticulosis    ? Microcytic anemia    ? Hammer Toe Of The Right Second Toe    ? Tubular adenoma of colon    ? Insulin dependent diabetes mellitus    ? Class 2 severe obesity due to excess calories with serious comorbidity and body mass index (BMI) of 36.0 to 36.9 in adult (H)    ? Carpal Tunnel Syndrome    ? Essential hypertension, benign      Past Medical History:   Diagnosis Date   ? Anemia    ? Arthritis    ? Diabetes mellitus, type II (H)    ? Hypertension    ? Obesity    ? Plantar fasciitis      Past Surgical History:   Procedure Laterality Date   ? BACK SURGERY      laminectomy L45 by Dr. Villarreal    ? HERNIA REPAIR Right     inguinal; child   ? MO HAER W/O FACETEC FORAMOT/[facility] 1/2 VRT SEG, LUMBAR Right 1/20/2021    Procedure: Right Lumbar 3 - Lumbar 4 hemilaminectomy and medial facetectomy and Right redo Lumbar 4 -Lumbar 5 hemilaminectomy and medial facetectomy;  Surgeon: Julissa Lopez MD;  Location: [facility] [place];  Service: Spine   ? TOTAL HIP ARTHROPLASTY Bilateral 2019 Feb 5, 2019 (right) and October 19, 2019 (left)     Current Outpatient Medications   Medication Sig Dispense Refill   ? acetaminophen (TYLENOL) 500 MG tablet Take 1,000 mg by mouth 3 (three) times a day.      ? albuterol (VENTOLIN HFA) 90 mcg/actuation inhaler Inhale 2 puffs 4 (four) times a day as needed for wheezing. 18 g 6   ? ascorbic acid, vitamin C, (VITAMIN C) 500 MG tablet Take 500 mg by mouth daily.     ? b complex vitamins (VITAMINS B COMPLEX) capsule Take 1 capsule by mouth.     ? DULoxetine (CYMBALTA) 30 MG capsule TAKE 1 CAPSULE BY MOUTH EVERY DAY 30 capsule 2   ? furosemide (LASIX) 40 MG tablet TAKE 0.5 TABLETS (20 MG TOTAL) BY MOUTH DAILY. 45 tablet 3   ? gabapentin (NEURONTIN) 300 MG capsule Take 3 capsules  "(900 mg total) by mouth 3 (three) times a day. 270 capsule 11   ? insulin glargine (LANTUS SOLOSTAR U-100 INSULIN) 100 unit/mL (3 mL) pen Inject 40 Units under the skin every morning. 45 mL 1   ? LANCETS MISC [employer] Microlet MISC  Check blood sugar 2 times per day and as needed     ? lisinopriL (PRINIVIL,ZESTRIL) 20 MG tablet TAKE 1 TABLET BY MOUTH EVERY DAY 90 tablet 3   ? magnesium oxide 500 mg Tab Take 500 mg by mouth daily.            ? metFORMIN (GLUCOPHAGE-XR) 500 MG 24 hr tablet TAKE 2 TABLETS BY MOUTH TWICE A DAY WITH MEALS 360 tablet 2   ? methocarbamoL (ROBAXIN) 750 MG tablet Take 1 tablet (750 mg total) by mouth every 6 (six) hours as needed. 56 tablet 2   ? methylPREDNISolone (MEDROL DOSEPACK) 4 mg tablet Follow package directions 21 tablet 0   ? multivitamin (MULTIPLE VITAMINS DAILY) per tablet Take 1 tablet by mouth daily.     ? oxyCODONE (ROXICODONE) 5 MG immediate release tablet Take 1-2 tablets (5-10 mg total) by mouth every 6 (six) hours as needed for pain. 30 tablet 0   ? pen needle, diabetic (BD ULTRA-FINE BRANDON PEN NEEDLE) 32 gauge x 5/32\" Ndle Use 1 each As Directed daily. 100 each 3   ? potassium 99 mg Tab Take 1 tablet by mouth 2 (two) times a day.            ? QVAR REDIHALER 80 mcg/actuation HFAB inhaler INHALE 2 PUFFS BY MOUTH TWICE A DAY - RINSE MOUTH AFTER USE-DO NOT SHAKE UNIT 31.8 g 3   ? senna-docusate (PERICOLACE) 8.6-50 mg tablet Take 1 tablet by mouth 2 (two) times a day.  0   ? sildenafil (REVATIO) 20 mg tablet Take 2-3 tablets (40-60 mg total) by mouth daily as needed. 90 tablet 2   ? simvastatin (ZOCOR) 40 MG tablet TAKE 1 TABLET BY MOUTH EVERYDAY AT BEDTIME 90 tablet 3   ? TRULICITY 1.5 mg/0.5 mL PnIj INJECT 0.5ML SUBCUTANEOUSLY EVERY 7 DAYS. 6 Syringe 4     No current facility-administered medications for this visit.        No Known Allergies    Social History     Tobacco Use   ? Smoking status: Former Smoker   ? Smokeless tobacco: Never Used   Substance Use Topics   ? Alcohol " "use: Yes     Alcohol/week: 1.0 standard drinks     Types: 1 Shots of liquor per week     Comment: occasional      Family History   Problem Relation Age of Onset   ? Heart attack Mother    ? Obesity Mother    ? Heart disease Father    ? Cancer Father         throat   ? Heart attack Father    ? Diabetes Sister    ? Diabetes Brother      Social History     Substance and Sexual Activity   Drug Use No        Objective     /70   Pulse 95   Temp 98.6  F (37  C) (Oral)   Resp 20   Ht 5' 9\" (1.753 m)   Wt (!) 263 lb (119.3 kg)   SpO2 97%   BMI 38.84 kg/m    Physical Exam    GENERAL APPEARANCE: healthy, alert and no distress at rest however transfers quite slowly with a limp with ambulation secondary to right hip pain concerns.  Using cane to assist with ambulation.     EYES: EOMI,  PERRL     HENT: ear canals and TM's normal and nose and mouth without ulcers or lesions     NECK: no adenopathy, no asymmetry, masses, or scars and thyroid normal to palpation     RESP: lungs clear to auscultation - no rales, rhonchi or wheezes     CV: regular rates and rhythm, normal S1 S2, no S3 or S4 and no murmur, click or rub     ABDOMEN:  soft, nontender, no HSM or masses and bowel sounds normal     MS: extremities normal- no gross deformities noted, no evidence of inflammation in joints, FROM in all extremities.     SKIN: no suspicious lesions or rashes     NEURO: Normal strength and tone, sensory exam grossly normal, mentation intact and speech normal     PSYCH: mentation appears normal. and affect normal/bright     LYMPHATICS: No cervical adenopathy    Recent Labs   Lab Test 07/01/21  1046 04/20/21  1152 03/10/21  0834 01/22/21  0601 01/13/21  1715 01/13/21  1715   HGB 11.0* 11.4*  --  9.6*   < > 11.1*    371  --  279   < > 342   INR  --   --   --   --   --  1.00   NA  --   --  141 137   < > 140   K  --   --  4.6 4.3   < > 4.6   CREATININE  --   --  0.94 0.73   < > 0.92    < > = values in this interval not displayed. "        PRE-OP Diagnostics:   Labs pending at this time. Results will be reviewed when available.  Recent Results (from the past 24 hour(s))   HM2(CBC w/o Differential)    Collection Time: 07/01/21 10:46 AM   Result Value Ref Range    WBC 7.1 4.0 - 11.0 thou/uL    RBC 5.34 4.40 - 6.20 mill/uL    Hemoglobin 11.0 (L) 14.0 - 18.0 g/dL    Hematocrit 34.6 (L) 40.0 - 54.0 %    MCV 65 (L) 80 - 100 fL    MCH 20.6 (L) 27.0 - 34.0 pg    MCHC 31.8 (L) 32.0 - 36.0 g/dL    RDW 19.8 (H) 11.0 - 14.5 %    Platelets 290 140 - 440 thou/uL    MPV 9.3 7.0 - 10.0 fL   Glycosylated Hemoglobin A1c    Collection Time: 07/01/21 10:46 AM   Result Value Ref Range    Hemoglobin A1c 8.4 (H) <=5.6 %     No EKG required, no history of coronary heart disease, significant arrhythmia, peripheral arterial disease or other structural heart disease.    REVISED CARDIAC RISK INDEX (RCRI)   The patient has the following serious cardiovascular risks for perioperative complications:   - High risk surgery (>5% cardiac complication risk) = 1 point   - Diabetes Mellitus (on Insulin) = 1 point    RCRI INTERPRETATION: 2 points: Class III (moderate risk - 6.6% complication rate)    Estimated Functional Capacity: Performs 4 METS exercise without symptoms (e.g.,light housework, stairs, 4 mph walk, 7 mph bike, slow step dance)           Signed Electronically by: Josh Shafer MD    Copy of this evaluation report is provided to requesting physician.

## 2021-07-04 NOTE — ADDENDUM NOTE
Addendum Note by Gus Savage RN at 5/24/2021 11:40 AM     Author: Gus Savage RN Service: -- Author Type: Registered Nurse    Filed: 5/24/2021 11:40 AM Encounter Date: 5/19/2021 Status: Signed    : Gus Savage RN (Registered Nurse)    Addended by: GUS SAVAGE on: 5/24/2021 11:40 AM        Modules accepted: Orders

## 2021-07-04 NOTE — ADDENDUM NOTE
Addendum Note by Jasmyn Silva CNP at 6/7/2021 10:40 AM     Author: Jasmyn Silva CNP Service: -- Author Type: Nurse Practitioner    Filed: 6/7/2021 10:40 AM Encounter Date: 6/7/2021 Status: Signed    : Jasmyn Silva CNP (Nurse Practitioner)    Addended by: JASMYN SILVA on: 6/7/2021 10:40 AM        Modules accepted: Orders

## 2021-07-08 ASSESSMENT — ASTHMA QUESTIONNAIRES: ACT_TOTALSCORE: 25

## 2021-07-22 ENCOUNTER — MYC MEDICAL ADVICE (OUTPATIENT)
Dept: FAMILY MEDICINE | Facility: CLINIC | Age: 66
End: 2021-07-22

## 2021-07-22 DIAGNOSIS — J45.30 MILD PERSISTENT ASTHMA WITHOUT COMPLICATION: Primary | ICD-10-CM

## 2021-07-22 NOTE — PROGRESS NOTES
Neurosurgery Progress Note  05/07/21    A/P: Leandro Brewer is a 65 y.o. male who is sp L3-4 hemilaminectomy, medial facetectomy and right redo L4-5 hemilaminectomy, medial facetectomy on 1/20/2021 for treatment of right lumbar radiculopathy which unfortunately has not improved following surgery    EMG was negative for radiculopathy  MRI and xrays demonstrate adequate L4 and L5 decompression without evidence of residual stenosis but there is some persistent enhancement around the L5 nerve  I discussed additional conservation options with Macr, but his last injection was not beneficial     For now, we will continue to monitor and plan for a check-in in one month, I noted we could always repeat a lumbar spine MRI and xrays but would prefer to wait 12 weeks from the time of his last MRI unless there are new red flag symptoms (numbness, tingling, weakness).    S:  In the right leg, he is having some pain (deep achiness like he worked out a lot) in the muscles and tendons which wraps in the buttock and goes down around the shin. There is some numbness around the big toe on the right foot between the big toe and second  Pain in the back is average 2/10, with work, prolonged standing it will get up to 8-10/10. This past Monday was horrible- back and leg pain were unbearable- it was the muscle pain. The mean, electric radiating pain has subsided.   Aggravating: Activity at work, standing is more painful than if he is moving  Relieving: Tylenol, ibuprofen regularly; will use oxycodone for a severe flair- he had to take 2 pills on Monday to get it to subside.    PMH: No interval change  PSH: No interval change    ROS: Denies changes in bowel or bladder. No incontinence. A full 14 point review of systems was otherwise completed and is negative aside from that mentioned above in the HPI    O:  Vitals:    05/07/21 1019   BP: 126/63   Pulse: 97   SpO2: 98%     Body mass index is 38.99 kg/m .    General: Awake, alert, NAD  HEENT:  AT/NC, EOMI, face symmetric, oral mucosa moist and pink  Heart: RRR  Lungs: Nonlabored respirations without accessory muscle use.  Neuro: Awake, alert, speech is clear and content is appropriate. MAEW x 4 with full strength in b/l UE and LE. Sensation is intact to temperature and LT. Vibratory sense is absent in the bl great toe  Coordination: Gait is antalgic  Reflexes: 1+ right patellar, 2+ left patellar. Absent achilles. No clonus. Toes downgoing bilaterally.  Musculoskeletal: Positive right straight leg raise  Psych: Appropriate mood and affect, pleasant, cooperative, alert and oriented x 3  Skin: Incision C/D/I    I spent greater than 30 minutes in this encounter for which >50% of the time was spent in face to face discussion obtaining the relevant history, obtaining the exam, reviewing relevant images and discussing options for treatment/management.    Julissa Lopez MD

## 2021-07-22 NOTE — PROGRESS NOTES
Marc is here to f/u on MRI and XR. He is s/p Right Lumbar 3 - Lumbar 4 hemilaminectomy and medial facetectomy and Right redo Lumbar 4 -Lumbar 5 hemilaminectomy and medial facetectomy on 1/20/21 by Dr. Lopez. He was doing well but then leg pain came back a few weeks ago. He is right lowe back and buttock pain and pain down right leg.   JShowen,CMA  Modified Oswestry Low back Pain Disability Questionnaire    1. PAIN INTENSITY  4- Pain medication provides me with little relief from pain  2. PERSONAL CARE  2- It is painful to take care of myself, and I am slow and careful.   3. LIFTING  3- Pain prevents me from lifting heavy weights off the floor but I can manage light to medium weights if they are conveniently placed  4. WALKING  3- Pain prevents me from walking more than   mile.  5. SITTING  2- Pain prevents me from sitting more than 1 hour.  6. STANDING  1- I can stand as long as I want, but it increases my pain.  7. SLEEPING  2- Even when I take medication, I sleep less than 6 hours.  8. SOCIAL LIFE  3- Pain prevents me from going out very often.  9. TRAVELING  3- My pain restricts my travel over 1 hour.  10. EMPLOYMENT/HOMEMAKING  2- I can perform most of my homemaking/job activities, but pain prevents me from performing more physically stressful activities (e.g. lifting, vacuuming).    SCORE:__25__ x2=__50%____

## 2021-07-22 NOTE — PROGRESS NOTES
Neurosurgery Progress Note  03/16/21    A/P: Leandro Brewer is a 65 y.o. male who is sp L3-4 hemilaminectomy, medial facetectomy and right redo L4-5 hemilaminectomy, medial facetectomy on 1/20/2021 who has ongoing right lumbar radicular pain    Post-operative imaging demonstrates some enhancement around the L5 nerve root without signs of significant nerve root compression. I recommended an EMG to see if signs of chronic vs active radiculopathy. Marc is in agreement with this plan and we will follow-up after it has been completed      S:  Notes increased back pain compared to prior surgery- currently 6/10- fluctuates and will go away but activity will bring it back.  Gets pain that is in the right buttock and radiates down the anterolateral thigh into the anterior shin where it stops at the ankle    Numbness/tingling: isolated to feet, bilateral, but also notes some in the lateral thighs as well and this is bilateral  Weakness: +right leg gives out occasionally    O:  Vitals:    03/16/21 1411   BP: 124/65   Pulse: (!) 115   SpO2: 96%     Body mass index is 39.87 kg/m .    General: Awake, alert, NAD  Neuro: Awake, alert, speech is clear and content is appropriate. MAEW x 4 with full strength in b/l LE. Sensation is intact to temperature and LT. Vibratory sense is intact in the bl great toe  Reflexes: Right patellar testing deferred in light of knee brace, 2+ left patellar. Absent achilles. No clonus. Toes downgoing bilaterally.  Musculoskeletal: Negative straight leg raise bl  Psych: Appropriate mood and affect, pleasant, cooperative, alert and oriented x 3  Skin: Incision C/D/I    Julissa Lopez MD

## 2021-07-22 NOTE — PROGRESS NOTES
Marc is here to f/u on EMG and injection. He had a Right L4-L5 and L5-S1 TFESI and it took about two weeks to kick in and than had some relief. MALDONADO Sesay    Modified Oswestry Low back Pain Disability Questionnaire    1. PAIN INTENSITY  3- Pain medication provides me with moderate relief from pain  2. PERSONAL CARE  1- I can take care of myself normally, but it increases my pain.   3. LIFTING  2- Pain prevents me from lifting heavy weights off the floor but I can manage if they are conveniently placed  4. WALKING  3- Pain prevents me from walking more than   mile.  5. SITTING  2- Pain prevents me from sitting more than 1 hour.  6. STANDING  1- I can stand as long as I want, but it increases my pain.  7. SLEEPING  2- Even when I take medication, I sleep less than 6 hours.  8. SOCIAL LIFE  2- Pain prevents me from participating in more energetic activities (e.g., sports, dancing).  9. TRAVELING  3- My pain restricts my travel over 1 hour.  10. EMPLOYMENT/HOMEMAKING  2- I can perform most of my homemaking/job activities, but pain prevents me from performing more physically stressful activities (e.g. lifting, vacuuming).    SCORE:___21_ x2=_42%_____

## 2021-07-23 RX ORDER — ALBUTEROL SULFATE 90 UG/1
2 AEROSOL, METERED RESPIRATORY (INHALATION) 4 TIMES DAILY PRN
Qty: 18 G | Refills: 6 | Status: SHIPPED | OUTPATIENT
Start: 2021-07-23 | End: 2022-08-06

## 2021-07-27 ENCOUNTER — TRANSFERRED RECORDS (OUTPATIENT)
Dept: HEALTH INFORMATION MANAGEMENT | Facility: CLINIC | Age: 66
End: 2021-07-27

## 2021-08-03 ENCOUNTER — TRANSFERRED RECORDS (OUTPATIENT)
Dept: HEALTH INFORMATION MANAGEMENT | Facility: CLINIC | Age: 66
End: 2021-08-03

## 2021-08-12 ENCOUNTER — MYC MEDICAL ADVICE (OUTPATIENT)
Dept: NEUROSURGERY | Facility: CLINIC | Age: 66
End: 2021-08-12

## 2021-09-07 ENCOUNTER — MYC MEDICAL ADVICE (OUTPATIENT)
Dept: FAMILY MEDICINE | Facility: CLINIC | Age: 66
End: 2021-09-07

## 2021-09-07 DIAGNOSIS — E11.9 TYPE 2 DIABETES MELLITUS WITHOUT COMPLICATION, WITH LONG-TERM CURRENT USE OF INSULIN (H): Primary | ICD-10-CM

## 2021-09-07 DIAGNOSIS — Z79.4 TYPE 2 DIABETES MELLITUS WITHOUT COMPLICATION, WITH LONG-TERM CURRENT USE OF INSULIN (H): Primary | ICD-10-CM

## 2021-09-14 ENCOUNTER — TRANSFERRED RECORDS (OUTPATIENT)
Dept: HEALTH INFORMATION MANAGEMENT | Facility: CLINIC | Age: 66
End: 2021-09-14

## 2021-09-14 LAB — RETINOPATHY: NEGATIVE

## 2021-09-21 ENCOUNTER — TRANSFERRED RECORDS (OUTPATIENT)
Dept: HEALTH INFORMATION MANAGEMENT | Facility: CLINIC | Age: 66
End: 2021-09-21

## 2021-09-22 ENCOUNTER — TELEPHONE (OUTPATIENT)
Dept: SLEEP MEDICINE | Facility: CLINIC | Age: 66
End: 2021-09-22

## 2021-10-10 ENCOUNTER — HEALTH MAINTENANCE LETTER (OUTPATIENT)
Age: 66
End: 2021-10-10

## 2021-10-19 NOTE — PROGRESS NOTES
Mom did send pictures over the jose, sent them to  for review.   Preoperative Exam    Scheduled Procedure: left MELITA  Surgery Date:  10/15/19  Surgery Location: [place]     Surgeon:  Dr. Evans    Assessment/Plan:     1. Pre-operative cardiovascular examination  Preoperative cardiovascular examination completed.  Left hip osteoarthritis scheduled left total hip arthroplasty October 15, 2019.  No contraindication to schedule procedure identified.  Electrocardiogram normal.  - Electrocardiogram Perform and Read    2. Primary osteoarthritis of left hip  Advanced arthritis left hip.  Scheduled total hip arthroplasty as noted.    3. Uncontrolled type 2 diabetes mellitus with hyperglycemia, with long-term current use of insulin (H)  Type 2 diabetes, fair control with A1c of 7.5% September 9, 2019.  Continues Lantus insulin 40 units a.m. with Trulicity 1.5 mg every 7 days and metformin extended release 500 mg using 2 tablets twice daily.  Ensure stable renal function glycemic control.  - Electrocardiogram Perform and Read  - Comprehensive Metabolic Panel    4. Essential hypertension  Hypertension stable with use of lisinopril 20 mg daily.  - Electrocardiogram Perform and Read  - Comprehensive Metabolic Panel    5. Diabetic polyneuropathy associated with type 2 diabetes mellitus (H)  Gabapentin 300 mg twice daily.    6. History of MRSA infection  History of MRSA infection.    7. History of cellulitis  CBC with differential and C-reactive protein pending with history of right lower extremity cellulitis.  Completing 3 additional days of cephalexin 500 mg 4 times daily otherwise minimal use Pen-Vee K 500 mg twice daily x6 months prophylactically.  Treating patient for onychomycosis to decrease risk for future recurrent cellulitis issues.  Med monitoring also completed with LFTs and CBC obtained.  Leg elevation.  Compression stockings.  Manage lymphedema.  - HM1(CBC and Differential)  - C-Reactive Protein  - HM1 (CBC with Diff)    8. Mild persistent asthma without complication  Mild  persistent asthma remains stable with Qvar 80 mcg 2 puffs twice daily.    9. Microcytic anemia  Microcytic anemia.  History of hemoglobin Tracey trait.  Check CBC to ensure stable with recent hemoglobin 11.5 and MCV 65.  - HM1(CBC and Differential)  - HM1 (CBC with Diff)    10. Lymphedema  Continues furosemide 40 mg using half tablet daily.  Compression stockings.  - Comprehensive Metabolic Panel    11. Onychomycosis  Complete terbinafine 250 mg daily x12 weeks.          Surgical Procedure Risk: Vascular/High (reported cardiac risk often > 5%)  Have you had prior anesthesia?: Yes  Have you or any family members had a previous anesthesia reaction:  No  Do you or any family members have a history of a clotting or bleeding disorder?: No  Cardiac Risk Assessment: no increased risk for major cardiac complications    Pending review of diagnostic evaluation, APPROVAL GIVEN to proceed with proposed procedure, without further diagnostic evaluation.      Functional Status: Independent  Patient plans to recover at home with family.     Subjective:      Leandro Brewer is a 63 y.o. male who presents for a preoperative consultation.  Left hip pain consistent with osteoarthritis etiology.  Scheduled left total hip arthroplasty noted.  Recent admission to [facility] September 8 through September 11, 2019 with right lower extremity cellulitis.  This is a recurrent issue for patient.  Improving currently.  Was treated initially with vancomycin and Zosyn and switched to IV cefazolin.  Discharged on cephalexin 500 mg 4 times daily x10 days.  This had been extended additional 10 days due to some ongoing redness while slowly improving.  Infectious disease consult with recommendation for treatment of onychomycosis x12 weeks as well as Pen-Vee K 500 mg twice daily x6 months prophylactically.  Will begin Pen-Vee K once cephalexin completed this Friday.  Continues furosemide 40 mg using half tablet each morning for lymphedema  "management.  No fevers or chills.  No shin pain.  Patient with history of MRSA colonization.  Prior right total hip arthroplasty 2019 with Dr. Evans.  Lisinopril 20 mg daily for hypertension.  Continues use of Lantus 40 units each morning plus metformin extended release 500 mg using 2 tablets twice daily for diabetes management.  Simvastatin 40 mg at bedtime for lipid management.  Comprehensive review of systems as above otherwise all negative.    All other systems reviewed and are negative, other than those listed in the HPI.     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 Thompson); right inguinal hernia age 8 months; right total hip arthroplasty at [facility] 2019 with Dr. Evans.   Hospitalizations: early 20s \"infected gallbladder\" WITHOUT surgery... ; cellulitis in legs (hospitalized twice); abdominal wall cyst requiring IV antibiotics x 4 days... ; 19-19 [facility] for right LE cellulitis/sepsis; right LE cellulitis 19-19 ([facility])  Would like a 90 day supply on all meds.   [facility] for eye exams, will schedule [facility]   FYI: see lab result \"Wild Chemistry\" from 11 re: hemoglobin Tracey trait resulting in mild microcyctic anemia (look for further cause of anemia if Hb < 11.0)    Pertinent History  Do you have difficulty breathing or chest pain after walking up a flight of stairs: No  History of obstructive sleep apnea: No  Steroid use in the last 6 months: No  Frequent Aspirin/NSAID use: Yes: ASA 81 mg daily  Prior Blood Transfusion: No  Prior Blood Transfusion Reaction: No  If for some reason prior to, during or after the procedure, if it " "is medically indicated, would you be willing to have a blood transfusion?:  There is no transfusion refusal.    Current Outpatient Medications   Medication Sig Dispense Refill     acetaminophen (TYLENOL) 500 MG tablet Take 1,000 mg by mouth 3 (three) times a day. Try to limit intake to 3000 mg a day             albuterol (VENTOLIN HFA) 90 mcg/actuation inhaler Inhale 2 puffs 4 (four) times a day as needed for wheezing. 18 g 6     ascorbic acid, vitamin C, (VITAMIN C) 500 MG tablet Take 500 mg by mouth daily.       aspirin 81 mg chewable tablet Chew 81 mg daily.       b complex vitamins (VITAMINS B COMPLEX) capsule Take 1 capsule by mouth.       blood-glucose meter kit [facility] Contour Monitor KIT  Use as Directed       cephalexin (KEFLEX) 500 MG capsule Take 1 capsule (500 mg total) by mouth 4 (four) times a day for 10 days. 40 capsule 0     CONTOUR NEXT EZ METER Misc Use daily for blood sugar testing 1 each 5     CONTOUR NEXT TEST STRIPS strips TEST 3 TIMES DAILY 300 strip 3     furosemide (LASIX) 40 MG tablet Take 20 mg by mouth daily. 1/2 tab       gabapentin (NEURONTIN) 300 MG capsule Take 300 mg by mouth 2 (two) times a day.       insulin glargine (LANTUS SOLOSTAR U-100 INSULIN) 100 unit/mL (3 mL) pen Inject 40 Units under the skin every morning. 30 mL 5     LANCETS MISC [employer] Microlet MISC  Check blood sugar 2 times per day and as needed       lisinopril (PRINIVIL,ZESTRIL) 20 MG tablet Take 1 tablet (20 mg total) by mouth daily. 90 tablet 3     magnesium oxide 500 mg Tab Take 500 mg by mouth daily.              metFORMIN (GLUCOPHAGE-XR) 500 MG 24 hr tablet Take 2 tablets (1,000 mg total) by mouth 2 (two) times a day with meals. 360 tablet 3     multivitamin (MULTIPLE VITAMINS DAILY) per tablet Take 1 tablet by mouth daily.       nystatin (MYCOSTATIN) powder Apply 1 application topically 3 (three) times a day. Groin rash       pen needle, diabetic (BD ULTRA-FINE BRANDON PEN NEEDLE) 32 gauge x 5/32\" Ndle Use 1 " per day. 100 each 3     penicillin VK (PEN VK) 500 MG tablet Take 1 tablet (500 mg total) by mouth 2 (two) times a day. 60 tablet 5     potassium 99 mg Tab Take 1 tablet by mouth 2 (two) times a day.              QVAR REDIHALER 80 mcg/actuation HFAB inhaler INHALE 2 PUFFS 2 TIMES DAILY -RINSE MOUTH AFTER USE --DO NOT SHAKE UNIT-- 10.6 g 5     sildenafil (REVATIO) 20 mg tablet Take 2-3 tablets (40-60 mg total) by mouth daily as needed. 30 tablet 5     simvastatin (ZOCOR) 40 MG tablet Take 40 mg by mouth at bedtime.       terbinafine HCl (LAMISIL) 250 mg tablet Take 1 tablet (250 mg total) by mouth daily. 84 tablet 0     TRULICITY 1.5 mg/0.5 mL PnIj INJECT 1.5MG UNDER THE SKIN EVERY 7 DAYS. 6 mL 0     No current facility-administered medications for this visit.         No Known Allergies    Patient Active Problem List   Diagnosis     Diverticulosis     Microcytic anemia     Asthma     Hammer Toe Of The Right Second Toe     Dermatitis     Medial Epicondylitis     Plantar Fasciitis     Tubular adenoma of colon     Insulin dependent diabetes mellitus (H)     Class 2 severe obesity due to excess calories with serious comorbidity and body mass index (BMI) of 36.0 to 36.9 in adult (H)     Carpal Tunnel Syndrome     Essential hypertension, benign     Arthralgias In Multiple Sites     Hyperlipidemia     Male Erectile Disorder     Edema     Lymphedema     Methicillin Resistant Staphylococcus Aureus Infection     Trochanteric bursitis of both hips     Plantar fasciitis     Mild persistent asthma without complication     Tinea cruris     Spinal stenosis of lumbar region, unspecified whether neurogenic claudication present     Primary osteoarthritis of right hip     Uncontrolled type 2 diabetes mellitus with hyperglycemia, with long-term current use of insulin (H)     Cellulitis     Erysipelas     Other diabetic neurological complication associated with diabetes mellitus due to underlying condition (H)     Diabetic neuropathy  associated with type 2 diabetes mellitus (H)     History of cellulitis     History of MRSA infection       Past Medical History:   Diagnosis Date     Anemia      Arthritis      Diabetes mellitus (H)      Diabetes mellitus, type II (H)      Hypertension      Obesity      Plantar fasciitis        Past Surgical History:   Procedure Laterality Date     BACK SURGERY       HERNIA REPAIR Right     inguinal; child       Social History     Socioeconomic History     Marital status:      Spouse name: Not on file     Number of children: Not on file     Years of education: Not on file     Highest education level: Not on file   Occupational History     Occupation: Manager     Employer: [employer]   Social Needs     Financial resource strain: Not on file     Food insecurity:     Worry: Not on file     Inability: Not on file     Transportation needs:     Medical: Not on file     Non-medical: Not on file   Tobacco Use     Smoking status: Former Smoker     Smokeless tobacco: Never Used   Substance and Sexual Activity     Alcohol use: Yes     Drug use: No     Sexual activity: Not on file   Lifestyle     Physical activity:     Days per week: Not on file     Minutes per session: Not on file     Stress: Not on file   Relationships     Social connections:     Talks on phone: Not on file     Gets together: Not on file     Attends [employer] service: Not on file     Active member of club or organization: Not on file     Attends meetings of clubs or organizations: Not on file     Relationship status: Not on file     Intimate partner violence:     Fear of current or ex partner: Not on file     Emotionally abused: Not on file     Physically abused: Not on file     Forced sexual activity: Not on file   Other Topics Concern     Not on file   Social History Narrative     Not on file       Patient Care Team:  Josh Shafer MD as PCP - General  Josh Shafer MD as Assigned PCP          Objective:     Vitals:    10/01/19  "1107   BP: 110/70   Pulse: (!) 110   SpO2: 99%   Weight: (!) 254 lb (115.2 kg)   Height: 5' 9\" (1.753 m)         Physical Exam:  Physical Exam     General Appearance:    Alert, cooperative, no distress, appears stated age.  Obesity.   Head:    Normocephalic, without obvious abnormality, atraumatic   Eyes:    PERRL, conjunctiva/corneas clear, EOM's intact, fundi     benign, both eyes.  Glasses.        Ears:    Normal TM's and external ear canals, both ears   Nose:   Nares normal, septum midline, mucosa normal, no drainage    or sinus tenderness   Throat:   Lips, mucosa, and tongue normal; teeth and gums normal   Neck:   Supple, symmetrical, trachea midline, no adenopathy;        thyroid:  No enlargement/tenderness/nodules; no carotid    bruit or JVD   Back:     Symmetric, no curvature, ROM normal, no CVA tenderness   Lungs:     Clear to auscultation bilaterally, respirations unlabored.  No wheeze.   Chest wall:    No tenderness or deformity   Heart:    Regular rate and rhythm, S1 and S2 normal, no murmur, rub   or gallop   Abdomen:     Soft, non-tender, bowel sounds active all four quadrants,     no masses, no organomegaly.     Genitalia:    deferred   Rectal:    deferred   Extremities:   Extremities atraumatic, no cyanosis.  Right shin slightly erythematous without evidence for active cellulites; trace right > left edema   Pulses:   2+ and symmetric all extremities   Skin:   Skin color, texture, turgor normal, no rashes or lesions   Lymph nodes:   Cervical, supraclavicular, and axillary nodes normal   Neurologic:   CNII-XII intact. Normal strength, sensation and reflexes       throughout        There are no Patient Instructions on file for this visit.    EKG:  10/1/19  NSR.  Normal EKG.      Labs:  Recent Results (from the past 24 hour(s))   Electrocardiogram Perform and Read    Collection Time: 10/01/19 11:35 AM   Result Value Ref Range    SYSTOLIC BLOOD PRESSURE      DIASTOLIC BLOOD PRESSURE      VENTRICULAR RATE " 81 BPM    ATRIAL RATE 81 BPM    P-R INTERVAL 154 ms    QRS DURATION 98 ms    Q-T INTERVAL 356 ms    QTC CALCULATION (BEZET) 413 ms    P Axis 58 degrees    R AXIS 18 degrees    T AXIS 29 degrees    MUSE DIAGNOSIS       Normal sinus rhythm  Normal ECG  When compared with ECG of 08-JAN-2019 14:52,  No significant change was found         Immunization History   Administered Date(s) Administered     DT (pediatric) 01/01/1998     INFLUENZA,RECOMBINANT,INJ,PF QUADRIVALENT 18+YRS 09/17/2019     Influenza W1v3-93, 02/25/2010     Influenza, inj, historic,unspecified 12/05/2007, 10/22/2008, 12/15/2018     Influenza, seasonal,quad inj 6-35 mos 09/24/2009, 09/17/2010     Influenza,seasonal quad, PF, =/> 6months 11/12/2015     Pneumo Polysac 23-V 12/05/2007     Td, Adult, Absorbed 07/11/2017     Td,adult,historic,unspecified 12/05/2007     Tdap 12/05/2007           Electronically signed by Josh Shafer MD 10/01/19 11:09 AM

## 2021-10-28 ENCOUNTER — OFFICE VISIT (OUTPATIENT)
Dept: SLEEP MEDICINE | Facility: CLINIC | Age: 66
End: 2021-10-28
Payer: COMMERCIAL

## 2021-10-28 DIAGNOSIS — R06.83 SNORING: ICD-10-CM

## 2021-10-28 DIAGNOSIS — G47.33 OSA (OBSTRUCTIVE SLEEP APNEA): ICD-10-CM

## 2021-10-28 PROCEDURE — G0399 HOME SLEEP TEST/TYPE 3 PORTA: HCPCS | Performed by: INTERNAL MEDICINE

## 2021-10-29 ENCOUNTER — DOCUMENTATION ONLY (OUTPATIENT)
Dept: SLEEP MEDICINE | Facility: CLINIC | Age: 66
End: 2021-10-29
Payer: COMMERCIAL

## 2021-10-29 DIAGNOSIS — G47.33 OSA (OBSTRUCTIVE SLEEP APNEA): ICD-10-CM

## 2021-10-29 NOTE — PROGRESS NOTES
This HSAT was performed using a Noxturnal T3 device which recorded snore, sound, movement activity, body position, nasal pressure, oronasal thermal airflow, pulse, oximetry and both chest and abdominal respiratory effort. HSAT data was restricted to the time patient states they were in bed.     HSAT was scored using 1B 4% hypopnea rule.     HST AHI (Non-PAT): 29.3  Snoring was reported as mild.  Time with SpO2 below 89% was 17.4 minutes.   Overall signal quality was good     Pt will follow up with sleep provider to determine appropriate therapy.

## 2021-10-29 NOTE — PROGRESS NOTES
HST POST-STUDY QUESTIONNAIRE    1. What time did you go to bed?  8:30PM  2. How long do you think it took to fall asleep?  1/2 hour  3. What time did you wake up to start the day?  3:30am  4. Did you get up during the night at all?  yes  5. If you woke up, do you remember approximately what time(s)? 12:30am  6. Did you have any difficulty with the equipment?  No  7. Did you us any type of treatment with this study?  None  8. Was the head of the bed elevated? No  9. Did you sleep in a recliner?  No  10. Did you stop using CPAP at least 3 days before this test?  NA  11. Any other information you'd like us to know? N/A

## 2021-11-01 PROBLEM — G47.33 OSA (OBSTRUCTIVE SLEEP APNEA): Status: ACTIVE | Noted: 2021-11-01

## 2021-11-01 NOTE — PROCEDURES
"HOME SLEEP STUDY INTERPRETATION    Patient: Leandro Brewer  MRN: [id number]  YOB: 1955  Study Date: 10/28/2021  PCP/Referring Provider: Josh Shafer;   Ordering Provider: Brianda Brown MD     Indications for Home Study: Leandro Brewer is a 65 year old male with a history of HTN, DM-2, asthma who presents with symptoms suggestive of obstructive sleep apnea.    Estimated body mass index is 38.84 kg/m  as calculated from the following:    Height as of 21: 1.753 m (5' 9\").    Weight as of 21: 119.3 kg (263 lb).  Total score - Rescue: 17 (2/10/2021 10:00 AM)  STOP-BAN/8    Data: A full night home sleep study was performed recording the standard physiologic parameters including body position, movement, sound, nasal pressure, thermal oral airflow, chest and abdominal movements with respiratory inductance plethysmography, and oxygen saturation by pulse oximetry. Pulse rate was estimated by oximetry recording. This study was considered adequate based on > 4 hours of quality oximetry and respiratory recording. As specified by the AASM Manual for the Scoring of Sleep and Associated events, version 2.3, Rule VIII.D 1B, 4% oxygen desaturation scoring for hypopneas is used as a standard of care on all home sleep apnea testing.    Analysis Time:  400.9 minutes    Respiration:   Sleep Associated Hypoxemia: sustained hypoxemia was present. Baseline oxygen saturation was 93.9%.  Time with saturation less than or equal to 88% was 12.3 minutes. The lowest oxygen saturation was 74%.   Snoring: Snoring was present.  Respiratory events: The home study revealed a presence of 18 obstructive apneas and 0 mixed and central apneas. There were 176 hypopneas resulting in a combined apnea/hypopnea index [AHI] of 29.3 events per hour.  AHI was 37.4 per hour supine, - per hour prone, 28.9 per hour on left side, and - per hour on right side.   Pattern: Excluding events noted above, respiratory rate and " pattern was Normal.    Position: Percent of time spent: supine - 4.4%, prone - 0%, on left - 94.8%, on right - 0%.    Heart Rate: By pulse oximetry normal rate was noted.     Assessment:   Moderate obstructive sleep apnea.  Sleep associated hypoxemia was present.    Recommendations:  CPAP therapy is the treatment of choice for moderate obstructive sleep apnea. Consider auto-CPAP at 5-15 cmH2O.  If CPAP is not tolerated, alternative therapy options include oral appliance therapy through referral to dental sleep medicine or upper airway surgical evaluation through Sleep ENT surgery.    Suggest optimizing sleep hygiene and avoiding sleep deprivation.  Weight management.    Diagnosis Code(s): Obstructive Sleep Apnea G47.33, Hypoxemia G47.36    Wilman Chase MD,November 1, 2021   Diplomate, American Board of Psychiatry and Neurology, Sleep Medicine

## 2021-11-09 DIAGNOSIS — Z79.4 TYPE 2 DIABETES MELLITUS WITHOUT COMPLICATION, WITH LONG-TERM CURRENT USE OF INSULIN (H): Primary | ICD-10-CM

## 2021-11-09 DIAGNOSIS — E11.9 TYPE 2 DIABETES MELLITUS WITHOUT COMPLICATION, WITH LONG-TERM CURRENT USE OF INSULIN (H): Primary | ICD-10-CM

## 2021-11-09 RX ORDER — METFORMIN HCL 500 MG
TABLET, EXTENDED RELEASE 24 HR ORAL
Qty: 360 TABLET | Refills: 3 | Status: SHIPPED | OUTPATIENT
Start: 2021-11-09 | End: 2022-09-10

## 2021-11-09 NOTE — TELEPHONE ENCOUNTER
Reason for Call:  Other prescription    Detailed comments: refill request;    Metformin 500 MG 24 hr tablet    Phone Number Pharmacy can be reached at: Other phone number:  [phone]    Best Time: anytime    Can we leave a detailed message on this number? YES    Call taken on 11/9/2021 at 11:48 AM by Nani Brown

## 2021-11-11 DIAGNOSIS — M54.16 LUMBAR RADICULOPATHY: Primary | ICD-10-CM

## 2021-11-11 ASSESSMENT — SLEEP AND FATIGUE QUESTIONNAIRES
HOW LIKELY ARE YOU TO NOD OFF OR FALL ASLEEP WHILE LYING DOWN TO REST IN THE AFTERNOON WHEN CIRCUMSTANCES PERMIT: HIGH CHANCE OF DOZING
HOW LIKELY ARE YOU TO NOD OFF OR FALL ASLEEP WHILE SITTING AND TALKING TO SOMEONE: SLIGHT CHANCE OF DOZING
HOW LIKELY ARE YOU TO NOD OFF OR FALL ASLEEP WHILE SITTING AND READING: HIGH CHANCE OF DOZING
HOW LIKELY ARE YOU TO NOD OFF OR FALL ASLEEP WHILE SITTING QUIETLY AFTER LUNCH WITHOUT ALCOHOL: MODERATE CHANCE OF DOZING
HOW LIKELY ARE YOU TO NOD OFF OR FALL ASLEEP IN A CAR, WHILE STOPPED FOR A FEW MINUTES IN TRAFFIC: MODERATE CHANCE OF DOZING
HOW LIKELY ARE YOU TO NOD OFF OR FALL ASLEEP WHILE SITTING INACTIVE IN A PUBLIC PLACE: MODERATE CHANCE OF DOZING
HOW LIKELY ARE YOU TO NOD OFF OR FALL ASLEEP WHILE WATCHING TV: HIGH CHANCE OF DOZING
HOW LIKELY ARE YOU TO NOD OFF OR FALL ASLEEP WHEN YOU ARE A PASSENGER IN A CAR FOR AN HOUR WITHOUT A BREAK: HIGH CHANCE OF DOZING

## 2021-11-11 NOTE — PROGRESS NOTES
Are you currently in the state of [place]: Yes   Marc is a 65 year old who is being evaluated via a billable video visit.      How would you like to obtain your AVS? MyChart  If the video visit is dropped, the invitation should be resent by: Text to cell phone: [phone]  Will anyone else be joining your video visit? No    Dedra Puga MA      Video-Visit Details    Type of service:  Video Visit  Video Start Time:  11/15/2021 03:43 pm  Stop: 11/15/2021 03:58 pm      Originating Location (pt. Location): Home    Distant Location (provider location):  [facility] [place]     Platform used for Video Visit: [facility]     Sleep clinic follow up visit note    Date on this visit: 11/15/2021    Primary Physician: Josh Shafer     Chief complaint: Results of home sleep study and discuss plan of care    Leandro Brewer 66 year old  male with a history of HTN, DM-2, asthma who presented with symptoms suggestive of obstructive sleep apnea.  Home Sleep study was obtained on 10/28/2021 to evaluate for possible obstructive sleep apnea.  Patient presents for virtual visit today to review the test results and to discuss plan of care.    Home sleep study report:    Study Date: 10/28/2021    Analysis Time:  400.9 minutes     Respiration:   Sleep Associated Hypoxemia: sustained hypoxemia was present. Baseline oxygen saturation was 93.9%.  Time with saturation less than or equal to 88% was 12.3 minutes. The lowest oxygen saturation was 74%.   Snoring: Snoring was present.  Respiratory events: The home study revealed a presence of 18 obstructive apneas and 0 mixed and central apneas. There were 176 hypopneas resulting in a combined apnea/hypopnea index [AHI] of 29.3 events per hour.  AHI was 37.4 per hour supine, - per hour prone, 28.9 per hour on left side, and - per hour on right side.   Pattern: Excluding events noted above, respiratory rate and pattern was Normal.     Position:  Percent of time spent: supine - 4.4%, prone - 0%, on left - 94.8%, on right - 0%.     Heart Rate: By pulse oximetry normal rate was noted.      Assessment:   Moderate obstructive sleep apnea.  Sleep associated hypoxemia was present.    Test results were discussed with patient in detail.  Patient reports that he mostly sleeps on his left side and never sleeps on his stomach or on the right side and not much on his back either.    Allergies:    No Known Allergies    Medications:    Current Outpatient Medications   Medication Sig Dispense Refill     acetaminophen (TYLENOL) 500 MG tablet Take 1,000 mg by mouth       albuterol (PROAIR HFA/PROVENTIL HFA/VENTOLIN HFA) 108 (90 Base) MCG/ACT inhaler Inhale 2 puffs into the lungs 4 times daily as needed for shortness of breath / dyspnea or wheezing 18 g 6     APPLE CIDER VINEGAR PO Take 1 tablet by mouth       beclomethasone HFA (QVAR REDIHALER) 80 MCG/ACT inhaler INHALE 2 PUFFS BY MOUTH TWICE A DAY - RINSE MOUTH AFTER USE-DO NOT SHAKE UNIT       blood glucose (NO BRAND SPECIFIED) lancets standard [employer] Microlet MISC  Check blood sugar 2 times per day and as needed       Cholecalciferol (D3 DOTS) 50 MCG (2000 UT) TBDP Take 1 capsule by mouth       dulaglutide (TRULICITY) 1.5 MG/0.5ML pen INJECT 0.5ML SUBCUTANEOUSLY EVERY 7 DAYS.       furosemide (LASIX) 20 MG tablet Take 20 mg by mouth       furosemide (LASIX) 40 MG tablet Take 20 mg by mouth       insulin glargine (LANTUS SOLOSTAR) 100 UNIT/ML pen Inject 40 Units Subcutaneous       lisinopril (ZESTRIL) 20 MG tablet TAKE 1 TABLET BY MOUTH EVERY DAY       Magnesium Oxide 500 MG TABS Take 500 mg by mouth       metFORMIN (GLUCOPHAGE-XR) 500 MG 24 hr tablet TAKE 2 TABLETS BY MOUTH TWICE A DAY WITH MEALS. 360 tablet 3     Multiple Vitamin (MULTIVITAMIN ADULT PO) Take 1 tablet by mouth       potassium 99 MG TABS Take 1 tablet by mouth       potassium gluconate 2.5 MEQ TABS Take 1 tablet by mouth       sildenafil (REVATIO) 20 MG  tablet Take 40-60 mg by mouth       simvastatin (ZOCOR) 40 MG tablet TAKE 1 TABLET BY MOUTH EVERYDAY AT BEDTIME       terbinafine (LAMISIL) 250 MG tablet Take 250 mg by mouth       vitamin (B COMPLEX) capsule Take 1 capsule by mouth       vitamin C (ASCORBIC ACID) 500 MG tablet Take 500 mg by mouth       gabapentin (NEURONTIN) 300 MG capsule Take 900 mg by mouth       naproxen (NAPROSYN) 500 MG tablet Take 1 tablet (500 mg) by mouth 2 times daily (with meals) 14 tablet 0       Problem List:  Patient Active Problem List    Diagnosis Date Noted     GUERA (obstructive sleep apnea) 11/01/2021     Priority: Medium     Lumbar radiculopathy 01/20/2021     Priority: Medium     Osteoarthritis of spine with radiculopathy, lumbar region 01/14/2021     Priority: Medium     Added automatically from request for surgery [id number]         Spondylolisthesis of lumbar region 01/14/2021     Priority: Medium     Added automatically from request for surgery [id number]         Insulin dependent diabetes mellitus      Priority: Medium     Created by Conversion  IMO SNOMED LOAD SPRING 2020 [.6/.18/.2020 9:04 PM]  Teofilo Ugarte:           History of cellulitis 10/01/2019     Priority: Medium     History of MRSA infection 10/01/2019     Priority: Medium     Diabetic neuropathy associated with type 2 diabetes mellitus (H) 09/09/2019     Priority: Medium     Essential hypertension, benign      Priority: Medium     Created by Conversion  Replacement Utility updated for latest IMO load         Primary osteoarthritis of right hip 02/21/2019     Priority: Medium     s/p right MELITA 2/5/19 (Dr. Evans) at [facility]         Uncontrolled type 2 diabetes mellitus with hyperglycemia, with long-term current use of insulin (H) 02/21/2019     Priority: Medium     Microcytic anemia      Priority: Medium     Created by Conversion  [facility] Annotation: Apr 26 2011 12:57PM - Josh Shafer: Microcytic.    Hemoglobin ELP 4/19/11 c/w hemoglobin Tracey  trait (harmless condition).    If   Hb < 11.0, then look for another reason.         Tubular adenoma of colon      Priority: Medium     Created by Conversion         Class 2 severe obesity due to excess calories with serious comorbidity and body mass index (BMI) of 36.0 to 36.9 in adult (H)      Priority: Medium     Created by Conversion         Tinea cruris 01/08/2019     Priority: Medium     Spinal stenosis of lumbar region, unspecified whether neurogenic claudication present 01/08/2019     Priority: Medium     Mild persistent asthma without complication 06/19/2018     Priority: Medium     Lymphedema      Priority: Medium     Created by Conversion         Hyperlipidemia      Priority: Medium     Created by Conversion         Diverticulosis      Priority: Medium     Created by Conversion  Replacement Utility updated for latest IMO load         Hammer Toe Of The Right Second Toe      Priority: Medium     Created by Conversion         Male Erectile Disorder      Priority: Medium     Created by Conversion         Carpal Tunnel Syndrome      Priority: Medium     Created by Conversion            Past Medical/Surgical History:  Past Medical History:   Diagnosis Date     Anemia      Arthritis      Diabetes mellitus, type II (H)      Hypertension      Obesity      Plantar fasciitis      Past Surgical History:   Procedure Laterality Date     BACK SURGERY      laminectomy L45 by Dr. Phil HARE W/O FACETEC FORAMOT/[facility] 1/2 VRT SEG, LUMBAR Right 1/20/2021    Procedure: Right Lumbar 3 - Lumbar 4 hemilaminectomy and medial facetectomy and Right redo Lumbar 4 -Lumbar 5 hemilaminectomy and medial facetectomy;  Surgeon: Julissa Lopez MD;  [facility];  Service: Spine     HERNIA REPAIR Right     inguinal; child     TOTAL HIP ARTHROPLASTY Bilateral 2019 Feb 5, 2019 (right) and October 19, 2019 (left)       Social History:  Social History     Socioeconomic History     Marital status:      Spouse  name: Not on file     Number of children: Not on file     Years of education: Not on file     Highest education level: Not on file   Occupational History     Not on file   Tobacco Use     Smoking status: Former Smoker     Smokeless tobacco: Never Used   Substance and Sexual Activity     Alcohol use: Yes     Alcohol/week: 1.0 standard drink     Comment: Alcoholic Drinks/day: occasional     Drug use: No     Sexual activity: Not on file   Other Topics Concern     Not on file   Social History Narrative     Not on file     Social Determinants of Health     Financial Resource Strain: Not on file   Food Insecurity: Not on file   Transportation Needs: Not on file   Physical Activity: Not on file   Stress: Not on file   Social Connections: Not on file   Intimate Partner Violence: Not on file   Housing Stability: Not on file       Family History:  Family History   Problem Relation Age of Onset     Coronary Artery Disease Mother      Obesity Mother      Heart Disease Father      Cancer Father         throat     Coronary Artery Disease Father      Diabetes Sister      Diabetes Brother          Physical Examination:  Vitals: There were no vitals taken for this visit.  BMI= There is no height or weight on file to calculate BMI.         [place] Total Score 11/11/2021   Total score - [place] 19     General: No apparent distress, appropriately groomed  Head: Normocephalic, atraumatic  Chest: No cough, no audible wheezing, able to talk in full sentences  Psych: coherent speech, normal rate and volume, able to articulate logical thoughts, able   to abstract reason, no tangential thoughts, no hallucinations   or delusions  His affect is normal  Neuro:  Mental status: Alert and  Oriented X 3  Speech: normal     Impression/Plan:  Obstructive sleep apnea with sleep associated hypoxemia: We discussed the test results in detail.  We discussed the associated consequences of untreated sleep disordered breathing and the various treatment  "options available including auto CPAP, dental appliance, upper airway surgical options, upper airway stimulation therapy/inspire.  Patient was interested in CPAP treatment.  Prescription was provided for auto CPAP with pressure settings ranging between 5-15 cmH2O. We discussed about the wait list due to current supply shortage.  We discussed about the option of purchasing the device through online resources via out-of-pocket if he is interested.  He wants to wait to get it through his insurance.  After obtaining the device, he was instructed to use the device regularly during sleep and get back to us if any interface concerns.  We discussed the mask exchange policy and the compliance goals.  He will be followed through sleep therapy management program.      Obesity: We discussed weight management with healthy diet, and exercise.    Patient was strongly advised to avoid driving, operating any heavy machinery or other hazardous situations while drowsy or sleepy.  Patient was counseled on the importance of driving while alert, to pull over if drowsy, or nap before getting into the vehicle if sleepy.      Patient was instructed to call our sleep clinic after obtaining the CPAP equipment to schedule a follow-up video visit in approximately 6 to 7 weeks after initiating CPAP therapy.    CC: No ref. provider found    The above note was dictated using voice recognition software. Although reviewed after completion, some word and grammatical error may remain . Please contact the author for any clarifications.    \"I spent a total of 30  minutes  with Leandro Brewer during today's  Video visit.  Most of this time was spent counseling the patient and  coordinating care regarding  GUERA, treatment options for GUERA, CPAP treatment, communicated with [facility] medical DME via [employer]  to help with urgent CPAP set up,  weight management , going over results of home sleep study , chart review  including documentation and further " "activities as noted above.\"      Nico Bernard MD  [address] [address] [phone]  Dept: [phone]                "

## 2021-11-13 ENCOUNTER — HOSPITAL ENCOUNTER (OUTPATIENT)
Dept: RADIOLOGY | Facility: HOSPITAL | Age: 66
Discharge: HOME | End: 2021-11-13
Attending: NEUROLOGICAL SURGERY | Admitting: NEUROLOGICAL SURGERY
Payer: COMMERCIAL

## 2021-11-13 DIAGNOSIS — M54.16 LUMBAR RADICULOPATHY: ICD-10-CM

## 2021-11-13 PROCEDURE — 72120 X-RAY BEND ONLY L-S SPINE: CPT

## 2021-11-15 ENCOUNTER — VIRTUAL VISIT (OUTPATIENT)
Dept: SLEEP MEDICINE | Facility: CLINIC | Age: 66
End: 2021-11-15
Payer: COMMERCIAL

## 2021-11-15 ENCOUNTER — OFFICE VISIT (OUTPATIENT)
Dept: NEUROSURGERY | Facility: CLINIC | Age: 66
End: 2021-11-15
Payer: COMMERCIAL

## 2021-11-15 VITALS
HEART RATE: 97 BPM | DIASTOLIC BLOOD PRESSURE: 69 MMHG | WEIGHT: 263 LBS | BODY MASS INDEX: 38.95 KG/M2 | OXYGEN SATURATION: 98 % | HEIGHT: 69 IN | SYSTOLIC BLOOD PRESSURE: 130 MMHG

## 2021-11-15 DIAGNOSIS — G47.33 OSA (OBSTRUCTIVE SLEEP APNEA): Primary | ICD-10-CM

## 2021-11-15 DIAGNOSIS — M54.50 ACUTE LEFT-SIDED LOW BACK PAIN WITHOUT SCIATICA: Primary | ICD-10-CM

## 2021-11-15 PROCEDURE — 99213 OFFICE O/P EST LOW 20 MIN: CPT | Performed by: NURSE PRACTITIONER

## 2021-11-15 PROCEDURE — 99214 OFFICE O/P EST MOD 30 MIN: CPT | Mod: 95 | Performed by: INTERNAL MEDICINE

## 2021-11-15 RX ORDER — NAPROXEN 500 MG/1
500 TABLET ORAL 2 TIMES DAILY WITH MEALS
Qty: 14 TABLET | Refills: 0 | Status: SHIPPED | OUTPATIENT
Start: 2021-11-15 | End: 2021-12-16

## 2021-11-15 ASSESSMENT — MIFFLIN-ST. JEOR: SCORE: 1973.34

## 2021-11-15 NOTE — PROGRESS NOTES
Neurosurgery Progress Note  11/15/2021    A/P: Leandro Brewer is a 65 yr old male S/P L3-4 hemilaminectomy, medial facetectomy and right redo L4-5 hemilaminectomy, medical facetectomy 1/20/2021 who had on going right lumbar radicular pain 6/1/2021 office visit with negative EMG. MRI stable but persistent enhancement around descending L5 nerve root. Also endorsed more right hip and groin pain. He saw orthopedics and had right hip revision 7/13/2021 and now right sided symptoms have resolved.     Today he endorses new Left low back pain. Occurred during PT late september. Lateral to the spine. He graduated PT. Chiropractor does not help. Naproxen does help - 90% (once a day). Tylenol lessens the pain too. Worsens after being seated and stands up again. Once standing it diminishes. Always there. Can lay flat. No leg pain, numbness or tingling. He has been weaning his Gabapentin 2 in the morning and 1 at night and see's improvement in brain function. He believes he has maxed out his PT benefits.     I am not convinced new left sided symptoms stem from his spine. Could be muscle strain, inflammation or known arthritis. Plan to continue with naproxen and tylenol for up to one week. Resume daily exercises PT had recommended. If not better in 4 weeks will order MRI. Marc will call to report. Otherwise, if he improves, no need to return to clinic. He is happy with the plan of care. Discussed with Dr Loepz.     HPI: Presented 12/2020 with lumbar radiculopathy and back pain. Was seen at the spine center previously. Failed conservative management. Right L5-S1 TFESI 11/2020 - few days of diminished pain but not resolved. Oral steroids helped initially. Cymbalta did not help. Gabapentin somewhat helpful. MRI mild multilevel lumbar spondylosis. L3-4 broad based paracentral disc bulge eccentric through the left neural foramen and bilateral facet arthropathy and mild facet join effusion as well as mild ligamentum flaux hypertrophy  "resulting in moderate lateral recess stenosis bilaterally. L4-5 mild anterior listhesis L4 and L5 with marked bilateral facet arthropathy.     Physical exam  /69   Pulse 97   Ht 1.753 m (5' 9\")   Wt 119.3 kg (263 lb)   SpO2 98%   BMI 38.84 kg/m      General: alert, oriented. MELCHOR. Speech is clear.     Motor: normal bulk and tone    Strength: full strength     Sensation: intact to light touch     Reflexes: no clonus     Images: reviewed flex/ex films today. No dynamic instability.     Glory Comer, AG-CNP  [facility]  O: [phone]    "

## 2021-11-15 NOTE — PATIENT INSTRUCTIONS
1. Continue with regular doses of naproxen and/or tylenol for a few days and see if they alleviate the discomfort.   2. Call in 4 weeks if no better and we can order MRI   3. Resume your exercises physical therapy had given you

## 2021-11-16 ENCOUNTER — TELEPHONE (OUTPATIENT)
Dept: SLEEP MEDICINE | Facility: CLINIC | Age: 66
End: 2021-11-16

## 2021-11-16 ENCOUNTER — OFFICE VISIT (OUTPATIENT)
Dept: FAMILY MEDICINE | Facility: CLINIC | Age: 66
End: 2021-11-16
Payer: COMMERCIAL

## 2021-11-16 VITALS
OXYGEN SATURATION: 95 % | DIASTOLIC BLOOD PRESSURE: 70 MMHG | WEIGHT: 260 LBS | SYSTOLIC BLOOD PRESSURE: 130 MMHG | HEART RATE: 104 BPM | BODY MASS INDEX: 39.4 KG/M2 | HEIGHT: 68 IN

## 2021-11-16 DIAGNOSIS — I10 ESSENTIAL HYPERTENSION, BENIGN: ICD-10-CM

## 2021-11-16 DIAGNOSIS — E78.5 HYPERLIPIDEMIA, UNSPECIFIED HYPERLIPIDEMIA TYPE: ICD-10-CM

## 2021-11-16 DIAGNOSIS — Z23 HIGH PRIORITY FOR 2019-NCOV VACCINE: ICD-10-CM

## 2021-11-16 DIAGNOSIS — E11.65 UNCONTROLLED TYPE 2 DIABETES MELLITUS WITH HYPERGLYCEMIA, WITH LONG-TERM CURRENT USE OF INSULIN (H): ICD-10-CM

## 2021-11-16 DIAGNOSIS — E11.9 TYPE 2 DIABETES MELLITUS WITHOUT COMPLICATION, WITHOUT LONG-TERM CURRENT USE OF INSULIN (H): Primary | ICD-10-CM

## 2021-11-16 DIAGNOSIS — J45.30 MILD PERSISTENT ASTHMA WITHOUT COMPLICATION: ICD-10-CM

## 2021-11-16 DIAGNOSIS — D50.9 MICROCYTIC ANEMIA: ICD-10-CM

## 2021-11-16 DIAGNOSIS — Z23 ENCOUNTER FOR IMMUNIZATION: ICD-10-CM

## 2021-11-16 DIAGNOSIS — Z79.4 UNCONTROLLED TYPE 2 DIABETES MELLITUS WITH HYPERGLYCEMIA, WITH LONG-TERM CURRENT USE OF INSULIN (H): ICD-10-CM

## 2021-11-16 DIAGNOSIS — G47.33 OSA (OBSTRUCTIVE SLEEP APNEA): ICD-10-CM

## 2021-11-16 DIAGNOSIS — I10 ESSENTIAL HYPERTENSION: ICD-10-CM

## 2021-11-16 DIAGNOSIS — G47.10 HYPERSOMNIA: ICD-10-CM

## 2021-11-16 DIAGNOSIS — Z00.00 ROUTINE PHYSICAL EXAMINATION: Primary | ICD-10-CM

## 2021-11-16 DIAGNOSIS — R06.83 SNORING: ICD-10-CM

## 2021-11-16 LAB
ALBUMIN SERPL-MCNC: 4.1 G/DL (ref 3.5–5)
ALP SERPL-CCNC: 88 U/L (ref 45–120)
ALT SERPL W P-5'-P-CCNC: 39 U/L (ref 0–45)
ANION GAP SERPL CALCULATED.3IONS-SCNC: 11 MMOL/L (ref 5–18)
AST SERPL W P-5'-P-CCNC: 32 U/L (ref 0–40)
BILIRUB SERPL-MCNC: 0.5 MG/DL (ref 0–1)
BUN SERPL-MCNC: 20 MG/DL (ref 8–22)
CALCIUM SERPL-MCNC: 10 MG/DL (ref 8.5–10.5)
CHLORIDE BLD-SCNC: 106 MMOL/L (ref 98–107)
CHOLEST SERPL-MCNC: 132 MG/DL
CO2 SERPL-SCNC: 24 MMOL/L (ref 22–31)
CREAT SERPL-MCNC: 1.12 MG/DL (ref 0.7–1.3)
ERYTHROCYTE [DISTWIDTH] IN BLOOD BY AUTOMATED COUNT: 21.1 % (ref 10–15)
GFR SERPL CREATININE-BSD FRML MDRD: 69 ML/MIN/1.73M2
GLUCOSE BLD-MCNC: 148 MG/DL (ref 70–125)
HBA1C MFR BLD: 8.8 % (ref 0–5.6)
HCT VFR BLD AUTO: 39.4 % (ref 40–53)
HDLC SERPL-MCNC: 34 MG/DL
HGB BLD-MCNC: 11.7 G/DL (ref 13.3–17.7)
HOLD SPECIMEN: NORMAL
LDLC SERPL CALC-MCNC: 71 MG/DL
MCH RBC QN AUTO: 19.8 PG (ref 26.5–33)
MCHC RBC AUTO-ENTMCNC: 29.7 G/DL (ref 31.5–36.5)
MCV RBC AUTO: 67 FL (ref 78–100)
PLATELET # BLD AUTO: 331 10E3/UL (ref 150–450)
POTASSIUM BLD-SCNC: 4.7 MMOL/L (ref 3.5–5)
PROT SERPL-MCNC: 6.9 G/DL (ref 6–8)
PSA SERPL-MCNC: 0.78 UG/L (ref 0–4.5)
RBC # BLD AUTO: 5.92 10E6/UL (ref 4.4–5.9)
SODIUM SERPL-SCNC: 141 MMOL/L (ref 136–145)
TRIGL SERPL-MCNC: 137 MG/DL
WBC # BLD AUTO: 7.3 10E3/UL (ref 4–11)

## 2021-11-16 PROCEDURE — 80061 LIPID PANEL: CPT | Performed by: FAMILY MEDICINE

## 2021-11-16 PROCEDURE — 36415 COLL VENOUS BLD VENIPUNCTURE: CPT | Performed by: FAMILY MEDICINE

## 2021-11-16 PROCEDURE — 85027 COMPLETE CBC AUTOMATED: CPT | Performed by: FAMILY MEDICINE

## 2021-11-16 PROCEDURE — 0004A COVID-19,PF,PFIZER (12+ YRS): CPT | Performed by: FAMILY MEDICINE

## 2021-11-16 PROCEDURE — 99396 PREV VISIT EST AGE 40-64: CPT | Mod: 25 | Performed by: FAMILY MEDICINE

## 2021-11-16 PROCEDURE — 90472 IMMUNIZATION ADMIN EACH ADD: CPT | Performed by: FAMILY MEDICINE

## 2021-11-16 PROCEDURE — 80053 COMPREHEN METABOLIC PANEL: CPT | Performed by: FAMILY MEDICINE

## 2021-11-16 PROCEDURE — 91300 COVID-19,PF,PFIZER (12+ YRS): CPT | Performed by: FAMILY MEDICINE

## 2021-11-16 PROCEDURE — 90750 HZV VACC RECOMBINANT IM: CPT | Performed by: FAMILY MEDICINE

## 2021-11-16 PROCEDURE — 83036 HEMOGLOBIN GLYCOSYLATED A1C: CPT | Performed by: FAMILY MEDICINE

## 2021-11-16 PROCEDURE — G0103 PSA SCREENING: HCPCS | Performed by: FAMILY MEDICINE

## 2021-11-16 PROCEDURE — 90471 IMMUNIZATION ADMIN: CPT | Performed by: FAMILY MEDICINE

## 2021-11-16 PROCEDURE — 90682 RIV4 VACC RECOMBINANT DNA IM: CPT | Performed by: FAMILY MEDICINE

## 2021-11-16 RX ORDER — INSULIN GLARGINE 100 [IU]/ML
60 INJECTION, SOLUTION SUBCUTANEOUS AT BEDTIME
Qty: 5 ML | Refills: 3 | Status: SHIPPED | OUTPATIENT
Start: 2021-11-16 | End: 2022-01-14

## 2021-11-16 ASSESSMENT — MIFFLIN-ST. JEOR: SCORE: 1943.85

## 2021-11-16 NOTE — PROGRESS NOTES
Answers for HPI/ROS submitted by the patient on 11/16/2021  Frequency of exercise:: 2-3 days/week  Getting at least 3 servings of Calcium per day:: NO  Diet:: Diabetic  Taking medications regularly:: Yes  Medication side effects:: Not applicable  Bi-annual eye exam:: Yes  Dental care twice a year:: Yes  Sleep apnea or symptoms of sleep apnea:: Sleep apnea  Duration of exercise:: 15-30 minutes

## 2021-11-16 NOTE — TELEPHONE ENCOUNTER
Left patient a voice mail to inform him of current CPAP shortage to return call if he would like to be added to the wait list.

## 2021-11-16 NOTE — PROGRESS NOTES
Assessment/Plan:     Routine physical examination  Routine healthcare maintenance.  Preventative cares reviewed.  Annual physical exams to continue.  PSA based on age criteria.  Healthcare directives recommended to be completed.  - Prostate Specific Antigen Screen  - Prostate Specific Antigen Screen    Uncontrolled type 2 diabetes mellitus with hyperglycemia, with long-term current use of insulin (H)  Type 2 diabetes with worsening control A1c increasing from 8.4% July 1, 2021 up to 8.8% today.  Has titrated Lantus from 40 units up to 55 units daily slowly over the last 30 days I will increase to 60 units daily consistently with continued use of Trulicity 1.5 mg weekly and Metformin  mg using 2 tablets twice daily.  Reassess at follow-up office visit in just over 3 months.  Recent diabetic eye exam without retinopathy October 4, 2021.  Somewhat diminished monofilament testing noted on foot exam today with diminished posterior tibial and dorsalis pedis pulses.  Microalbumin screen normal March 10, 2021.  - Hemoglobin A1c  - Comprehensive metabolic panel  - Comprehensive metabolic panel    Essential hypertension, benign  Hypertension well appropriate control with lisinopril 20 mg daily.  Med monitoring completed.  - Comprehensive metabolic panel  - Comprehensive metabolic panel    Hyperlipidemia, unspecified hyperlipidemia type  Continued use of simvastatin 40 mg at bedtime.  Check lipid cascade today while fasting.  Med monitoring completed.  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  Ensure stable with recent right total hip revision surgery noted July 13, 2021 with acute on chronic anemia noted.  - Comprehensive metabolic panel  - CBC with platelets  - CBC with platelets  - Comprehensive metabolic panel    Mild  persistent asthma without complication  Continues Qvar 80 mcg using 2 puffs twice daily.  Not requiring albuterol MDI.  Asthma control test 24 out of 25.    Encounter for immunization  Flublok immunization and Shingrix immunization booster provided.  - INFLUENZA QUAD, PF (RIV4) (FLUBLOK)  - ZOSTER VACCINE RECOMBINANT ADJUVANTED IM NJX    High priority for 2019-nCoV vaccine  Covid-19 Pfizer third shot booster provided today.  - COVID-19,PF,PFIZER (12+ Yrs PURPLE LABEL)       I have had an Advance Directives discussion with the patient.  The following high BMI interventions were performed this visit: encouragement to exercise, weight monitoring, weight loss from baseline weight and lifestyle education regarding diet.  Ensure ongoing efforts to achieve weight goal < 250 pounds initially, < 240 pounds ideally.           Subjective:     Leandro Brewer is a 66 year old male who presents for an annual exam.  In general doing well.  Blood sugars to remain somewhat elevated and has slowly been increasing his Lantus insulin from 40 units currently to 55 units by increasing slowly over the last 30 days.  Metformin  mg using 2 tablets twice daily plus Trulicity 1.5 mg weekly.  No hypoglycemic episodes.  Lowest blood sugar is 166 ranging up to 303 over the last 7 days.  7-day average of 239, 14-day average 236, 30-day average of 240 and 90-day average of 246 noted.  Microcytic anemia with hemoglobin Tracey trait noted.  Some shortness of breath with stair climbing without chest pain.  Has not required albuterol MDI for asthma and continues Qvar 80 mcg using 2 puffs twice daily.  Denies recent illness.  Comprehensive review of systems as above otherwise all negative.      Healthy Habits:     Getting at least 3 servings of Calcium per day:  NO    Bi-annual eye exam:  Yes    Dental care twice a year:  Yes    Sleep apnea or symptoms of sleep apnea:  Sleep apnea    Diet:  Diabetic    Frequency of exercise:  2-3 days/week    " Duration of exercise:  15-30 minutes    Taking medications regularly:  Yes    Medication side effects:  Not applicable    PHQ-2 Total Score: 0      \"Brandie\" x    2 daughters (Ryann, Laurita)   3 sons (Anil, Michael, Josh)   Mom - dec MI, kidney surgery   Dad -  age 83 (PVD s/p bipass, AKA with sepsis), h/o esophageal stricture, pacemaker/defib   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 Thompson); right inguinal hernia age 6 months; right total hip arthroplasty at [facility] 2019 with Dr. Evans.   Hospitalizations: early 20s \"infected gallbladder\" WITHOUT surgery... ; cellulitis in legs (hospitalized twice); abdominal wall cyst requiring IV antibiotics x 4 days... ; right Lumbar 3 - Lumbar 4 hemilaminectomy and medial facetectomy and Right redo Lumbar 4 -Lumbar 5 hemilaminectomy and medial facetectomy 21 (Dr. Lopez)     19-19 [facility] for right LE cellulitis/sepsis; right LE cellulitis 19-19 ([facility]); 10/15/19 s/p left MELITA (Dr. Evans)     FYI: see lab result \"Wild Chemistry\" from 11 re: hemoglobin Tracey trait resulting in mild microcyctic anemia (look for further cause of anemia if Hb < 11.0)      Immunization History   Administered Date(s) Administered     COVID-19,PF,Pfizer (12+ Yrs) 2021, 2021     DT (PEDS <7y) 1998     Flu, Unspecified 2007, 10/22/2008, 12/15/2018, 10/31/2020     Influenza (H1N1) 2010     Influenza Quad, Recombinant, pf(RIV4) (Flublok) 2019     Influenza Vaccine IM > 6 months Valent IIV4 (Alfuria,Fluzone) 2015     Influenza Vaccine, 6+MO IM (QUADRIVALENT W/PRESERVATIVES) 2009, 2010     Influenza,INJ,MDCK,PF,Quad >4yrs 10/31/2020     Pneumococcal 23 valent 2007, 2021     Td (Adult), Adsorbed 2017     Td,adult,historic,unspecified 2007     Tdap " (Adacel,Boostrix) 12/05/2007     Zoster vaccine recombinant adjuvanted (SHINGRIX) 11/12/2020     Immunization status: Needs Flublok immunization and Shingrix booster today.  Needs third shot Covid-19 Pfizer vaccination today as well.    Current Outpatient Medications   Medication Sig Dispense Refill     acetaminophen (TYLENOL) 500 MG tablet Take 1,000 mg by mouth       albuterol (PROAIR HFA/PROVENTIL HFA/VENTOLIN HFA) 108 (90 Base) MCG/ACT inhaler Inhale 2 puffs into the lungs 4 times daily as needed for shortness of breath / dyspnea or wheezing 18 g 6     APPLE CIDER VINEGAR PO Take 1 tablet by mouth       beclomethasone HFA (QVAR REDIHALER) 80 MCG/ACT inhaler INHALE 2 PUFFS BY MOUTH TWICE A DAY - RINSE MOUTH AFTER USE-DO NOT SHAKE UNIT       blood glucose (NO BRAND SPECIFIED) lancets standard [employer] Microlet MISC  Check blood sugar 2 times per day and as needed       Cholecalciferol (D3 DOTS) 50 MCG (2000 UT) TBDP Take 1 capsule by mouth       dulaglutide (TRULICITY) 1.5 MG/0.5ML pen INJECT 0.5ML SUBCUTANEOUSLY EVERY 7 DAYS.       furosemide (LASIX) 40 MG tablet Take 20 mg by mouth       insulin glargine (LANTUS SOLOSTAR) 100 UNIT/ML pen Inject 40 Units Subcutaneous       lisinopril (ZESTRIL) 20 MG tablet TAKE 1 TABLET BY MOUTH EVERY DAY       Magnesium Oxide 500 MG TABS Take 500 mg by mouth       metFORMIN (GLUCOPHAGE-XR) 500 MG 24 hr tablet TAKE 2 TABLETS BY MOUTH TWICE A DAY WITH MEALS. 360 tablet 3     Multiple Vitamin (MULTIVITAMIN ADULT PO) Take 1 tablet by mouth       naproxen (NAPROSYN) 500 MG tablet Take 1 tablet (500 mg) by mouth 2 times daily (with meals) 14 tablet 0     potassium gluconate 2.5 MEQ TABS Take 1 tablet by mouth       sildenafil (REVATIO) 20 MG tablet Take 40-60 mg by mouth       simvastatin (ZOCOR) 40 MG tablet TAKE 1 TABLET BY MOUTH EVERYDAY AT BEDTIME       terbinafine (LAMISIL) 250 MG tablet Take 250 mg by mouth       vitamin (B COMPLEX) capsule Take 1 capsule by mouth       vitamin  C (ASCORBIC ACID) 500 MG tablet Take 500 mg by mouth       furosemide (LASIX) 20 MG tablet Take 20 mg by mouth (Patient not taking: Reported on 11/16/2021)       gabapentin (NEURONTIN) 300 MG capsule Take 600 mg by mouth TWICE A DAY       potassium 99 MG TABS Take 1 tablet by mouth (Patient not taking: Reported on 11/16/2021)       Past Medical History:   Diagnosis Date     Anemia      Arthritis      Diabetes mellitus, type II (H)      Hypertension      Obesity      Plantar fasciitis      Past Surgical History:   Procedure Laterality Date     BACK SURGERY      laminectomy L45 by Dr. Phil HARE W/O FACETEC FORAMOT/SURYKC 1/2 VRT SEG, LUMBAR Right 1/20/2021    Procedure: Right Lumbar 3 - Lumbar 4 hemilaminectomy and medial facetectomy and Right redo Lumbar 4 -Lumbar 5 hemilaminectomy and medial facetectomy;  Surgeon: Julissa Lopez MD;  Location: [facility] [place];  Service: Spine     HERNIA REPAIR Right     inguinal; child     TOTAL HIP ARTHROPLASTY Bilateral 2019 Feb 5, 2019 (right) and October 19, 2019 (left)     Patient has no known allergies.  Family History   Problem Relation Age of Onset     Coronary Artery Disease Mother      Obesity Mother      Heart Disease Father      Cancer Father         throat     Coronary Artery Disease Father      Diabetes Sister      Diabetes Brother      Social History     Socioeconomic History     Marital status:      Spouse name: Not on file     Number of children: Not on file     Years of education: Not on file     Highest education level: Not on file   Occupational History     Not on file   Tobacco Use     Smoking status: Former Smoker     Smokeless tobacco: Never Used   Substance and Sexual Activity     Alcohol use: Yes     Alcohol/week: 1.0 standard drink     Comment: Alcoholic Drinks/day: occasional     Drug use: No     Sexual activity: Not on file   Other Topics Concern     Not on file   Social History Narrative     Not on file     Social Determinants of  "Health     Financial Resource Strain: Not on file   Food Insecurity: Not on file   Transportation Needs: Not on file   Physical Activity: Not on file   Stress: Not on file   Social Connections: Not on file   Intimate Partner Violence: Not on file   Housing Stability: Not on file       Review of Systems  Comprehensive ROS: as above, otherwise all negative.           Objective:     /70   Pulse 104   Ht 1.727 m (5' 8\")   Wt 117.9 kg (260 lb)   SpO2 95%   BMI 39.53 kg/m    Body mass index is 39.53 kg/m .    Physical    General Appearance:    Alert, cooperative, no distress, appears stated age.  BMI 39.53.  Transfer somewhat slowly due to left lower back pain associated with strain during physical therapy in September described.   Head:    Normocephalic, without obvious abnormality, atraumatic   Eyes:    PERRL, conjunctiva/corneas clear, EOM's intact, fundi     benign, both eyes        Ears:    Normal TM's and external ear canals, both ears   Nose:   Nares normal, septum midline, mucosa normal, no drainage    or sinus tenderness   Throat:   Lips, mucosa, and tongue normal; teeth and gums normal   Neck:   Supple, symmetrical, trachea midline, no adenopathy;        thyroid:  No enlargement/tenderness/nodules; no carotid    bruit or JVD   Back:     Symmetric, no curvature, ROM normal, no CVA tenderness   Lungs:     Clear to auscultation bilaterally, respirations unlabored   Chest wall:    No tenderness or deformity   Heart:    Regular rate and rhythm, S1 and S2 normal, no murmur, rub   or gallop   Abdomen:     Soft, non-tender, bowel sounds active all four quadrants,     no masses, no organomegaly.     Genitalia:    Normal male without lesion, discharge or tenderness.  No inguinal hernia noted.     Rectal:    Normal tone.  Prostate normal/symmetric, no masses or tenderness.   Extremities:   Extremities normal, atraumatic, no cyanosis or edema   Pulses:   2+ and symmetric all extremities   Skin:   Skin color, " texture, turgor normal, no rashes or lesions   Lymph nodes:   Cervical, supraclavicular, and axillary nodes normal   Neurologic:   CNII-XII intact. Normal strength, sensation and reflexes       throughout                This note has been dictated using voice recognition software and as a result may contain minor grammatical errors and unintended word substitutions.

## 2021-11-18 DIAGNOSIS — L03.115 CELLULITIS OF RIGHT LOWER EXTREMITY: Primary | ICD-10-CM

## 2021-11-18 RX ORDER — CEPHALEXIN 500 MG/1
500 CAPSULE ORAL 4 TIMES DAILY
Qty: 56 CAPSULE | Refills: 0 | Status: SHIPPED | OUTPATIENT
Start: 2021-11-18 | End: 2021-12-02

## 2021-11-24 ASSESSMENT — ASTHMA QUESTIONNAIRES
QUESTION_5 LAST FOUR WEEKS HOW WOULD YOU RATE YOUR ASTHMA CONTROL: COMPLETELY CONTROLLED
QUESTION_3 LAST FOUR WEEKS HOW OFTEN DID YOUR ASTHMA SYMPTOMS (WHEEZING, COUGHING, SHORTNESS OF BREATH, CHEST TIGHTNESS OR PAIN) WAKE YOU UP AT NIGHT OR EARLIER THAN USUAL IN THE MORNING: NOT AT ALL
QUESTION_1 LAST FOUR WEEKS HOW MUCH OF THE TIME DID YOUR ASTHMA KEEP YOU FROM GETTING AS MUCH DONE AT WORK, SCHOOL OR AT HOME: NONE OF THE TIME
QUESTION_4 LAST FOUR WEEKS HOW OFTEN HAVE YOU USED YOUR RESCUE INHALER OR NEBULIZER MEDICATION (SUCH AS ALBUTEROL): NOT AT ALL
ACT_TOTALSCORE: 24
ACUTE_EXACERBATION_TODAY: NO
QUESTION_2 LAST FOUR WEEKS HOW OFTEN HAVE YOU HAD SHORTNESS OF BREATH: ONCE OR TWICE A WEEK

## 2021-11-25 ASSESSMENT — ASTHMA QUESTIONNAIRES: ACT_TOTALSCORE: 24

## 2021-12-10 ENCOUNTER — DOCUMENTATION ONLY (OUTPATIENT)
Dept: SLEEP MEDICINE | Facility: CLINIC | Age: 66
End: 2021-12-10
Payer: COMMERCIAL

## 2021-12-10 NOTE — PROGRESS NOTES
Patient was offered choice of vendor and chose [facility].  Patient Leandro Brewer was set up at [facility]  on December 10, 2021. Patient received a Resmed Airsense 11 Pressures were set at 5-15 cm H2O.   Patient s ramp is 5 cm H2O for Off and FLEX/EPR is EPR, 2.  Patient received a Resmed Mask name: Airfit [id number]  Nasal mask size Medium, heated tubing and heated humidifier.  Patient does not need to meet compliance. Patient has a follow up on TBD with Dr. Bernard.    Priyank Oviedo

## 2021-12-13 ENCOUNTER — DOCUMENTATION ONLY (OUTPATIENT)
Dept: SLEEP MEDICINE | Facility: CLINIC | Age: 66
End: 2021-12-13
Payer: COMMERCIAL

## 2021-12-13 DIAGNOSIS — G47.33 OSA (OBSTRUCTIVE SLEEP APNEA): ICD-10-CM

## 2021-12-13 NOTE — PROGRESS NOTES
3 day Sleep therapy management telephone visit    Diagnostic AHI:  29.3 HST    Confirmed with patient at time of call- Yes Patient is still interested in STM service       Called patient and his phone just kept ringing and he had no voicemail.        Objective data     Order Settings for PAP  CPAP min 5    CPAP max 15          Assessment: Minimal usage      Action plan: Patient to have 14 day STM visit. Patient has a follow up visit scheduled:   yes within 31-90 days of set up    Replacement device: No  STM ordered by provider: Yes     Total time spent on accessing and  interpreting remote patient PAP therapy data  10 minutes    Total time spent counseling, coaching  and reviewing PAP therapy data with patient  1 minutes    [id number] no

## 2021-12-14 NOTE — PROGRESS NOTES
Pain isAssessment:   Leandro Brewer is a 66 y.o. y.o. male with past medical history significant for type 2 diabetes mellitus, obstructive sleep apnea, obesity, hyperlipidemia, hypertension who presents today for follow-up regarding acute on chronic left low back pain without radicular symptoms.  Consistent with lumbar facet arthropathy with secondary myofascial pain.  His most recent MRI lumbar spine from May 2021 does show moderate to advanced facet arthropathy L4-5 and moderate facet arthropathy L5-S1.  -This patient previously had right low back and right lower extremity pain.   Patient has a history of right L3-4 hemilaminectomy and redo right L4-5 hemilaminectomy with Dr. Lopez January 20, 2021.  He did not have any improvement in his pain following the surgery.  He was actually found to have a complication with his right hip replacement.  He underwent right hip replacement revision surgery in July 2021 and his right-sided pain has completely resolved.         Plan:     A shared decision making plan was used.  The patient's values and choices were respected.  The following represents what was discussed and decided upon by the physician assistant and the patient.      1.  DIAGNOSTIC TESTS: I reviewed the MRI lumbar spine, lumbar spine flexion-extension x-rays, and EMG right lower extremity.  I also reviewed recent CMP.  No further diagnostic tests were ordered.  If pain persist, he may need a repeat MRI lumbar spine.    2.  PHYSICAL THERAPY: Patient did physical therapy for his lower back most recently in April 2021.  He did physical therapy for his hip through September.  I encouraged him to resume his home exercise program for his lower back.  He has been trying to do some of the exercises, but because of his pain he has been somewhat limited.    3.  MEDICATIONS:  -I prescribed a 2-week course of naproxen 500 mg twice daily.  I would not recommend longer-term NSAID use due to comorbidities.  -Patient can use  tizanidine as needed.  This is somewhat helpful although it causes drowsiness.  He can take it at night.  -Patient can continues on Tylenol up to 1000 mg 3 times daily.    4.  INTERVENTIONS: No interventions were ordered.  Patient is going to trial conservative measures first.  If pain fails to improve, I would recommend left L3, L4, L5 medial branch blocks as the next step to determine if pain is facet mediated.  -If he had a positive response to the medial branch blocks I would recommend facet joint injections (if covered by insurance) versus radiofrequency ablation.  -If he failed the medial branch blocks, would recommend obtaining an updated MRI lumbar spine before pursuing any additional interventional pain management.      5.  PATIENT EDUCATION: Patient is in agreement the above plan.  All questions were answered.      6.  FOLLOW-UP: I offered to follow-up with the patient.  He prefers to follow-up as needed.  He will contact our clinic if symptoms fail to improve.  If he has any other questions or concerns, he should not hesitate to call.    Subjective:     Leandro Brewer is a 66 y.o. male who presents today for follow-up regarding acute on chronic left low back pain.  I have not seen this patient since December 2020.  Patient had a right L3-4 hemilaminectomy and redo right L4-5 hemilaminectomy with Dr. Lopez January 20, 2021.  After that surgery patient had no improvement in his symptoms.  In fact, his pain got even more severe.  He was ultimately found to have a complication with his prior right hip replacement.  He had right hip replacement revision surgery in July 2021 and his right-sided pain has resolved completely.  He began to experience left low back pain in September 2021 when he was in the pool doing physical therapy to rehab his hip.  He felt a pop or snap in his back.  He took naproxen for a week which was helpful, but he had some off-and-on back pain since then.  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    Patient complains of left-sided low back pain.  Pain is located in the lower left lumbar region.  He denies any pain radiating into the buttocks or down the legs.  Denies any numbness, tingling, weakness down the legs.  He rates his pain today as a 6 out of 10.  At its worst it is a 10 out of 10.  At its best it is a 2 out of 10.  Pain is aggravated with transitioning from seated to standing, bending, twisting.  He needs help from his wife to put on his socks.  Pain is alleviated with standing up straight and lying flat.  He denies loss of bowel or bladder control.  Denies any recent fevers.    Patient had physical therapy most recently for his lower back in April 2021.  He has been trying to do some of his home exercises but he feels limited because of his pain.  He has been taking Tylenol which helps a little bit.  He has taken some leftover tizanidine which has provided some relief of his pain but causes drowsiness.  He is not tolerating it during the day.      Review of Systems:  Negative for numbness/tingling, weakness, loss of bowel/bladder control, inability to urinate, headache, pain much worse at night, trip/stumble/falls, difficulty swallowing, difficulty with hand skills, fevers, unintentional weight loss.     Objective:   CONSTITUTIONAL:  Vital signs as above.  No acute distress.  The patient is well nourished and well groomed.    PSYCHIATRIC:  The patient is awake, alert, oriented to person, place and time.  The patient is answering questions appropriately with clear speech.  Normal affect.  HEENT: Normocephalic, atraumatic.  Sclera clear.    SKIN:  Skin over the face, posterior torso, bilateral upper and lower extremities is clean, dry, intact without rashes.  MUSCULOSKELETAL:  Gait is nonantalgic.  Able to ambulate on toes and heels bilaterally.  The patient is able to heel and toe walk without any difficulty.  Lumbar flexion mildly restricted with increased pain.   Lumbar extension mildly restricted with increased pain.  Lumbar facet loading maneuvers increased low back pain on the left.  Mild tenderness palpation left lower lumbar paraspinous muscles L4-5 and L5-S1.    The patient has 5/5 strength bilateral hip flexors, knee flexors/extensors, ankle dorsi/plantar flexors.  NEUROLOGICAL: Reflexes were 2+ left 1+ right patellar.  Sensation to light touch is intact bilateral lower extremities.     RESULTS:    I reviewed the MRI lumbar spine with and without contrast from [facility] dated May 24, 2021.  This shows postoperative changes of a right L3-4 hemilaminectomy.  The central canal and neural foramen at this level are adequate.  At L4-5 there are postoperative changes of a laminectomy with some enhancing fibrosis in the operative site extending along the right L5 nerve root.  There is no significant central canal or neuroforaminal stenosis at this level.  At L5-S1 there is a minimal disc osteophyte complex and moderate facet arthropathy but no neural compromise.  Please see report for further details.      EXAM: XR LUMBAR BENDING ONLY 2/3 VIEWS  LOCATION: [facility]  DATE/TIME: 11/13/2021 8:36 AM     INDICATION: Lumbar radiculopathy.     COMPARISON: Lumbar spine MR 5/24/2021.     TECHNIQUE: CR lumbar spine.                                                                      IMPRESSION: Degenerative grade 1 anterolisthesis of L4 upon L5 measuring 2-3 mm is again noted without significant change in the flexed or extended positions. Alignment of the lumbar vertebrae is otherwise normal. Vertebral body heights normal. No   fractures.      EMG right lower extremity June 23, 2021:  Comment NCS: Borderline study  1.  Low amplitude bilateral sural SNAPs.    2.  Borderline proximal amplitude of the right peroneal CMAP without amplitude drop or slowing across the fibular neck.  Likely normal.  3.  Normal right peroneal CMAP to the tibialis anterior.  4.   Normal right tibial CMAP  5.  Normal left peroneal CMAP to the EDB.     Comment EMG: Normal study  1.  Normal needle EMG right lower extremity.     Interpretation: Essentially normal study: There is electrodiagnostic evidence of:     1.  Mildly low amplitude bilateral sural sensory studies.  These findings can be normal for the patient's age or may represent a sensory or sensorimotor peripheral polyneuropathy which would be consistent with a history of diabetes mellitus.       2. There is no electrodiagnostic evidence of lumbosacral radiculopathy, lumbosacral plexopathy, or focal neuropathy in the right lower extremity.  Specifically, there is no electrodiagnostic evidence of a right L5 radiculopathy.

## 2021-12-16 ENCOUNTER — OFFICE VISIT (OUTPATIENT)
Dept: PHYSICAL MEDICINE AND REHAB | Facility: CLINIC | Age: 66
End: 2021-12-16
Payer: COMMERCIAL

## 2021-12-16 VITALS
BODY MASS INDEX: 39.5 KG/M2 | TEMPERATURE: 98.1 F | HEART RATE: 92 BPM | HEIGHT: 68 IN | SYSTOLIC BLOOD PRESSURE: 136 MMHG | WEIGHT: 260.6 LBS | DIASTOLIC BLOOD PRESSURE: 62 MMHG

## 2021-12-16 DIAGNOSIS — M79.18 MYOFASCIAL PAIN: ICD-10-CM

## 2021-12-16 DIAGNOSIS — M47.816 LUMBAR FACET ARTHROPATHY: ICD-10-CM

## 2021-12-16 PROCEDURE — 99214 OFFICE O/P EST MOD 30 MIN: CPT | Performed by: PHYSICIAN ASSISTANT

## 2021-12-16 RX ORDER — NAPROXEN 500 MG/1
500 TABLET ORAL 2 TIMES DAILY WITH MEALS
Qty: 28 TABLET | Refills: 0 | Status: SHIPPED | OUTPATIENT
Start: 2021-12-16 | End: 2022-05-16

## 2021-12-16 ASSESSMENT — PAIN SCALES - GENERAL: PAINLEVEL: SEVERE PAIN (6)

## 2021-12-16 ASSESSMENT — MIFFLIN-ST. JEOR: SCORE: 1946.57

## 2021-12-16 NOTE — LETTER
12/16/2021         RE: Leandro Brewer  [address] [facility] [facility] [id number]        Dear Colleague,    Thank you for referring your patient, Leandro Brewer, to the [facility] [place]. Please see a copy of my visit note below.    Pain isAssessment:   Leandro Brewer is a 66 y.o. y.o. male with past medical history significant for type 2 diabetes mellitus, obstructive sleep apnea, obesity, hyperlipidemia, hypertension who presents today for follow-up regarding acute on chronic left low back pain without radicular symptoms.  Consistent with lumbar facet arthropathy with secondary myofascial pain.  His most recent MRI lumbar spine from May 2021 does show moderate to advanced facet arthropathy L4-5 and moderate facet arthropathy L5-S1.  -This patient previously had right low back and right lower extremity pain.   Patient has a history of right L3-4 hemilaminectomy and redo right L4-5 hemilaminectomy with Dr. Lopez January 20, 2021.  He did not have any improvement in his pain following the surgery.  He was actually found to have a complication with his right hip replacement.  He underwent right hip replacement revision surgery in July 2021 and his right-sided pain has completely resolved.         Plan:     A shared decision making plan was used.  The patient's values and choices were respected.  The following represents what was discussed and decided upon by the physician assistant and the patient.      1.  DIAGNOSTIC TESTS: I reviewed the MRI lumbar spine, lumbar spine flexion-extension x-rays, and EMG right lower extremity.  I also reviewed recent CMP.  No further diagnostic tests were ordered.  If pain persist, he may need a repeat MRI lumbar spine.    2.  PHYSICAL THERAPY: Patient did physical therapy for his lower back most recently in April 2021.  He did physical therapy for his hip through September.  I encouraged him to resume his home exercise program for his lower  back.  He has been trying to do some of the exercises, but because of his pain he has been somewhat limited.    3.  MEDICATIONS:  -I prescribed a 2-week course of naproxen 500 mg twice daily.  I would not recommend longer-term NSAID use due to comorbidities.  -Patient can use tizanidine as needed.  This is somewhat helpful although it causes drowsiness.  He can take it at night.  -Patient can continues on Tylenol up to 1000 mg 3 times daily.    4.  INTERVENTIONS: No interventions were ordered.  Patient is going to trial conservative measures first.  If pain fails to improve, I would recommend left L3, L4, L5 medial branch blocks as the next step to determine if pain is facet mediated.  -If he had a positive response to the medial branch blocks I would recommend facet joint injections (if covered by insurance) versus radiofrequency ablation.  -If he failed the medial branch blocks, would recommend obtaining an updated MRI lumbar spine before pursuing any additional interventional pain management.      5.  PATIENT EDUCATION: Patient is in agreement the above plan.  All questions were answered.      6.  FOLLOW-UP: I offered to follow-up with the patient.  He prefers to follow-up as needed.  He will contact our clinic if symptoms fail to improve.  If he has any other questions or concerns, he should not hesitate to call.    Subjective:     Leandro Brewer is a 66 y.o. male who presents today for follow-up regarding acute on chronic left low back pain.  I have not seen this patient since December 2020.  Patient had a right L3-4 hemilaminectomy and redo right L4-5 hemilaminectomy with Dr. Lopez January 20, 2021.  After that surgery patient had no improvement in his symptoms.  In fact, his pain got even more severe.  He was ultimately found to have a complication with his prior right hip replacement.  He had right hip replacement revision surgery in July 2021 and his right-sided pain has resolved completely.  He began to  experience left low back pain in September 2021 when he was in the pool doing physical therapy to rehab his hip.  He felt a pop or snap in his back.  He took naproxen for a week which was helpful, but he had some off-and-on back pain since then.  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    Patient complains of left-sided low back pain.  Pain is located in the lower left lumbar region.  He denies any pain radiating into the buttocks or down the legs.  Denies any numbness, tingling, weakness down the legs.  He rates his pain today as a 6 out of 10.  At its worst it is a 10 out of 10.  At its best it is a 2 out of 10.  Pain is aggravated with transitioning from seated to standing, bending, twisting.  He needs help from his wife to put on his socks.  Pain is alleviated with standing up straight and lying flat.  He denies loss of bowel or bladder control.  Denies any recent fevers.    Patient had physical therapy most recently for his lower back in April 2021.  He has been trying to do some of his home exercises but he feels limited because of his pain.  He has been taking Tylenol which helps a little bit.  He has taken some leftover tizanidine which has provided some relief of his pain but causes drowsiness.  He is not tolerating it during the day.      Review of Systems:  Negative for numbness/tingling, weakness, loss of bowel/bladder control, inability to urinate, headache, pain much worse at night, trip/stumble/falls, difficulty swallowing, difficulty with hand skills, fevers, unintentional weight loss.     Objective:   CONSTITUTIONAL:  Vital signs as above.  No acute distress.  The patient is well nourished and well groomed.    PSYCHIATRIC:  The patient is awake, alert, oriented to person, place and time.  The patient is answering questions appropriately with clear speech.  Normal affect.  HEENT: Normocephalic, atraumatic.  Sclera clear.    SKIN:  Skin over the face, posterior torso,  bilateral upper and lower extremities is clean, dry, intact without rashes.  MUSCULOSKELETAL:  Gait is nonantalgic.  Able to ambulate on toes and heels bilaterally.  The patient is able to heel and toe walk without any difficulty.  Lumbar flexion mildly restricted with increased pain.  Lumbar extension mildly restricted with increased pain.  Lumbar facet loading maneuvers increased low back pain on the left.  Mild tenderness palpation left lower lumbar paraspinous muscles L4-5 and L5-S1.    The patient has 5/5 strength bilateral hip flexors, knee flexors/extensors, ankle dorsi/plantar flexors.  NEUROLOGICAL: Reflexes were 2+ left 1+ right patellar.  Sensation to light touch is intact bilateral lower extremities.     RESULTS:    I reviewed the MRI lumbar spine with and without contrast from [facility] dated May 24, 2021.  This shows postoperative changes of a right L3-4 hemilaminectomy.  The central canal and neural foramen at this level are adequate.  At L4-5 there are postoperative changes of a laminectomy with some enhancing fibrosis in the operative site extending along the right L5 nerve root.  There is no significant central canal or neuroforaminal stenosis at this level.  At L5-S1 there is a minimal disc osteophyte complex and moderate facet arthropathy but no neural compromise.  Please see report for further details.      EXAM: XR LUMBAR BENDING ONLY 2/3 VIEWS  LOCATION: [facility]  DATE/TIME: 11/13/2021 8:36 AM     INDICATION: Lumbar radiculopathy.     COMPARISON: Lumbar spine MR 5/24/2021.     TECHNIQUE: CR lumbar spine.                                                                      IMPRESSION: Degenerative grade 1 anterolisthesis of L4 upon L5 measuring 2-3 mm is again noted without significant change in the flexed or extended positions. Alignment of the lumbar vertebrae is otherwise normal. Vertebral body heights normal. No   fractures.      EMG right lower extremity June 23,  2021:  Comment NCS: Borderline study  1.  Low amplitude bilateral sural SNAPs.    2.  Borderline proximal amplitude of the right peroneal CMAP without amplitude drop or slowing across the fibular neck.  Likely normal.  3.  Normal right peroneal CMAP to the tibialis anterior.  4.  Normal right tibial CMAP  5.  Normal left peroneal CMAP to the EDB.     Comment EMG: Normal study  1.  Normal needle EMG right lower extremity.     Interpretation: Essentially normal study: There is electrodiagnostic evidence of:     1.  Mildly low amplitude bilateral sural sensory studies.  These findings can be normal for the patient's age or may represent a sensory or sensorimotor peripheral polyneuropathy which would be consistent with a history of diabetes mellitus.       2. There is no electrodiagnostic evidence of lumbosacral radiculopathy, lumbosacral plexopathy, or focal neuropathy in the right lower extremity.  Specifically, there is no electrodiagnostic evidence of a right L5 radiculopathy.         Again, thank you for allowing me to participate in the care of your patient.        Sincerely,        Antonieta Thompson PA-C

## 2021-12-16 NOTE — PATIENT INSTRUCTIONS
We will try a two week course of naproxen.     Continue using tizanidine as needed.  Let me know if you need a refill.    Try to do your home exercises on a daily basis.    If pain doesn't improve I would recommend medial branch blocks (test injection) to determine if pain is coming from the arthritis joints in your back.    If you have a positive response to the test injections we could pursue cortisone shots in the joints (if insurance will authorize) or radiofrequency ablation (burning the nerves).    If you fail the test injections I would get a new MRI lumbar spine to evaluate further.    Please call at [phone] or send me a [employer] message if pain doesn't get better.

## 2021-12-28 ENCOUNTER — DOCUMENTATION ONLY (OUTPATIENT)
Dept: SLEEP MEDICINE | Facility: CLINIC | Age: 66
End: 2021-12-28
Payer: COMMERCIAL

## 2021-12-28 DIAGNOSIS — G47.33 OSA (OBSTRUCTIVE SLEEP APNEA): ICD-10-CM

## 2022-01-10 ENCOUNTER — DOCUMENTATION ONLY (OUTPATIENT)
Dept: SLEEP MEDICINE | Facility: CLINIC | Age: 67
End: 2022-01-10
Payer: COMMERCIAL

## 2022-01-10 DIAGNOSIS — G47.33 OSA (OBSTRUCTIVE SLEEP APNEA): ICD-10-CM

## 2022-01-10 NOTE — PROGRESS NOTES
30 DAY STM VISIT    Diagnostic AHI:  29.3 HST    Subjective measures:   Mostly good, has been stuffed up for the last few nights. Patient is currently working a lot and is exhausted from that.   He wakes up once in a while with a dry mouth. I adjusted the humidity level from 4 to 5.     Assessment: Pt meeting objective benchmarks.  Patient failing following subjective benchmarks: dryness    Action plan: pt to have 6 month [facility] visit  Patient has scheduled a follow up visit with Dr. Bernard on 3/22/22.   Device type: Auto-CPAP  PAP settings: CPAP min 5.0 cm  H20     CPAP max 15.0 cm  H20    95th% pressure 9.9 cm  H20      RESMED EPR level Setting: TWO    RESMED Soft response setting:  OFF  Mask type:  Nasal Mask  Objective measures: 14 day rolling measures      Compliance  92 %      Leak  27.68 lpm  last  upload      AHI 0.79   last  upload      Average number of minutes 355      Objective measure goal  Compliance   Goal >70%  Leak   Goal < 24 lpm  AHI  Goal < 5  Usage  Goal >240        Total time spent on accessing and interpreting remote patient PAP therapy data  1 minutes    Total time spent counseling, coaching  and reviewing PAP therapy data with patient  5 minutes     [id number] this call  [id number] no  at 3 or 14 day [facility]

## 2022-01-11 ENCOUNTER — OFFICE VISIT (OUTPATIENT)
Dept: FAMILY MEDICINE | Facility: CLINIC | Age: 67
End: 2022-01-11
Payer: COMMERCIAL

## 2022-01-11 VITALS
SYSTOLIC BLOOD PRESSURE: 116 MMHG | OXYGEN SATURATION: 96 % | DIASTOLIC BLOOD PRESSURE: 69 MMHG | HEART RATE: 92 BPM | RESPIRATION RATE: 16 BRPM | TEMPERATURE: 98.1 F | BODY MASS INDEX: 38.96 KG/M2 | WEIGHT: 256.2 LBS

## 2022-01-11 DIAGNOSIS — L03.115 CELLULITIS OF RIGHT ANTERIOR LOWER LEG: Primary | ICD-10-CM

## 2022-01-11 PROCEDURE — 99214 OFFICE O/P EST MOD 30 MIN: CPT | Performed by: PHYSICIAN ASSISTANT

## 2022-01-11 RX ORDER — CEPHALEXIN 500 MG/1
500 CAPSULE ORAL 3 TIMES DAILY
Qty: 21 CAPSULE | Refills: 0 | Status: SHIPPED | OUTPATIENT
Start: 2022-01-11 | End: 2022-01-18

## 2022-01-11 NOTE — PROGRESS NOTES
URGENT CARE VISIT:    SUBJECTIVE:   Chief Complaint   Patient presents with     Leg Swelling     Rt leg, redness, tender and warm to the touch, started today (was having pain under knee cap), prone to cellulitits, no fever, no headaches or dizziness      Leandro Brewer is a 66 year old diabetic male who presents with a chief complaint of right leg swelling and redness.  Symptoms began today. There is no known injury. No recent COVID infection, travel, or surgeries.  Denies pain or sob. He has hx of cellulitis multiple times. A few times he has been hospitalized for it.  He treated it initially with no therapy. This is not the first time this type of injury has occurred to this patient.     PMH:   Past Medical History:   Diagnosis Date     Anemia      Arthritis      Diabetes mellitus, type II (H)      Hypertension      Obesity      Plantar fasciitis      Allergies: Patient has no known allergies.   Medications:   Current Outpatient Medications   Medication Sig Dispense Refill     acetaminophen (TYLENOL) 500 MG tablet Take 1,000 mg by mouth       albuterol (PROAIR HFA/PROVENTIL HFA/VENTOLIN HFA) 108 (90 Base) MCG/ACT inhaler Inhale 2 puffs into the lungs 4 times daily as needed for shortness of breath / dyspnea or wheezing 18 g 6     APPLE CIDER VINEGAR PO Take 1 tablet by mouth       aspirin (ASA) 325 MG EC tablet        beclomethasone HFA (QVAR REDIHALER) 80 MCG/ACT inhaler INHALE 2 PUFFS BY MOUTH TWICE A DAY - RINSE MOUTH AFTER USE-DO NOT SHAKE UNIT       blood glucose (NO BRAND SPECIFIED) lancets standard [employer] Microlet MISC  Check blood sugar 2 times per day and as needed       cephALEXin (KEFLEX) 500 MG capsule Take 1 capsule (500 mg) by mouth 3 times daily for 7 days 21 capsule 0     Cholecalciferol (D3 DOTS) 50 MCG (2000 UT) TBDP Take 1 capsule by mouth       dulaglutide (TRULICITY) 1.5 MG/0.5ML pen INJECT 0.5ML SUBCUTANEOUSLY EVERY 7 DAYS.       furosemide (LASIX) 40 MG tablet Take 20 mg by mouth        insulin glargine (LANTUS SOLOSTAR) 100 UNIT/ML pen Inject 60 Units Subcutaneous At Bedtime 5 mL 3     lisinopril (ZESTRIL) 20 MG tablet TAKE 1 TABLET BY MOUTH EVERY DAY       Magnesium Oxide 500 MG TABS Take 500 mg by mouth       metFORMIN (GLUCOPHAGE-XR) 500 MG 24 hr tablet TAKE 2 TABLETS BY MOUTH TWICE A DAY WITH MEALS. 360 tablet 3     Multiple Vitamin (MULTIVITAMIN ADULT PO) Take 1 tablet by mouth       naproxen (NAPROSYN) 500 MG tablet Take 1 tablet (500 mg) by mouth 2 times daily (with meals) Take with food. 28 tablet 0     potassium gluconate 2.5 MEQ TABS Take 1 tablet by mouth       sildenafil (REVATIO) 20 MG tablet Take 40-60 mg by mouth       simvastatin (ZOCOR) 40 MG tablet TAKE 1 TABLET BY MOUTH EVERYDAY AT BEDTIME       terbinafine (LAMISIL) 250 MG tablet Take 250 mg by mouth       tiZANidine (ZANAFLEX) 4 MG tablet        vitamin (B COMPLEX) capsule Take 1 capsule by mouth       vitamin C (ASCORBIC ACID) 500 MG tablet Take 500 mg by mouth       gabapentin (NEURONTIN) 300 MG capsule Take 600 mg by mouth TWICE A DAY       Social History:   Social History     Tobacco Use     Smoking status: Former Smoker     Smokeless tobacco: Never Used   Substance Use Topics     Alcohol use: Yes     Alcohol/week: 1.0 standard drink     Comment: Alcoholic Drinks/day: occasional       ROS:  General: negative  Skin: right leg redness and swelling  Eyes: negative  Ears/Nose/Throat: negative  Respiratory: No shortness of breath, dyspnea on exertion, cough, or hemoptysis  Cardiovascular: negative  Gastrointestinal: negative  Genitourinary: negative  Musculoskeletal: negative  Neurologic: negative      OBJECTIVE:  /69   Pulse 92   Temp 98.1  F (36.7  C) (Oral)   Resp 16   Wt 116.2 kg (256 lb 3.2 oz)   SpO2 96%   BMI 38.96 kg/m    GENERAL APPEARANCE: healthy, alert and no distress  MUSCULOSKELETAL: No right calf TTP. FROM. Gait normal.  EXTREMITIES: peripheral pulses normal  SKIN: Mild erythema and edema over  anterior aspect of right lower leg.   NEURO: sensation intact   PSYCH: normal mood and affect      ASSESSMENT:    ICD-10-CM    1. Cellulitis of right anterior lower leg  L03.115 cephALEXin (KEFLEX) 500 MG capsule       PLAN:  Patient Instructions   Patient was educated on the natural course of condition. He has no risk factors for DVT and has extensive history of cellulitis. Appears to be early cellulitis and we will start abx treatments. Take medication as prescribed. Side effects discussed.  Conservative measures discussed including over-the-counter analgesics (Tylenol or Ibuprofen). Observe carefully for any signs of increased redness or pain in the affected area. See your primary care provider if symptoms worsen or do not improve in 3 days. Seek emergency care if you develop severe pain, streaking, or fever.     Patient verbalized understanding and is agreeable to plan. The patient was discharged ambulatory and in stable condition.    Gladys Velasquez PA-C on 1/11/2022 at 2:29 PM

## 2022-01-11 NOTE — PATIENT INSTRUCTIONS
Patient was educated on the natural course of condition. Take medication as prescribed. Side effects discussed.  Conservative measures discussed including over-the-counter analgesics (Tylenol or Ibuprofen). Observe carefully for any signs of increased redness or pain in the affected area. See your primary care provider if symptoms worsen or do not improve in 3 days. Seek emergency care if you develop severe pain, streaking, or fever.

## 2022-01-12 ENCOUNTER — MYC MEDICAL ADVICE (OUTPATIENT)
Dept: PHYSICAL MEDICINE AND REHAB | Facility: CLINIC | Age: 67
End: 2022-01-12
Payer: COMMERCIAL

## 2022-01-12 DIAGNOSIS — M47.816 LUMBAR FACET ARTHROPATHY: Primary | ICD-10-CM

## 2022-01-13 NOTE — TELEPHONE ENCOUNTER
Patient returned call. Back pain is low back on left toward midline. Denies radiation of pain into legs. He feels this is the same pain he had when in clinic with [facility]. Order placed in Epic for  left L3, L4, L5 medial branch blocks. Injection requirements reviewed in full with patient. Stated understanding. Transferred to scheduling to make appointment.

## 2022-01-18 VITALS
WEIGHT: 270 LBS | HEART RATE: 115 BPM | OXYGEN SATURATION: 96 % | BODY MASS INDEX: 39.99 KG/M2 | DIASTOLIC BLOOD PRESSURE: 65 MMHG | HEIGHT: 69 IN | SYSTOLIC BLOOD PRESSURE: 124 MMHG

## 2022-01-18 VITALS
OXYGEN SATURATION: 97 % | DIASTOLIC BLOOD PRESSURE: 70 MMHG | BODY MASS INDEX: 38.95 KG/M2 | WEIGHT: 263 LBS | TEMPERATURE: 98.6 F | HEIGHT: 69 IN | RESPIRATION RATE: 20 BRPM | SYSTOLIC BLOOD PRESSURE: 126 MMHG | HEART RATE: 95 BPM

## 2022-01-18 VITALS
WEIGHT: 255 LBS | OXYGEN SATURATION: 96 % | SYSTOLIC BLOOD PRESSURE: 128 MMHG | HEIGHT: 70 IN | BODY MASS INDEX: 36.51 KG/M2 | DIASTOLIC BLOOD PRESSURE: 70 MMHG | HEART RATE: 106 BPM

## 2022-01-18 VITALS
SYSTOLIC BLOOD PRESSURE: 146 MMHG | DIASTOLIC BLOOD PRESSURE: 76 MMHG | BODY MASS INDEX: 38.51 KG/M2 | WEIGHT: 260 LBS | OXYGEN SATURATION: 96 % | RESPIRATION RATE: 18 BRPM | HEART RATE: 80 BPM | HEIGHT: 69 IN

## 2022-01-18 VITALS
TEMPERATURE: 97.4 F | RESPIRATION RATE: 18 BRPM | DIASTOLIC BLOOD PRESSURE: 76 MMHG | SYSTOLIC BLOOD PRESSURE: 100 MMHG | WEIGHT: 260.7 LBS | HEART RATE: 72 BPM | SYSTOLIC BLOOD PRESSURE: 116 MMHG | BODY MASS INDEX: 38.61 KG/M2 | DIASTOLIC BLOOD PRESSURE: 62 MMHG | HEIGHT: 69 IN | HEART RATE: 88 BPM

## 2022-01-18 VITALS — SYSTOLIC BLOOD PRESSURE: 130 MMHG | HEART RATE: 72 BPM | RESPIRATION RATE: 18 BRPM | DIASTOLIC BLOOD PRESSURE: 84 MMHG

## 2022-01-18 VITALS
WEIGHT: 264 LBS | HEART RATE: 97 BPM | OXYGEN SATURATION: 98 % | BODY MASS INDEX: 39.1 KG/M2 | HEIGHT: 69 IN | DIASTOLIC BLOOD PRESSURE: 63 MMHG | SYSTOLIC BLOOD PRESSURE: 126 MMHG

## 2022-01-18 VITALS
OXYGEN SATURATION: 96 % | DIASTOLIC BLOOD PRESSURE: 80 MMHG | BODY MASS INDEX: 38.99 KG/M2 | SYSTOLIC BLOOD PRESSURE: 130 MMHG | HEART RATE: 102 BPM | WEIGHT: 264 LBS

## 2022-01-18 VITALS
TEMPERATURE: 98.1 F | DIASTOLIC BLOOD PRESSURE: 70 MMHG | BODY MASS INDEX: 39.87 KG/M2 | HEART RATE: 104 BPM | WEIGHT: 270 LBS | OXYGEN SATURATION: 96 % | SYSTOLIC BLOOD PRESSURE: 118 MMHG

## 2022-01-18 VITALS
DIASTOLIC BLOOD PRESSURE: 70 MMHG | SYSTOLIC BLOOD PRESSURE: 110 MMHG | HEART RATE: 90 BPM | OXYGEN SATURATION: 97 % | WEIGHT: 261 LBS | BODY MASS INDEX: 38.54 KG/M2

## 2022-01-18 VITALS
HEART RATE: 122 BPM | OXYGEN SATURATION: 98 % | DIASTOLIC BLOOD PRESSURE: 70 MMHG | SYSTOLIC BLOOD PRESSURE: 126 MMHG | WEIGHT: 264 LBS | BODY MASS INDEX: 39.1 KG/M2 | HEIGHT: 69 IN

## 2022-01-18 VITALS
OXYGEN SATURATION: 96 % | BODY MASS INDEX: 37.18 KG/M2 | HEART RATE: 87 BPM | DIASTOLIC BLOOD PRESSURE: 60 MMHG | WEIGHT: 251 LBS | SYSTOLIC BLOOD PRESSURE: 110 MMHG | TEMPERATURE: 97.2 F | HEIGHT: 69 IN

## 2022-01-18 VITALS
SYSTOLIC BLOOD PRESSURE: 138 MMHG | OXYGEN SATURATION: 97 % | DIASTOLIC BLOOD PRESSURE: 78 MMHG | HEART RATE: 124 BPM | WEIGHT: 269 LBS | BODY MASS INDEX: 39.72 KG/M2

## 2022-01-18 VITALS — WEIGHT: 260.3 LBS | BODY MASS INDEX: 38.55 KG/M2 | HEIGHT: 69 IN

## 2022-01-18 ASSESSMENT — PATIENT HEALTH QUESTIONNAIRE - PHQ9
SUM OF ALL RESPONSES TO PHQ QUESTIONS 1-9: 0
SUM OF ALL RESPONSES TO PHQ QUESTIONS 1-9: 6

## 2022-02-21 DIAGNOSIS — J45.30 MILD PERSISTENT ASTHMA, UNCOMPLICATED: ICD-10-CM

## 2022-02-22 RX ORDER — BECLOMETHASONE DIPROPIONATE HFA 80 UG/1
AEROSOL, METERED RESPIRATORY (INHALATION)
Qty: 31.8 G | Refills: 1 | Status: SHIPPED | OUTPATIENT
Start: 2022-02-22 | End: 2022-08-06

## 2022-02-22 NOTE — TELEPHONE ENCOUNTER
"Outpatient Medication Detail     Disp Refills Start End RACIEL   QVAR REDIHALER 80 mcg/actuation HFAB inhaler 31.8 g 3 2/24/2021  No   Sig: INHALE 2 PUFFS BY MOUTH TWICE A DAY - RINSE MOUTH AFTER USE-DO NOT SHAKE UNIT   Sent to pharmacy as: Qvar RediHaler 80 mcg/actuation HFA breath activated aerosol (beclomethasone)   E-Prescribing Status: Receipt confirmed by pharmacy (2/24/2021  2:12 PM CST)       Last office visit provider:  11/16/21     Requested Prescriptions   Pending Prescriptions Disp Refills     QVAR REDIHALER 80 MCG/ACT inhaler [Pharmacy Med Name: QVAR REDIHALER 80 MCG] 31.8 g 3     Sig: INHALE 2 PUFFS BY MOUTH TWICE A DAY - RINSE MOUTH AFTER USE-DO NOT SHAKE UNIT       Inhaled Steroids Protocol Passed - 2/22/2022  9:22 AM        Passed - Patient is age 12 or older        Passed - Asthma control assessment score within normal limits in last 6 months     Please review ACT score.           Passed - Medication is active on med list        Passed - Recent (6 mo) or future (30 days) visit within the authorizing provider's specialty     Patient had office visit in the last 6 months or has a visit in the next 30 days with authorizing provider or within the authorizing provider's specialty.  See \"Patient Info\" tab in inbasket, or \"Choose Columns\" in Meds & Orders section of the refill encounter.                 Fantasma Pizarro RN 02/22/22 9:22 AM  "

## 2022-03-07 ENCOUNTER — VIRTUAL VISIT (OUTPATIENT)
Dept: SLEEP MEDICINE | Facility: CLINIC | Age: 67
End: 2022-03-07
Payer: COMMERCIAL

## 2022-03-07 VITALS — BODY MASS INDEX: 37.77 KG/M2 | WEIGHT: 255 LBS | HEIGHT: 69 IN

## 2022-03-07 DIAGNOSIS — G47.33 OSA ON CPAP: Primary | ICD-10-CM

## 2022-03-07 PROCEDURE — 99214 OFFICE O/P EST MOD 30 MIN: CPT | Mod: 95 | Performed by: INTERNAL MEDICINE

## 2022-03-07 NOTE — PROGRESS NOTES
Marc is a 66 year old who is being evaluated via a billable video visit.      How would you like to obtain your AVS? MyChart  If the video visit is dropped, the invitation should be resent by: Send to e-mail at: [email or web address]  Will anyone else be joining your video visit? No        Video-Visit Details    Type of service:  Video Visit    Video Start Time:Start: 03/07/2022 03:37 pm  Stop: 03/07/2022 03:53 pm    Originating Location (pt. Location): Home    Distant Location (provider location):  [facility] [place]     Platform used for Video Visit: [facility]     Sleep clinic follow up visit note    Date on this visit: 3/7/2022    Primary Physician: Josh Shafer     Chief complaint: Follow-up of GUEAR    Leandro Brewer is a  66 year old male  with a history of HTN, DM-2, asthma who was diagnosed with moderate GUERA during home sleep study obtained on 10/28/2021.    He was set up at [place] on December 10, 2021. Patient received a Resmed Airsense 11 Pressures were set at 5-15 cm H2O. Patient received a Resmed Mask name: Airfit [id number]  Nasal mask size Medium, heated tubing and heated humidifier.      Patient presents for virtual visit  for follow up of GUERA..     He reports using the CPAP regularly during sleep.  He likes the nasal mask.  There are no reports of snoring/apneas/awakenings due to gasping for air with the CPAP use  Pressure settings feel comfortable  Sleep quality improved with CPAP treatment, including decreased nocturia   He also reports improvement in energy levels.  He denies EDS.  Denies drowsiness while driving  [place] sleepiness score pre-CPAP November 15, 2021:19/24  [place] sleepiness score today: 8/24      Home sleep study report:     Study Date: 10/28/2021     Analysis Time:  400.9 minutes  Respiration:   Sleep Associated Hypoxemia: sustained hypoxemia was present. Baseline oxygen saturation was 93.9%.  Time with saturation less than or  equal to 88% was 12.3 minutes. The lowest oxygen saturation was 74%.   Snoring: Snoring was present.  Respiratory events: The home study revealed a presence of 18 obstructive apneas and 0 mixed and central apneas. There were 176 hypopneas resulting in a combined apnea/hypopnea index [AHI] of 29.3 events per hour.  AHI was 37.4 per hour supine, - per hour prone, 28.9 per hour on left side, and - per hour on right side.   Pattern: Excluding events noted above, respiratory rate and pattern was Normal.     Position: Percent of time spent: supine - 4.4%, prone - 0%, on left - 94.8%, on right - 0%.     Heart Rate: By pulse oximetry normal rate was noted.      Assessment:   Moderate obstructive sleep apnea.  Sleep associated hypoxemia was present.      CPAP compliance download:  ResMed   Auto-PAP 5.0 - 15.0 cmH2O 30 day usage data:    100% of days with > 4 hours of use. 0/30 days with no use.   Average use 487 minutes per day.   95%ile Leak 18.23 L/min.   CPAP 95% pressure 10.1 cm.   AHI 0.75 events per hour.       Allergies:    No Known Allergies    Medications:    Current Outpatient Medications   Medication Sig Dispense Refill     acetaminophen (TYLENOL) 500 MG tablet Take 1,000 mg by mouth       albuterol (PROAIR HFA/PROVENTIL HFA/VENTOLIN HFA) 108 (90 Base) MCG/ACT inhaler Inhale 2 puffs into the lungs 4 times daily as needed for shortness of breath / dyspnea or wheezing 18 g 6     APPLE CIDER VINEGAR PO Take 1 tablet by mouth       aspirin (ASA) 325 MG EC tablet        blood glucose (NO BRAND SPECIFIED) lancets standard [employer] Microlet MISC  Check blood sugar 2 times per day and as needed       Cholecalciferol (D3 DOTS) 50 MCG (2000 UT) TBDP Take 1 capsule by mouth       dulaglutide (TRULICITY) 1.5 MG/0.5ML pen Inject 1.5 mg Subcutaneous every 7 days 6 mL 3     furosemide (LASIX) 40 MG tablet Take 0.5 tablets (20 mg) by mouth daily 45 tablet 3     gabapentin (NEURONTIN) 300 MG capsule Take 600 mg by mouth TWICE A  DAY       insulin glargine (LANTUS SOLOSTAR) 100 UNIT/ML pen Inject 60 Units Subcutaneous At Bedtime 15 mL 3     lisinopril (ZESTRIL) 20 MG tablet TAKE 1 TABLET BY MOUTH EVERY DAY       Magnesium Oxide 500 MG TABS Take 500 mg by mouth       metFORMIN (GLUCOPHAGE-XR) 500 MG 24 hr tablet TAKE 2 TABLETS BY MOUTH TWICE A DAY WITH MEALS. 360 tablet 3     Multiple Vitamin (MULTIVITAMIN ADULT PO) Take 1 tablet by mouth       naproxen (NAPROSYN) 500 MG tablet Take 1 tablet (500 mg) by mouth 2 times daily (with meals) Take with food. 28 tablet 0     potassium gluconate 2.5 MEQ TABS Take 1 tablet by mouth       QVAR REDIHALER 80 MCG/ACT inhaler INHALE 2 PUFFS BY MOUTH TWICE A DAY - RINSE MOUTH AFTER USE-DO NOT SHAKE UNIT 31.8 g 1     sildenafil (REVATIO) 20 MG tablet Take 40-60 mg by mouth       simvastatin (ZOCOR) 40 MG tablet TAKE 1 TABLET BY MOUTH EVERYDAY AT BEDTIME 90 tablet 3     terbinafine (LAMISIL) 250 MG tablet Take 250 mg by mouth       tiZANidine (ZANAFLEX) 4 MG tablet        vitamin (B COMPLEX) capsule Take 1 capsule by mouth       vitamin C (ASCORBIC ACID) 500 MG tablet Take 500 mg by mouth         Problem List:  Patient Active Problem List    Diagnosis Date Noted     GUERA (obstructive sleep apnea) 11/01/2021     Priority: Medium     Lumbar radiculopathy 01/20/2021     Priority: Medium     Osteoarthritis of spine with radiculopathy, lumbar region 01/14/2021     Priority: Medium     Added automatically from request for surgery [id number]         Spondylolisthesis of lumbar region 01/14/2021     Priority: Medium     Added automatically from request for surgery [id number]         Insulin dependent diabetes mellitus      Priority: Medium     Created by Conversion  IMO SNOMED LOAD SPRING 2020 [.6/.18/.2020 9:04 PM]  Teofilo Ugarte:           History of cellulitis 10/01/2019     Priority: Medium     History of MRSA infection 10/01/2019     Priority: Medium     Diabetic neuropathy associated with type 2 diabetes mellitus (H)  09/09/2019     Priority: Medium     Essential hypertension, benign      Priority: Medium     Created by Conversion  Replacement Utility updated for latest IMO load         Primary osteoarthritis of right hip 02/21/2019     Priority: Medium     s/p right MELITA 2/5/19 (Dr. Evans) at [facility]         Uncontrolled type 2 diabetes mellitus with hyperglycemia, with long-term current use of insulin (H) 02/21/2019     Priority: Medium     Microcytic anemia      Priority: Medium     Created by Conversion  [facility] Annotation: Apr 26 2011 12:57PM - Josh Shafer: Microcytic.    Hemoglobin ELP 4/19/11 c/w hemoglobin Tracey trait (harmless condition).    If   Hb < 11.0, then look for another reason.         Tubular adenoma of colon      Priority: Medium     Created by Conversion         Class 2 severe obesity due to excess calories with serious comorbidity and body mass index (BMI) of 36.0 to 36.9 in adult (H)      Priority: Medium     Created by Conversion         Tinea cruris 01/08/2019     Priority: Medium     Spinal stenosis of lumbar region, unspecified whether neurogenic claudication present 01/08/2019     Priority: Medium     Mild persistent asthma without complication 06/19/2018     Priority: Medium     Lymphedema      Priority: Medium     Created by Conversion         Hyperlipidemia      Priority: Medium     Created by Conversion         Diverticulosis      Priority: Medium     Created by Conversion  Replacement Utility updated for latest IMO load         Hammer Toe Of The Right Second Toe      Priority: Medium     Created by Conversion         Male Erectile Disorder      Priority: Medium     Created by Conversion         Carpal Tunnel Syndrome      Priority: Medium     Created by Conversion            Past Medical/Surgical History:  Past Medical History:   Diagnosis Date     Anemia      Arthritis      Diabetes mellitus, type II (H)      Hypertension      Obesity      Plantar fasciitis      Past  "Surgical History:   Procedure Laterality Date     BACK SURGERY      laminectomy L45 by Dr. Villarreal      HERNIA REPAIR Right     inguinal; child     TOTAL HIP ARTHROPLASTY Bilateral 2019    Feb 5, 2019 (right) and October 19, 2019 (left)     ZZC HARE W/O FACETEC FORAMOT/DSKC 1/2 VRT SEG, LUMBAR Right 1/20/2021    Procedure: Right Lumbar 3 - Lumbar 4 hemilaminectomy and medial facetectomy and Right redo Lumbar 4 -Lumbar 5 hemilaminectomy and medial facetectomy;  Surgeon: Julissa Lopez MD;  Location: [facility];  Service: Spine       Social History:  Social History     Socioeconomic History     Marital status:      Spouse name: Not on file     Number of children: Not on file     Years of education: Not on file     Highest education level: Not on file   Occupational History     Not on file   Tobacco Use     Smoking status: Former Smoker     Smokeless tobacco: Never Used   Substance and Sexual Activity     Alcohol use: Yes     Alcohol/week: 1.0 standard drink     Comment: Alcoholic Drinks/day: occasional     Drug use: No     Sexual activity: Not on file   Other Topics Concern     Not on file   Social History Narrative     Not on file     Social Determinants of Health     Financial Resource Strain: Not on file   Food Insecurity: Not on file   Transportation Needs: Not on file   Physical Activity: Not on file   Stress: Not on file   Social Connections: Not on file   Intimate Partner Violence: Not on file   Housing Stability: Not on file       Family History:  Family History   Problem Relation Age of Onset     Coronary Artery Disease Mother      Obesity Mother      Heart Disease Father      Cancer Father         throat     Coronary Artery Disease Father      Diabetes Sister      Diabetes Brother        Physical Examination:  Vitals: Ht 1.753 m (5' 9\")   Wt 115.7 kg (255 lb)   BMI 37.66 kg/m    BMI= Body mass index is 37.66 kg/m .    [place] Total Score 11/11/2021   Total score - [place] 19     General: No " apparent distress, appropriately groomed  Chest: No cough, no audible wheezing, able to talk in full sentences  Psych: coherent speech, normal rate and volume, able to articulate logical thoughts, able   to abstract reason, no tangential thoughts, no hallucinations   or delusions  His affect is normal  Neuro:  Mental status: Alert and  Oriented X 3  Speech: normal     Impression/Plan:  Obstructive sleep apnea: Pt reports adequate compliance with PAP therapy and reports positive benefits with PAP use.   GUERA is adequately controlled with Auto CPAP at the current settings per compliance DL.   Prescription provided for renewal of PAP supplies.  Recommended him  to continue using the CPAP regularly during sleep and instructed  to get  the supplies for the PAP replaced regularly.    Patient instructed to remember to bring PAP with him  if hospitalized and if anticipating procedure that requires sedation/surgery to be sure to discuss with the provider/surgeon that he  has sleep apnea and uses PAP therapy.    Sleep associated hypoxemia(during the PSG, time with saturation less than or equal to 88% was 12.3 minutes): Recommended obtaining overnight oximetry test with the auto CPAP at the current pressure setting along with parallel compliance download to check for resolution of hypoxemia that was observed during the previous sleep study. Orders for MARIBEL generated in epic. The test results will be communicated to the patient via My Chart.    Obesity: We discussed weight management with healthy diet, and exercise.    Patient was strongly advised to avoid driving, operating any heavy machinery or other hazardous situations while drowsy or sleepy.  Patient was counseled on the importance of driving while alert, to pull over if drowsy, or nap before getting into the vehicle if sleepy.      If the oximetry results are normal, he will follow up in 1 year or sooner if any concerns.    CC: No ref. provider found    The above note was  "dictated using voice recognition software. Although reviewed after completion, some word and grammatical error may remain . Please contact the author for any clarifications.    \"I spent a total of 30  minutes  with Leandro Brewer during today's  Video visit. Most  of this time was spent counseling the patient and  coordinating care regarding  GUERA, CPAP treatment,  weight management , going over PAP download/sleep study , chart review  including documentation and further activities as noted above.\"      Nico Bernard MD  [facility] [address] [address] [phone]  Dept: [phone]                "

## 2022-03-09 NOTE — PROGRESS NOTES
[facility] HAS BEEN INFORMED TO CONTACT PATIENT TO SCHEDULE MARIBEL TESTING ON CPAP, THEY WILL CALL PATIENT TO PLACE ON THE MARIBEL WAIT LIST.     ANNUAL SLEEP RETURN REMINDER SENT TO PATIENT.

## 2022-03-16 ENCOUNTER — OFFICE VISIT (OUTPATIENT)
Dept: FAMILY MEDICINE | Facility: CLINIC | Age: 67
End: 2022-03-16
Payer: COMMERCIAL

## 2022-03-16 VITALS
DIASTOLIC BLOOD PRESSURE: 60 MMHG | HEART RATE: 105 BPM | OXYGEN SATURATION: 98 % | BODY MASS INDEX: 37.95 KG/M2 | WEIGHT: 257 LBS | SYSTOLIC BLOOD PRESSURE: 120 MMHG

## 2022-03-16 DIAGNOSIS — Z79.4 UNCONTROLLED TYPE 2 DIABETES MELLITUS WITH HYPERGLYCEMIA, WITH LONG-TERM CURRENT USE OF INSULIN (H): Primary | ICD-10-CM

## 2022-03-16 DIAGNOSIS — I89.0 LYMPHEDEMA: ICD-10-CM

## 2022-03-16 DIAGNOSIS — I10 ESSENTIAL HYPERTENSION, BENIGN: ICD-10-CM

## 2022-03-16 DIAGNOSIS — G62.9 PERIPHERAL POLYNEUROPATHY: ICD-10-CM

## 2022-03-16 DIAGNOSIS — E11.65 UNCONTROLLED TYPE 2 DIABETES MELLITUS WITH HYPERGLYCEMIA, WITH LONG-TERM CURRENT USE OF INSULIN (H): Primary | ICD-10-CM

## 2022-03-16 DIAGNOSIS — E78.5 HYPERLIPIDEMIA, UNSPECIFIED HYPERLIPIDEMIA TYPE: ICD-10-CM

## 2022-03-16 DIAGNOSIS — L84 CORN OF TOE: ICD-10-CM

## 2022-03-16 LAB
ANION GAP SERPL CALCULATED.3IONS-SCNC: 13 MMOL/L (ref 5–18)
BUN SERPL-MCNC: 22 MG/DL (ref 8–22)
CALCIUM SERPL-MCNC: 9.8 MG/DL (ref 8.5–10.5)
CHLORIDE BLD-SCNC: 104 MMOL/L (ref 98–107)
CO2 SERPL-SCNC: 25 MMOL/L (ref 22–31)
CREAT SERPL-MCNC: 1.34 MG/DL (ref 0.7–1.3)
CREAT UR-MCNC: 90 MG/DL
GFR SERPL CREATININE-BSD FRML MDRD: 59 ML/MIN/1.73M2
GLUCOSE BLD-MCNC: 184 MG/DL (ref 70–125)
HBA1C MFR BLD: 7.2 % (ref 0–5.6)
HOLD SPECIMEN: NORMAL
HOLD SPECIMEN: NORMAL
MICROALBUMIN UR-MCNC: <0.5 MG/DL (ref 0–1.99)
MICROALBUMIN/CREAT UR: NORMAL MG/G{CREAT}
POTASSIUM BLD-SCNC: 4.5 MMOL/L (ref 3.5–5)
SODIUM SERPL-SCNC: 142 MMOL/L (ref 136–145)

## 2022-03-16 PROCEDURE — 11055 PARING/CUTG B9 HYPRKER LES 1: CPT | Performed by: FAMILY MEDICINE

## 2022-03-16 PROCEDURE — 80048 BASIC METABOLIC PNL TOTAL CA: CPT | Performed by: FAMILY MEDICINE

## 2022-03-16 PROCEDURE — 82043 UR ALBUMIN QUANTITATIVE: CPT | Performed by: FAMILY MEDICINE

## 2022-03-16 PROCEDURE — 83036 HEMOGLOBIN GLYCOSYLATED A1C: CPT | Performed by: FAMILY MEDICINE

## 2022-03-16 PROCEDURE — 99215 OFFICE O/P EST HI 40 MIN: CPT | Mod: 25 | Performed by: FAMILY MEDICINE

## 2022-03-16 PROCEDURE — 36415 COLL VENOUS BLD VENIPUNCTURE: CPT | Performed by: FAMILY MEDICINE

## 2022-03-16 RX ORDER — GABAPENTIN 300 MG/1
600 CAPSULE ORAL 3 TIMES DAILY
Qty: 560 CAPSULE | Refills: 3 | Status: SHIPPED | OUTPATIENT
Start: 2022-03-16 | End: 2022-03-17

## 2022-03-16 NOTE — PROGRESS NOTES
Assessment/Plan:    Uncontrolled type 2 diabetes mellitus with hyperglycemia, with long-term current use of insulin (H)  Type 2 diabetes with improved control with prior A1c increasing from 8.4% up to 8.8% November 16, 2021 however today did decrease to 7.2% following prior medication adjustment.  Had increased Lantus insulin from 40 units up to 61 units daily currently.  Trulicity 1.5 mg weekly.  Metformin  mg using 2 tablets twice daily as well.  We will leave urine specimen for microalbumin update.  Abnormal monofilament testing noted.  Annual diabetic eye exams to continue.  Request placed for orthopedic custom shoe.  Reassess at follow-up no later than 4 months.  - REVIEW OF HEALTH MAINTENANCE PROTOCOL ORDERS  - Albumin Random Urine Quantitative with Creat Ratio  - Hemoglobin A1c  - Hemoglobin A1c  - Basic metabolic panel  - Basic metabolic panel  - Albumin Random Urine Quantitative with Creat Ratio  - ORTHO SHOE CUSTOM SHOES    Essential hypertension, benign  Hypertension appears well controlled with lisinopril 20 mg daily.    Hyperlipidemia, unspecified hyperlipidemia type  Continues use of simvastatin 40 mg at bedtime with lipid cascade near goal November 16, 2021 with total cholesterol 132, triglycerides 137, HDL 34 and LDL 71.    Peripheral polyneuropathy  Due to peripheral neuropathy concerns with worsening since prior discontinuation of gabapentin that had been utilized for hip pain, will restart gabapentin and titrate to 300 mg 3 times daily and may increase to 600 mg 3 times daily as needed for foot discomfort.  Anticipate reassessment at follow-up no later than 4 months.  Does have current quantity of gabapentin at home that he may complete before refilling prescription.  - gabapentin (NEURONTIN) 300 MG capsule  Dispense: 560 capsule; Refill: 3    Corn of toe  Right third toe corn distal aspect with significant patient discomfort if he has this area of bumped etc.  No signs of paronychia etc.   "Parring of corn was completed.  Use corn pads in order to relieve direct pressure over this area.  Custom diabetic shoes with order placed.    Lymphedema  Lymphedema history.  Benefits of compression stockings and did provide prescription refill per patient request  - Compression Sleeve/Stocking Order    40 minutes total time spent on the date of encounter including patient chart review, counseling and coordination of cares, and documentation.         Subjective:    Leandro Brewer is seen today for follow-up assessment.  Uncontrolled type 2 diabetes previously noted.  A1c had increased from 7.1 up to 7.8% and 8.4% and 8.8% most recently 2021.  Had increase Lantus previously from 40 units up to currently 61 units daily apparently.  Trulicity 1.5 mg weekly.  Metformin  mg using 2 tablets twice daily as well.  States 14-day average less than 200.  7-day average at 208.  Now tolerating CPAP for GUERA which he initiated 2021.  Sleeping well and getting nearly 100 points per night which is the goal with his software monitoring.  Lisinopril 20 mg daily for hypertension while using simvastatin 40 mg at bedtime for lipid management.  Historically had use gabapentin for hip pain but was able to discontinue after prior right hip surgery which went very well.  Now has worsening foot pain however and wondering if he should restart gabapentin with history of neuropathy.  Also has right middle toe discomfort if he bumps it causing shooting pain.  No drainage from the site.  No fevers.  Comprehensive review of systems as above otherwise all negative.  Needs refill on compression stockings for lymphedema management as well.     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 Thompson); right inguinal hernia age 6 months; right total hip arthroplasty at [facility] February 5, 2019 with Dr. Evans.   Hospitalizations: early 20s \"infected gallbladder\" WITHOUT surgery... ; cellulitis in legs (hospitalized twice); abdominal wall cyst requiring IV antibiotics x 4 days... ; right Lumbar 3 - Lumbar 4 hemilaminectomy and medial facetectomy and Right redo Lumbar 4 -Lumbar 5 hemilaminectomy and medial facetectomy 1/20/21 (Dr. Lopez)     Past Surgical History:   Procedure Laterality Date     BACK SURGERY      laminectomy L45 by Dr. Villarreal      HERNIA REPAIR Right     inguinal; child     TOTAL HIP ARTHROPLASTY Bilateral 2019 Feb 5, 2019 (right) and October 19, 2019 (left)     ZZC HARE W/O FACETEC FORAMOT/DSKC 1/2 VRT SEG, LUMBAR Right 1/20/2021    Procedure: Right Lumbar 3 - Lumbar 4 hemilaminectomy and medial facetectomy and Right redo Lumbar 4 -Lumbar 5 hemilaminectomy and medial facetectomy;  Surgeon: Julissa Lopez MD;  Location: [facility];  Service: Spine        Family History   Problem Relation Age of Onset     Coronary Artery Disease Mother      Obesity Mother      Heart Disease Father      Cancer Father         throat     Coronary Artery Disease Father      Diabetes Sister      Diabetes Brother         Past Medical History:   Diagnosis Date     Anemia      Arthritis      Diabetes mellitus, type II (H)      Hypertension      Obesity      Plantar fasciitis         Social History     Tobacco Use     Smoking status: Former Smoker     Smokeless tobacco: Never Used   Substance Use Topics     Alcohol use: Yes     Alcohol/week: 1.0 standard drink     Comment: Alcoholic Drinks/day: occasional     Drug use: No        Current Outpatient Medications   Medication Sig Dispense Refill     acetaminophen (TYLENOL) 500 MG tablet Take 1,000 mg by mouth       albuterol (PROAIR HFA/PROVENTIL HFA/VENTOLIN HFA) 108 (90 " Base) MCG/ACT inhaler Inhale 2 puffs into the lungs 4 times daily as needed for shortness of breath / dyspnea or wheezing 18 g 6     APPLE CIDER VINEGAR PO Take 1 tablet by mouth       aspirin (ASA) 325 MG EC tablet        blood glucose (NO BRAND SPECIFIED) lancets standard [employer] Microlet MISC  Check blood sugar 2 times per day and as needed       Cholecalciferol (D3 DOTS) 50 MCG (2000 UT) TBDP Take 1 capsule by mouth       dulaglutide (TRULICITY) 1.5 MG/0.5ML pen Inject 1.5 mg Subcutaneous every 7 days 6 mL 3     furosemide (LASIX) 40 MG tablet Take 0.5 tablets (20 mg) by mouth daily 45 tablet 3     gabapentin (NEURONTIN) 300 MG capsule Take 2 capsules (600 mg) by mouth 3 times daily 560 capsule 3     insulin glargine (LANTUS SOLOSTAR) 100 UNIT/ML pen Inject 60 Units Subcutaneous At Bedtime 15 mL 3     lisinopril (ZESTRIL) 20 MG tablet TAKE 1 TABLET BY MOUTH EVERY DAY       Magnesium Oxide 500 MG TABS Take 500 mg by mouth       metFORMIN (GLUCOPHAGE-XR) 500 MG 24 hr tablet TAKE 2 TABLETS BY MOUTH TWICE A DAY WITH MEALS. 360 tablet 3     Multiple Vitamin (MULTIVITAMIN ADULT PO) Take 1 tablet by mouth       naproxen (NAPROSYN) 500 MG tablet Take 1 tablet (500 mg) by mouth 2 times daily (with meals) Take with food. 28 tablet 0     potassium gluconate 2.5 MEQ TABS Take 1 tablet by mouth       QVAR REDIHALER 80 MCG/ACT inhaler INHALE 2 PUFFS BY MOUTH TWICE A DAY - RINSE MOUTH AFTER USE-DO NOT SHAKE UNIT 31.8 g 1     sildenafil (REVATIO) 20 MG tablet Take 40-60 mg by mouth       simvastatin (ZOCOR) 40 MG tablet TAKE 1 TABLET BY MOUTH EVERYDAY AT BEDTIME 90 tablet 3     terbinafine (LAMISIL) 250 MG tablet Take 250 mg by mouth       tiZANidine (ZANAFLEX) 4 MG tablet        vitamin (B COMPLEX) capsule Take 1 capsule by mouth       vitamin C (ASCORBIC ACID) 500 MG tablet Take 500 mg by mouth            Objective:    Vitals:    03/16/22 1443   BP: 120/60   Pulse: 105   SpO2: 98%   Weight: 116.6 kg (257 lb)      Body mass  index is 37.95 kg/m .    Alert.  No apparent distress.  Peripheral edema trace bilateral with compression stockings utilized currently with benefit noted.  Corn noted distal right middle toe as well with parrring performed with excellent results.  Corn pad to be utilized to offset pressure over this area and avoid excess pressure in future as well.  Chest clear.  Cardiac exam regular.      This note has been dictated using voice recognition software and as a result may contain minor grammatical errors and unintended word substitutions.   DME (Durable Medical Equipment) Orders and Documentation  Orders Placed This Encounter   Procedures     Compression Sleeve/Stocking Order      The patient was assessed and it was determined the patient is in need of the following listed DME Supplies/Equipment. Please complete supporting documentation below to demonstrate medical necessity.      Compression Sleeve/Stocking(s) Supplies Documentation  The patient needs compression stockings for lymphedema management.      Request for diabetic shoes also with uncontrolled type 2 diabetes reviewed with peripheral neuropathy and corn formation involving right middle toe.

## 2022-03-17 ENCOUNTER — TELEPHONE (OUTPATIENT)
Dept: FAMILY MEDICINE | Facility: CLINIC | Age: 67
End: 2022-03-17
Payer: COMMERCIAL

## 2022-03-17 RX ORDER — GABAPENTIN 300 MG/1
600 CAPSULE ORAL 3 TIMES DAILY
Qty: 560 CAPSULE | Refills: 3 | Status: SHIPPED | OUTPATIENT
Start: 2022-03-17 | End: 2022-05-02

## 2022-03-17 NOTE — TELEPHONE ENCOUNTER
FYI:  New prescription sent to local pharmacy showing gabapentin 300 mg capsules used 2 capsules (600 mg) 3 times daily.

## 2022-03-17 NOTE — TELEPHONE ENCOUNTER
Reason for Call:  Medication or medication refill:    Do you use a [facility]?  Name of the pharmacy and phone number for the current request:  [facility]/PHARMACY #[id number] - [place] [address]    Name of the medication requested: gabapentin (NEURONTIN) 300 MG capsule    Other request: current route states: Take 2 capsules (600 mg) by mouth 3 times daily TWICE A DAY. Pharmacist would like clarification on it. Pt is unable to  medication and is upset. Pharmacist second time reaching out to Dr. Shafer and no response. Would like to know as soon as possible.     Can we leave a detailed message on this number? Not Applicable    Phone number patient can be reached at: Call Dentin at [facility] pharmacy at [phone]    Best Time: NA    Call taken on 3/17/2022 at 8:58 AM by Linh Dunbar

## 2022-03-26 ENCOUNTER — MYC MEDICAL ADVICE (OUTPATIENT)
Dept: FAMILY MEDICINE | Facility: CLINIC | Age: 67
End: 2022-03-26
Payer: COMMERCIAL

## 2022-03-26 DIAGNOSIS — Z79.4 UNCONTROLLED TYPE 2 DIABETES MELLITUS WITH HYPERGLYCEMIA, WITH LONG-TERM CURRENT USE OF INSULIN (H): Primary | ICD-10-CM

## 2022-03-26 DIAGNOSIS — E11.65 UNCONTROLLED TYPE 2 DIABETES MELLITUS WITH HYPERGLYCEMIA, WITH LONG-TERM CURRENT USE OF INSULIN (H): Primary | ICD-10-CM

## 2022-05-16 ENCOUNTER — NURSE TRIAGE (OUTPATIENT)
Dept: NURSING | Facility: CLINIC | Age: 67
End: 2022-05-16
Payer: COMMERCIAL

## 2022-05-16 ENCOUNTER — OFFICE VISIT (OUTPATIENT)
Dept: FAMILY MEDICINE | Facility: CLINIC | Age: 67
End: 2022-05-16
Payer: COMMERCIAL

## 2022-05-16 VITALS
OXYGEN SATURATION: 98 % | SYSTOLIC BLOOD PRESSURE: 122 MMHG | TEMPERATURE: 98.4 F | BODY MASS INDEX: 38.94 KG/M2 | DIASTOLIC BLOOD PRESSURE: 64 MMHG | HEART RATE: 106 BPM | WEIGHT: 263.7 LBS

## 2022-05-16 DIAGNOSIS — L03.115 CELLULITIS OF RIGHT LOWER EXTREMITY: Primary | ICD-10-CM

## 2022-05-16 PROCEDURE — 99213 OFFICE O/P EST LOW 20 MIN: CPT | Performed by: FAMILY MEDICINE

## 2022-05-16 RX ORDER — CEPHALEXIN 500 MG/1
500 CAPSULE ORAL 4 TIMES DAILY
Qty: 56 CAPSULE | Refills: 0 | Status: SHIPPED | OUTPATIENT
Start: 2022-05-16 | End: 2022-05-30

## 2022-05-16 ASSESSMENT — ASTHMA QUESTIONNAIRES: ACT_TOTALSCORE: 25

## 2022-05-16 NOTE — PROGRESS NOTES
"  Assessment & Plan     Cellulitis of right lower extremity  We will treat with 14-day course of cephalexin.  Prescription sent to pharmacy.  Counseled on use of medication and side effects.  Advised elevation of leg and close monitoring of symptoms.  Advised him to send update through [employer] in about 2 days.  Contact clinic if symptoms significantly worsening or not improving or if new concerning symptoms develop.  - cephALEXin (KEFLEX) 500 MG capsule  Dispense: 56 capsule; Refill: 0               BMI:   Estimated body mass index is 38.94 kg/m  as calculated from the following:    Height as of 3/7/22: 1.753 m (5' 9\").    Weight as of this encounter: 119.6 kg (263 lb 11.2 oz).         No follow-ups on file.    Kiley Yates MD  [facility] is a 66 year old who presents for the following health issues     HPI     Is seen today for concern of right lower extremity cellulitis.  He has history of recurrent episodes of cellulitis.  He has been dealing with this for many years, since 1981.  Has been hospitalized in the past.  Most recently treated in January with a course of cephalexin and symptoms resolved.  States that typically he will be prescribed a 14-day course of cephalexin and this works well to clear up his infection.  He was working out in the yard yesterday.  Does not recall getting any scratches or cuts on his leg.  Woke up this morning with body aches and chills.  Blood sugar was high at 301.  He has noticed some slight swelling in his right lower extremity as well as some mild redness.  These are typical symptoms that he experiences with onset of cellulitis.  He has not had fever.  He is a diabetic.  Last A1c in March was 7.2%.  He has no other concerns or questions today.  Meds and allergies reviewed and updated.  Review of systems is otherwise negative.       Review of Systems         Objective    /64 (BP Location: Right arm, Cuff Size: Adult Large)   " Pulse 106   Temp 98.4  F (36.9  C) (Oral)   Wt 119.6 kg (263 lb 11.2 oz)   SpO2 98%   BMI 38.94 kg/m    Body mass index is 38.94 kg/m .  Physical Exam   GENERAL: healthy, alert and no distress  MS: There is erythema, warmth and tenderness present right lower extremity above the ankle and below the knee consistent with cellulitis

## 2022-05-16 NOTE — LETTER
May 16, 2022      Leandro Brewer  [address] [id number]        To Whom It May Concern:    Leandro Brewer  was seen on 5/16/22.  Please excuse him  until 5/17/22 due to illness.        Sincerely,        Kiley Yates MD

## 2022-05-16 NOTE — TELEPHONE ENCOUNTER
Patient calling to report body aches from head to toe that he noted this morning when he woke up.  Also having some stiffness, mild swelling, and warm to the touch in right leg above the ankle.  Pt has a history of recurrent cellulitis and states that his symptoms are the same as when his cellulitis would start up.  Also has MRSA but has no open areas.  Blood sugar this morning was 311.  Pt has no symptoms and has ran out of urine ketone tests.  Pt is at work right now and forgot to bring his glucometer with him. He is on lantus.    Disposition: See HCP within 4 hours.  Care advice given.  Pt was transferred to [facility] to see if he can get an appt this morning.  If not, advised pt to go to [facility]Walk in clinic.  Pt agreeable to plan.    Elmira Azul RN, BSN Nurse Triage Advisor 5/16/2022 6:49 AM     COVID 19 Nurse Triage Plan/Patient Instructions    Please be aware that novel coronavirus (COVID-19) may be circulating in the community. If you develop symptoms such as fever, cough, or SOB or if you have concerns about the presence of another infection including coronavirus (COVID-19), please contact your health care provider or visit [email or web address].     Disposition/Instructions    In-Person Visit with provider recommended. Reference Visit Selection Guide.    Thank you for taking steps to prevent the spread of this virus.  o Limit your contact with others.  o Wear a simple mask to cover your cough.  o Wash your hands well and often.    Resources    [facility]: About COVID-19: [email or web address]    CDC: What to Do If You're Sick: www.cdc.gov/coronavirus/2019-ncov/about/steps-when-sick.html    CDC: Ending Home Isolation: www.cdc.gov/coronavirus/2019-ncov/hcp/disposition-in-home-patients.html     CDC: Caring for Someone: www.cdc.gov/coronavirus/2019-ncov/if-you-are-sick/care-for-someone.html     [facility]: Interim Guidance for Hospital Discharge to Home:  [email or web address]/hcp/hospdischarge.pdf    [facility] clinical trials (COVID-19 research studies): [email or web address]     Below are the COVID-19 hotlines at the [facility] ([facility]). Interpreters are available.   o For health questions: Call 123-433-6469 or 1-641.759.1471 (7 a.m. to 7 p.m.)  o For questions about schools and childcare: Call 769-483-2339 or 1-266.566.7019 (7 a.m. to 7 p.m.)                     Reason for Disposition    [1] Thigh, calf, or ankle swelling AND [2] only 1 side    [1] Blood glucose > 300 mg/dL (16.7 mmol/L) AND [2] uses insulin (e.g., insulin-dependent, all people with type 1 diabetes)    Additional Information    Negative: Severe difficulty breathing (e.g., struggling for each breath, speaks in single words)    Negative: Looks like a broken bone or dislocated joint (e.g., crooked or deformed)    Negative: Sounds like a life-threatening emergency to the triager    Negative: Difficulty breathing at rest    Negative: Entire foot is cool or blue in comparison to other side    Negative: [1] Can't walk or can barely walk AND [2] new onset    Negative: [1] Difficulty breathing with exertion (e.g., walking) AND [2] new onset or worsening    Negative: [1] Red area or streak AND [2] fever    Negative: [1] Swelling is painful to touch AND [2] fever    Negative: [1] Cast on leg or ankle AND [2] now increased pain    Negative: Patient sounds very sick or weak to the triager    Negative: SEVERE leg swelling (e.g., swelling extends above knee, entire leg is swollen, weeping fluid)    Negative: [1] Red area or streak [2] large (> 2 in. or 5 cm)    Negative: [1] Thigh or calf pain AND [2] only 1 side AND [3] present > 1 hour    Negative: Unconscious or difficult to awaken    Negative: Acting confused (e.g., disoriented, slurred speech)    Negative: Very weak (e.g., can't stand)    Negative: Sounds like a life-threatening  emergency to the triager    Negative: Blood glucose > 500 mg/dL (27.8 mmol/L)    Negative: [1] Blood glucose > 240 mg/dL (13.3 mmol/L) AND [2] urine ketones moderate-large (or more than 1+)    Negative: [1] Blood glucose > 240 mg/dL (13.3 mmol/L) AND [2] blood ketones > 1.4 mmol/L    Negative: [1] Blood glucose > 240 mg/dL (13.3 mmol/L) AND [2] vomiting AND [3] unable to check for ketones (in blood or urine)    Negative: [1] New onset diabetes suspected (e.g., frequent urination, weak, weight loss) AND [2] vomiting or rapid breathing    Negative: Vomiting lasts > 4 hours    Negative: Patient sounds very sick or weak to the triager    Negative: Fever > 100.4 F (38.0 C)    Negative: Blood glucose > 400 mg/dL (22.2 mmol/L)    Negative: [1] Blood glucose > 300 mg/dL (16.7 mmol/L) AND [2] two or more times in a row    Negative: Urine ketones moderate - large (or blood ketones > 1.4 mmol/L)    Negative: [1] Caller has URGENT medication or insulin pump question AND [2] triager unable to answer question    Negative: [1] Symptoms of high blood sugar (e.g., frequent urination, weak, weight loss) AND [2] not able to test blood glucose    Negative: New onset diabetes suspected (e.g., frequent urination, weakness, weight loss)    Negative: [1] Caller has NON-URGENT medication or insulin pump question AND [2] triager unable to answer question    Protocols used: LEG SWELLING AND EDEMA-A-AH, DIABETES - HIGH BLOOD SUGAR-A-AH

## 2022-07-21 ENCOUNTER — OFFICE VISIT (OUTPATIENT)
Dept: FAMILY MEDICINE | Facility: CLINIC | Age: 67
End: 2022-07-21
Payer: COMMERCIAL

## 2022-07-21 VITALS
WEIGHT: 263 LBS | HEART RATE: 92 BPM | BODY MASS INDEX: 38.84 KG/M2 | DIASTOLIC BLOOD PRESSURE: 60 MMHG | SYSTOLIC BLOOD PRESSURE: 110 MMHG | OXYGEN SATURATION: 95 %

## 2022-07-21 DIAGNOSIS — E78.5 HYPERLIPIDEMIA, UNSPECIFIED HYPERLIPIDEMIA TYPE: ICD-10-CM

## 2022-07-21 DIAGNOSIS — Z23 HIGH PRIORITY FOR 2019-NCOV VACCINE: ICD-10-CM

## 2022-07-21 DIAGNOSIS — Z23 ENCOUNTER FOR IMMUNIZATION: ICD-10-CM

## 2022-07-21 DIAGNOSIS — Z79.4 UNCONTROLLED TYPE 2 DIABETES MELLITUS WITH HYPERGLYCEMIA, WITH LONG-TERM CURRENT USE OF INSULIN (H): Primary | ICD-10-CM

## 2022-07-21 DIAGNOSIS — E66.01 CLASS 2 SEVERE OBESITY DUE TO EXCESS CALORIES WITH SERIOUS COMORBIDITY AND BODY MASS INDEX (BMI) OF 38.0 TO 38.9 IN ADULT (H): ICD-10-CM

## 2022-07-21 DIAGNOSIS — E66.812 CLASS 2 SEVERE OBESITY DUE TO EXCESS CALORIES WITH SERIOUS COMORBIDITY AND BODY MASS INDEX (BMI) OF 38.0 TO 38.9 IN ADULT (H): ICD-10-CM

## 2022-07-21 DIAGNOSIS — E11.65 UNCONTROLLED TYPE 2 DIABETES MELLITUS WITH HYPERGLYCEMIA, WITH LONG-TERM CURRENT USE OF INSULIN (H): Primary | ICD-10-CM

## 2022-07-21 DIAGNOSIS — M54.50 CHRONIC MIDLINE LOW BACK PAIN WITHOUT SCIATICA: ICD-10-CM

## 2022-07-21 DIAGNOSIS — G89.29 CHRONIC MIDLINE LOW BACK PAIN WITHOUT SCIATICA: ICD-10-CM

## 2022-07-21 DIAGNOSIS — I10 ESSENTIAL HYPERTENSION, BENIGN: ICD-10-CM

## 2022-07-21 LAB
HBA1C MFR BLD: 7.5 % (ref 0–5.6)
HOLD SPECIMEN: NORMAL

## 2022-07-21 PROCEDURE — 83036 HEMOGLOBIN GLYCOSYLATED A1C: CPT | Performed by: FAMILY MEDICINE

## 2022-07-21 PROCEDURE — 36415 COLL VENOUS BLD VENIPUNCTURE: CPT | Performed by: FAMILY MEDICINE

## 2022-07-21 PROCEDURE — 0054A COVID-19,PF,PFIZER (12+ YRS): CPT | Performed by: FAMILY MEDICINE

## 2022-07-21 PROCEDURE — 80048 BASIC METABOLIC PNL TOTAL CA: CPT | Performed by: FAMILY MEDICINE

## 2022-07-21 PROCEDURE — 90677 PCV20 VACCINE IM: CPT | Performed by: FAMILY MEDICINE

## 2022-07-21 PROCEDURE — 90471 IMMUNIZATION ADMIN: CPT | Performed by: FAMILY MEDICINE

## 2022-07-21 PROCEDURE — 91305 COVID-19,PF,PFIZER (12+ YRS): CPT | Performed by: FAMILY MEDICINE

## 2022-07-21 PROCEDURE — 99214 OFFICE O/P EST MOD 30 MIN: CPT | Mod: 25 | Performed by: FAMILY MEDICINE

## 2022-07-21 RX ORDER — DULAGLUTIDE 1.5 MG/.5ML
3 INJECTION, SOLUTION SUBCUTANEOUS
Qty: 12 ML | Refills: 3 | Status: SHIPPED | OUTPATIENT
Start: 2022-07-21 | End: 2022-11-25

## 2022-07-21 ASSESSMENT — PAIN SCALES - GENERAL: PAINLEVEL: MILD PAIN (2)

## 2022-07-21 NOTE — PROGRESS NOTES
Assessment/Plan:    Uncontrolled type 2 diabetes mellitus with hyperglycemia, with long-term current use of insulin (H)  Type 2 diabetes fair control currently with A1c worsening slightly from 7.2% up to 7.5% today.  Lantus insulin currently 61 units daily.  Metformin  mg using 2 tablets twice daily.  Will increase Trulicity from 1.5 mg up to 3 mg weekly and reassess at follow-up welcome to Medicare physical in 4 months.  - Hemoglobin A1c  - dulaglutide (TRULICITY) 1.5 MG/0.5ML pen  Dispense: 12 mL; Refill: 3  - Basic metabolic panel    Essential hypertension, benign  Hypertension well controlled currently.  Remains on lisinopril 20 mg daily.  - Basic metabolic panel    Hyperlipidemia, unspecified hyperlipidemia type  Lipid cascade previously at goal November 16, 2021 while on simvastatin 40 mg at bedtime.    Encounter for immunization  Pneumococcal 20 immunization provided today.  - Pneumococcal 20 Valent Conjugate (Prevnar 20)    High priority for 2019-nCoV vaccine  Covid-19 Pfizer second booster provided today.  - COVID-19,PF,PFIZER (12+ Yrs GRAY LABEL)    Class 2 severe obesity due to excess calories with serious comorbidity and body mass index (BMI) of 38.0 to 38.9 in adult (H)  Dietary and exercise modification for weight goal less than 250 pounds initially, less than 240 pounds ideally.    Chronic midline low back pain without sciatica  Has seen spine care clinic in the past and wants to defer follow-up at this time.          Subjective:     Leandro Brewer is seen today for follow-up evaluation.  Type 2 diabetes.  Previously poor control with A1c as high as 8.8% with subsequent improvement however down to 7.2% March 16, 2022.  6 pound weight gain since that time.  Continues Lantus insulin around 61 units daily typically in the morning.  Metformin  mg using 2 tablets twice daily and Trulicity 1.5 mg/week.  No hypoglycemic episodes.  Tolerating well.  Continues use of lisinopril 20 mg daily for  "hypertension while remaining on simvastatin 40 mg at bedtime for lipid management.  Has chronic lower back pain.  Relatively stable with some worsening however recently.  Appreciates pool therapy through [facility] in [place].  Comprehensive review of systems as above otherwise all negative.     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 Thompson); right inguinal hernia age 6 months; right total hip arthroplasty at [facility] 2019 with Dr. Evans.   Hospitalizations: early 20s \"infected gallbladder\" WITHOUT surgery... ; cellulitis in legs (hospitalized twice); abdominal wall cyst requiring IV antibiotics x 4 days... ; right Lumbar 3 - Lumbar 4 hemilaminectomy and medial facetectomy and Right redo Lumbar 4 -Lumbar 5 hemilaminectomy and medial facetectomy 21 (Dr. Lopez)     19-19 [facility] for right LE cellulitis/sepsis; right LE cellulitis 19-19 ([facility]); 10/15/19 s/p left MELITA (Dr. 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 Villarreal      HERNIA REPAIR Right     inguinal; child     TOTAL HIP ARTHROPLASTY Bilateral 2019 (right) and 2019 (left)     ZZC HARE W/O FACETEC FORAMOT/[facility]  VRT SEG, LUMBAR Right 2021    Procedure: Right Lumbar 3 - Lumbar 4 hemilaminectomy and medial facetectomy and Right redo Lumbar 4 -Lumbar 5 hemilaminectomy and medial facetectomy;  Surgeon: Julissa Lopez MD;  Location: [facility];  Service: Spine        Family History   Problem Relation Age of Onset     Coronary Artery Disease Mother      Obesity Mother      Heart Disease Father      Cancer Father         throat     Coronary Artery Disease Father      Diabetes Sister      Diabetes Brother         Past Medical History:   Diagnosis Date     Anemia      Arthritis      Diabetes mellitus, type II (H)      Hypertension      Obesity      Plantar fasciitis         Social History     Tobacco Use     Smoking status: Former Smoker     Smokeless tobacco: Never Used   Substance Use Topics     Alcohol use: Yes     Alcohol/week: 1.0 standard drink     Comment: Alcoholic Drinks/day: occasional     Drug use: No        Current Outpatient Medications   Medication Sig Dispense Refill     acetaminophen (TYLENOL) 500 MG tablet Take 1,000 mg by mouth       albuterol (PROAIR HFA/PROVENTIL HFA/VENTOLIN HFA) 108 (90 Base) MCG/ACT inhaler Inhale 2 puffs into the lungs 4 times daily as needed for shortness of breath / dyspnea or wheezing 18 g 6     APPLE CIDER VINEGAR PO Take 1 tablet by mouth       aspirin (ASA) 325 MG EC tablet        blood glucose (NO BRAND SPECIFIED) lancets standard [employer] Microlet MISC  Check blood sugar 2 times per day and as needed       Cholecalciferol (D3 DOTS) 50 MCG (2000 UT) TBDP Take 1 capsule by mouth       dulaglutide (TRULICITY) 1.5 MG/0.5ML pen Inject 3 mg Subcutaneous every 7 days 12 mL 3     furosemide (LASIX) 40 MG tablet Take 0.5 tablets (20 mg) by mouth daily 45 tablet 3     gabapentin (NEURONTIN) 300 MG capsule TAKE 3 CAPSULES (900 MG TOTAL) BY MOUTH 3 (THREE) TIMES A DAY. 270 capsule 11     insulin pen needle (32G X 4 MM) 32G X 4 MM miscellaneous Use one pen needle daily or as directed. 100 each 3     LANTUS SOLOSTAR 100 UNIT/ML soln INJECT 60 UNITS SUBCUTANEOUS AT BEDTIME 60 mL 1     lisinopril (ZESTRIL) 20 MG tablet TAKE 1 TABLET BY MOUTH EVERY DAY       Magnesium Oxide 500 MG TABS Take 500 mg by mouth        metFORMIN (GLUCOPHAGE-XR) 500 MG 24 hr tablet TAKE 2 TABLETS BY MOUTH TWICE A DAY WITH MEALS. 360 tablet 3     Multiple Vitamin (MULTIVITAMIN ADULT PO) Take 1 tablet by mouth       potassium gluconate 2.5 MEQ TABS Take 1 tablet by mouth       QVAR REDIHALER 80 MCG/ACT inhaler INHALE 2 PUFFS BY MOUTH TWICE A DAY - RINSE MOUTH AFTER USE-DO NOT SHAKE UNIT 31.8 g 1     sildenafil (REVATIO) 20 MG tablet Take 40-60 mg by mouth       simvastatin (ZOCOR) 40 MG tablet TAKE 1 TABLET BY MOUTH EVERYDAY AT BEDTIME 90 tablet 3     vitamin (B COMPLEX) capsule Take 1 capsule by mouth       vitamin C (ASCORBIC ACID) 500 MG tablet Take 500 mg by mouth       tiZANidine (ZANAFLEX) 4 MG tablet             Objective:    Vitals:    07/21/22 1433   BP: 110/60   Pulse: 92   SpO2: 95%   Weight: 119.3 kg (263 lb)      Body mass index is 38.84 kg/m .    Alert.  No apparent distress.  Chest clear.  Cardiac exam regular.  Extremities warm and dry.      This note has been dictated using voice recognition software and as a result may contain minor grammatical errors and unintended word substitutions.           Answers for HPI/ROS submitted by the patient on 7/17/2022  Frequency of checking blood sugars:: one time daily  What time of day are you checking your blood sugars : before meals  Have you had any blood sugars above 200?: Yes  Have you had any blood sugars below 70?: No  Hypoglycemia symptoms:: dizziness, lethargy  Diabetic concerns:: none  Paraesthesia present:: numbness in feet  Your back pain is: chronic  Where is your back pain located? : right lower back, left lower back, right shoulder  How would you describe your back pain? : fullness, sharp  Where does your back pain spread? : nowhere, right shoulder  Since you noticed your back pain, how has it changed? : always present, but gets better and worse  Does your back pain interfere with your job?: No  How many servings of fruits and vegetables do you eat daily?: 2-3  On average, how many  sweetened beverages do you drink each day (Examples: soda, juice, sweet tea, etc.  Do NOT count diet or artificially sweetened beverages)?: 0  How many minutes a day do you exercise enough to make your heart beat faster?: 30 to 60  How many days a week do you exercise enough to make your heart beat faster?: 5  How many days per week do you miss taking your medication?: 0

## 2022-07-22 LAB
ANION GAP SERPL CALCULATED.3IONS-SCNC: 12 MMOL/L (ref 7–15)
BUN SERPL-MCNC: 20.5 MG/DL (ref 8–23)
CALCIUM SERPL-MCNC: 9.4 MG/DL (ref 8.8–10.2)
CHLORIDE SERPL-SCNC: 105 MMOL/L (ref 98–107)
CREAT SERPL-MCNC: 0.93 MG/DL (ref 0.67–1.17)
DEPRECATED HCO3 PLAS-SCNC: 24 MMOL/L (ref 22–29)
GFR SERPL CREATININE-BSD FRML MDRD: >90 ML/MIN/1.73M2
GLUCOSE SERPL-MCNC: 165 MG/DL (ref 70–99)
POTASSIUM SERPL-SCNC: 4.1 MMOL/L (ref 3.4–5.3)
SODIUM SERPL-SCNC: 141 MMOL/L (ref 136–145)

## 2022-08-05 DIAGNOSIS — J45.30 MILD PERSISTENT ASTHMA WITHOUT COMPLICATION: ICD-10-CM

## 2022-08-05 DIAGNOSIS — J45.30 MILD PERSISTENT ASTHMA, UNCOMPLICATED: ICD-10-CM

## 2022-08-05 DIAGNOSIS — I10 ESSENTIAL (PRIMARY) HYPERTENSION: ICD-10-CM

## 2022-08-06 RX ORDER — BECLOMETHASONE DIPROPIONATE HFA 80 UG/1
AEROSOL, METERED RESPIRATORY (INHALATION)
Qty: 31.8 G | Refills: 1 | Status: SHIPPED | OUTPATIENT
Start: 2022-08-06 | End: 2023-02-28

## 2022-08-06 RX ORDER — ALBUTEROL SULFATE 90 UG/1
2 AEROSOL, METERED RESPIRATORY (INHALATION) 4 TIMES DAILY PRN
Qty: 18 G | Refills: 6 | Status: SHIPPED | OUTPATIENT
Start: 2022-08-06

## 2022-08-07 ENCOUNTER — NURSE TRIAGE (OUTPATIENT)
Dept: NURSING | Facility: CLINIC | Age: 67
End: 2022-08-07

## 2022-08-07 DIAGNOSIS — U07.1 INFECTION DUE TO 2019 NOVEL CORONAVIRUS: Primary | ICD-10-CM

## 2022-08-07 RX ORDER — LISINOPRIL 20 MG/1
TABLET ORAL
Qty: 90 TABLET | Refills: 3 | Status: SHIPPED | OUTPATIENT
Start: 2022-08-07 | End: 2023-07-29

## 2022-08-07 NOTE — TELEPHONE ENCOUNTER
S: Suspected covid.  B: Writer got permission from patient to speak with his wife about his health.    Did an at home covid test today.  Unsure if it is positive. Symptoms started on 8/6.    Sore throat    Dry cough    HA    Has been vaccination x 4 with Pfizer.  Denies fever, chest tightness or SOB.      A: Wife wondering where they could go and get tested today.  [facility] offer test to treat or  Urgent Care.  Talked about being assessed for treatment with Paxlovid.    R:  Wife will check [facility] ir will go to [place] [facility]    Reason for Disposition    [1] COVID-19 infection suspected by caller or triager AND [2] mild symptoms (cough, fever, or others) AND [3] negative COVID-19 rapid test    Additional Information    Negative: SEVERE difficulty breathing (e.g., struggling for each breath, speaks in single words)    Negative: Difficult to awaken or acting confused (e.g., disoriented, slurred speech)    Negative: Bluish (or gray) lips or face now    Negative: Shock suspected (e.g., cold/pale/clammy skin, too weak to stand, low BP, rapid pulse)    Negative: Sounds like a life-threatening emergency to the triager    Negative: SEVERE or constant chest pain or pressure  (Exception: Mild central chest pain, present only when coughing.)    Negative: MODERATE difficulty breathing (e.g., speaks in phrases, SOB even at rest, pulse 100-120)    Negative: [1] Headache AND [2] stiff neck (can't touch chin to chest)    Negative: Oxygen level (e.g., pulse oximetry) 90 percent or lower    Negative: Chest pain or pressure    Negative: Patient sounds very sick or weak to the triager    Negative: MILD difficulty breathing (e.g., minimal/no SOB at rest, SOB with walking, pulse <100)    Negative: Fever > 103 F (39.4 C)    Negative: [1] Fever > 101 F (38.3 C) AND [2] age > 60 years    Negative: [1] Fever > 100.0 F (37.8 C) AND [2] bedridden (e.g., nursing home patient, CVA, chronic illness, recovering  from surgery)    Negative: HIGH RISK for severe COVID complications (e.g., weak immune system, age > 64 years, obesity with BMI > 25, pregnant, chronic lung disease or other chronic medical condition)  (Exception: Already seen by PCP and no new or worsening symptoms.)    Negative: [1] HIGH RISK patient AND [2] influenza is widespread in the community AND [3] ONE OR MORE respiratory symptoms: cough, sore throat, runny or stuffy nose    Negative: [1] HIGH RISK patient AND [2] influenza exposure within the last 7 days AND [3] ONE OR MORE respiratory symptoms: cough, sore throat, runny or stuffy nose    Negative: Oxygen level (e.g., pulse oximetry) 91 to 94 percent    Negative: Fever present > 3 days (72 hours)    Negative: [1] Fever returns after gone for over 24 hours AND [2] symptoms worse or not improved    Negative: [1] Continuous (nonstop) coughing interferes with work or school AND [2] no improvement using cough treatment per Care Advice    Protocols used: CORONAVIRUS (COVID-19) DIAGNOSED OR [id number] 1.18.2022

## 2022-08-07 NOTE — TELEPHONE ENCOUNTER
"Last Written Prescription Date:  7/23/21  Last Fill Quantity: 18,  # refills: 6   Last office visit provider:  7/21/22     Requested Prescriptions   Pending Prescriptions Disp Refills     albuterol (PROAIR HFA/PROVENTIL HFA/VENTOLIN HFA) 108 (90 Base) MCG/ACT inhaler [Pharmacy Med Name: ALBUTEROL HFA (VENTOLIN) INH]  6     Sig: INHALE 2 PUFFS INTO THE LUNGS 4 TIMES DAILY AS NEEDED FOR SHORTNESS OF BREATH / DYSPNEA OR WHEEZING       Asthma Maintenance Inhalers - Anticholinergics Passed - 8/5/2022  4:09 PM        Passed - Patient is age 12 years or older        Passed - Asthma control assessment score within normal limits in last 6 months     Please review ACT score.           Passed - Medication is active on med list        Passed - Recent (6 mo) or future (30 days) visit within the authorizing provider's specialty     Patient had office visit in the last 6 months or has a visit in the next 30 days with authorizing provider or within the authorizing provider's specialty.  See \"Patient Info\" tab in inbasket, or \"Choose Columns\" in Meds & Orders section of the refill encounter.           Short-Acting Beta Agonist Inhalers Protocol  Passed - 8/5/2022  4:09 PM        Passed - Patient is age 12 or older        Passed - Asthma control assessment score within normal limits in last 6 months     Please review ACT score.           Passed - Medication is active on med list        Passed - Recent (6 mo) or future (30 days) visit within the authorizing provider's specialty     Patient had office visit in the last 6 months or has a visit in the next 30 days with authorizing provider or within the authorizing provider's specialty.  See \"Patient Info\" tab in inbasket, or \"Choose Columns\" in Meds & Orders section of the refill encounter.                 Nuvia Milan RN 08/06/22 8:08 PM  "

## 2022-08-07 NOTE — TELEPHONE ENCOUNTER
"Routing refill request to provider for review/approval because:  Medication is reported/historical  lisinopril    Last Written Prescription Date:    Last Fill Quantity: ,  # refills:    Last office visit provider:  7/21/22     Requested Prescriptions   Pending Prescriptions Disp Refills     QVAR REDIHALER 80 MCG/ACT inhaler [Pharmacy Med Name: QVAR REDIHALER 80 MCG] 31.8 g 1     Sig: INHALE 2 PUFFS BY MOUTH TWICE A DAY - RINSE MOUTH AFTER USE-DO NOT SHAKE UNIT       Inhaled Steroids Protocol Passed - 8/5/2022  4:09 PM        Passed - Patient is age 12 or older        Passed - Asthma control assessment score within normal limits in last 6 months     Please review ACT score.           Passed - Medication is active on med list        Passed - Recent (6 mo) or future (30 days) visit within the authorizing provider's specialty     Patient had office visit in the last 6 months or has a visit in the next 30 days with authorizing provider or within the authorizing provider's specialty.  See \"Patient Info\" tab in inbasket, or \"Choose Columns\" in Meds & Orders section of the refill encounter.               lisinopril (ZESTRIL) 20 MG tablet [Pharmacy Med Name: LISINOPRIL 20 MG TABLET] 90 tablet 3     Sig: TAKE 1 TABLET BY MOUTH EVERY DAY       ACE Inhibitors (Including Combos) Protocol Passed - 8/5/2022  4:09 PM        Passed - Blood pressure under 140/90 in past 12 months     BP Readings from Last 3 Encounters:   07/21/22 110/60   05/16/22 122/64   03/16/22 120/60                 Passed - Recent (12 mo) or future (30 days) visit within the authorizing provider's specialty     Patient has had an office visit with the authorizing provider or a provider within the authorizing providers department within the previous 12 mos or has a future within next 30 days. See \"Patient Info\" tab in inbasket, or \"Choose Columns\" in Meds & Orders section of the refill encounter.              Passed - Medication is active on med list        " Passed - Patient is age 18 or older        Passed - Normal serum creatinine on file in past 12 months     Recent Labs   Lab Test 07/21/22  1440   CR 0.93       Ok to refill medication if creatinine is low          Passed - Normal serum potassium on file in past 12 months     Recent Labs   Lab Test 07/21/22  1440   POTASSIUM 4.1                  Nuvia Milan, RN 08/06/22 8:07 PM

## 2022-09-01 DIAGNOSIS — L30.9 DERMATITIS: Primary | ICD-10-CM

## 2022-09-01 RX ORDER — BETAMETHASONE DIPROPIONATE 0.5 MG/G
OINTMENT, AUGMENTED TOPICAL 2 TIMES DAILY
Qty: 15 G | Refills: 0 | Status: SHIPPED | OUTPATIENT
Start: 2022-09-01 | End: 2022-09-10

## 2022-09-09 DIAGNOSIS — L30.9 DERMATITIS: ICD-10-CM

## 2022-09-09 DIAGNOSIS — E11.9 TYPE 2 DIABETES MELLITUS WITHOUT COMPLICATION, WITH LONG-TERM CURRENT USE OF INSULIN (H): ICD-10-CM

## 2022-09-09 DIAGNOSIS — E11.9 TYPE 2 DIABETES MELLITUS WITHOUT COMPLICATION, WITHOUT LONG-TERM CURRENT USE OF INSULIN (H): ICD-10-CM

## 2022-09-09 DIAGNOSIS — Z79.4 TYPE 2 DIABETES MELLITUS WITHOUT COMPLICATION, WITH LONG-TERM CURRENT USE OF INSULIN (H): ICD-10-CM

## 2022-09-10 RX ORDER — METFORMIN HCL 500 MG
TABLET, EXTENDED RELEASE 24 HR ORAL
Qty: 360 TABLET | Refills: 0 | Status: SHIPPED | OUTPATIENT
Start: 2022-09-10 | End: 2022-12-23

## 2022-09-10 RX ORDER — BETAMETHASONE DIPROPIONATE 0.5 MG/G
OINTMENT, AUGMENTED TOPICAL
Qty: 15 G | Refills: 0 | Status: SHIPPED | OUTPATIENT
Start: 2022-09-10 | End: 2022-12-23

## 2022-09-10 RX ORDER — INSULIN GLARGINE 100 [IU]/ML
INJECTION, SOLUTION SUBCUTANEOUS
Qty: 60 ML | Refills: 0 | Status: SHIPPED | OUTPATIENT
Start: 2022-09-10 | End: 2022-12-23

## 2022-09-10 NOTE — TELEPHONE ENCOUNTER
"Last Written Prescription Date:  11/9/21  Last Fill Quantity: 360,  # refills: 3   Last office visit provider:  7/21/22     Requested Prescriptions   Pending Prescriptions Disp Refills     LANTUS SOLOSTAR 100 UNIT/ML soln [Pharmacy Med Name: LANTUS SOLOSTAR 100 UNIT/ML]  1     Sig: INJECT 60 UNITS SUBCUTANEOUSLY AT BEDTIME       Long Acting Insulin Protocol Passed - 9/9/2022  5:17 PM        Passed - Serum creatinine on file in past 12 months     Recent Labs   Lab Test 07/21/22  1440   CR 0.93       Ok to refill medication if creatinine is low          Passed - HgbA1C in past 3 or 6 months     If HgbA1C is 8 or greater, it needs to be on file within the past 3 months.  If less than 8, must be on file within the past 6 months.     Recent Labs   Lab Test 07/21/22  1440   A1C 7.5*             Passed - Medication is active on med list        Passed - Patient is age 18 or older        Passed - Recent (6 mo) or future (30 days) visit within the authorizing provider's specialty     Patient had office visit in the last 6 months or has a visit in the next 30 days with authorizing provider or within the authorizing provider's specialty.  See \"Patient Info\" tab in inbasket, or \"Choose Columns\" in Meds & Orders section of the refill encounter.               augmented betamethasone dipropionate (DIPROLENE-AF) 0.05 % external ointment [Pharmacy Med Name: BETAMETHASONE DP AUG 0.05% OIN] 15 g 0     Sig: APPLY TO AFFECTED AREA TWICE A DAY       Topical Steroids and Nonsteroidals Protocol Passed - 9/9/2022  5:17 PM        Passed - Patient is age 6 or older        Passed - Authorizing prescriber's most recent note related to this medication read.     If refill request is for ophthalmic use, please forward request to provider for approval.          Passed - High potency steroid not ordered        Passed - Recent (12 mo) or future (30 days) visit within the authorizing provider's specialty     Patient has had an office visit with the " "authorizing provider or a provider within the authorizing providers department within the previous 12 mos or has a future within next 30 days. See \"Patient Info\" tab in inbasket, or \"Choose Columns\" in Meds & Orders section of the refill encounter.              Passed - Medication is active on med list           metFORMIN (GLUCOPHAGE XR) 500 MG 24 hr tablet [Pharmacy Med Name: METFORMIN HCL  MG TABLET] 360 tablet 3     Sig: TAKE 2 TABLETS BY MOUTH TWICE A DAY WITH MEALS       Biguanide Agents Passed - 9/9/2022  5:17 PM        Passed - Patient is age 10 or older        Passed - Patient has documented A1c within the specified period of time.     If HgbA1C is 8 or greater, it needs to be on file within the past 3 months.  If less than 8, must be on file within the past 6 months.     Recent Labs   Lab Test 07/21/22  1440   A1C 7.5*             Passed - Patient's CR is NOT>1.4 OR Patient's EGFR is NOT<45 within past 12 mos.     Recent Labs   Lab Test 07/21/22  1440 11/16/21  0714 07/01/21  1046   GFRESTIMATED >90   < > >60   GFRESTBLACK  --   --  >60    < > = values in this interval not displayed.       Recent Labs   Lab Test 07/21/22  1440   CR 0.93             Passed - Patient does NOT have a diagnosis of CHF.        Passed - Medication is active on med list        Passed - Recent (6 mo) or future (30 days) visit within the authorizing provider's specialty     Patient had office visit in the last 6 months or has a visit in the next 30 days with authorizing provider or within the authorizing provider's specialty.  See \"Patient Info\" tab in inbasket, or \"Choose Columns\" in Meds & Orders section of the refill encounter.                 Nuvia Milan RN 09/10/22 1:57 PM  "

## 2022-09-20 ENCOUNTER — TELEPHONE (OUTPATIENT)
Dept: FAMILY MEDICINE | Facility: CLINIC | Age: 67
End: 2022-09-20

## 2022-09-20 NOTE — TELEPHONE ENCOUNTER
Pt is having carpal tunnel pain that isn't helped by chiropractic care. He's going to contact [facility] or [facility] and will let us know if he needs a referral.    Pt reports the rash on R leg above ankle hasn't gone away and is showing up on both legs now. Please advise if he should come in for a visit.

## 2022-09-24 ENCOUNTER — HEALTH MAINTENANCE LETTER (OUTPATIENT)
Age: 67
End: 2022-09-24

## 2022-09-27 ENCOUNTER — TRANSFERRED RECORDS (OUTPATIENT)
Dept: HEALTH INFORMATION MANAGEMENT | Facility: CLINIC | Age: 67
End: 2022-09-27

## 2022-10-05 ENCOUNTER — MYC MEDICAL ADVICE (OUTPATIENT)
Dept: FAMILY MEDICINE | Facility: CLINIC | Age: 67
End: 2022-10-05

## 2022-10-05 NOTE — CONFIDENTIAL NOTE
Call made, no answer. Left message instructing pt to make video appt with any [facility] provider. Gave scheduling phone number.     Itzel Godoy RN on 10/5/2022 at 4:26 PM

## 2022-10-06 ENCOUNTER — VIRTUAL VISIT (OUTPATIENT)
Dept: FAMILY MEDICINE | Facility: CLINIC | Age: 67
End: 2022-10-06
Payer: COMMERCIAL

## 2022-10-06 DIAGNOSIS — L30.9 DERMATITIS: Primary | ICD-10-CM

## 2022-10-06 PROBLEM — T84.59XA INFECTED PROSTHETIC HIP, INITIAL ENCOUNTER (H): Status: ACTIVE | Noted: 2019-04-07

## 2022-10-06 PROBLEM — A41.9 SEVERE SEPSIS (H): Status: ACTIVE | Noted: 2019-04-07

## 2022-10-06 PROBLEM — Z96.642 HISTORY OF LEFT HIP REPLACEMENT: Status: ACTIVE | Noted: 2019-02-07

## 2022-10-06 PROBLEM — R65.20 SEVERE SEPSIS (H): Status: ACTIVE | Noted: 2019-04-07

## 2022-10-06 PROBLEM — Z96.649 INFECTED PROSTHETIC HIP, INITIAL ENCOUNTER (H): Status: ACTIVE | Noted: 2019-04-07

## 2022-10-06 PROCEDURE — 99213 OFFICE O/P EST LOW 20 MIN: CPT | Mod: 95 | Performed by: FAMILY MEDICINE

## 2022-10-06 RX ORDER — AMOXICILLIN 500 MG/1
CAPSULE ORAL
COMMUNITY
Start: 2022-09-12 | End: 2023-08-01

## 2022-10-06 RX ORDER — DULAGLUTIDE 3 MG/.5ML
INJECTION, SOLUTION SUBCUTANEOUS
COMMUNITY
Start: 2022-08-09 | End: 2023-08-01

## 2022-10-06 RX ORDER — CLOTRIMAZOLE AND BETAMETHASONE DIPROPIONATE 10; .64 MG/G; MG/G
CREAM TOPICAL 2 TIMES DAILY
Qty: 50 G | Refills: 1 | Status: SHIPPED | OUTPATIENT
Start: 2022-10-06 | End: 2024-06-24

## 2022-10-06 NOTE — PROGRESS NOTES
"Marc is a 66 year old who is being evaluated via a billable telephone visit.      What phone number would you like to be contacted at? [phone]  How would you like to obtain your AVS? MyChart    Assessment & Plan     Dermatitis  Discontinue other topical applications and try the following medications for 10 days if no improvement will need direct examination face-to-face; doubt that this is a cellulitis  - clotrimazole-betamethasone (LOTRISONE) 1-0.05 % external cream  Dispense: 50 g; Refill: 1               BMI:   Estimated body mass index is 38.84 kg/m  as calculated from the following:    Height as of 3/7/22: 1.753 m (5' 9\").    Weight as of 7/21/22: 119.3 kg (263 lb).           No follow-ups on file.    Noam Recinos MD  [facility]    Subjective   Marc is a 66 year old, presenting for the following health issues:  Derm Problem (rash)      HPI   Lenadro is a type II diabetic was had a stubborn rash on his ankle which he has been applying betamethasone cream to for about 2 months without any relief now he is getting another red spot which has an irregular border measuring about 3 x 4 cm with a clear center which is a little bit urticarial; I cannot visualize this because is a telephone visit but it could be a fungal mixed dermatitis; we will try topical combination antifungal corticosteroid cream for 10 days if improvement occurs continue for 2 weeks if not we will need direct face-to-face examination; I doubt that this is a cellulitis        Review of Systems   Constitutional, HEENT, cardiovascular, pulmonary, gi and gu systems are negative, except as otherwise noted.      Objective           Vitals:  No vitals were obtained today due to virtual visit.    Physical Exam   healthy, alert and no distress  PSYCH: Alert and oriented times 3; coherent speech, normal   rate and volume, able to articulate logical thoughts, able   to abstract reason, no tangential thoughts, no hallucinations   or " delusions  His affect is normal  RESP: No cough, no audible wheezing, able to talk in full sentences  Remainder of exam unable to be completed due to telephone visits                Phone call duration: 11 minutes

## 2022-11-01 ENCOUNTER — MYC MEDICAL ADVICE (OUTPATIENT)
Dept: FAMILY MEDICINE | Facility: CLINIC | Age: 67
End: 2022-11-01

## 2022-11-01 ENCOUNTER — TELEPHONE (OUTPATIENT)
Dept: FAMILY MEDICINE | Facility: CLINIC | Age: 67
End: 2022-11-01

## 2022-11-01 DIAGNOSIS — J01.01 ACUTE RECURRENT MAXILLARY SINUSITIS: Primary | ICD-10-CM

## 2022-11-01 NOTE — TELEPHONE ENCOUNTER
Reason for call:  Symptom     Symptom or request: Congestion, stuffy and runny nose, yellow drainage causing coughing.    Duration (how long have symptoms been present): over two weeks    Have you been treated for this before? Yes    Additional comments: Requesting abx be sent to pharmacy.    Phone number to reach patient:  Cell number on file:    Telephone Information:   Mobile [phone]       Best Time:  Any    Can we leave a detailed message on this number?  YES

## 2022-11-01 NOTE — TELEPHONE ENCOUNTER
Spoke with patient.  Discussed sinusitis etiology.  Treated with Augmentin 875/125 use twice daily x10 days.  Patient to update me next week to ensure desired improvement.

## 2022-11-21 ASSESSMENT — ASTHMA QUESTIONNAIRES
ACT_TOTALSCORE: 25
ACT_TOTALSCORE: 25
QUESTION_5 LAST FOUR WEEKS HOW WOULD YOU RATE YOUR ASTHMA CONTROL: COMPLETELY CONTROLLED
QUESTION_4 LAST FOUR WEEKS HOW OFTEN HAVE YOU USED YOUR RESCUE INHALER OR NEBULIZER MEDICATION (SUCH AS ALBUTEROL): NOT AT ALL
QUESTION_1 LAST FOUR WEEKS HOW MUCH OF THE TIME DID YOUR ASTHMA KEEP YOU FROM GETTING AS MUCH DONE AT WORK, SCHOOL OR AT HOME: NONE OF THE TIME
QUESTION_2 LAST FOUR WEEKS HOW OFTEN HAVE YOU HAD SHORTNESS OF BREATH: NOT AT ALL
QUESTION_3 LAST FOUR WEEKS HOW OFTEN DID YOUR ASTHMA SYMPTOMS (WHEEZING, COUGHING, SHORTNESS OF BREATH, CHEST TIGHTNESS OR PAIN) WAKE YOU UP AT NIGHT OR EARLIER THAN USUAL IN THE MORNING: NOT AT ALL

## 2022-11-21 ASSESSMENT — ENCOUNTER SYMPTOMS
NAUSEA: 0
NERVOUS/ANXIOUS: 0
PALPITATIONS: 0
SORE THROAT: 0
HEMATOCHEZIA: 0
PARESTHESIAS: 1
FREQUENCY: 0
DIZZINESS: 0
JOINT SWELLING: 0
MYALGIAS: 0
EYE PAIN: 0
CONSTIPATION: 0
SHORTNESS OF BREATH: 0
HEARTBURN: 0
FEVER: 0
HEMATURIA: 0
ABDOMINAL PAIN: 0
DYSURIA: 0
CHILLS: 0
DIARRHEA: 0
HEADACHES: 0
WEAKNESS: 0
ARTHRALGIAS: 1
COUGH: 0

## 2022-11-21 ASSESSMENT — ACTIVITIES OF DAILY LIVING (ADL): CURRENT_FUNCTION: NO ASSISTANCE NEEDED

## 2022-11-25 ENCOUNTER — OFFICE VISIT (OUTPATIENT)
Dept: FAMILY MEDICINE | Facility: CLINIC | Age: 67
End: 2022-11-25
Attending: FAMILY MEDICINE
Payer: COMMERCIAL

## 2022-11-25 VITALS
OXYGEN SATURATION: 97 % | HEIGHT: 69 IN | SYSTOLIC BLOOD PRESSURE: 110 MMHG | DIASTOLIC BLOOD PRESSURE: 60 MMHG | BODY MASS INDEX: 37.47 KG/M2 | HEART RATE: 90 BPM | WEIGHT: 253 LBS | RESPIRATION RATE: 21 BRPM

## 2022-11-25 DIAGNOSIS — E66.01 CLASS 2 SEVERE OBESITY DUE TO EXCESS CALORIES WITH SERIOUS COMORBIDITY AND BODY MASS INDEX (BMI) OF 37.0 TO 37.9 IN ADULT (H): ICD-10-CM

## 2022-11-25 DIAGNOSIS — D12.6 TUBULAR ADENOMA OF COLON: ICD-10-CM

## 2022-11-25 DIAGNOSIS — G62.9 PERIPHERAL POLYNEUROPATHY: ICD-10-CM

## 2022-11-25 DIAGNOSIS — J45.30 MILD PERSISTENT ASTHMA WITHOUT COMPLICATION: ICD-10-CM

## 2022-11-25 DIAGNOSIS — I10 ESSENTIAL HYPERTENSION, BENIGN: ICD-10-CM

## 2022-11-25 DIAGNOSIS — I89.0 LYMPHEDEMA: ICD-10-CM

## 2022-11-25 DIAGNOSIS — Z23 ENCOUNTER FOR IMMUNIZATION: ICD-10-CM

## 2022-11-25 DIAGNOSIS — L73.9 FOLLICULITIS: ICD-10-CM

## 2022-11-25 DIAGNOSIS — Z00.00 WELCOME TO MEDICARE PREVENTIVE VISIT: Primary | ICD-10-CM

## 2022-11-25 DIAGNOSIS — E11.42 TYPE 2 DIABETES MELLITUS WITH DIABETIC POLYNEUROPATHY, WITHOUT LONG-TERM CURRENT USE OF INSULIN (H): ICD-10-CM

## 2022-11-25 DIAGNOSIS — D50.9 MICROCYTIC ANEMIA: ICD-10-CM

## 2022-11-25 DIAGNOSIS — Z23 HIGH PRIORITY FOR 2019-NCOV VACCINE: ICD-10-CM

## 2022-11-25 DIAGNOSIS — E66.812 CLASS 2 SEVERE OBESITY DUE TO EXCESS CALORIES WITH SERIOUS COMORBIDITY AND BODY MASS INDEX (BMI) OF 37.0 TO 37.9 IN ADULT (H): ICD-10-CM

## 2022-11-25 DIAGNOSIS — Z12.5 SCREENING FOR PROSTATE CANCER: ICD-10-CM

## 2022-11-25 DIAGNOSIS — E78.5 HYPERLIPIDEMIA, UNSPECIFIED HYPERLIPIDEMIA TYPE: ICD-10-CM

## 2022-11-25 LAB
ALBUMIN SERPL BCG-MCNC: 4.8 G/DL (ref 3.5–5.2)
ALP SERPL-CCNC: 86 U/L (ref 40–129)
ALT SERPL W P-5'-P-CCNC: 35 U/L (ref 10–50)
ANION GAP SERPL CALCULATED.3IONS-SCNC: 16 MMOL/L (ref 7–15)
AST SERPL W P-5'-P-CCNC: 25 U/L (ref 10–50)
BILIRUB SERPL-MCNC: 0.5 MG/DL
BUN SERPL-MCNC: 15.2 MG/DL (ref 8–23)
CALCIUM SERPL-MCNC: 9.9 MG/DL (ref 8.8–10.2)
CHLORIDE SERPL-SCNC: 98 MMOL/L (ref 98–107)
CHOLEST SERPL-MCNC: 141 MG/DL
CREAT SERPL-MCNC: 0.98 MG/DL (ref 0.67–1.17)
DEPRECATED HCO3 PLAS-SCNC: 24 MMOL/L (ref 22–29)
ERYTHROCYTE [DISTWIDTH] IN BLOOD BY AUTOMATED COUNT: 20.1 % (ref 10–15)
GFR SERPL CREATININE-BSD FRML MDRD: 86 ML/MIN/1.73M2
GLUCOSE SERPL-MCNC: 140 MG/DL (ref 70–99)
HBA1C MFR BLD: 7.1 % (ref 0–5.6)
HCT VFR BLD AUTO: 43.7 % (ref 40–53)
HDLC SERPL-MCNC: 40 MG/DL
HGB BLD-MCNC: 13.3 G/DL (ref 13.3–17.7)
HOLD SPECIMEN: NORMAL
LDLC SERPL CALC-MCNC: 70 MG/DL
MCH RBC QN AUTO: 20.4 PG (ref 26.5–33)
MCHC RBC AUTO-ENTMCNC: 30.4 G/DL (ref 31.5–36.5)
MCV RBC AUTO: 67 FL (ref 78–100)
NONHDLC SERPL-MCNC: 101 MG/DL
PLATELET # BLD AUTO: 271 10E3/UL (ref 150–450)
POTASSIUM SERPL-SCNC: 5.2 MMOL/L (ref 3.4–5.3)
PROT SERPL-MCNC: 7.4 G/DL (ref 6.4–8.3)
PSA SERPL-MCNC: 1 NG/ML (ref 0–4.5)
RBC # BLD AUTO: 6.51 10E6/UL (ref 4.4–5.9)
SODIUM SERPL-SCNC: 138 MMOL/L (ref 136–145)
TRIGL SERPL-MCNC: 155 MG/DL
WBC # BLD AUTO: 9.1 10E3/UL (ref 4–11)

## 2022-11-25 PROCEDURE — 83036 HEMOGLOBIN GLYCOSYLATED A1C: CPT | Performed by: FAMILY MEDICINE

## 2022-11-25 PROCEDURE — 99214 OFFICE O/P EST MOD 30 MIN: CPT | Mod: 25 | Performed by: FAMILY MEDICINE

## 2022-11-25 PROCEDURE — 0124A COVID-19 VACCINE BIVALENT BOOSTER 12+ (PFIZER): CPT | Performed by: FAMILY MEDICINE

## 2022-11-25 PROCEDURE — G0103 PSA SCREENING: HCPCS | Performed by: FAMILY MEDICINE

## 2022-11-25 PROCEDURE — 85027 COMPLETE CBC AUTOMATED: CPT | Performed by: FAMILY MEDICINE

## 2022-11-25 PROCEDURE — 80053 COMPREHEN METABOLIC PANEL: CPT | Performed by: FAMILY MEDICINE

## 2022-11-25 PROCEDURE — 91312 COVID-19 VACCINE BIVALENT BOOSTER 12+ (PFIZER): CPT | Performed by: FAMILY MEDICINE

## 2022-11-25 PROCEDURE — 99397 PER PM REEVAL EST PAT 65+ YR: CPT | Mod: 25 | Performed by: FAMILY MEDICINE

## 2022-11-25 PROCEDURE — 90662 IIV NO PRSV INCREASED AG IM: CPT | Performed by: FAMILY MEDICINE

## 2022-11-25 PROCEDURE — 90471 IMMUNIZATION ADMIN: CPT | Performed by: FAMILY MEDICINE

## 2022-11-25 PROCEDURE — 36415 COLL VENOUS BLD VENIPUNCTURE: CPT | Performed by: FAMILY MEDICINE

## 2022-11-25 PROCEDURE — 80061 LIPID PANEL: CPT | Performed by: FAMILY MEDICINE

## 2022-11-25 RX ORDER — DOXYCYCLINE HYCLATE 100 MG
100 TABLET ORAL 2 TIMES DAILY
Qty: 20 TABLET | Refills: 0 | Status: SHIPPED | OUTPATIENT
Start: 2022-11-25 | End: 2022-12-05

## 2022-11-25 ASSESSMENT — ENCOUNTER SYMPTOMS
COUGH: 0
EYE PAIN: 0
CHILLS: 0
HEMATOCHEZIA: 0
SORE THROAT: 0
HEMATURIA: 0
WEAKNESS: 0
DIZZINESS: 0
PARESTHESIAS: 1
HEARTBURN: 0
ABDOMINAL PAIN: 0
FEVER: 0
CONSTIPATION: 0
MYALGIAS: 0
PALPITATIONS: 0
NERVOUS/ANXIOUS: 0
JOINT SWELLING: 0
FREQUENCY: 0
ARTHRALGIAS: 1
DIARRHEA: 0
SHORTNESS OF BREATH: 0
DYSURIA: 0
HEADACHES: 0
NAUSEA: 0

## 2022-11-25 ASSESSMENT — ACTIVITIES OF DAILY LIVING (ADL): CURRENT_FUNCTION: NO ASSISTANCE NEEDED

## 2022-11-25 NOTE — PROGRESS NOTES
"SUBJECTIVE:     Marc is a 67 year old who presents for Preventive Visit.       Patient has been advised of split billing requirements and indicates understanding: Yes    Are you in the first 12 months of your Medicare coverage?  Yes,  Visual Acuity:  Right Eye: 20/25    Left Eye: 20/25  Both Eyes: 20/25      Welcome Medicare physical completed.  Risk questionnaire reviewed with suboptimal diet.  Hearing loss.  Known history of type 2 diabetes currently on metformin 500 mg using 2 tablets twice daily, Trulicity 3 mg/week and Lantus insulin 60 units daily.  No hypoglycemic episodes.  No GI distress.  Gabapentin 900 mg 3 times daily for peripheral neuropathy.  Lisinopril 20 mg daily for hypertension.  Simvastatin 40 mg at bedtime for lipid management.  Qvar 80 mcg using 2 puffs twice daily with albuterol MDI on as-needed basis however not requiring with asthma control test 25 out of 25 today.  Furosemide 40 mg using half tablet (20 mg) each morning with compression stockings when working.  Benefits of edema management noted.  Advanced adenoma with tubular adenomas historically and had colonoscopy 2020 told to repeat at 3-year interval.  Has had leg rash inner thighs predominantly.  Tried Lotrisone cream without significant benefit.  History of microcytic anemia as well with history of hemoglobin Tracey trait.  Comprehensive review of systems as above otherwise all negative.      Tubular adenomas with prior advanced adenoma with recommendation for 3-year follow-up on colonoscopy initially completed 2020.       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 Thompson); right inguinal hernia age 6 months; right " "total hip arthroplasty at [facility] February 5, 2019 with Dr. Evans.   Hospitalizations: early 20s \"infected gallbladder\" WITHOUT surgery... ; cellulitis in legs (hospitalized twice); abdominal wall cyst requiring IV antibiotics x 4 days... ; right Lumbar 3 - Lumbar 4 hemilaminectomy and medial facetectomy and Right redo Lumbar 4 -Lumbar 5 hemilaminectomy and medial facetectomy 1/20/21 (Dr. Lopez)     4/7/19-4/12/19 [facility] for right LE cellulitis/sepsis; right LE cellulitis 9/8/19-9/9/19 ([facility]); 10/15/19 s/p left MELITA (Dr. Evans)     FYI: see lab result \"[facility]\" from 4/19/11 re: hemoglobin Tracey trait resulting in mild microcyctic anemia (look for further cause of anemia if Hb < 11.0)      Healthy Habits:     In general, how would you rate your overall health?  Excellent    Frequency of exercise:  4-5 days/week    Duration of exercise:  30-45 minutes    Do you usually eat at least 4 servings of fruit and vegetables a day, include whole grains    & fiber and avoid regularly eating high fat or \"junk\" foods?  No    Taking medications regularly:  Yes    Medication side effects:  None    Ability to successfully perform activities of daily living:  No assistance needed    Home Safety:  No safety concerns identified    Hearing Impairment:  Difficulty following a conversation in a noisy restaurant or crowded room, feel that people are mumbling or not speaking clearly, need to ask people to speak up or repeat themselves, find that men's voices are easier to understand than woman's and difficulty understanding soft or whispered speech    In the past 6 months, have you been bothered by leaking of urine?  No    In general, how would you rate your overall mental or emotional health?  Excellent      PHQ-2 Total Score: 0    Additional concerns today:  Yes      Have you ever done Advance Care Planning? (For example, a Health Directive, POLST, or a discussion with a medical provider or your " loved ones about your wishes): No, advance care planning information given to patient to review.  Advanced care planning was discussed at today's visit.       Fall risk  Fallen 2 or more times in the past year?: No  Any fall with injury in the past year?: No    Cognitive Screening   1) Repeat 3 items (Leader, Season, Table)    2) Clock draw: NORMAL  3) 3 item recall: Recalls 2 objects   Results: NORMAL clock, 1-2 items recalled: COGNITIVE IMPAIRMENT LESS LIKELY    Mini-CogTM Copyright ERIN Bowen. Licensed by the author for use in [facility]; reprinted with permission (maria eugenia@[facility]). All rights reserved.      Do you have sleep apnea, excessive snoring or daytime drowsiness?: yes    Reviewed and updated as needed this visit by clinical staff                  Reviewed and updated as needed this visit by Provider                 Social History     Tobacco Use     Smoking status: Former     Smokeless tobacco: Never   Substance Use Topics     Alcohol use: Yes     Alcohol/week: 1.0 standard drink     Comment: Alcoholic Drinks/day: occasional     If you drink alcohol do you typically have >3 drinks per day or >7 drinks per week? No    Alcohol Use 11/25/2022   Prescreen: >3 drinks/day or >7 drinks/week? -   Prescreen: >3 drinks/day or >7 drinks/week? No               Current providers sharing in care for this patient include:   Patient Care Team:  Josh Shafer MD as PCP - General  Josh Shafer MD as Assigned PCP  Nico Bernard MD as Assigned Sleep Provider    The following health maintenance items are reviewed in Epic and correct as of today:  Health Maintenance   Topic Date Due     LUNG CANCER SCREENING  Never done     INFLUENZA VACCINE (1) 09/01/2022     EYE EXAM  09/14/2022     COVID-19 Vaccine (5 - Booster for Pfizer series) 09/15/2022     AORTIC ANEURYSM SCREENING (SYSTEM ASSIGNED)  Never done     LIPID  11/16/2022     DIABETIC FOOT EXAM  11/16/2022     ASTHMA ACTION PLAN   11/16/2022     COLORECTAL CANCER SCREENING  01/27/2023     MICROALBUMIN  03/16/2023     ANNUAL REVIEW OF HM ORDERS  03/16/2023     A1C  05/25/2023     ASTHMA CONTROL TEST  05/25/2023     BMP  07/21/2023     MEDICARE ANNUAL WELLNESS VISIT  11/25/2023     FALL RISK ASSESSMENT  11/25/2023     DTAP/TDAP/TD IMMUNIZATION (3 - Td or Tdap) 07/11/2027     ADVANCE CARE PLANNING  11/25/2027     HEPATITIS C SCREENING  Completed     HIV SCREENING  Completed     PHQ-2 (once per calendar year)  Completed     Pneumococcal Vaccine: 65+ Years  Completed     ZOSTER IMMUNIZATION  Completed     IPV IMMUNIZATION  Aged Out     MENINGITIS IMMUNIZATION  Aged Out     Lab work is in process  Labs reviewed in EPIC  BP Readings from Last 3 Encounters:   11/25/22 110/60   07/21/22 110/60   05/16/22 122/64    Wt Readings from Last 3 Encounters:   11/25/22 114.8 kg (253 lb)   07/21/22 119.3 kg (263 lb)   05/16/22 119.6 kg (263 lb 11.2 oz)                  Patient Active Problem List   Diagnosis     Diverticulosis     Microcytic anemia     Hammer Toe Of The Right Second Toe     Tubular adenoma of colon     Insulin dependent diabetes mellitus     Class 2 severe obesity due to excess calories with serious comorbidity and body mass index (BMI) of 36.0 to 36.9 in adult (H)     Carpal Tunnel Syndrome     Essential hypertension, benign     Hyperlipidemia     Male Erectile Disorder     Lymphedema     Mild persistent asthma without complication     Tinea cruris     Spinal stenosis of lumbar region, unspecified whether neurogenic claudication present     Primary osteoarthritis of right hip     Uncontrolled type 2 diabetes mellitus with hyperglycemia, with long-term current use of insulin (H)     Diabetic neuropathy associated with type 2 diabetes mellitus (H)     History of cellulitis     History of MRSA infection     Osteoarthritis of spine with radiculopathy, lumbar region     Spondylolisthesis of lumbar region     Lumbar radiculopathy     GUERA  (obstructive sleep apnea)     History of left hip replacement     Infected prosthetic hip, initial encounter (H)     Severe sepsis (H)     Past Surgical History:   Procedure Laterality Date     BACK SURGERY      laminectomy L45 by Dr. Villarreal      HERNIA REPAIR Right     inguinal; child     TOTAL HIP ARTHROPLASTY Bilateral 2019    Feb 5, 2019 (right) and October 19, 2019 (left)     ZZC HARE W/O FACETEC FORAMOT/DSKC 1/2 VRT SEG, LUMBAR Right 1/20/2021    Procedure: Right Lumbar 3 - Lumbar 4 hemilaminectomy and medial facetectomy and Right redo Lumbar 4 -Lumbar 5 hemilaminectomy and medial facetectomy;  Surgeon: Julissa Lopez MD;  Location[facility];  Service: Spine       Social History     Tobacco Use     Smoking status: Former     Smokeless tobacco: Never   Substance Use Topics     Alcohol use: Yes     Alcohol/week: 1.0 standard drink     Comment: Alcoholic Drinks/day: occasional     Family History   Problem Relation Age of Onset     Coronary Artery Disease Mother      Obesity Mother      Heart Disease Father      Cancer Father         throat     Coronary Artery Disease Father      Diabetes Sister      Diabetes Brother          Current Outpatient Medications   Medication Sig Dispense Refill     acetaminophen (TYLENOL) 500 MG tablet Take 1,000 mg by mouth       albuterol (PROAIR HFA/PROVENTIL HFA/VENTOLIN HFA) 108 (90 Base) MCG/ACT inhaler INHALE 2 PUFFS INTO THE LUNGS 4 TIMES DAILY AS NEEDED FOR SHORTNESS OF BREATH / DYSPNEA OR WHEEZING 18 g 6     amoxicillin (AMOXIL) 500 MG capsule        APPLE CIDER VINEGAR PO Take 1 tablet by mouth       aspirin (ASA) 325 MG EC tablet        augmented betamethasone dipropionate (DIPROLENE-AF) 0.05 % external ointment APPLY TO AFFECTED AREA TWICE A DAY 15 g 0     blood glucose (NO BRAND SPECIFIED) lancets standard [employer] Microlet MISC  Check blood sugar 2 times per day and as needed       Cholecalciferol (D3 DOTS) 50 MCG (2000 UT) TBDP Take 1 capsule by mouth        clotrimazole-betamethasone (LOTRISONE) 1-0.05 % external cream Apply topically 2 times daily 50 g 1     doxycycline hyclate (VIBRA-TABS) 100 MG tablet Take 1 tablet (100 mg) by mouth 2 times daily for 10 days 20 tablet 0     furosemide (LASIX) 40 MG tablet Take 0.5 tablets (20 mg) by mouth daily 45 tablet 3     gabapentin (NEURONTIN) 300 MG capsule TAKE 3 CAPSULES (900 MG TOTAL) BY MOUTH 3 (THREE) TIMES A DAY. 270 capsule 11     insulin pen needle (32G X 4 MM) 32G X 4 MM miscellaneous Use one pen needle daily or as directed. 100 each 3     LANTUS SOLOSTAR 100 UNIT/ML soln INJECT 60 UNITS SUBCUTANEOUSLY AT BEDTIME 60 mL 0     lisinopril (ZESTRIL) 20 MG tablet TAKE 1 TABLET BY MOUTH EVERY DAY 90 tablet 3     Magnesium Oxide 500 MG TABS Take 500 mg by mouth       metFORMIN (GLUCOPHAGE XR) 500 MG 24 hr tablet TAKE 2 TABLETS BY MOUTH TWICE A DAY WITH MEALS 360 tablet 0     Multiple Vitamin (MULTIVITAMIN ADULT PO) Take 1 tablet by mouth       potassium gluconate 2.5 MEQ TABS Take 1 tablet by mouth       QVAR REDIHALER 80 MCG/ACT inhaler INHALE 2 PUFFS BY MOUTH TWICE A DAY - RINSE MOUTH AFTER USE-DO NOT SHAKE UNIT 31.8 g 1     sildenafil (REVATIO) 20 MG tablet Take 40-60 mg by mouth       simvastatin (ZOCOR) 40 MG tablet TAKE 1 TABLET BY MOUTH EVERYDAY AT BEDTIME 90 tablet 3     TRULICITY 3 MG/0.5ML SOPN INJECT 3 MG SUBCUTANEOUS EVERY 7 DAYS       vitamin (B COMPLEX) capsule Take 1 capsule by mouth       vitamin C (ASCORBIC ACID) 500 MG tablet Take 500 mg by mouth       dulaglutide (TRULICITY) 1.5 MG/0.5ML pen Inject 3 mg Subcutaneous every 7 days 12 mL 3     No Known Allergies  Recent Labs   Lab Test 11/25/22  0839 07/21/22  1440 03/16/22  1542 03/16/22  1452 11/16/21  0714 07/01/21  1046 07/01/21  1046 03/11/21  0638 11/12/20  0914 07/08/20  0900 07/08/20  0759 01/02/20  0900 01/02/20  0826 10/01/19  1200   A1C 7.1* 7.5*  --  7.2* 8.8*  --  8.4*  --    < >  --    < >  --    < >  --    LDL  --   --   --   --  71  --   --   " --   --  63  --  73  --   --    HDL  --   --   --   --  34*  --   --   --   --  34*  --  36*  --   --    TRIG  --   --   --   --  137  --   --   --   --  259*  --  281*  --   --    ALT  --   --   --   --  39  --   --   --   --   --   --  28  --  21   CR  --  0.93 1.34*  --  1.12  --  0.81 0.8   < > 0.87  --  0.84  --  0.91   GFRESTIMATED  --  >90 59*  --  69   < > >60 >60   < > >60  --  >60  --  >60   GFRESTBLACK  --   --   --   --   --   --  >60 >60   < > >60  --  >60  --  >60   POTASSIUM  --  4.1 4.5  --  4.7  --  4.8  --    < > 4.6  --  4.7  --  4.5    < > = values in this interval not displayed.        Needs COVID-19 Pfizer bivalent booster and high-dose flu shot today.      Review of Systems   Constitutional: Negative for chills and fever.   HENT: Positive for congestion and hearing loss. Negative for ear pain and sore throat.    Eyes: Positive for visual disturbance. Negative for pain.   Respiratory: Negative for cough and shortness of breath.    Cardiovascular: Positive for peripheral edema. Negative for chest pain and palpitations.   Gastrointestinal: Negative for abdominal pain, constipation, diarrhea, heartburn, hematochezia and nausea.   Genitourinary: Positive for impotence. Negative for dysuria, frequency, genital sores, hematuria, penile discharge and urgency.   Musculoskeletal: Positive for arthralgias. Negative for joint swelling and myalgias.   Skin: Positive for rash.   Neurological: Positive for paresthesias. Negative for dizziness, weakness and headaches.   Psychiatric/Behavioral: Negative for mood changes. The patient is not nervous/anxious.      Constitutional, HEENT, cardiovascular, pulmonary, GI, , musculoskeletal, neuro, skin, endocrine and psych systems are negative, except as otherwise noted.    OBJECTIVE:   /60   Pulse 90   Resp 21   Ht 1.759 m (5' 9.25\")   Wt 114.8 kg (253 lb)   SpO2 97%   BMI 37.09 kg/m   Estimated body mass index is 37.09 kg/m  as calculated from the " "following:    Height as of this encounter: 1.759 m (5' 9.25\").    Weight as of this encounter: 114.8 kg (253 lb).     Physical Exam  GENERAL: healthy, alert and no distress  EYES: Eyes grossly normal to inspection, PERRL and conjunctivae and sclerae normal  HENT: ear canals and TM's normal, nose and mouth without ulcers or lesions  NECK: no adenopathy, no asymmetry, masses, or scars and thyroid normal to palpation  RESP: lungs clear to auscultation - no rales, rhonchi or wheezes  CV: regular rate and rhythm, normal S1 S2, no S3 or S4, no murmur, click or rub, no peripheral edema and peripheral pulses strong  ABDOMEN: soft, nontender, no hepatosplenomegaly, no masses and bowel sounds normal   (male): normal male genitalia without lesions or urethral discharge, no hernia  RECTAL: normal sphincter tone, no rectal masses, prostate normal size, smooth, nontender without nodules or masses  MS: no gross musculoskeletal defects noted, no edema  SKIN: no suspicious lesions or rashes  NEURO: Normal strength and tone, mentation intact and speech normal  PSYCH: mentation appears normal, affect normal/bright    Diagnostic Test Results:  Labs reviewed in Epic  Results for orders placed or performed in visit on 11/25/22 (from the past 24 hour(s))   Hemoglobin A1c   Result Value Ref Range    Hemoglobin A1C 7.1 (H) 0.0 - 5.6 %   Extra Tube    Narrative    The following orders were created for panel order Extra Tube.  Procedure                               Abnormality         Status                     ---------                               -----------         ------                     Extra Red Top Tube[[id number]]                               In process                   Please view results for these tests on the individual orders.       ASSESSMENT / PLAN:     Welcome to Medicare preventive visit  Welcome to Medicare physical completed.  Risk questionnaire noted for suboptimal diet and hearing loss.  Annual wellness visits to " "continue.    Class 2 severe obesity due to excess calories with serious comorbidity and body mass index (BMI) of 37.0 to 37.9 in adult (H)  Dietary and exercise modifications for weight goal less than 240 pounds initially, less than 220 pounds ideally.    Type 2 diabetes mellitus with diabetic polyneuropathy, without long-term current use of insulin (H)  Type 2 diabetes well controlled with A1c decreasing from 7.5% down to 7.1% today.  Diabetic eye exam to be completed at earliest convenience stating that he is \"overdue\".  Check lipid cascade and med monitoring today.  Continues metformin 1000 mg twice daily, Trulicity 3 mg weekly and Lantus insulin 60 units daily.  Monofilament testing today was abnormal.  Prior microalbumin screen was normal March 16, 2022.  Diabetes recheck in 4 months.  - Adult Eye  Referral  - Lipid panel reflex to direct LDL Fasting  - Comprehensive metabolic panel  - Lipid panel reflex to direct LDL Fasting  - Comprehensive metabolic panel    Essential hypertension, benign  Continues lisinopril 20 mg daily.  Medication monitoring completed.  No history of microalbuminuria historically.  Has had normal renal function typically.  Ensure adequate hydration.  - Comprehensive metabolic panel  - Comprehensive metabolic panel    Hyperlipidemia, unspecified hyperlipidemia type  Continuing use of simvastatin 40 mg at bedtime.    Microcytic anemia  Microcytic anemia with hemoglobin Tracey trait.  Update CBC today.  - CBC with platelets  - CBC with platelets    Peripheral polyneuropathy  Peripheral neuropathy.  Gabapentin 900 mg 3 times daily.  Abnormal monofilament testing today noted on exam.  - Comprehensive metabolic panel  - Comprehensive metabolic panel    High priority for 2019-nCoV vaccine  COVID-19 bivalent Pfizer booster provided.  - COVID-19,PF,PFIZER BOOSTER BIVALENT 12+Yrs    Encounter for immunization  High-dose flu shot provided today.  - INFLUENZA VACCINE 65+ (FLUZONE " "HD)    Mild persistent asthma without complication  Asthma control test 25 out of 25 and continues Qvar 80 mcg using 2 puffs twice daily.  Albuterol MDI available but not requiring.    Screening for prostate cancer  PSA for prostate cancer screening completed.  - Prostate Specific Antigen Screen  - Prostate Specific Antigen Screen    Folliculitis  Doxycycline 100 mg twice daily x10 days for persistent folliculitis noted on bilateral inner thighs.  - doxycycline hyclate (VIBRA-TABS) 100 MG tablet  Dispense: 20 tablet; Refill: 0    Lymphedema  Furosemide 40 mg using half tablet (20 mg) each morning.    Tubular adenoma of colon  Tubular adenoma of colon with advanced adenoma historically.  Most recent colonoscopy January 27, 2020 and told to repeat at 3-year interval.       Patient has been advised of split billing requirements and indicates understanding: No      COUNSELING:  Reviewed preventive health counseling, as reflected in patient instructions       Regular exercise       Healthy diet/nutrition       Vision screening       Hearing screening       Dental care       Bladder control       Fall risk prevention       Aspirin prophylaxis        Colon cancer screening       Prostate cancer screening      BMI:   Estimated body mass index is 37.09 kg/m  as calculated from the following:    Height as of this encounter: 1.759 m (5' 9.25\").    Weight as of this encounter: 114.8 kg (253 lb).   Weight management plan: Discussed healthy diet and exercise guidelines      He reports that he has quit smoking. He has never used smokeless tobacco.      Appropriate preventive services were discussed with this patient, including applicable screening as appropriate for cardiovascular disease, diabetes, osteopenia/osteoporosis, and glaucoma.  As appropriate for age/gender, discussed screening for colorectal cancer, prostate cancer, breast cancer, and cervical cancer. Checklist reviewing preventive services available has been given to " the patient.    Reviewed patients plan of care and provided an AVS. The Intermediate Care Plan ( asthma action plan, low back pain action plan, and migraine action plan) for Leandro meets the Care Plan requirement. This Care Plan has been established and reviewed with the Patient.          Josh Shafer MD  [facility]    Identified Health Risks:    The patient was counseled and encouraged to consider modifying their diet and eating habits. He was provided with information on recommended healthy diet options.  The patient was provided with written information regarding signs of hearing loss.

## 2022-11-25 NOTE — PATIENT INSTRUCTIONS
Patient Education   Personalized Prevention Plan  You are due for the preventive services outlined below.  Your care team is available to assist you in scheduling these services.  If you have already completed any of these items, please share that information with your care team to update in your medical record.  Health Maintenance Due   Topic Date Due     LUNG CANCER SCREENING  Never done     Flu Vaccine (1) 09/01/2022     Eye Exam  09/14/2022     COVID-19 Vaccine (5 - Booster for Pfizer series) 09/15/2022     AORTIC ANEURYSM SCREENING (SYSTEM ASSIGNED)  Never done     Cholesterol Lab  11/16/2022     Diabetic Foot Exam  11/16/2022     Asthma Action Plan - yearly  11/16/2022       Understanding USDA MyPlate  The USDA has guidelines to help you make healthy food choices. These are called MyPlate. MyPlate shows the food groups that make up healthy meals using the image of a place setting. Before you eat, think about the healthiest choices for what to put on your plate or in your cup or bowl. To learn more about building a healthy plate, visit www.choosemyplate.gov.    The food groups    Fruits. Any fruit or 100% fruit juice counts as part of the Fruit Group. Fruits may be fresh, canned, frozen, or dried, and may be whole, cut-up, or pureed. Make 1/2 of your plate fruits and vegetables.    Vegetables. Any vegetable or 100% vegetable juice counts as a member of the Vegetable Group. Vegetables may be fresh, frozen, canned, or dried. They can be served raw or cooked and may be whole, cut-up, or mashed. Make 1/2 of your plate fruits and vegetables.    Grains. All foods made from grains are part of the Grains Group. These include wheat, rice, oats, cornmeal, and barley. Grains are often used to make foods such as bread, pasta, oatmeal, cereal, tortillas, and grits. Grains should be no more than 1/4 of your plate. At least half of your grains should be whole grains.    Protein. This group includes meat, poultry, seafood,  beans and peas, eggs, processed soy products (such as tofu), nuts (including nut butters), and seeds. Make protein choices no more than 1/4 of your plate. Meat and poultry choices should be lean or low fat.    Dairy. The Dairy Group includes all fluid milk products and foods made from milk that contain calcium, such as yogurt and cheese. (Foods that have little calcium, such as cream, butter, and cream cheese, are not part of this group.) Most dairy choices should be low-fat or fat-free.    Oils. Oils aren't a food group, but they do contain essential nutrients. However it's important to watch your intake of oils. These are fats that are liquid at room temperature. They include canola, corn, olive, soybean, vegetable, and sunflower oil. Foods that are mainly oil include mayonnaise, certain salad dressings, and soft margarines. You likely already get your daily oil allowance from the foods you eat.  Things to limit  Eating healthy also means limiting these things in your diet:       Salt (sodium). Many processed foods have a lot of sodium. To keep sodium intake down, eat fresh vegetables, meats, poultry, and seafood when possible. Purchase low-sodium, reduced-sodium, or no-salt-added food products at the store. And don't add salt to your meals at home. Instead, season them with herbs and spices such as dill, oregano, cumin, and paprika. Or try adding flavor with lemon or lime zest and juice.    Saturated fat. Saturated fats are most often found in animal products such as beef, pork, and chicken. They are often solid at room temperature, such as butter. To reduce your saturated fat intake, choose leaner cuts of meat and poultry. And try healthier cooking methods such as grilling, broiling, roasting, or baking. For a simple lower-fat swap, use plain nonfat yogurt instead of mayonnaise when making potato salad or macaroni salad.    Added sugars. These are sugars added to foods. They are in foods such as ice cream,  candy, soda, fruit drinks, sports drinks, energy drinks, cookies, pastries, jams, and syrups. Cut down on added sugars by sharing sweet treats with a family member or friend. You can also choose fruit for dessert, and drink water or other unsweetened beverages.     [employer] last reviewed this educational content on 6/1/2020 2000-2021 [employer]. All rights reserved. This information is not intended as a substitute for professional medical care. Always follow your healthcare professional's instructions.          Signs of Hearing Loss      Hearing much better with one ear can be a sign of hearing loss.   Hearing loss is a problem shared by many people. In fact, it is one of the most common health problems, particularly as people age. Most people age 65 and older have some hearing loss. By age 80, almost everyone does. Hearing loss often occurs slowly over the years. So you may not realize your hearing has gotten worse.  Have your hearing checked  Call your healthcare provider if you:    Have to strain to hear normal conversation    Have to watch other people s faces very carefully to follow what they re saying    Need to ask people to repeat what they ve said    Often misunderstand what people are saying    Turn the volume of the television or radio up so high that others complain    Feel that people are mumbling when they re talking to you    Find that the effort to hear leaves you feeling tired and irritated    Notice, when using the phone, that you hear better with one ear than the other  [employer] last reviewed this educational content on 1/1/2020 2000-2021 [employer]. All rights reserved. This information is not intended as a substitute for professional medical care. Always follow your healthcare professional's instructions.

## 2022-11-26 DIAGNOSIS — E78.5 HYPERLIPIDEMIA, UNSPECIFIED HYPERLIPIDEMIA TYPE: ICD-10-CM

## 2022-11-26 DIAGNOSIS — I89.0 LYMPHEDEMA: ICD-10-CM

## 2022-11-27 RX ORDER — FUROSEMIDE 40 MG
TABLET ORAL
Qty: 45 TABLET | Refills: 3 | Status: SHIPPED | OUTPATIENT
Start: 2022-11-27 | End: 2023-12-28

## 2022-11-27 RX ORDER — SIMVASTATIN 40 MG
TABLET ORAL
Qty: 90 TABLET | Refills: 3 | Status: SHIPPED | OUTPATIENT
Start: 2022-11-27 | End: 2023-12-28

## 2022-11-28 NOTE — TELEPHONE ENCOUNTER
"Last Written Prescription Date:  1/14/22  Last Fill Quantity: 45,  # refills: 3   Last office visit provider:  11/25/22     Requested Prescriptions   Pending Prescriptions Disp Refills     furosemide (LASIX) 40 MG tablet [Pharmacy Med Name: FUROSEMIDE 40 MG TABLET] 45 tablet 3     Sig: TAKE 0.5 TABLETS BY MOUTH DAILY       Diuretics (Including Combos) Protocol Passed - 11/26/2022 12:59 PM        Passed - Blood pressure under 140/90 in past 12 months     BP Readings from Last 3 Encounters:   11/25/22 110/60   07/21/22 110/60   05/16/22 122/64                 Passed - Recent (12 mo) or future (30 days) visit within the authorizing provider's specialty     Patient has had an office visit with the authorizing provider or a provider within the authorizing providers department within the previous 12 mos or has a future within next 30 days. See \"Patient Info\" tab in inbasket, or \"Choose Columns\" in Meds & Orders section of the refill encounter.              Passed - Medication is active on med list        Passed - Patient is age 18 or older        Passed - Normal serum creatinine on file in past 12 months     Recent Labs   Lab Test 11/25/22  0839   CR 0.98              Passed - Normal serum potassium on file in past 12 months     Recent Labs   Lab Test 11/25/22  0839   POTASSIUM 5.2                    Passed - Normal serum sodium on file in past 12 months     Recent Labs   Lab Test 11/25/22  0839                    simvastatin (ZOCOR) 40 MG tablet [Pharmacy Med Name: SIMVASTATIN 40 MG TABLET] 90 tablet 3     Sig: TAKE 1 TABLET BY MOUTH EVERYDAY AT BEDTIME       Statins Protocol Passed - 11/26/2022 12:59 PM        Passed - LDL on file in past 12 months     Recent Labs   Lab Test 11/25/22  0839   LDL 70             Passed - No abnormal creatine kinase in past 12 months     No lab results found.             Passed - Recent (12 mo) or future (30 days) visit within the authorizing provider's specialty     Patient has had " "an office visit with the authorizing provider or a provider within the authorizing providers department within the previous 12 mos or has a future within next 30 days. See \"Patient Info\" tab in inbasket, or \"Choose Columns\" in Meds & Orders section of the refill encounter.              Passed - Medication is active on med list        Passed - Patient is age 18 or older             Nuvia Milan RN 11/27/22 10:31 PM  "

## 2022-12-01 ENCOUNTER — TRANSFERRED RECORDS (OUTPATIENT)
Dept: MULTI SPECIALTY CLINIC | Facility: CLINIC | Age: 67
End: 2022-12-01

## 2022-12-01 LAB — RETINOPATHY: NORMAL

## 2022-12-07 DIAGNOSIS — R21 RASH: Primary | ICD-10-CM

## 2022-12-07 DIAGNOSIS — I89.0 LYMPHEDEMA: ICD-10-CM

## 2022-12-07 NOTE — PROGRESS NOTES
DME (Durable Medical Equipment) Orders and Documentation  Orders Placed This Encounter   Procedures     Compression Sleeve/Stocking Order      The patient was assessed and it was determined the patient is in need of the following listed DME Supplies/Equipment. Please complete supporting documentation below to demonstrate medical necessity.      Compression Sleeve/Stocking(s) Supplies Documentation  The patient needs compression stockings for lymphedema management.

## 2022-12-08 ENCOUNTER — TELEPHONE (OUTPATIENT)
Dept: DERMATOLOGY | Facility: CLINIC | Age: 67
End: 2022-12-08

## 2022-12-08 NOTE — TELEPHONE ENCOUNTER
This encounter is being sent to inform the clinic that this patient has a referral from Josh Shafer MD for the diagnoses of rash and has requested that this patient be seen within Priority: 1-2 Weeks.  Based on the availability of our provider(s), we are unable to accommodate this request.      Were all sites offered this patient?  Yes      Does scheduling algorithm request to schedule next available?  Patient has been scheduled for the first available opening with Itzel Zuñiga PA-C on 3/7/2023.  We have informed the patient that the clinic will review their referral and reach out if a sooner appointment is medically necessary.

## 2022-12-13 ENCOUNTER — OFFICE VISIT (OUTPATIENT)
Dept: DERMATOLOGY | Facility: CLINIC | Age: 67
End: 2022-12-13
Payer: COMMERCIAL

## 2022-12-13 DIAGNOSIS — L73.2 HIDRADENITIS SUPPURATIVA: Primary | ICD-10-CM

## 2022-12-13 PROCEDURE — 99243 OFF/OP CNSLTJ NEW/EST LOW 30: CPT | Performed by: DERMATOLOGY

## 2022-12-13 RX ORDER — DOXYCYCLINE 100 MG/1
100 CAPSULE ORAL 2 TIMES DAILY
Qty: 60 CAPSULE | Refills: 6 | Status: SHIPPED | OUTPATIENT
Start: 2022-12-13 | End: 2023-08-01

## 2022-12-13 RX ORDER — CLINDAMYCIN AND BENZOYL PEROXIDE 10; 50 MG/G; MG/G
GEL TOPICAL 2 TIMES DAILY
Qty: 50 G | Refills: 1 | Status: SHIPPED | OUTPATIENT
Start: 2022-12-13 | End: 2023-04-18

## 2022-12-13 ASSESSMENT — PAIN SCALES - GENERAL: PAINLEVEL: NO PAIN (0)

## 2022-12-13 NOTE — LETTER
12/13/2022         RE: Leandro Brewer  [address] [id number]        Dear Colleague,    Thank you for referring your patient, Leandro Brewer, to the [facility]. Please see a copy of my visit note below.    Leandro Brewer is an extremely pleasant 67 year old year old male patient I was asked to see by A Hollis for lesion on right thigh.  .   Patient states this has been present for months.  Patient reports the following symptoms:  Red and tender.  Patient reports the following previous treatments keflex.  These treatments did not work.  Patient reports the following modifying factors none.  Associated symptoms: none.  Patient has no other skin complaints today.  Remainder of the HPI, Meds, PMH, Allergies, FH, and SH was reviewed in chart.      Past Medical History:   Diagnosis Date     Anemia      Arthritis      Diabetes mellitus, type II (H)      Hypertension      Obesity      Plantar fasciitis        Past Surgical History:   Procedure Laterality Date     BACK SURGERY      laminectomy L45 by Dr. Villarreal      HERNIA REPAIR Right     inguinal; child     TOTAL HIP ARTHROPLASTY Bilateral 2019 Feb 5, 2019 (right) and October 19, 2019 (left)     ZZC HARE W/O FACETEC FORAMOT/DSKC 1/2 VRT SEG, LUMBAR Right 1/20/2021    Procedure: Right Lumbar 3 - Lumbar 4 hemilaminectomy and medial facetectomy and Right redo Lumbar 4 -Lumbar 5 hemilaminectomy and medial facetectomy;  Surgeon: Julissa Lopez MD;  Location: [facility] [place];  Service: Spine        Family History   Problem Relation Age of Onset     Coronary Artery Disease Mother      Obesity Mother      Heart Disease Father      Cancer Father         throat     Coronary Artery Disease Father      Diabetes Sister      Diabetes Brother        Social History     Socioeconomic History     Marital status:      Spouse name: Not on file     Number of children: Not on file     Years of education: Not on file      Highest education level: Not on file   Occupational History     Not on file   Tobacco Use     Smoking status: Former     Smokeless tobacco: Never   Vaping Use     Vaping Use: Never used   Substance and Sexual Activity     Alcohol use: Yes     Alcohol/week: 1.0 standard drink     Comment: Alcoholic Drinks/day: occasional     Drug use: No     Sexual activity: Not on file   Other Topics Concern     Not on file   Social History Narrative     Not on file     Social Determinants of Health     Financial Resource Strain: Not on file   Food Insecurity: Not on file   Transportation Needs: Not on file   Physical Activity: Not on file   Stress: Not on file   Social Connections: Not on file   Intimate Partner Violence: Not on file   Housing Stability: Not on file       Outpatient Encounter Medications as of 12/13/2022   Medication Sig Dispense Refill     acetaminophen (TYLENOL) 500 MG tablet Take 1,000 mg by mouth       albuterol (PROAIR HFA/PROVENTIL HFA/VENTOLIN HFA) 108 (90 Base) MCG/ACT inhaler INHALE 2 PUFFS INTO THE LUNGS 4 TIMES DAILY AS NEEDED FOR SHORTNESS OF BREATH / DYSPNEA OR WHEEZING 18 g 6     APPLE CIDER VINEGAR PO Take 1 tablet by mouth       aspirin (ASA) 325 MG EC tablet        blood glucose (NO BRAND SPECIFIED) lancets standard [employer] Microlet MISC  Check blood sugar 2 times per day and as needed       Cholecalciferol (D3 DOTS) 50 MCG (2000 UT) TBDP Take 1 capsule by mouth       furosemide (LASIX) 40 MG tablet TAKE 0.5 TABLETS BY MOUTH DAILY 45 tablet 3     gabapentin (NEURONTIN) 300 MG capsule TAKE 3 CAPSULES (900 MG TOTAL) BY MOUTH 3 (THREE) TIMES A DAY. 270 capsule 11     insulin pen needle (32G X 4 MM) 32G X 4 MM miscellaneous Use one pen needle daily or as directed. 100 each 3     LANTUS SOLOSTAR 100 UNIT/ML soln INJECT 60 UNITS SUBCUTANEOUSLY AT BEDTIME 60 mL 0     lisinopril (ZESTRIL) 20 MG tablet TAKE 1 TABLET BY MOUTH EVERY DAY 90 tablet 3     Magnesium Oxide 500 MG TABS Take 500 mg by mouth        metFORMIN (GLUCOPHAGE XR) 500 MG 24 hr tablet TAKE 2 TABLETS BY MOUTH TWICE A DAY WITH MEALS 360 tablet 0     Multiple Vitamin (MULTIVITAMIN ADULT PO) Take 1 tablet by mouth       potassium gluconate 2.5 MEQ TABS Take 1 tablet by mouth       QVAR REDIHALER 80 MCG/ACT inhaler INHALE 2 PUFFS BY MOUTH TWICE A DAY - RINSE MOUTH AFTER USE-DO NOT SHAKE UNIT 31.8 g 1     sildenafil (REVATIO) 20 MG tablet Take 40-60 mg by mouth       simvastatin (ZOCOR) 40 MG tablet TAKE 1 TABLET BY MOUTH EVERYDAY AT BEDTIME 90 tablet 3     TRULICITY 3 MG/0.5ML SOPN INJECT 3 MG SUBCUTANEOUS EVERY 7 DAYS       vitamin (B COMPLEX) capsule Take 1 capsule by mouth       vitamin C (ASCORBIC ACID) 500 MG tablet Take 500 mg by mouth       amoxicillin (AMOXIL) 500 MG capsule  (Patient not taking: Reported on 12/13/2022)       augmented betamethasone dipropionate (DIPROLENE-AF) 0.05 % external ointment APPLY TO AFFECTED AREA TWICE A DAY (Patient not taking: Reported on 12/13/2022) 15 g 0     clotrimazole-betamethasone (LOTRISONE) 1-0.05 % external cream Apply topically 2 times daily (Patient not taking: Reported on 12/13/2022) 50 g 1     No facility-administered encounter medications on file as of 12/13/2022.             O:   NAD, WDWN, Alert & Oriented, Mood & Affect wnl, Vitals stable   Here today alone   General appearance normal   Vitals stable   Alert, oriented and in no acute distress     Fluctuant nodule with comedones on right medial thigh          Eyes: Conjunctivae/lids:Normal     ENT: Lips, buccal mucosa, tongue: normal    MSK:Normal    Cardiovascular: peripheral edema none    Pulm: Breathing Normal    Neuro/Psych: Orientation:Alert and Orientedx3 ; Mood/Affect:normal       A/P:  1/ ruptured cysts  Doxy twice daily  hibiclens daily do not put on face  benzaclin twice daily  Return to clinic 4 weeks  It was a pleasure speaking to Leandro Brewer today.  Previous clinic notes and pertinent laboratory tests were reviewed prior to  Leandro Brewer's visit.        Again, thank you for allowing me to participate in the care of your patient.        Sincerely,        Arcenio Fernandez MD

## 2022-12-13 NOTE — PATIENT INSTRUCTIONS
Doxy twice daily (oral medication)   hibiclens daily do not put on face ( daily wash)   benzaclin twice daily  Return to clinic 4 weeks

## 2022-12-13 NOTE — PROGRESS NOTES
Leandro Brewer is an extremely pleasant 67 year old year old male patient I was asked to see by A Hollis for lesion on right thigh.  .   Patient states this has been present for months.  Patient reports the following symptoms:  Red and tender.  Patient reports the following previous treatments keflex.  These treatments did not work.  Patient reports the following modifying factors none.  Associated symptoms: none.  Patient has no other skin complaints today.  Remainder of the HPI, Meds, PMH, Allergies, FH, and SH was reviewed in chart.      Past Medical History:   Diagnosis Date     Anemia      Arthritis      Diabetes mellitus, type II (H)      Hypertension      Obesity      Plantar fasciitis        Past Surgical History:   Procedure Laterality Date     BACK SURGERY      laminectomy L45 by Dr. Villarreal      HERNIA REPAIR Right     inguinal; child     TOTAL HIP ARTHROPLASTY Bilateral 2019 Feb 5, 2019 (right) and October 19, 2019 (left)     ZZC HARE W/O FACETEC FORAMOT/DSKC 1/2 VRT SEG, LUMBAR Right 1/20/2021    Procedure: Right Lumbar 3 - Lumbar 4 hemilaminectomy and medial facetectomy and Right redo Lumbar 4 -Lumbar 5 hemilaminectomy and medial facetectomy;  Surgeon: Julissa Lopez MD;  Location: [facility] - [place];  Service: Spine        Family History   Problem Relation Age of Onset     Coronary Artery Disease Mother      Obesity Mother      Heart Disease Father      Cancer Father         throat     Coronary Artery Disease Father      Diabetes Sister      Diabetes Brother        Social History     Socioeconomic History     Marital status:      Spouse name: Not on file     Number of children: Not on file     Years of education: Not on file     Highest education level: Not on file   Occupational History     Not on file   Tobacco Use     Smoking status: Former     Smokeless tobacco: Never   Vaping Use     Vaping Use: Never used   Substance and Sexual Activity     Alcohol use: Yes     Alcohol/week: 1.0  standard drink     Comment: Alcoholic Drinks/day: occasional     Drug use: No     Sexual activity: Not on file   Other Topics Concern     Not on file   Social History Narrative     Not on file     Social Determinants of Health     Financial Resource Strain: Not on file   Food Insecurity: Not on file   Transportation Needs: Not on file   Physical Activity: Not on file   Stress: Not on file   Social Connections: Not on file   Intimate Partner Violence: Not on file   Housing Stability: Not on file       Outpatient Encounter Medications as of 12/13/2022   Medication Sig Dispense Refill     acetaminophen (TYLENOL) 500 MG tablet Take 1,000 mg by mouth       albuterol (PROAIR HFA/PROVENTIL HFA/VENTOLIN HFA) 108 (90 Base) MCG/ACT inhaler INHALE 2 PUFFS INTO THE LUNGS 4 TIMES DAILY AS NEEDED FOR SHORTNESS OF BREATH / DYSPNEA OR WHEEZING 18 g 6     APPLE CIDER VINEGAR PO Take 1 tablet by mouth       aspirin (ASA) 325 MG EC tablet        blood glucose (NO BRAND SPECIFIED) lancets standard [employer] Microlet MISC  Check blood sugar 2 times per day and as needed       Cholecalciferol (D3 DOTS) 50 MCG (2000 UT) TBDP Take 1 capsule by mouth       furosemide (LASIX) 40 MG tablet TAKE 0.5 TABLETS BY MOUTH DAILY 45 tablet 3     gabapentin (NEURONTIN) 300 MG capsule TAKE 3 CAPSULES (900 MG TOTAL) BY MOUTH 3 (THREE) TIMES A DAY. 270 capsule 11     insulin pen needle (32G X 4 MM) 32G X 4 MM miscellaneous Use one pen needle daily or as directed. 100 each 3     LANTUS SOLOSTAR 100 UNIT/ML soln INJECT 60 UNITS SUBCUTANEOUSLY AT BEDTIME 60 mL 0     lisinopril (ZESTRIL) 20 MG tablet TAKE 1 TABLET BY MOUTH EVERY DAY 90 tablet 3     Magnesium Oxide 500 MG TABS Take 500 mg by mouth       metFORMIN (GLUCOPHAGE XR) 500 MG 24 hr tablet TAKE 2 TABLETS BY MOUTH TWICE A DAY WITH MEALS 360 tablet 0     Multiple Vitamin (MULTIVITAMIN ADULT PO) Take 1 tablet by mouth       potassium gluconate 2.5 MEQ TABS Take 1 tablet by mouth       QVAR REDIHALER 80  MCG/ACT inhaler INHALE 2 PUFFS BY MOUTH TWICE A DAY - RINSE MOUTH AFTER USE-DO NOT SHAKE UNIT 31.8 g 1     sildenafil (REVATIO) 20 MG tablet Take 40-60 mg by mouth       simvastatin (ZOCOR) 40 MG tablet TAKE 1 TABLET BY MOUTH EVERYDAY AT BEDTIME 90 tablet 3     TRULICITY 3 MG/0.5ML SOPN INJECT 3 MG SUBCUTANEOUS EVERY 7 DAYS       vitamin (B COMPLEX) capsule Take 1 capsule by mouth       vitamin C (ASCORBIC ACID) 500 MG tablet Take 500 mg by mouth       amoxicillin (AMOXIL) 500 MG capsule  (Patient not taking: Reported on 12/13/2022)       augmented betamethasone dipropionate (DIPROLENE-AF) 0.05 % external ointment APPLY TO AFFECTED AREA TWICE A DAY (Patient not taking: Reported on 12/13/2022) 15 g 0     clotrimazole-betamethasone (LOTRISONE) 1-0.05 % external cream Apply topically 2 times daily (Patient not taking: Reported on 12/13/2022) 50 g 1     No facility-administered encounter medications on file as of 12/13/2022.             O:   NAD, WDWN, Alert & Oriented, Mood & Affect wnl, Vitals stable   Here today alone   General appearance normal   Vitals stable   Alert, oriented and in no acute distress     Fluctuant nodule with comedones on right medial thigh          Eyes: Conjunctivae/lids:Normal     ENT: Lips, buccal mucosa, tongue: normal    MSK:Normal    Cardiovascular: peripheral edema none    Pulm: Breathing Normal    Neuro/Psych: Orientation:Alert and Orientedx3 ; Mood/Affect:normal       A/P:  1/ ruptured cysts  Doxy twice daily  hibiclens daily do not put on face  benzaclin twice daily  Return to clinic 4 weeks  It was a pleasure speaking to Leandro Brewer today.  Previous clinic notes and pertinent laboratory tests were reviewed prior to Leandro Brewer's visit.

## 2022-12-22 DIAGNOSIS — Z79.4 TYPE 2 DIABETES MELLITUS WITHOUT COMPLICATION, WITH LONG-TERM CURRENT USE OF INSULIN (H): ICD-10-CM

## 2022-12-22 DIAGNOSIS — L30.9 DERMATITIS: ICD-10-CM

## 2022-12-22 DIAGNOSIS — E11.9 TYPE 2 DIABETES MELLITUS WITHOUT COMPLICATION, WITH LONG-TERM CURRENT USE OF INSULIN (H): ICD-10-CM

## 2022-12-22 DIAGNOSIS — E11.9 TYPE 2 DIABETES MELLITUS WITHOUT COMPLICATION, WITHOUT LONG-TERM CURRENT USE OF INSULIN (H): ICD-10-CM

## 2022-12-23 RX ORDER — BETAMETHASONE DIPROPIONATE 0.5 MG/G
OINTMENT, AUGMENTED TOPICAL
Qty: 15 G | Refills: 0 | Status: SHIPPED | OUTPATIENT
Start: 2022-12-23 | End: 2023-05-02

## 2022-12-23 RX ORDER — INSULIN GLARGINE 100 [IU]/ML
INJECTION, SOLUTION SUBCUTANEOUS
Qty: 45 ML | Refills: 1 | Status: SHIPPED | OUTPATIENT
Start: 2022-12-23 | End: 2023-05-02

## 2022-12-23 RX ORDER — METFORMIN HCL 500 MG
TABLET, EXTENDED RELEASE 24 HR ORAL
Qty: 360 TABLET | Refills: 1 | Status: SHIPPED | OUTPATIENT
Start: 2022-12-23 | End: 2023-05-02

## 2022-12-23 NOTE — TELEPHONE ENCOUNTER
"Routing refill request to provider for review/approval because:  System reports patient not taking medication.    Last Written Prescription Date:  9/10/22  Last Fill Quantity: 15 g,  # refills: 0   Last office visit provider:  11/25/22     Requested Prescriptions   Pending Prescriptions Disp Refills     augmented betamethasone dipropionate (DIPROLENE-AF) 0.05 % external ointment [Pharmacy Med Name: BETAMETHASONE DP AUG 0.05% OIN] 15 g 0     Sig: APPLY TO AFFECTED AREA TWICE A DAY       Topical Steroids and Nonsteroidals Protocol Passed - 12/23/2022  1:44 PM        Passed - Patient is age 6 or older        Passed - Authorizing prescriber's most recent note related to this medication read.     If refill request is for ophthalmic use, please forward request to provider for approval.          Passed - High potency steroid not ordered        Passed - Recent (12 mo) or future (30 days) visit within the authorizing provider's specialty     Patient has had an office visit with the authorizing provider or a provider within the authorizing providers department within the previous 12 mos or has a future within next 30 days. See \"Patient Info\" tab in inbasket, or \"Choose Columns\" in Meds & Orders section of the refill encounter.              Passed - Medication is active on med list         Signed Prescriptions Disp Refills    metFORMIN (GLUCOPHAGE XR) 500 MG 24 hr tablet 360 tablet 1     Sig: TAKE 2 TABLETS BY MOUTH TWICE A DAY WITH MEALS.       Biguanide Agents Passed - 12/23/2022  1:44 PM        Passed - Patient is age 10 or older        Passed - Patient has documented A1c within the specified period of time.     If HgbA1C is 8 or greater, it needs to be on file within the past 3 months.  If less than 8, must be on file within the past 6 months.     Recent Labs   Lab Test 11/25/22  0839   A1C 7.1*             Passed - Patient's CR is NOT>1.4 OR Patient's EGFR is NOT<45 within past 12 mos.     Recent Labs   Lab Test " "11/25/22  0839 11/16/21  0714 07/01/21  1046   GFRESTIMATED 86   < > >60   GFRESTBLACK  --   --  >60    < > = values in this interval not displayed.       Recent Labs   Lab Test 11/25/22  0839   CR 0.98             Passed - Patient does NOT have a diagnosis of CHF.        Passed - Medication is active on med list        Passed - Recent (6 mo) or future (30 days) visit within the authorizing provider's specialty     Patient had office visit in the last 6 months or has a visit in the next 30 days with authorizing provider or within the authorizing provider's specialty.  See \"Patient Info\" tab in inbasket, or \"Choose Columns\" in Meds & Orders section of the refill encounter.              LANTUS SOLOSTAR 100 UNIT/ML soln 45 mL 1     Sig: INJECT 60 UNITS SUBCUTANEOUSLY AT BEDTIME       Long Acting Insulin Protocol Passed - 12/22/2022 11:13 AM        Passed - Serum creatinine on file in past 12 months     Recent Labs   Lab Test 11/25/22  0839   CR 0.98       Ok to refill medication if creatinine is low          Passed - HgbA1C in past 3 or 6 months     If HgbA1C is 8 or greater, it needs to be on file within the past 3 months.  If less than 8, must be on file within the past 6 months.     Recent Labs   Lab Test 11/25/22  0839   A1C 7.1*             Passed - Medication is active on med list        Passed - Patient is age 18 or older        Passed - Recent (6 mo) or future (30 days) visit within the authorizing provider's specialty     Patient had office visit in the last 6 months or has a visit in the next 30 days with authorizing provider or within the authorizing provider's specialty.  See \"Patient Info\" tab in inbasket, or \"Choose Columns\" in Meds & Orders section of the refill encounter.                 Fantasma Pizarro RN 12/23/22 1:48 PM  "

## 2022-12-23 NOTE — TELEPHONE ENCOUNTER
"Last Written Prescription Date:  9/10/22  Last Fill Quantity: 360,  # refills: 0   Last office visit provider:  11/25/22    Last Written Prescription Date:  9/10/22  Last Fill Quantity: 60 ml,  # refills: 0   Last office visit provider:  11/25/22     Requested Prescriptions   Pending Prescriptions Disp Refills     metFORMIN (GLUCOPHAGE XR) 500 MG 24 hr tablet [Pharmacy Med Name: METFORMIN HCL  MG TABLET] 360 tablet 0     Sig: TAKE 2 TABLETS BY MOUTH TWICE A DAY WITH MEALS       Biguanide Agents Passed - 12/23/2022  1:44 PM        Passed - Patient is age 10 or older        Passed - Patient has documented A1c within the specified period of time.     If HgbA1C is 8 or greater, it needs to be on file within the past 3 months.  If less than 8, must be on file within the past 6 months.     Recent Labs   Lab Test 11/25/22  0839   A1C 7.1*             Passed - Patient's CR is NOT>1.4 OR Patient's EGFR is NOT<45 within past 12 mos.     Recent Labs   Lab Test 11/25/22  0839 11/16/21  0714 07/01/21  1046   GFRESTIMATED 86   < > >60   GFRESTBLACK  --   --  >60    < > = values in this interval not displayed.       Recent Labs   Lab Test 11/25/22  0839   CR 0.98             Passed - Patient does NOT have a diagnosis of CHF.        Passed - Medication is active on med list        Passed - Recent (6 mo) or future (30 days) visit within the authorizing provider's specialty     Patient had office visit in the last 6 months or has a visit in the next 30 days with authorizing provider or within the authorizing provider's specialty.  See \"Patient Info\" tab in inbasket, or \"Choose Columns\" in Meds & Orders section of the refill encounter.               augmented betamethasone dipropionate (DIPROLENE-AF) 0.05 % external ointment [Pharmacy Med Name: BETAMETHASONE DP AUG 0.05% OIN] 15 g 0     Sig: APPLY TO AFFECTED AREA TWICE A DAY       Topical Steroids and Nonsteroidals Protocol Passed - 12/23/2022  1:44 PM        Passed - Patient " "is age 6 or older        Passed - Authorizing prescriber's most recent note related to this medication read.     If refill request is for ophthalmic use, please forward request to provider for approval.          Passed - High potency steroid not ordered        Passed - Recent (12 mo) or future (30 days) visit within the authorizing provider's specialty     Patient has had an office visit with the authorizing provider or a provider within the authorizing providers department within the previous 12 mos or has a future within next 30 days. See \"Patient Info\" tab in inbasket, or \"Choose Columns\" in Meds & Orders section of the refill encounter.              Passed - Medication is active on med list           LANTUS SOLOSTAR 100 UNIT/ML soln [Pharmacy Med Name: LANTUS SOLOSTAR 100 UNIT/ML]       Sig: INJECT 60 UNITS SUBCUTANEOUSLY AT BEDTIME       Long Acting Insulin Protocol Passed - 12/22/2022 11:13 AM        Passed - Serum creatinine on file in past 12 months     Recent Labs   Lab Test 11/25/22  0839   CR 0.98       Ok to refill medication if creatinine is low          Passed - HgbA1C in past 3 or 6 months     If HgbA1C is 8 or greater, it needs to be on file within the past 3 months.  If less than 8, must be on file within the past 6 months.     Recent Labs   Lab Test 11/25/22  0839   A1C 7.1*             Passed - Medication is active on med list        Passed - Patient is age 18 or older        Passed - Recent (6 mo) or future (30 days) visit within the authorizing provider's specialty     Patient had office visit in the last 6 months or has a visit in the next 30 days with authorizing provider or within the authorizing provider's specialty.  See \"Patient Info\" tab in inbasket, or \"Choose Columns\" in Meds & Orders section of the refill encounter.                 Fantasma Pizarro RN 12/23/22 1:45 PM  "

## 2023-01-17 ENCOUNTER — OFFICE VISIT (OUTPATIENT)
Dept: DERMATOLOGY | Facility: CLINIC | Age: 68
End: 2023-01-17
Payer: COMMERCIAL

## 2023-01-17 DIAGNOSIS — L73.2 HIDRADENITIS SUPPURATIVA: Primary | ICD-10-CM

## 2023-01-17 PROCEDURE — 99213 OFFICE O/P EST LOW 20 MIN: CPT | Performed by: DERMATOLOGY

## 2023-01-17 NOTE — PROGRESS NOTES
Leandro Brewer is an extremely pleasant 67 year old year old male patient here today for f/u HS doing well clear.  Patient has no other skin complaints today.  Remainder of the HPI, Meds, PMH, Allergies, FH, and SH was reviewed in chart.      Past Medical History:   Diagnosis Date     Anemia      Arthritis      Diabetes mellitus, type II (H)      Hypertension      Obesity      Plantar fasciitis        Past Surgical History:   Procedure Laterality Date     BACK SURGERY      laminectomy L45 by Dr. Villarreal      HERNIA REPAIR Right     inguinal; child     TOTAL HIP ARTHROPLASTY Bilateral 2019 Feb 5, 2019 (right) and October 19, 2019 (left)     ZZC HARE W/O FACETEC FORAMOT/DSKC 1/2 VRT SEG, LUMBAR Right 1/20/2021    Procedure: Right Lumbar 3 - Lumbar 4 hemilaminectomy and medial facetectomy and Right redo Lumbar 4 -Lumbar 5 hemilaminectomy and medial facetectomy;  Surgeon: Julissa Lopez MD;  Location: [facility] [place];  Service: Spine        Family History   Problem Relation Age of Onset     Coronary Artery Disease Mother      Obesity Mother      Heart Disease Father      Cancer Father         throat     Coronary Artery Disease Father      Diabetes Sister      Diabetes Brother        Social History     Socioeconomic History     Marital status:      Spouse name: Not on file     Number of children: Not on file     Years of education: Not on file     Highest education level: Not on file   Occupational History     Not on file   Tobacco Use     Smoking status: Former     Smokeless tobacco: Never   Vaping Use     Vaping Use: Never used   Substance and Sexual Activity     Alcohol use: Yes     Alcohol/week: 1.0 standard drink     Comment: Alcoholic Drinks/day: occasional     Drug use: No     Sexual activity: Not on file   Other Topics Concern     Not on file   Social History Narrative     Not on file     Social Determinants of Health     Financial Resource Strain: Not on file   Food Insecurity: Not on file    Transportation Needs: Not on file   Physical Activity: Not on file   Stress: Not on file   Social Connections: Not on file   Intimate Partner Violence: Not on file   Housing Stability: Not on file       Outpatient Encounter Medications as of 1/17/2023   Medication Sig Dispense Refill     augmented betamethasone dipropionate (DIPROLENE-AF) 0.05 % external ointment APPLY TO AFFECTED AREA TWICE A DAY 15 g 0     acetaminophen (TYLENOL) 500 MG tablet Take 1,000 mg by mouth       albuterol (PROAIR HFA/PROVENTIL HFA/VENTOLIN HFA) 108 (90 Base) MCG/ACT inhaler INHALE 2 PUFFS INTO THE LUNGS 4 TIMES DAILY AS NEEDED FOR SHORTNESS OF BREATH / DYSPNEA OR WHEEZING 18 g 6     amoxicillin (AMOXIL) 500 MG capsule  (Patient not taking: Reported on 12/13/2022)       APPLE CIDER VINEGAR PO Take 1 tablet by mouth       aspirin (ASA) 325 MG EC tablet        blood glucose (NO BRAND SPECIFIED) lancets standard [employer] Microlet MISC  Check blood sugar 2 times per day and as needed       chlorhexidine (HIBICLENS) 4 % liquid Wash groin daily 473 mL 11     Cholecalciferol (D3 DOTS) 50 MCG (2000 UT) TBDP Take 1 capsule by mouth       clindamycin-benzoyl peroxide (BENZACLIN) 1-5 % external gel Apply topically 2 times daily 50 g 1     clotrimazole-betamethasone (LOTRISONE) 1-0.05 % external cream Apply topically 2 times daily (Patient not taking: Reported on 12/13/2022) 50 g 1     doxycycline monohydrate (MONODOX) 100 MG capsule Take 1 capsule (100 mg) by mouth 2 times daily 60 capsule 6     furosemide (LASIX) 40 MG tablet TAKE 0.5 TABLETS BY MOUTH DAILY 45 tablet 3     gabapentin (NEURONTIN) 300 MG capsule TAKE 3 CAPSULES (900 MG TOTAL) BY MOUTH 3 (THREE) TIMES A DAY. 270 capsule 11     insulin pen needle (32G X 4 MM) 32G X 4 MM miscellaneous Use one pen needle daily or as directed. 100 each 3     LANTUS SOLOSTAR 100 UNIT/ML soln INJECT 60 UNITS SUBCUTANEOUSLY AT BEDTIME 45 mL 1     lisinopril (ZESTRIL) 20 MG tablet TAKE 1 TABLET BY MOUTH  EVERY DAY 90 tablet 3     Magnesium Oxide 500 MG TABS Take 500 mg by mouth       metFORMIN (GLUCOPHAGE XR) 500 MG 24 hr tablet TAKE 2 TABLETS BY MOUTH TWICE A DAY WITH MEALS. 360 tablet 1     Multiple Vitamin (MULTIVITAMIN ADULT PO) Take 1 tablet by mouth       potassium gluconate 2.5 MEQ TABS Take 1 tablet by mouth       QVAR REDIHALER 80 MCG/ACT inhaler INHALE 2 PUFFS BY MOUTH TWICE A DAY - RINSE MOUTH AFTER USE-DO NOT SHAKE UNIT 31.8 g 1     sildenafil (REVATIO) 20 MG tablet Take 40-60 mg by mouth       simvastatin (ZOCOR) 40 MG tablet TAKE 1 TABLET BY MOUTH EVERYDAY AT BEDTIME 90 tablet 3     TRULICITY 3 MG/0.5ML SOPN INJECT 3 MG SUBCUTANEOUS EVERY 7 DAYS       vitamin (B COMPLEX) capsule Take 1 capsule by mouth       vitamin C (ASCORBIC ACID) 500 MG tablet Take 500 mg by mouth       No facility-administered encounter medications on file as of 1/17/2023.             O:   NAD, WDWN, Alert & Oriented, Mood & Affect wnl, Vitals stable   Here today alone    General appearance normal   Vitals stable   Alert, oriented and in no acute distress     Post inflammatory change sin groin    Eyes: Conjunctivae/lids:Normal     ENT: Lips, buccal mucosa, tongue: normal    MSK:Normal    Cardiovascular: peripheral edema none    Pulm: Breathing Normal    Neuro/Psych: Orientation:Alert and Orientedx3 ; Mood/Affect:normal       A/P:  1. HS stable  Decrease doxy to once daily  Cont benaclin  Cont hibiclens wash   Return to clinic 4 months  It was a pleasure speaking to Leandro Brewer today.  Previous clinic notes and pertinent laboratory tests were reviewed prior to Leandro Brewer's visit.

## 2023-01-17 NOTE — LETTER
1/17/2023         RE: Leandro Brewer  [address] [address]        Dear Colleague,    Thank you for referring your patient, Leandro Brewer, to the [facility]. Please see a copy of my visit note below.    Leandro Brewer is an extremely pleasant 67 year old year old male patient here today for f/u HS doing well clear.  Patient has no other skin complaints today.  Remainder of the HPI, Meds, PMH, Allergies, FH, and SH was reviewed in chart.      Past Medical History:   Diagnosis Date     Anemia      Arthritis      Diabetes mellitus, type II (H)      Hypertension      Obesity      Plantar fasciitis        Past Surgical History:   Procedure Laterality Date     BACK SURGERY      laminectomy L45 by Dr. Villarreal      HERNIA REPAIR Right     inguinal; child     TOTAL HIP ARTHROPLASTY Bilateral 2019 Feb 5, 2019 (right) and October 19, 2019 (left)     ZZC HARE W/O FACETEC FORAMOT/DSKC 1/2 VRT SEG, LUMBAR Right 1/20/2021    Procedure: Right Lumbar 3 - Lumbar 4 hemilaminectomy and medial facetectomy and Right redo Lumbar 4 -Lumbar 5 hemilaminectomy and medial facetectomy;  Surgeon: Julissa Lopez MD;  Location: [facility] OR;  Service: Spine        Family History   Problem Relation Age of Onset     Coronary Artery Disease Mother      Obesity Mother      Heart Disease Father      Cancer Father         throat     Coronary Artery Disease Father      Diabetes Sister      Diabetes Brother        Social History     Socioeconomic History     Marital status:      Spouse name: Not on file     Number of children: Not on file     Years of education: Not on file     Highest education level: Not on file   Occupational History     Not on file   Tobacco Use     Smoking status: Former     Smokeless tobacco: Never   Vaping Use     Vaping Use: Never used   Substance and Sexual Activity     Alcohol use: Yes     Alcohol/week: 1.0 standard drink     Comment: Alcoholic  Drinks/day: occasional     Drug use: No     Sexual activity: Not on file   Other Topics Concern     Not on file   Social History Narrative     Not on file     Social Determinants of Health     Financial Resource Strain: Not on file   Food Insecurity: Not on file   Transportation Needs: Not on file   Physical Activity: Not on file   Stress: Not on file   Social Connections: Not on file   Intimate Partner Violence: Not on file   Housing Stability: Not on file       Outpatient Encounter Medications as of 1/17/2023   Medication Sig Dispense Refill     augmented betamethasone dipropionate (DIPROLENE-AF) 0.05 % external ointment APPLY TO AFFECTED AREA TWICE A DAY 15 g 0     acetaminophen (TYLENOL) 500 MG tablet Take 1,000 mg by mouth       albuterol (PROAIR HFA/PROVENTIL HFA/VENTOLIN HFA) 108 (90 Base) MCG/ACT inhaler INHALE 2 PUFFS INTO THE LUNGS 4 TIMES DAILY AS NEEDED FOR SHORTNESS OF BREATH / DYSPNEA OR WHEEZING 18 g 6     amoxicillin (AMOXIL) 500 MG capsule  (Patient not taking: Reported on 12/13/2022)       APPLE CIDER VINEGAR PO Take 1 tablet by mouth       aspirin (ASA) 325 MG EC tablet        blood glucose (NO BRAND SPECIFIED) lancets standard [employer] Microlet MISC  Check blood sugar 2 times per day and as needed       chlorhexidine (HIBICLENS) 4 % liquid Wash groin daily 473 mL 11     Cholecalciferol (D3 DOTS) 50 MCG (2000 UT) TBDP Take 1 capsule by mouth       clindamycin-benzoyl peroxide (BENZACLIN) 1-5 % external gel Apply topically 2 times daily 50 g 1     clotrimazole-betamethasone (LOTRISONE) 1-0.05 % external cream Apply topically 2 times daily (Patient not taking: Reported on 12/13/2022) 50 g 1     doxycycline monohydrate (MONODOX) 100 MG capsule Take 1 capsule (100 mg) by mouth 2 times daily 60 capsule 6     furosemide (LASIX) 40 MG tablet TAKE 0.5 TABLETS BY MOUTH DAILY 45 tablet 3     gabapentin (NEURONTIN) 300 MG capsule TAKE 3 CAPSULES (900 MG TOTAL) BY MOUTH 3 (THREE) TIMES A DAY. 270 capsule 11      insulin pen needle (32G X 4 MM) 32G X 4 MM miscellaneous Use one pen needle daily or as directed. 100 each 3     LANTUS SOLOSTAR 100 UNIT/ML soln INJECT 60 UNITS SUBCUTANEOUSLY AT BEDTIME 45 mL 1     lisinopril (ZESTRIL) 20 MG tablet TAKE 1 TABLET BY MOUTH EVERY DAY 90 tablet 3     Magnesium Oxide 500 MG TABS Take 500 mg by mouth       metFORMIN (GLUCOPHAGE XR) 500 MG 24 hr tablet TAKE 2 TABLETS BY MOUTH TWICE A DAY WITH MEALS. 360 tablet 1     Multiple Vitamin (MULTIVITAMIN ADULT PO) Take 1 tablet by mouth       potassium gluconate 2.5 MEQ TABS Take 1 tablet by mouth       QVAR REDIHALER 80 MCG/ACT inhaler INHALE 2 PUFFS BY MOUTH TWICE A DAY - RINSE MOUTH AFTER USE-DO NOT SHAKE UNIT 31.8 g 1     sildenafil (REVATIO) 20 MG tablet Take 40-60 mg by mouth       simvastatin (ZOCOR) 40 MG tablet TAKE 1 TABLET BY MOUTH EVERYDAY AT BEDTIME 90 tablet 3     TRULICITY 3 MG/0.5ML SOPN INJECT 3 MG SUBCUTANEOUS EVERY 7 DAYS       vitamin (B COMPLEX) capsule Take 1 capsule by mouth       vitamin C (ASCORBIC ACID) 500 MG tablet Take 500 mg by mouth       No facility-administered encounter medications on file as of 1/17/2023.             O:   NAD, WDWN, Alert & Oriented, Mood & Affect wnl, Vitals stable   Here today alone    General appearance normal   Vitals stable   Alert, oriented and in no acute distress     Post inflammatory change sin groin    Eyes: Conjunctivae/lids:Normal     ENT: Lips, buccal mucosa, tongue: normal    MSK:Normal    Cardiovascular: peripheral edema none    Pulm: Breathing Normal    Neuro/Psych: Orientation:Alert and Orientedx3 ; Mood/Affect:normal       A/P:  1. HS stable  Decrease doxy to once daily  Cont benaclin  Cont hibiclens wash   Return to clinic 4 months  It was a pleasure speaking to Leandro Brewer today.  Previous clinic notes and pertinent laboratory tests were reviewed prior to Leandro Brewer's visit.        Again, thank you for allowing me to participate in the care of your  patient.        Sincerely,        Arcenio Fernandez MD

## 2023-01-19 ENCOUNTER — TRANSFERRED RECORDS (OUTPATIENT)
Dept: HEALTH INFORMATION MANAGEMENT | Facility: CLINIC | Age: 68
End: 2023-01-19

## 2023-02-16 ENCOUNTER — TRANSFERRED RECORDS (OUTPATIENT)
Dept: HEALTH INFORMATION MANAGEMENT | Facility: CLINIC | Age: 68
End: 2023-02-16
Payer: COMMERCIAL

## 2023-02-27 DIAGNOSIS — J45.30 MILD PERSISTENT ASTHMA, UNCOMPLICATED: ICD-10-CM

## 2023-02-28 RX ORDER — BECLOMETHASONE DIPROPIONATE HFA 80 UG/1
AEROSOL, METERED RESPIRATORY (INHALATION)
Qty: 31.8 G | Refills: 0 | Status: SHIPPED | OUTPATIENT
Start: 2023-02-28 | End: 2023-05-02

## 2023-02-28 NOTE — TELEPHONE ENCOUNTER
"Last Written Prescription Date:  8/6/22  Last Fill Quantity: 31.8,  # refills: 1   Last office visit provider:  11/25/22     Requested Prescriptions   Pending Prescriptions Disp Refills     QVAR REDIHALER 80 MCG/ACT inhaler [Pharmacy Med Name: QVAR REDIHALER 80 MCG] 31.8 g 1     Sig: INHALE 2 PUFFS BY MOUTH TWICE A DAY - RINSE MOUTH AFTER USE-DO NOT SHAKE UNIT       Inhaled Steroids Protocol Passed - 2/27/2023  5:02 PM        Passed - Patient is age 12 or older        Passed - Asthma control assessment score within normal limits in last 6 months     Please review ACT score.           Passed - Medication is active on med list        Passed - Recent (6 mo) or future (30 days) visit within the authorizing provider's specialty     Patient had office visit in the last 6 months or has a visit in the next 30 days with authorizing provider or within the authorizing provider's specialty.  See \"Patient Info\" tab in inbasket, or \"Choose Columns\" in Meds & Orders section of the refill encounter.                 Nuvia Milan RN 02/28/23 5:21 PM  "

## 2023-03-29 PROBLEM — Z86.0100 HISTORY OF COLONIC POLYPS: Status: ACTIVE | Noted: 2020-01-27

## 2023-03-29 PROBLEM — K63.5 POLYP OF COLON: Status: ACTIVE | Noted: 2020-01-27

## 2023-03-30 ENCOUNTER — OFFICE VISIT (OUTPATIENT)
Dept: FAMILY MEDICINE | Facility: CLINIC | Age: 68
End: 2023-03-30
Payer: COMMERCIAL

## 2023-03-30 VITALS
HEIGHT: 69 IN | DIASTOLIC BLOOD PRESSURE: 64 MMHG | TEMPERATURE: 98.6 F | BODY MASS INDEX: 37.92 KG/M2 | OXYGEN SATURATION: 95 % | SYSTOLIC BLOOD PRESSURE: 126 MMHG | HEART RATE: 79 BPM | WEIGHT: 256 LBS | RESPIRATION RATE: 16 BRPM

## 2023-03-30 DIAGNOSIS — D50.9 MICROCYTIC ANEMIA: ICD-10-CM

## 2023-03-30 DIAGNOSIS — I10 ESSENTIAL HYPERTENSION, BENIGN: ICD-10-CM

## 2023-03-30 DIAGNOSIS — E78.5 HYPERLIPIDEMIA, UNSPECIFIED HYPERLIPIDEMIA TYPE: ICD-10-CM

## 2023-03-30 DIAGNOSIS — E66.01 CLASS 2 SEVERE OBESITY DUE TO EXCESS CALORIES WITH SERIOUS COMORBIDITY AND BODY MASS INDEX (BMI) OF 37.0 TO 37.9 IN ADULT (H): ICD-10-CM

## 2023-03-30 DIAGNOSIS — E11.42 TYPE 2 DIABETES MELLITUS WITH DIABETIC POLYNEUROPATHY, WITHOUT LONG-TERM CURRENT USE OF INSULIN (H): ICD-10-CM

## 2023-03-30 DIAGNOSIS — Z79.4 TYPE 2 DIABETES MELLITUS WITHOUT COMPLICATION, WITH LONG-TERM CURRENT USE OF INSULIN (H): Primary | ICD-10-CM

## 2023-03-30 DIAGNOSIS — E11.9 TYPE 2 DIABETES MELLITUS WITHOUT COMPLICATION, WITH LONG-TERM CURRENT USE OF INSULIN (H): Primary | ICD-10-CM

## 2023-03-30 DIAGNOSIS — E66.812 CLASS 2 SEVERE OBESITY DUE TO EXCESS CALORIES WITH SERIOUS COMORBIDITY AND BODY MASS INDEX (BMI) OF 37.0 TO 37.9 IN ADULT (H): ICD-10-CM

## 2023-03-30 LAB
ANION GAP SERPL CALCULATED.3IONS-SCNC: 13 MMOL/L (ref 7–15)
BUN SERPL-MCNC: 17.8 MG/DL (ref 8–23)
CALCIUM SERPL-MCNC: 9.9 MG/DL (ref 8.8–10.2)
CHLORIDE SERPL-SCNC: 100 MMOL/L (ref 98–107)
CREAT SERPL-MCNC: 0.85 MG/DL (ref 0.67–1.17)
CREAT UR-MCNC: 46.7 MG/DL
DEPRECATED HCO3 PLAS-SCNC: 24 MMOL/L (ref 22–29)
ERYTHROCYTE [DISTWIDTH] IN BLOOD BY AUTOMATED COUNT: 19.5 % (ref 10–15)
GFR SERPL CREATININE-BSD FRML MDRD: >90 ML/MIN/1.73M2
GLUCOSE SERPL-MCNC: 123 MG/DL (ref 70–99)
HBA1C MFR BLD: 7.6 % (ref 0–5.6)
HCT VFR BLD AUTO: 37.1 % (ref 40–53)
HGB BLD-MCNC: 12.4 G/DL (ref 13.3–17.7)
HOLD SPECIMEN: NORMAL
MCH RBC QN AUTO: 21.3 PG (ref 26.5–33)
MCHC RBC AUTO-ENTMCNC: 33.4 G/DL (ref 31.5–36.5)
MCV RBC AUTO: 64 FL (ref 78–100)
MICROALBUMIN UR-MCNC: <12 MG/L
MICROALBUMIN/CREAT UR: NORMAL MG/G{CREAT}
PLATELET # BLD AUTO: 238 10E3/UL (ref 150–450)
POTASSIUM SERPL-SCNC: 4.4 MMOL/L (ref 3.4–5.3)
RBC # BLD AUTO: 5.83 10E6/UL (ref 4.4–5.9)
SODIUM SERPL-SCNC: 137 MMOL/L (ref 136–145)
WBC # BLD AUTO: 7.7 10E3/UL (ref 4–11)

## 2023-03-30 PROCEDURE — 83036 HEMOGLOBIN GLYCOSYLATED A1C: CPT | Performed by: FAMILY MEDICINE

## 2023-03-30 PROCEDURE — 82043 UR ALBUMIN QUANTITATIVE: CPT | Performed by: FAMILY MEDICINE

## 2023-03-30 PROCEDURE — 85027 COMPLETE CBC AUTOMATED: CPT | Performed by: FAMILY MEDICINE

## 2023-03-30 PROCEDURE — 82570 ASSAY OF URINE CREATININE: CPT | Performed by: FAMILY MEDICINE

## 2023-03-30 PROCEDURE — 36415 COLL VENOUS BLD VENIPUNCTURE: CPT | Performed by: FAMILY MEDICINE

## 2023-03-30 PROCEDURE — 99214 OFFICE O/P EST MOD 30 MIN: CPT | Performed by: FAMILY MEDICINE

## 2023-03-30 PROCEDURE — 80048 BASIC METABOLIC PNL TOTAL CA: CPT | Performed by: FAMILY MEDICINE

## 2023-03-30 ASSESSMENT — PAIN SCALES - GENERAL: PAINLEVEL: NO PAIN (0)

## 2023-03-30 NOTE — PROGRESS NOTES
Assessment/Plan:    Type 2 diabetes mellitus without complication, with long-term current use of insulin (H)  Type 2 diabetes reviewed.  A1c increasing slightly from 7.1% instead up to 7.6% over the winter months and holidays.  Continues Lantus 60 units daily, Trulicity 3 mg/week and metformin 1000 mg twice daily.  Known history of peripheral neuropathy.  Had diabetic eye exam December 10, 2022.  Update microalbumin screen today.  Diabetes recheck in 4 months.  Weight goal less than 240 pounds initially, less than 220 pounds ideally.  - Albumin Random Urine Quantitative with Creat Ratio  - Albumin Random Urine Quantitative with Creat Ratio  - Basic metabolic panel  - Basic metabolic panel  - Hemoglobin A1c  - Hemoglobin A1c    Essential hypertension, benign  Hypertension appears stable with lisinopril 20 mg daily.  Some dizziness if he bends over at the store and then stands up quickly.  Will change positions slower.  Notify persistent concerns.  Medication monitoring completed.  - Basic metabolic panel  - Basic metabolic panel    Hyperlipidemia, unspecified hyperlipidemia type  Remains on simvastatin 40 mg at bedtime.  Lipid cascade appeared at goal November 25, 2022.    Microcytic anemia  Hemoglobin Tracey trait.  History of microcytic anemia associated.  Update CBC today to ensure stable findings  - CBC with platelets  - CBC with platelets    Type 2 diabetes mellitus with diabetic polyneuropathy, without long-term current use of insulin (H)  As above.    Class 2 severe obesity due to excess calories with serious comorbidity and body mass index (BMI) of 37.0 to 37.9 in adult (H)  Dietary and exercise modification for weight goal less than 240 pounds initially, less than 220 pounds ideally.               Subjective:    Leandro Brewer is seen today for follow-up assessment of type 2 diabetes.  Lantus 60 units daily, Trulicity 3 mg/week and metformin 1000 mg twice daily.  No GI distress.  Lisinopril 20 mg daily  "for hypertension.  Occasionally gets dizzy if he bends over and then stands up quickly while working.  Simvastatin 40 mg at bedtime for lipid management.  Peripheral neuropathy treated with gabapentin currently 900 mg in the morning and bedtime otherwise we will try to add third dose in the afternoon.  Had eye exam December 10, 2022 without described retinopathy.  Needs to update urine microalbumin screen today.  Comprehensive review of systems as above otherwise all negative.     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 Thompson); right inguinal hernia age 6 months; right total hip arthroplasty at  2019 with Dr. Evans.   Hospitalizations: early 20s \"infected gallbladder\" WITHOUT surgery... ; cellulitis in legs (hospitalized twice); abdominal wall cyst requiring IV antibiotics x 4 days... ; right Lumbar 3 - Lumbar 4 hemilaminectomy and medial facetectomy and Right redo Lumbar 4 -Lumbar 5 hemilaminectomy and medial facetectomy 21 (Dr. Lopez)     Past Surgical History:   Procedure Laterality Date     BACK SURGERY      laminectomy L45 by Dr. Villarreal      HERNIA REPAIR Right     inguinal; child     TOTAL HIP ARTHROPLASTY Bilateral 2019 (right) and 2019 (left)     ZZC HARE W/O FACETEC FORAMOT/[facility]  VRT SEG, LUMBAR Right 2021    Procedure: Right Lumbar 3 - Lumbar 4 hemilaminectomy and medial facetectomy and Right redo Lumbar 4 -Lumbar 5 hemilaminectomy and medial facetectomy;  Surgeon: Julissa Lopez MD;  Location: [facility] OR;  Service: Spine        Family History   Problem Relation Age of Onset     Coronary Artery Disease Mother      Obesity " Mother      Heart Disease Father      Cancer Father         throat     Coronary Artery Disease Father      Diabetes Sister      Diabetes Brother         Past Medical History:   Diagnosis Date     Anemia      Arthritis      Diabetes mellitus, type II (H)      Hypertension      Obesity      Plantar fasciitis         Social History     Tobacco Use     Smoking status: Former     Smokeless tobacco: Never   Vaping Use     Vaping Use: Never used   Substance Use Topics     Alcohol use: Yes     Alcohol/week: 1.0 standard drink     Comment: Alcoholic Drinks/day: occasional     Drug use: No        Current Outpatient Medications   Medication Sig Dispense Refill     APPLE CIDER VINEGAR PO Take 1 tablet by mouth       aspirin (ASA) 325 MG EC tablet        augmented betamethasone dipropionate (DIPROLENE-AF) 0.05 % external ointment APPLY TO AFFECTED AREA TWICE A DAY 15 g 0     blood glucose (NO BRAND SPECIFIED) lancets standard [employer] Microlet MISC  Check blood sugar 2 times per day and as needed       chlorhexidine (HIBICLENS) 4 % liquid Wash groin daily 473 mL 11     Cholecalciferol (D3 DOTS) 50 MCG (2000 UT) TBDP Take 1 capsule by mouth       clindamycin-benzoyl peroxide (BENZACLIN) 1-5 % external gel Apply topically 2 times daily 50 g 1     doxycycline monohydrate (MONODOX) 100 MG capsule Take 1 capsule (100 mg) by mouth 2 times daily 60 capsule 6     furosemide (LASIX) 40 MG tablet TAKE 0.5 TABLETS BY MOUTH DAILY 45 tablet 3     gabapentin (NEURONTIN) 300 MG capsule TAKE 3 CAPSULES (900 MG TOTAL) BY MOUTH 3 (THREE) TIMES A DAY. 270 capsule 11     insulin pen needle (32G X 4 MM) 32G X 4 MM miscellaneous Use one pen needle daily or as directed. 100 each 3     LANTUS SOLOSTAR 100 UNIT/ML soln INJECT 60 UNITS SUBCUTANEOUSLY AT BEDTIME 45 mL 1     lisinopril (ZESTRIL) 20 MG tablet TAKE 1 TABLET BY MOUTH EVERY DAY 90 tablet 3     Magnesium Oxide 500 MG TABS Take 500 mg by mouth       metFORMIN (GLUCOPHAGE XR) 500 MG 24 hr tablet  "TAKE 2 TABLETS BY MOUTH TWICE A DAY WITH MEALS. 360 tablet 1     metFORMIN (GLUCOPHAGE) 1000 MG tablet Take by mouth every 12 hours       Multiple Vitamin (MULTIVITAMIN ADULT PO) Take 1 tablet by mouth       potassium gluconate 2.5 MEQ TABS Take 1 tablet by mouth       QVAR REDIHALER 80 MCG/ACT inhaler INHALE 2 PUFFS BY MOUTH TWICE A DAY - RINSE MOUTH AFTER USE-DO NOT SHAKE UNIT 31.8 g 0     sildenafil (REVATIO) 20 MG tablet Take 40-60 mg by mouth       simvastatin (ZOCOR) 40 MG tablet TAKE 1 TABLET BY MOUTH EVERYDAY AT BEDTIME 90 tablet 3     TRULICITY 3 MG/0.5ML SOPN INJECT 3 MG SUBCUTANEOUS EVERY 7 DAYS       vitamin (B COMPLEX) capsule Take 1 capsule by mouth       vitamin C (ASCORBIC ACID) 500 MG tablet Take 500 mg by mouth       acetaminophen (TYLENOL) 500 MG tablet Take 1,000 mg by mouth (Patient not taking: Reported on 3/30/2023)       albuterol (PROAIR HFA/PROVENTIL HFA/VENTOLIN HFA) 108 (90 Base) MCG/ACT inhaler INHALE 2 PUFFS INTO THE LUNGS 4 TIMES DAILY AS NEEDED FOR SHORTNESS OF BREATH / DYSPNEA OR WHEEZING (Patient not taking: Reported on 3/30/2023) 18 g 6     amoxicillin (AMOXIL) 500 MG capsule  (Patient not taking: Reported on 12/13/2022)       clotrimazole-betamethasone (LOTRISONE) 1-0.05 % external cream Apply topically 2 times daily (Patient not taking: Reported on 12/13/2022) 50 g 1          Objective:    Vitals:    03/30/23 0918   BP: 126/64   BP Location: Left arm   Patient Position: Sitting   Cuff Size: Adult Regular   Pulse: 79   Resp: 16   Temp: 98.6  F (37  C)   TempSrc: Temporal   SpO2: 95%   Weight: 116.1 kg (256 lb)   Height: 1.755 m (5' 9.09\")      Body mass index is 37.7 kg/m .    Alert.  No apparent distress.  Chest clear.  Cardiac exam regular.  Extremities warm and dry.      This note has been dictated using voice recognition software and as a result may contain minor grammatical errors and unintended word substitutions.         Answers for HPI/ROS submitted by the patient on " 3/30/2023  Do you check your blood pressure regularly outside of the clinic?: Yes  Are your blood pressures ever more than 140 on the top number (systolic) OR more than 90 on the bottom number (diastolic)? (For example, greater than 140/90): No  Are you following a low salt diet?: No  Frequency of checking blood sugars:: one time daily  What time of day are you checking your blood sugars : before meals  Have you had any blood sugars above 200?: Yes  Have you had any blood sugars below 70?: No  Hypoglycemia symptoms:: dizziness  Diabetic concerns:: blood sugar frequently over 200, other  Paraesthesia present:: numbness in feet, burning in feet  Have you had a diabetic eye exam within the last year?: Yes  Date of last eye exam: : 12/2022  Where was this eye exam done?: [facility]  How many servings of fruits and vegetables do you eat daily?: 2-3  On average, how many sweetened beverages do you drink each day (Examples: soda, juice, sweet tea, etc.  Do NOT count diet or artificially sweetened beverages)?: 0  How many minutes a day do you exercise enough to make your heart beat faster?: 30 to 60  How many days a week do you exercise enough to make your heart beat faster?: 6  How many days per week do you miss taking your medication?: 0

## 2023-04-18 ENCOUNTER — OFFICE VISIT (OUTPATIENT)
Dept: DERMATOLOGY | Facility: CLINIC | Age: 68
End: 2023-04-18
Payer: COMMERCIAL

## 2023-04-18 DIAGNOSIS — L73.2 HIDRADENITIS SUPPURATIVA: ICD-10-CM

## 2023-04-18 PROCEDURE — 99213 OFFICE O/P EST LOW 20 MIN: CPT | Performed by: DERMATOLOGY

## 2023-04-18 RX ORDER — CLINDAMYCIN AND BENZOYL PEROXIDE 10; 50 MG/G; MG/G
GEL TOPICAL 2 TIMES DAILY
Qty: 50 G | Refills: 1 | Status: SHIPPED | OUTPATIENT
Start: 2023-04-18 | End: 2023-09-18

## 2023-04-18 RX ORDER — DOXYCYCLINE 50 MG/1
50 CAPSULE ORAL DAILY
Qty: 60 CAPSULE | Refills: 6 | Status: SHIPPED | OUTPATIENT
Start: 2023-04-18 | End: 2024-06-24

## 2023-04-18 ASSESSMENT — PAIN SCALES - GENERAL: PAINLEVEL: NO PAIN (0)

## 2023-04-18 NOTE — PROGRESS NOTES
Leandro Brewer is an extremely pleasant 67 year old year old male patient here today for f/u hidradenitis clear with topicals no issues.  Patient has no other skin complaints today.  Remainder of the HPI, Meds, PMH, Allergies, FH, and SH was reviewed in chart.      Past Medical History:   Diagnosis Date     Anemia      Arthritis      Diabetes mellitus, type II (H)      Hypertension      Obesity      Plantar fasciitis        Past Surgical History:   Procedure Laterality Date     BACK SURGERY      laminectomy L45 by Dr. Villarreal      HERNIA REPAIR Right     inguinal; child     TOTAL HIP ARTHROPLASTY Bilateral 2019 Feb 5, 2019 (right) and October 19, 2019 (left)     ZZC HARE W/O FACETEC FORAMOT/[facility] 1/2 VRT SEG, LUMBAR Right 1/20/2021    Procedure: Right Lumbar 3 - Lumbar 4 hemilaminectomy and medial facetectomy and Right redo Lumbar 4 -Lumbar 5 hemilaminectomy and medial facetectomy;  Surgeon: Julissa Lopez MD;  Location: [facility];  Service: Spine        Family History   Problem Relation Age of Onset     Coronary Artery Disease Mother      Obesity Mother      Heart Disease Father      Cancer Father         throat     Coronary Artery Disease Father      Diabetes Sister      Diabetes Brother        Social History     Socioeconomic History     Marital status:      Spouse name: Not on file     Number of children: Not on file     Years of education: Not on file     Highest education level: Not on file   Occupational History     Not on file   Tobacco Use     Smoking status: Former     Smokeless tobacco: Never   Vaping Use     Vaping status: Never Used   Substance and Sexual Activity     Alcohol use: Yes     Alcohol/week: 1.0 standard drink of alcohol     Comment: Alcoholic Drinks/day: occasional     Drug use: No     Sexual activity: Not on file   Other Topics Concern     Not on file   Social History Narrative     Not on file     Social Determinants of Health     Financial Resource Strain: Not on  file   Food Insecurity: Not on file   Transportation Needs: Not on file   Physical Activity: Not on file   Stress: Not on file   Social Connections: Not on file   Intimate Partner Violence: Not on file   Housing Stability: Not on file       Outpatient Encounter Medications as of 4/18/2023   Medication Sig Dispense Refill     augmented betamethasone dipropionate (DIPROLENE-AF) 0.05 % external ointment APPLY TO AFFECTED AREA TWICE A DAY 15 g 0     acetaminophen (TYLENOL) 500 MG tablet Take 1,000 mg by mouth (Patient not taking: Reported on 3/30/2023)       albuterol (PROAIR HFA/PROVENTIL HFA/VENTOLIN HFA) 108 (90 Base) MCG/ACT inhaler INHALE 2 PUFFS INTO THE LUNGS 4 TIMES DAILY AS NEEDED FOR SHORTNESS OF BREATH / DYSPNEA OR WHEEZING (Patient not taking: Reported on 3/30/2023) 18 g 6     amoxicillin (AMOXIL) 500 MG capsule  (Patient not taking: Reported on 12/13/2022)       APPLE CIDER VINEGAR PO Take 1 tablet by mouth       aspirin (ASA) 325 MG EC tablet        blood glucose (NO BRAND SPECIFIED) lancets standard [employer] Microlet MISC  Check blood sugar 2 times per day and as needed       chlorhexidine (HIBICLENS) 4 % liquid Wash groin daily 473 mL 11     Cholecalciferol (D3 DOTS) 50 MCG (2000 UT) TBDP Take 1 capsule by mouth       clindamycin-benzoyl peroxide (BENZACLIN) 1-5 % external gel Apply topically 2 times daily 50 g 1     clotrimazole-betamethasone (LOTRISONE) 1-0.05 % external cream Apply topically 2 times daily (Patient not taking: Reported on 12/13/2022) 50 g 1     doxycycline monohydrate (MONODOX) 100 MG capsule Take 1 capsule (100 mg) by mouth 2 times daily 60 capsule 6     furosemide (LASIX) 40 MG tablet TAKE 0.5 TABLETS BY MOUTH DAILY 45 tablet 3     gabapentin (NEURONTIN) 300 MG capsule TAKE 3 CAPSULES (900 MG TOTAL) BY MOUTH 3 (THREE) TIMES A DAY. 270 capsule 11     insulin pen needle (32G X 4 MM) 32G X 4 MM miscellaneous Use one pen needle daily or as directed. 100 each 3     LANTUS SOLOSTAR 100  UNIT/ML soln INJECT 60 UNITS SUBCUTANEOUSLY AT BEDTIME 45 mL 1     lisinopril (ZESTRIL) 20 MG tablet TAKE 1 TABLET BY MOUTH EVERY DAY 90 tablet 3     Magnesium Oxide 500 MG TABS Take 500 mg by mouth       metFORMIN (GLUCOPHAGE XR) 500 MG 24 hr tablet TAKE 2 TABLETS BY MOUTH TWICE A DAY WITH MEALS. 360 tablet 1     metFORMIN (GLUCOPHAGE) 1000 MG tablet Take by mouth every 12 hours       Multiple Vitamin (MULTIVITAMIN ADULT PO) Take 1 tablet by mouth       potassium gluconate 2.5 MEQ TABS Take 1 tablet by mouth       QVAR REDIHALER 80 MCG/ACT inhaler INHALE 2 PUFFS BY MOUTH TWICE A DAY - RINSE MOUTH AFTER USE-DO NOT SHAKE UNIT 31.8 g 0     sildenafil (REVATIO) 20 MG tablet Take 40-60 mg by mouth       simvastatin (ZOCOR) 40 MG tablet TAKE 1 TABLET BY MOUTH EVERYDAY AT BEDTIME 90 tablet 3     TRULICITY 3 MG/0.5ML SOPN INJECT 3 MG SUBCUTANEOUS EVERY 7 DAYS       vitamin (B COMPLEX) capsule Take 1 capsule by mouth       vitamin C (ASCORBIC ACID) 500 MG tablet Take 500 mg by mouth       No facility-administered encounter medications on file as of 4/18/2023.             O:   NAD, WDWN, Alert & Oriented, Mood & Affect wnl, Vitals stable   Here today alone    General appearance normal   Vitals stable   Alert, oriented and in no acute distress     Clear per patient       Eyes: Conjunctivae/lids:Normal     ENT: Lips, buccal mucosa, tongue: normal    MSK:Normal    Cardiovascular: peripheral edema none    Pulm: Breathing Normal    Neuro/Psych: Orientation:Alert and Orientedx3 ; Mood/Affect:normal       A/P:  1. Hidradenitis   Stable on topicals and doxy cont   Decrease doxy to 50 daily  Return to clinic 6months  It was a pleasure speaking to Leandro Brewer today.  Previous clinic notes and pertinent laboratory tests were reviewed prior to Leandro Brewer's visit.

## 2023-04-18 NOTE — LETTER
4/18/2023         RE: Leandro Brewer  [address] [id number]        Dear Colleague,    Thank you for referring your patient, Leandro Brewer, to the [facility]. Please see a copy of my visit note below.    Leandro Brewer is an extremely pleasant 67 year old year old male patient here today for f/u hidradenitis clear with topicals no issues.  Patient has no other skin complaints today.  Remainder of the HPI, Meds, PMH, Allergies, FH, and SH was reviewed in chart.      Past Medical History:   Diagnosis Date     Anemia      Arthritis      Diabetes mellitus, type II (H)      Hypertension      Obesity      Plantar fasciitis        Past Surgical History:   Procedure Laterality Date     BACK SURGERY      laminectomy L45 by Dr. Villarreal      HERNIA REPAIR Right     inguinal; child     TOTAL HIP ARTHROPLASTY Bilateral 2019 Feb 5, 2019 (right) and October 19, 2019 (left)     ZZC HARE W/O FACETEC FORAMOT/[facility] 1/2 VRT SEG, LUMBAR Right 1/20/2021    Procedure: Right Lumbar 3 - Lumbar 4 hemilaminectomy and medial facetectomy and Right redo Lumbar 4 -Lumbar 5 hemilaminectomy and medial facetectomy;  Surgeon: Julissa Lopez MD;  [place] [facility] [place];  Service: Spine        Family History   Problem Relation Age of Onset     Coronary Artery Disease Mother      Obesity Mother      Heart Disease Father      Cancer Father         throat     Coronary Artery Disease Father      Diabetes Sister      Diabetes Brother        Social History     Socioeconomic History     Marital status:      Spouse name: Not on file     Number of children: Not on file     Years of education: Not on file     Highest education level: Not on file   Occupational History     Not on file   Tobacco Use     Smoking status: Former     Smokeless tobacco: Never   Vaping Use     Vaping status: Never Used   Substance and Sexual Activity     Alcohol use: Yes     Alcohol/week: 1.0 standard  drink of alcohol     Comment: Alcoholic Drinks/day: occasional     Drug use: No     Sexual activity: Not on file   Other Topics Concern     Not on file   Social History Narrative     Not on file     Social Determinants of Health     Financial Resource Strain: Not on file   Food Insecurity: Not on file   Transportation Needs: Not on file   Physical Activity: Not on file   Stress: Not on file   Social Connections: Not on file   Intimate Partner Violence: Not on file   Housing Stability: Not on file       Outpatient Encounter Medications as of 4/18/2023   Medication Sig Dispense Refill     augmented betamethasone dipropionate (DIPROLENE-AF) 0.05 % external ointment APPLY TO AFFECTED AREA TWICE A DAY 15 g 0     acetaminophen (TYLENOL) 500 MG tablet Take 1,000 mg by mouth (Patient not taking: Reported on 3/30/2023)       albuterol (PROAIR HFA/PROVENTIL HFA/VENTOLIN HFA) 108 (90 Base) MCG/ACT inhaler INHALE 2 PUFFS INTO THE LUNGS 4 TIMES DAILY AS NEEDED FOR SHORTNESS OF BREATH / DYSPNEA OR WHEEZING (Patient not taking: Reported on 3/30/2023) 18 g 6     amoxicillin (AMOXIL) 500 MG capsule  (Patient not taking: Reported on 12/13/2022)       APPLE CIDER VINEGAR PO Take 1 tablet by mouth       aspirin (ASA) 325 MG EC tablet        blood glucose (NO BRAND SPECIFIED) lancets standard [employer] Microlet MISC  Check blood sugar 2 times per day and as needed       chlorhexidine (HIBICLENS) 4 % liquid Wash groin daily 473 mL 11     Cholecalciferol (D3 DOTS) 50 MCG (2000 UT) TBDP Take 1 capsule by mouth       clindamycin-benzoyl peroxide (BENZACLIN) 1-5 % external gel Apply topically 2 times daily 50 g 1     clotrimazole-betamethasone (LOTRISONE) 1-0.05 % external cream Apply topically 2 times daily (Patient not taking: Reported on 12/13/2022) 50 g 1     doxycycline monohydrate (MONODOX) 100 MG capsule Take 1 capsule (100 mg) by mouth 2 times daily 60 capsule 6     furosemide (LASIX) 40 MG tablet TAKE 0.5 TABLETS BY MOUTH DAILY 45  tablet 3     gabapentin (NEURONTIN) 300 MG capsule TAKE 3 CAPSULES (900 MG TOTAL) BY MOUTH 3 (THREE) TIMES A DAY. 270 capsule 11     insulin pen needle (32G X 4 MM) 32G X 4 MM miscellaneous Use one pen needle daily or as directed. 100 each 3     LANTUS SOLOSTAR 100 UNIT/ML soln INJECT 60 UNITS SUBCUTANEOUSLY AT BEDTIME 45 mL 1     lisinopril (ZESTRIL) 20 MG tablet TAKE 1 TABLET BY MOUTH EVERY DAY 90 tablet 3     Magnesium Oxide 500 MG TABS Take 500 mg by mouth       metFORMIN (GLUCOPHAGE XR) 500 MG 24 hr tablet TAKE 2 TABLETS BY MOUTH TWICE A DAY WITH MEALS. 360 tablet 1     metFORMIN (GLUCOPHAGE) 1000 MG tablet Take by mouth every 12 hours       Multiple Vitamin (MULTIVITAMIN ADULT PO) Take 1 tablet by mouth       potassium gluconate 2.5 MEQ TABS Take 1 tablet by mouth       QVAR REDIHALER 80 MCG/ACT inhaler INHALE 2 PUFFS BY MOUTH TWICE A DAY - RINSE MOUTH AFTER USE-DO NOT SHAKE UNIT 31.8 g 0     sildenafil (REVATIO) 20 MG tablet Take 40-60 mg by mouth       simvastatin (ZOCOR) 40 MG tablet TAKE 1 TABLET BY MOUTH EVERYDAY AT BEDTIME 90 tablet 3     TRULICITY 3 MG/0.5ML SOPN INJECT 3 MG SUBCUTANEOUS EVERY 7 DAYS       vitamin (B COMPLEX) capsule Take 1 capsule by mouth       vitamin C (ASCORBIC ACID) 500 MG tablet Take 500 mg by mouth       No facility-administered encounter medications on file as of 4/18/2023.             O:   NAD, WDWN, Alert & Oriented, Mood & Affect wnl, Vitals stable   Here today alone    General appearance normal   Vitals stable   Alert, oriented and in no acute distress     Clear per patient       Eyes: Conjunctivae/lids:Normal     ENT: Lips, buccal mucosa, tongue: normal    MSK:Normal    Cardiovascular: peripheral edema none    Pulm: Breathing Normal    Neuro/Psych: Orientation:Alert and Orientedx3 ; Mood/Affect:normal       A/P:  1. Hidradenitis   Stable on topicals and doxy cont   Decrease doxy to 50 daily  Return to clinic 6months  It was a pleasure speaking to Leandro Brewer  today.  Previous clinic notes and pertinent laboratory tests were reviewed prior to Leandro Brewer's visit.        Again, thank you for allowing me to participate in the care of your patient.        Sincerely,        Arcenio Fernandez MD

## 2023-05-01 DIAGNOSIS — G62.9 PERIPHERAL POLYNEUROPATHY: ICD-10-CM

## 2023-05-01 DIAGNOSIS — E11.9 TYPE 2 DIABETES MELLITUS WITHOUT COMPLICATION, WITHOUT LONG-TERM CURRENT USE OF INSULIN (H): ICD-10-CM

## 2023-05-01 DIAGNOSIS — E11.9 TYPE 2 DIABETES MELLITUS WITHOUT COMPLICATION, WITH LONG-TERM CURRENT USE OF INSULIN (H): ICD-10-CM

## 2023-05-01 DIAGNOSIS — Z79.4 TYPE 2 DIABETES MELLITUS WITHOUT COMPLICATION, WITH LONG-TERM CURRENT USE OF INSULIN (H): ICD-10-CM

## 2023-05-01 DIAGNOSIS — J45.30 MILD PERSISTENT ASTHMA, UNCOMPLICATED: ICD-10-CM

## 2023-05-02 ENCOUNTER — MYC MEDICAL ADVICE (OUTPATIENT)
Dept: FAMILY MEDICINE | Facility: CLINIC | Age: 68
End: 2023-05-02
Payer: COMMERCIAL

## 2023-05-02 DIAGNOSIS — F52.8 OTHER SEXUAL DYSFUNCTION NOT DUE TO A SUBSTANCE OR KNOWN PHYSIOLOGICAL CONDITION: Primary | ICD-10-CM

## 2023-05-02 RX ORDER — METFORMIN HCL 500 MG
TABLET, EXTENDED RELEASE 24 HR ORAL
Qty: 360 TABLET | Refills: 1 | Status: SHIPPED | OUTPATIENT
Start: 2023-05-02 | End: 2023-10-23

## 2023-05-02 RX ORDER — INSULIN GLARGINE 100 [IU]/ML
INJECTION, SOLUTION SUBCUTANEOUS
Qty: 45 ML | Refills: 1 | Status: SHIPPED | OUTPATIENT
Start: 2023-05-02 | End: 2023-09-25

## 2023-05-02 RX ORDER — SILDENAFIL CITRATE 20 MG/1
40-60 TABLET ORAL
Status: CANCELLED | OUTPATIENT
Start: 2023-05-02

## 2023-05-02 RX ORDER — SILDENAFIL CITRATE 20 MG/1
40-60 TABLET ORAL DAILY PRN
Qty: 90 TABLET | Refills: 3 | Status: SHIPPED | OUTPATIENT
Start: 2023-05-02 | End: 2023-08-01

## 2023-05-02 RX ORDER — GABAPENTIN 300 MG/1
CAPSULE ORAL
Qty: 180 CAPSULE | Refills: 12 | Status: SHIPPED | OUTPATIENT
Start: 2023-05-02 | End: 2023-12-05

## 2023-05-02 RX ORDER — BECLOMETHASONE DIPROPIONATE HFA 80 UG/1
AEROSOL, METERED RESPIRATORY (INHALATION)
Qty: 31.8 G | Refills: 0 | Status: SHIPPED | OUTPATIENT
Start: 2023-05-02 | End: 2023-09-25

## 2023-05-02 NOTE — TELEPHONE ENCOUNTER
"    Last Written Prescription Date:  12/23/22  Last Fill Quantity: 45 mL,  # refills: 1   Last office visit provider:  3/30/23, PCP     Requested Prescriptions   Pending Prescriptions Disp Refills     LANTUS SOLOSTAR 100 UNIT/ML soln [Pharmacy Med Name: LANTUS SOLOSTAR 100 UNIT/ML] 45 mL 1     Sig: INJECT 60 UNITS SUBCUTANEOUSLY AT BEDTIME       Long Acting Insulin Protocol Passed - 5/1/2023  7:11 PM        Passed - Serum creatinine on file in past 12 months     Recent Labs   Lab Test 03/30/23  0913   CR 0.85       Ok to refill medication if creatinine is low          Passed - HgbA1C in past 3 or 6 months     If HgbA1C is 8 or greater, it needs to be on file within the past 3 months.  If less than 8, must be on file within the past 6 months.     Recent Labs   Lab Test 03/30/23  0916   A1C 7.6*             Passed - Medication is active on med list        Passed - Patient is age 18 or older        Passed - Recent (6 mo) or future (30 days) visit within the authorizing provider's specialty     Patient had office visit in the last 6 months or has a visit in the next 30 days with authorizing provider or within the authorizing provider's specialty.  See \"Patient Info\" tab in inbasket, or \"Choose Columns\" in Meds & Orders section of the refill encounter.          Routing refill request to provider for review/approval because:  Sig needs clarification    Last Written Prescription Date:  5/2/22  Last Fill Quantity: 270,  # refills: 11  Last office visit provider:  3/30/23, PCP          gabapentin (NEURONTIN) 300 MG capsule [Pharmacy Med Name: GABAPENTIN 300 MG CAPSULE] 180 capsule 12     Sig: TAKE 2 CAPSULES BY MOUTH 3 TIMES DAILY.       There is no refill protocol information for this order         Last Written Prescription Date:  12/23/22  Last Fill Quantity: 360,  # refills: 1   Last office visit provider:  3/30/23, PCP          metFORMIN (GLUCOPHAGE XR) 500 MG 24 hr tablet [Pharmacy Med Name: METFORMIN HCL  MG " "TABLET] 360 tablet 1     Sig: TAKE 2 TABLETS BY MOUTH TWICE A DAY WITH MEALS       Biguanide Agents Passed - 5/1/2023  7:11 PM        Passed - Patient is age 10 or older        Passed - Patient has documented A1c within the specified period of time.     If HgbA1C is 8 or greater, it needs to be on file within the past 3 months.  If less than 8, must be on file within the past 6 months.     Recent Labs   Lab Test 03/30/23  0916   A1C 7.6*             Passed - Patient's CR is NOT>1.4 OR Patient's EGFR is NOT<45 within past 12 mos.     Recent Labs   Lab Test 03/30/23  0913 11/16/21  0714 07/01/21  1046   GFRESTIMATED >90   < > >60   GFRESTBLACK  --   --  >60    < > = values in this interval not displayed.       Recent Labs   Lab Test 03/30/23 0913   CR 0.85             Passed - Patient does NOT have a diagnosis of CHF.        Passed - Medication is active on med list        Passed - Recent (6 mo) or future (30 days) visit within the authorizing provider's specialty     Patient had office visit in the last 6 months or has a visit in the next 30 days with authorizing provider or within the authorizing provider's specialty.  See \"Patient Info\" tab in inbasket, or \"Choose Columns\" in Meds & Orders section of the refill encounter.                Last Written Prescription Date:  2/28/23  Last Fill Quantity: 31.8 g,  # refills: 0   Last office visit provider:  3/30/23, PCP          QVAR REDIHALER 80 MCG/ACT inhaler [Pharmacy Med Name: QVAR REDIHALER 80 MCG] 31.8 g 0     Sig: INHALE 2 PUFFS BY MOUTH TWICE A DAY - RINSE MOUTH AFTER USE-DO NOT SHAKE UNIT       Inhaled Steroids Protocol Passed - 5/1/2023  7:11 PM        Passed - Patient is age 12 or older        Passed - Asthma control assessment score within normal limits in last 6 months     Please review ACT score.           Passed - Medication is active on med list        Passed - Recent (6 mo) or future (30 days) visit within the authorizing provider's specialty     Patient " "had office visit in the last 6 months or has a visit in the next 30 days with authorizing provider or within the authorizing provider's specialty.  See \"Patient Info\" tab in inbasket, or \"Choose Columns\" in Meds & Orders section of the refill encounter.                 Samira Sotelo RN 05/02/23 5:31 PM  "

## 2023-05-03 ENCOUNTER — VIRTUAL VISIT (OUTPATIENT)
Dept: SLEEP MEDICINE | Facility: CLINIC | Age: 68
End: 2023-05-03
Payer: COMMERCIAL

## 2023-05-03 VITALS — BODY MASS INDEX: 37.77 KG/M2 | WEIGHT: 255 LBS | HEIGHT: 69 IN

## 2023-05-03 DIAGNOSIS — E66.9 OBESITY WITH SLEEP APNEA: ICD-10-CM

## 2023-05-03 DIAGNOSIS — G47.33 OSA ON CPAP: Primary | ICD-10-CM

## 2023-05-03 DIAGNOSIS — G47.30 OBESITY WITH SLEEP APNEA: ICD-10-CM

## 2023-05-03 PROCEDURE — 99214 OFFICE O/P EST MOD 30 MIN: CPT | Mod: VID | Performed by: INTERNAL MEDICINE

## 2023-05-03 ASSESSMENT — PAIN SCALES - GENERAL: PAINLEVEL: NO PAIN (0)

## 2023-05-03 NOTE — PROGRESS NOTES
Virtual Visit Details    Type of service:  Video Visit   Video Start Time: 3:38 PM  Video End Time: 3:54 PM  Originating Location (pt. Location): Home    Distant Location (provider location):  Off-site  Platform used for Video Visit: [facility]  Sleep clinic follow-up visit note     Date: May 3, 2023     Leandro Brewer is a  67 year old male  with a history of HTN, DM-2, asthma who was diagnosed with moderate GUERA during home sleep study obtained on 10/28/2021.    He was set up at [facility] on December 10, 2021. Patient received a Resmed Airsense 11 Pressures were set at 5-15 cm H2O. Patient received a Resmed Mask name: Airfit [id number]  Nasal mask size Medium, heated tubing and heated humidifier.       Patient presents for virtual visit  for follow up of GUERA and to review CPAP compliance measures     He reports using the CPAP regularly during sleep.  He likes the nasal mask.  There are no reports of snoring/apneas/awakenings due to gasping for air with the CPAP use  Pressure settings feel comfortable, though he feels he could do some more pressure when he puts the mask on initially.  Sleep quality is better with CPAP treatment.  His bedtime is 7:30 PM and he wakes up at 3:20 AM.  He denies difficulty falling asleep or staying asleep.    He denies fatigue or EDS.  He denies drowsiness while driving.     Home sleep study report:     Study Date: 10/28/2021     Analysis Time:  400.9 minutes  Respiration:   Sleep Associated Hypoxemia: sustained hypoxemia was present. Baseline oxygen saturation was 93.9%.  Time with saturation less than or equal to 88% was 12.3 minutes. The lowest oxygen saturation was 74%.   Snoring: Snoring was present.  Respiratory events: The home study revealed a presence of 18 obstructive apneas and 0 mixed and central apneas. There were 176 hypopneas resulting in a combined apnea/hypopnea index [AHI] of 29.3 events per hour.  AHI was 37.4 per hour supine, - per hour prone,  "28.9 per hour on left side, and - per hour on right side.   Pattern: Excluding events noted above, respiratory rate and pattern was Normal.     Position: Percent of time spent: supine - 4.4%, prone - 0%, on left - 94.8%, on right - 0%.     Heart Rate: By pulse oximetry normal rate was noted.      Assessment:   Moderate obstructive sleep apnea.  Sleep associated hypoxemia was present.        CPAP compliance download:  ResMed   Auto-PAP 5.0 - 15.0 cmH2O 30 day usage data:    100% of days with > =4 hours of use. 0/30 days with no use.   Average use 8 hours and 32 minutes per day.   95%ile Leak 14.8 L/min.   CPAP 95% pressure 11.2 cm.   AHI 0.4 events per hour.        Past medical/surgical history, family history, social history, medications and allergies were reviewed.            Physical Examination:   Per EMR:Ht 1.753 m (5' 9\")   Wt 115.7 kg (255 lb)   BMI 37.66 kg/m    General: Pleasant. Cooperative. In no apparent distress.  Pulmonary: Able to speak in full sentences easily. No cough or wheeze.   Neurologic: Alert, oriented x3.  Psychiatric: Mood euthymic. Affect congruent with full range and intensity.     ASSESSMENT/PLAN:  Obstructive sleep apnea: Pt reports adequate compliance with CPAP and reports positive benefits with CPAP treatment. Based on compliance measures, GUERA is adequately controlled with auto CPAP at the current settings.    DME orders were generated to revise the pressure settings on the auto CPAP to range 8-15 cm water, based on the compliance download and patient's report of air hunger and for renewal of CPAP supplies.  He was instructed to let me know if he has any trouble tolerating the revised pressure settings.  DME orders were also generated for travel auto CPAP with pressure settings 8 to 15 cm water.  Recommended to continue using the CPAP regularly during sleep and getting the supplies for the CPAP replaced regularly.   Patient instructed to remember to bring PAP with him if hospitalized " "and if anticipating procedure that requires sedation/surgery to be sure to discuss with the provider/surgeon that he  has sleep apnea and uses PAP therapy.     Obesity: We discussed weight management with healthy diet, and exercise.     Patient was strongly advised to avoid driving, operating any heavy machinery or other hazardous situations while drowsy or sleepy.  Patient was counseled on the importance of driving while alert, to pull over if drowsy, or nap before getting into the vehicle if sleepy.      Follow-up with sleep clinic via video visit in 1 year via video visit or sooner if any concerns.     The above note was dictated using voice recognition software. Although reviewed after completion, some word and grammatical error may remain . Please contact the author for any clarifications.      \" Total time spent was 30 minutes  for this appointment on this date of service which include time spent before, during and after the visit for chart review, patient care, counseling and coordination of care. Including documentation\"      Nico Bernard MD  [facility]  [address] , [place] [address]      "

## 2023-05-03 NOTE — NURSING NOTE
Is the patient currently in the state of [place]? YES    Visit mode:VIDEO    If the visit is dropped, the patient can be reconnected by: VIDEO VISIT: Send to e-mail at: [email or web address]    Will anyone else be joining the visit? NO      How would you like to obtain your AVS? MyChart    Are changes needed to the allergy or medication list? NO    Reason for visit: Video Visit (Annual follow-up)

## 2023-06-10 DIAGNOSIS — F52.8 OTHER SEXUAL DYSFUNCTION NOT DUE TO A SUBSTANCE OR KNOWN PHYSIOLOGICAL CONDITION: ICD-10-CM

## 2023-06-10 RX ORDER — SILDENAFIL CITRATE 20 MG/1
40-60 TABLET ORAL DAILY PRN
Qty: 90 TABLET | Refills: 3 | OUTPATIENT
Start: 2023-06-10

## 2023-06-10 NOTE — TELEPHONE ENCOUNTER
"Last Written Prescription Date: 5/2/2023  Last Fill Quantity: 90,  # refills: 3: should have refills left--message sent to pharmacy   Last office visit provider: 3/30/2023 with PCP Dr PETRA Shafer     Requested Prescriptions   Pending Prescriptions Disp Refills     sildenafil (REVATIO) 20 MG tablet [Pharmacy Med Name: SILDENAFIL 20 MG TABLET] 90 tablet 3     Sig: TAKE 2-3 TABLETS (40-60 MG) BY MOUTH DAILY AS NEEDED (MALE ERECTILE DYSFUNCTION)       Erectile Dysfuction Protocol Passed - 6/10/2023  8:31 AM        Passed - Absence of nitrates on medication list        Passed - Absence of Alpha Blockers on Med list        Passed - Recent (12 mo) or future (30 days) visit within the authorizing provider's specialty     Patient has had an office visit with the authorizing provider or a provider within the authorizing providers department within the previous 12 mos or has a future within next 30 days. See \"Patient Info\" tab in inbasket, or \"Choose Columns\" in Meds & Orders section of the refill encounter.              Passed - Medication is active on med list        Passed - Patient is age 18 or older             Dulce Garcia RN 06/10/23 6:01 PM  "

## 2023-07-29 DIAGNOSIS — I10 ESSENTIAL (PRIMARY) HYPERTENSION: ICD-10-CM

## 2023-07-29 RX ORDER — LISINOPRIL 20 MG/1
TABLET ORAL
Qty: 90 TABLET | Refills: 0 | Status: SHIPPED | OUTPATIENT
Start: 2023-07-29 | End: 2023-11-21

## 2023-07-29 NOTE — TELEPHONE ENCOUNTER
"Last Written Prescription Date:  8/7/2022  Last Fill Quantity: 90,  # refills: 3   Last office visit provider:   3/30/2023    Requested Prescriptions   Pending Prescriptions Disp Refills    lisinopril (ZESTRIL) 20 MG tablet [Pharmacy Med Name: LISINOPRIL 20 MG TABLET] 90 tablet 3     Sig: TAKE 1 TABLET BY MOUTH EVERY DAY       ACE Inhibitors (Including Combos) Protocol Passed - 7/29/2023  7:30 AM        Passed - Blood pressure under 140/90 in past 12 months     BP Readings from Last 3 Encounters:   03/30/23 126/64   11/25/22 110/60   07/21/22 110/60                 Passed - Recent (12 mo) or future (30 days) visit within the authorizing provider's specialty     Patient has had an office visit with the authorizing provider or a provider within the authorizing providers department within the previous 12 mos or has a future within next 30 days. See \"Patient Info\" tab in inbasket, or \"Choose Columns\" in Meds & Orders section of the refill encounter.              Passed - Medication is active on med list        Passed - Patient is age 18 or older        Passed - Normal serum creatinine on file in past 12 months     Recent Labs   Lab Test 03/30/23  0913   CR 0.85       Ok to refill medication if creatinine is low          Passed - Normal serum potassium on file in past 12 months     Recent Labs   Lab Test 03/30/23 0913   POTASSIUM 4.4                  Kayla Ron RN 07/29/23 12:15 PM  "

## 2023-07-31 ASSESSMENT — ASTHMA QUESTIONNAIRES
ACT_TOTALSCORE: 25
ACT_TOTALSCORE: 25
QUESTION_4 LAST FOUR WEEKS HOW OFTEN HAVE YOU USED YOUR RESCUE INHALER OR NEBULIZER MEDICATION (SUCH AS ALBUTEROL): NOT AT ALL
QUESTION_1 LAST FOUR WEEKS HOW MUCH OF THE TIME DID YOUR ASTHMA KEEP YOU FROM GETTING AS MUCH DONE AT WORK, SCHOOL OR AT HOME: NONE OF THE TIME
QUESTION_5 LAST FOUR WEEKS HOW WOULD YOU RATE YOUR ASTHMA CONTROL: COMPLETELY CONTROLLED
QUESTION_3 LAST FOUR WEEKS HOW OFTEN DID YOUR ASTHMA SYMPTOMS (WHEEZING, COUGHING, SHORTNESS OF BREATH, CHEST TIGHTNESS OR PAIN) WAKE YOU UP AT NIGHT OR EARLIER THAN USUAL IN THE MORNING: NOT AT ALL
QUESTION_2 LAST FOUR WEEKS HOW OFTEN HAVE YOU HAD SHORTNESS OF BREATH: NOT AT ALL

## 2023-08-01 ENCOUNTER — OFFICE VISIT (OUTPATIENT)
Dept: FAMILY MEDICINE | Facility: CLINIC | Age: 68
End: 2023-08-01
Attending: FAMILY MEDICINE
Payer: COMMERCIAL

## 2023-08-01 VITALS
TEMPERATURE: 97.6 F | BODY MASS INDEX: 38.36 KG/M2 | SYSTOLIC BLOOD PRESSURE: 120 MMHG | HEART RATE: 80 BPM | DIASTOLIC BLOOD PRESSURE: 80 MMHG | WEIGHT: 259 LBS | OXYGEN SATURATION: 98 % | HEIGHT: 69 IN

## 2023-08-01 DIAGNOSIS — I10 ESSENTIAL HYPERTENSION, BENIGN: ICD-10-CM

## 2023-08-01 DIAGNOSIS — E11.9 TYPE 2 DIABETES MELLITUS WITHOUT COMPLICATION, WITH LONG-TERM CURRENT USE OF INSULIN (H): Primary | ICD-10-CM

## 2023-08-01 DIAGNOSIS — F52.8 OTHER SEXUAL DYSFUNCTION NOT DUE TO A SUBSTANCE OR KNOWN PHYSIOLOGICAL CONDITION: ICD-10-CM

## 2023-08-01 DIAGNOSIS — Z79.4 TYPE 2 DIABETES MELLITUS WITHOUT COMPLICATION, WITH LONG-TERM CURRENT USE OF INSULIN (H): Primary | ICD-10-CM

## 2023-08-01 DIAGNOSIS — E78.5 HYPERLIPIDEMIA, UNSPECIFIED HYPERLIPIDEMIA TYPE: ICD-10-CM

## 2023-08-01 LAB
ALBUMIN SERPL BCG-MCNC: 4.7 G/DL (ref 3.5–5.2)
ALP SERPL-CCNC: 83 U/L (ref 40–129)
ALT SERPL W P-5'-P-CCNC: 35 U/L (ref 0–70)
ANION GAP SERPL CALCULATED.3IONS-SCNC: 12 MMOL/L (ref 7–15)
AST SERPL W P-5'-P-CCNC: 36 U/L (ref 0–45)
BILIRUB SERPL-MCNC: 0.5 MG/DL
BUN SERPL-MCNC: 17.6 MG/DL (ref 8–23)
CALCIUM SERPL-MCNC: 9.9 MG/DL (ref 8.8–10.2)
CHLORIDE SERPL-SCNC: 101 MMOL/L (ref 98–107)
CHOLEST SERPL-MCNC: 139 MG/DL
CREAT SERPL-MCNC: 0.93 MG/DL (ref 0.67–1.17)
DEPRECATED HCO3 PLAS-SCNC: 25 MMOL/L (ref 22–29)
GFR SERPL CREATININE-BSD FRML MDRD: >90 ML/MIN/1.73M2
GLUCOSE SERPL-MCNC: 155 MG/DL (ref 70–99)
HBA1C MFR BLD: 8.1 % (ref 0–5.6)
HDLC SERPL-MCNC: 34 MG/DL
HOLD SPECIMEN: NORMAL
HOLD SPECIMEN: NORMAL
LDLC SERPL CALC-MCNC: 58 MG/DL
NONHDLC SERPL-MCNC: 105 MG/DL
POTASSIUM SERPL-SCNC: 4.7 MMOL/L (ref 3.4–5.3)
PROT SERPL-MCNC: 7.4 G/DL (ref 6.4–8.3)
SODIUM SERPL-SCNC: 138 MMOL/L (ref 136–145)
TRIGL SERPL-MCNC: 236 MG/DL

## 2023-08-01 PROCEDURE — 80053 COMPREHEN METABOLIC PANEL: CPT | Performed by: FAMILY MEDICINE

## 2023-08-01 PROCEDURE — 80061 LIPID PANEL: CPT | Performed by: FAMILY MEDICINE

## 2023-08-01 PROCEDURE — 36415 COLL VENOUS BLD VENIPUNCTURE: CPT | Performed by: FAMILY MEDICINE

## 2023-08-01 PROCEDURE — 83036 HEMOGLOBIN GLYCOSYLATED A1C: CPT | Performed by: FAMILY MEDICINE

## 2023-08-01 PROCEDURE — 99214 OFFICE O/P EST MOD 30 MIN: CPT | Performed by: FAMILY MEDICINE

## 2023-08-01 RX ORDER — DULAGLUTIDE 4.5 MG/.5ML
4.5 INJECTION, SOLUTION SUBCUTANEOUS WEEKLY
Qty: 6 ML | Refills: 3 | Status: SHIPPED | OUTPATIENT
Start: 2023-08-01 | End: 2023-10-23

## 2023-08-01 RX ORDER — SILDENAFIL CITRATE 20 MG/1
40-60 TABLET ORAL DAILY PRN
Qty: 90 TABLET | Refills: 3 | Status: SHIPPED | OUTPATIENT
Start: 2023-08-01 | End: 2024-06-24

## 2023-08-01 ASSESSMENT — PAIN SCALES - GENERAL: PAINLEVEL: NO PAIN (0)

## 2023-08-01 NOTE — PROGRESS NOTES
Assessment/Plan:    Type 2 diabetes mellitus without complication, with long-term current use of insulin (H)  Type 2 diabetes reviewed.  A1c worsening from 7.6% up to 8.1%.  We will increase Trulicity from 3 mg/week up to 4.5 mg/week while continuing metformin 1000 g twice daily and Lantus insulin 60 units daily.  Reassess at annual wellness visit in 4 months.  Known history of peripheral neuropathy.  Diabetic eye exam December 10, 2022.  Recent microalbumin screen normal March 30, 2023.  - Hemoglobin A1c  - Dulaglutide (TRULICITY) 4.5 MG/0.5ML SOPN  Dispense: 6 mL; Refill: 3  - Comprehensive metabolic panel    Essential hypertension, benign  Hypertension well controlled and continuing lisinopril 20 mg daily.  - Comprehensive metabolic panel    Hyperlipidemia, unspecified hyperlipidemia type  Continues use of simvastatin 40 mg at bedtime.  Check lipid cascade today while fasting.  3 pound weight gain since March 30, 2023 with weight goal of remaining less than 240 pounds initially, less than 220 pounds ideally.  - Lipid panel reflex to direct LDL Fasting    Male Erectile Disorder  Sildenafil refilled for mail-order pharmacy.  - sildenafil (REVATIO) 20 MG tablet  Dispense: 90 tablet; Refill: 3          Subjective:    Leandro Alarcon Osman is seen today for diabetes follow-up.  Type 2 diabetes.  Lantus insulin 60 units daily with metformin 1000 mg twice daily.  Currently utilizing Trulicity 3 mg/week.  Has not noticed any significant weight loss despite relative having lost 80 pounds with Trulicity.  Some dietary indiscretion continues.  Works out at [employer] 5-6 times per week up to an hour swimming.  Mark between 801,000 medina according to his watch.  Lisinopril 20 mg daily for hypertension.  Simvastatin 40 mg at bedtime for lipid management.  Gabapentin 600 mg 3 times daily with peripheral neuropathy.  Needs refill of sildenafil for mail order pharmacy otherwise insurance will not cover it.  Uses sildenafil as  "needed.  Comprehensive review of systems as above otherwise all negative.       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 Thompson); right inguinal hernia age 6 months; right total hip arthroplasty at [facility] 2019 with Dr. Evans.   Hospitalizations: early 20s \"infected gallbladder\" WITHOUT surgery... ; cellulitis in legs (hospitalized twice); abdominal wall cyst requiring IV antibiotics x 4 days... ; right Lumbar 3 - Lumbar 4 hemilaminectomy and medial facetectomy and Right redo Lumbar 4 -Lumbar 5 hemilaminectomy and medial facetectomy 21 (Dr. Lopez)     19-19 [facility] for right LE cellulitis/sepsis; right LE cellulitis 19-19 ([facility]); 10/15/19 s/p left MELITA (Dr. 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 Villarreal     HERNIA REPAIR Right     inguinal; child    TOTAL HIP ARTHROPLASTY Bilateral 2019    2019 (right) and 2019 (left)    ZZC HARE W/O FACETEC FORAMOT/DSKC  VRT SEG, LUMBAR Right 2021    Procedure: Right Lumbar 3 - Lumbar 4 hemilaminectomy and medial facetectomy and Right redo Lumbar 4 -Lumbar 5 hemilaminectomy and medial facetectomy;  Surgeon: Julissa Lopez MD;  Location: [facility];  Service: Spine        Family History   Problem Relation Age of Onset    Coronary Artery Disease Mother     Obesity Mother     Heart Disease Father     Cancer Father         throat    Coronary Artery Disease " Father     Diabetes Sister     Diabetes Brother         Past Medical History:   Diagnosis Date    Anemia     Arthritis     Diabetes mellitus, type II (H)     Hypertension     Obesity     Plantar fasciitis         Social History     Tobacco Use    Smoking status: Former    Smokeless tobacco: Never   Vaping Use    Vaping Use: Never used   Substance Use Topics    Alcohol use: Yes     Alcohol/week: 1.0 standard drink of alcohol     Comment: Alcoholic Drinks/day: occasional    Drug use: No        Current Outpatient Medications   Medication Sig Dispense Refill    albuterol (PROAIR HFA/PROVENTIL HFA/VENTOLIN HFA) 108 (90 Base) MCG/ACT inhaler INHALE 2 PUFFS INTO THE LUNGS 4 TIMES DAILY AS NEEDED FOR SHORTNESS OF BREATH / DYSPNEA OR WHEEZING 18 g 6    APPLE CIDER VINEGAR PO Take 1 tablet by mouth      aspirin (ASA) 325 MG EC tablet       augmented betamethasone dipropionate (DIPROLENE-AF) 0.05 % external ointment APPLY TO AFFECTED AREA TWICE A DAY 15 g 3    blood glucose (NO BRAND SPECIFIED) lancets standard [employer] Microlet MISC  Check blood sugar 2 times per day and as needed      chlorhexidine (HIBICLENS) 4 % liquid Wash groin daily 473 mL 11    Cholecalciferol (D3 DOTS) 50 MCG (2000 UT) TBDP Take 1 capsule by mouth      clindamycin-benzoyl peroxide (BENZACLIN) 1-5 % external gel Apply topically 2 times daily 50 g 1    clotrimazole-betamethasone (LOTRISONE) 1-0.05 % external cream Apply topically 2 times daily 50 g 1    doxycycline monohydrate (MONODOX) 50 MG capsule Take 1 capsule (50 mg) by mouth daily 60 capsule 6    Dulaglutide (TRULICITY) 4.5 MG/0.5ML SOPN Inject 4.5 mg Subcutaneous once a week 6 mL 3    furosemide (LASIX) 40 MG tablet TAKE 0.5 TABLETS BY MOUTH DAILY 45 tablet 3    gabapentin (NEURONTIN) 300 MG capsule TAKE 2 CAPSULES BY MOUTH 3 TIMES DAILY. 180 capsule 12    insulin pen needle (32G X 4 MM) 32G X 4 MM miscellaneous Use one pen needle daily or as directed. 100 each 3    LANTUS SOLOSTAR 100 UNIT/ML  "soln INJECT 60 UNITS SUBCUTANEOUSLY AT BEDTIME 45 mL 1    lisinopril (ZESTRIL) 20 MG tablet TAKE 1 TABLET BY MOUTH EVERY DAY 90 tablet 0    Magnesium Oxide 500 MG TABS Take 500 mg by mouth      metFORMIN (GLUCOPHAGE XR) 500 MG 24 hr tablet TAKE 2 TABLETS BY MOUTH TWICE A DAY WITH MEALS 360 tablet 1    Multiple Vitamin (MULTIVITAMIN ADULT PO) Take 1 tablet by mouth      potassium gluconate 2.5 MEQ TABS Take 1 tablet by mouth      QVAR REDIHALER 80 MCG/ACT inhaler INHALE 2 PUFFS BY MOUTH TWICE A DAY - RINSE MOUTH AFTER USE-DO NOT SHAKE UNIT 31.8 g 0    sildenafil (REVATIO) 20 MG tablet Take 2-3 tablets (40-60 mg) by mouth daily as needed (male erectile dysfunction) 90 tablet 3    simvastatin (ZOCOR) 40 MG tablet TAKE 1 TABLET BY MOUTH EVERYDAY AT BEDTIME 90 tablet 3    vitamin (B COMPLEX) capsule Take 1 capsule by mouth      vitamin C (ASCORBIC ACID) 500 MG tablet Take 500 mg by mouth            Objective:    Vitals:    08/01/23 0742   BP: 120/80   Pulse: 80   Temp: 97.6  F (36.4  C)   SpO2: 98%   Weight: 117.5 kg (259 lb)   Height: 1.753 m (5' 9\")      Body mass index is 38.25 kg/m .    Alert.  No apparent distress.  Chest clear.  Cardiac exam regular.  Extremities warm and dry.      This note has been dictated using voice recognition software and as a result may contain minor grammatical errors and unintended word substitutions.       Answers submitted by the patient for this visit:  Diabetes Visit (Submitted on 7/31/2023)  Chief Complaint: Chronic problems general questions HPI Form  Frequency of checking blood sugars:: one time daily  What time of day are you checking your blood sugars : before meals  Have you had any blood sugars above 200?: Yes  Have you had any blood sugars below 70?: No  Hypoglycemia symptoms:: weakness  Diabetic concerns:: blood sugar frequently over 200  Paraesthesia present:: none of these symptoms  General Questionnaire (Submitted on 7/31/2023)  Chief Complaint: Chronic problems general " questions HPI Form  How many servings of fruits and vegetables do you eat daily?: 2-3  On average, how many sweetened beverages do you drink each day (Examples: soda, juice, sweet tea, etc.  Do NOT count diet or artificially sweetened beverages)?: 0  How many minutes a day do you exercise enough to make your heart beat faster?: 10 to 19  How many days a week do you exercise enough to make your heart beat faster?: 5  How many days per week do you miss taking your medication?: 0

## 2023-08-13 ENCOUNTER — MYC MEDICAL ADVICE (OUTPATIENT)
Dept: FAMILY MEDICINE | Facility: CLINIC | Age: 68
End: 2023-08-13
Payer: COMMERCIAL

## 2023-08-14 NOTE — TELEPHONE ENCOUNTER
Called sildenafil verbally to [facility]:    Writer to spoke to a pharmacy techFely, at [facility] to give rx verbally:  sildenafil (REVATIO) 20 MG tablet, qty #90 with 3 refills     5-7 business days is processing time.    Jori Dunbar, MINISTERION, RN  [facility]

## 2023-08-14 NOTE — TELEPHONE ENCOUNTER
Tried calling [facility] to not fill rx sent to them on 8/1/23 for sildenafil 20 mg tablets as pt is supposed to use a different mail order pharmacy. Per Cortney, she is not able to pull up an account for this pt at all.    Jori Dunbar, MINISTERION, RN  [facility]

## 2023-09-03 DIAGNOSIS — L03.115 CELLULITIS OF RIGHT LOWER EXTREMITY: Primary | ICD-10-CM

## 2023-09-03 RX ORDER — CEPHALEXIN 500 MG/1
500 CAPSULE ORAL 4 TIMES DAILY
Qty: 56 CAPSULE | Refills: 1 | Status: SHIPPED | OUTPATIENT
Start: 2023-09-03 | End: 2024-05-06

## 2023-09-18 DIAGNOSIS — L73.2 HIDRADENITIS SUPPURATIVA: ICD-10-CM

## 2023-09-18 RX ORDER — CLINDAMYCIN AND BENZOYL PEROXIDE 10; 50 MG/G; MG/G
GEL TOPICAL 2 TIMES DAILY
Qty: 50 G | Refills: 1 | Status: SHIPPED | OUTPATIENT
Start: 2023-09-18 | End: 2024-02-14

## 2023-09-18 NOTE — TELEPHONE ENCOUNTER
Requested Prescriptions   Pending Prescriptions Disp Refills    clindamycin-benzoyl peroxide (BENZACLIN) 1-5 % external gel 50 g 1     Sig: Apply topically 2 times daily       There is no refill protocol information for this order         Last Written Prescription Date:  04/18/23  Last Fill Quantity: 50 G,  # refills: 1   Last office visit: 4/18/2023  with prescribing provider:  Dr. Fernandez   Future Office Visit:  none scheduled    Requesting Pharmacy: [facility] Sabrina LORD,  [place]

## 2023-09-20 ENCOUNTER — TELEPHONE (OUTPATIENT)
Dept: FAMILY MEDICINE | Facility: CLINIC | Age: 68
End: 2023-09-20
Payer: COMMERCIAL

## 2023-09-20 NOTE — TELEPHONE ENCOUNTER
PA is needed for sildenafil 20mg taking 2-3 tablets as needed for male erectile dysfunction.  Quantity of 90 with 3 refills.    DX F52.8 Male erectile disorder    Insurance: medicare - member id [id number]                      [place] - member id - [id number]

## 2023-09-21 NOTE — TELEPHONE ENCOUNTER
PA Initiation    Medication: SILDENAFIL CITRATE 20 MG PO TABS  Insurance Company: Other (see comments)Comment:  [facility]  [facility] [address] [address] AT [address] [facility]  Filling Pharmacy Phone: [phone]  Filling Pharmacy Fax: [phone]  Start Date: 9/21/2023    Called plan to inedbra MUNIZ

## 2023-09-22 DIAGNOSIS — J45.30 MILD PERSISTENT ASTHMA, UNCOMPLICATED: ICD-10-CM

## 2023-09-22 DIAGNOSIS — E11.9 TYPE 2 DIABETES MELLITUS WITHOUT COMPLICATION, WITHOUT LONG-TERM CURRENT USE OF INSULIN (H): ICD-10-CM

## 2023-09-22 DIAGNOSIS — I10 ESSENTIAL (PRIMARY) HYPERTENSION: ICD-10-CM

## 2023-09-22 NOTE — TELEPHONE ENCOUNTER
PRIOR AUTHORIZATION DENIED    Medication: SILDENAFIL CITRATE 20 MG PO TABS  Insurance Company: Other (see comments)Comment:  [employer]  Denial Date: 9/21/2023  Denial Rational:     Appeal Information:     Patient Notified: No

## 2023-09-25 RX ORDER — INSULIN GLARGINE 100 [IU]/ML
INJECTION, SOLUTION SUBCUTANEOUS
Qty: 45 ML | Refills: 0 | Status: SHIPPED | OUTPATIENT
Start: 2023-09-25 | End: 2023-11-27

## 2023-09-25 RX ORDER — BECLOMETHASONE DIPROPIONATE HFA 80 UG/1
AEROSOL, METERED RESPIRATORY (INHALATION)
Qty: 31.8 G | Refills: 0 | Status: SHIPPED | OUTPATIENT
Start: 2023-09-25 | End: 2023-12-28

## 2023-09-25 RX ORDER — LISINOPRIL 20 MG/1
TABLET ORAL
Qty: 90 TABLET | Refills: 0 | OUTPATIENT
Start: 2023-09-25

## 2023-10-20 DIAGNOSIS — E11.9 TYPE 2 DIABETES MELLITUS WITHOUT COMPLICATION, WITH LONG-TERM CURRENT USE OF INSULIN (H): ICD-10-CM

## 2023-10-20 DIAGNOSIS — Z79.4 TYPE 2 DIABETES MELLITUS WITHOUT COMPLICATION, WITH LONG-TERM CURRENT USE OF INSULIN (H): ICD-10-CM

## 2023-10-20 DIAGNOSIS — Z79.4 UNCONTROLLED TYPE 2 DIABETES MELLITUS WITH HYPERGLYCEMIA, WITH LONG-TERM CURRENT USE OF INSULIN (H): ICD-10-CM

## 2023-10-20 DIAGNOSIS — E11.65 UNCONTROLLED TYPE 2 DIABETES MELLITUS WITH HYPERGLYCEMIA, WITH LONG-TERM CURRENT USE OF INSULIN (H): ICD-10-CM

## 2023-10-23 RX ORDER — DULAGLUTIDE 3 MG/.5ML
3 INJECTION, SOLUTION SUBCUTANEOUS
Refills: 7 | OUTPATIENT
Start: 2023-10-23

## 2023-10-23 RX ORDER — METFORMIN HCL 500 MG
TABLET, EXTENDED RELEASE 24 HR ORAL
Qty: 360 TABLET | Refills: 0 | Status: SHIPPED | OUTPATIENT
Start: 2023-10-23 | End: 2024-03-21

## 2023-10-23 RX ORDER — DULAGLUTIDE 4.5 MG/.5ML
4.5 INJECTION, SOLUTION SUBCUTANEOUS WEEKLY
Qty: 6 ML | Refills: 3 | Status: SHIPPED | OUTPATIENT
Start: 2023-10-23 | End: 2023-12-28

## 2023-11-20 DIAGNOSIS — I10 ESSENTIAL (PRIMARY) HYPERTENSION: ICD-10-CM

## 2023-11-21 RX ORDER — LISINOPRIL 20 MG/1
TABLET ORAL
Qty: 90 TABLET | Refills: 0 | Status: SHIPPED | OUTPATIENT
Start: 2023-11-21 | End: 2023-12-05

## 2023-11-27 DIAGNOSIS — E11.9 TYPE 2 DIABETES MELLITUS WITHOUT COMPLICATION, WITHOUT LONG-TERM CURRENT USE OF INSULIN (H): ICD-10-CM

## 2023-11-27 RX ORDER — INSULIN GLARGINE 100 [IU]/ML
INJECTION, SOLUTION SUBCUTANEOUS
Qty: 45 ML | Refills: 3 | Status: SHIPPED | OUTPATIENT
Start: 2023-11-27 | End: 2024-06-05

## 2023-12-04 ENCOUNTER — PATIENT OUTREACH (OUTPATIENT)
Dept: GASTROENTEROLOGY | Facility: CLINIC | Age: 68
End: 2023-12-04
Payer: COMMERCIAL

## 2023-12-05 ENCOUNTER — OFFICE VISIT (OUTPATIENT)
Dept: FAMILY MEDICINE | Facility: CLINIC | Age: 68
End: 2023-12-05
Attending: FAMILY MEDICINE
Payer: COMMERCIAL

## 2023-12-05 VITALS
TEMPERATURE: 97.6 F | DIASTOLIC BLOOD PRESSURE: 60 MMHG | BODY MASS INDEX: 37.33 KG/M2 | HEART RATE: 90 BPM | RESPIRATION RATE: 16 BRPM | SYSTOLIC BLOOD PRESSURE: 110 MMHG | OXYGEN SATURATION: 98 % | HEIGHT: 69 IN | WEIGHT: 252 LBS

## 2023-12-05 DIAGNOSIS — I10 ESSENTIAL (PRIMARY) HYPERTENSION: ICD-10-CM

## 2023-12-05 DIAGNOSIS — E11.42 DIABETIC POLYNEUROPATHY ASSOCIATED WITH TYPE 2 DIABETES MELLITUS (H): ICD-10-CM

## 2023-12-05 DIAGNOSIS — D12.6 TUBULAR ADENOMA OF COLON: ICD-10-CM

## 2023-12-05 DIAGNOSIS — I10 ESSENTIAL HYPERTENSION, BENIGN: ICD-10-CM

## 2023-12-05 DIAGNOSIS — Z00.00 ENCOUNTER FOR MEDICARE ANNUAL WELLNESS EXAM: Primary | ICD-10-CM

## 2023-12-05 DIAGNOSIS — I89.0 LYMPHEDEMA: ICD-10-CM

## 2023-12-05 DIAGNOSIS — G62.9 PERIPHERAL POLYNEUROPATHY: ICD-10-CM

## 2023-12-05 DIAGNOSIS — Z79.4 TYPE 2 DIABETES MELLITUS WITHOUT COMPLICATION, WITH LONG-TERM CURRENT USE OF INSULIN (H): ICD-10-CM

## 2023-12-05 DIAGNOSIS — E78.5 HYPERLIPIDEMIA, UNSPECIFIED HYPERLIPIDEMIA TYPE: ICD-10-CM

## 2023-12-05 DIAGNOSIS — E11.9 TYPE 2 DIABETES MELLITUS WITHOUT COMPLICATION, WITH LONG-TERM CURRENT USE OF INSULIN (H): ICD-10-CM

## 2023-12-05 DIAGNOSIS — E66.812 CLASS 2 SEVERE OBESITY DUE TO EXCESS CALORIES WITH SERIOUS COMORBIDITY AND BODY MASS INDEX (BMI) OF 36.0 TO 36.9 IN ADULT (H): ICD-10-CM

## 2023-12-05 DIAGNOSIS — J45.30 MILD PERSISTENT ASTHMA WITHOUT COMPLICATION: ICD-10-CM

## 2023-12-05 DIAGNOSIS — Z23 ENCOUNTER FOR IMMUNIZATION: ICD-10-CM

## 2023-12-05 DIAGNOSIS — F52.8 OTHER SEXUAL DYSFUNCTION NOT DUE TO A SUBSTANCE OR KNOWN PHYSIOLOGICAL CONDITION: ICD-10-CM

## 2023-12-05 DIAGNOSIS — E66.01 CLASS 2 SEVERE OBESITY DUE TO EXCESS CALORIES WITH SERIOUS COMORBIDITY AND BODY MASS INDEX (BMI) OF 36.0 TO 36.9 IN ADULT (H): ICD-10-CM

## 2023-12-05 DIAGNOSIS — D50.9 MICROCYTIC ANEMIA: ICD-10-CM

## 2023-12-05 DIAGNOSIS — Z12.5 SCREENING FOR PROSTATE CANCER: ICD-10-CM

## 2023-12-05 DIAGNOSIS — G47.33 OSA ON CPAP: ICD-10-CM

## 2023-12-05 LAB
ANION GAP SERPL CALCULATED.3IONS-SCNC: 14 MMOL/L (ref 7–15)
BUN SERPL-MCNC: 17.6 MG/DL (ref 8–23)
CALCIUM SERPL-MCNC: 9.8 MG/DL (ref 8.8–10.2)
CHLORIDE SERPL-SCNC: 103 MMOL/L (ref 98–107)
CREAT SERPL-MCNC: 0.85 MG/DL (ref 0.67–1.17)
DEPRECATED HCO3 PLAS-SCNC: 24 MMOL/L (ref 22–29)
EGFRCR SERPLBLD CKD-EPI 2021: >90 ML/MIN/1.73M2
ERYTHROCYTE [DISTWIDTH] IN BLOOD BY AUTOMATED COUNT: 20 % (ref 10–15)
GLUCOSE SERPL-MCNC: 134 MG/DL (ref 70–99)
HBA1C MFR BLD: 6.9 % (ref 0–5.6)
HCT VFR BLD AUTO: 42.1 % (ref 40–53)
HGB BLD-MCNC: 13.4 G/DL (ref 13.3–17.7)
HOLD SPECIMEN: NORMAL
MCH RBC QN AUTO: 21.2 PG (ref 26.5–33)
MCHC RBC AUTO-ENTMCNC: 31.8 G/DL (ref 31.5–36.5)
MCV RBC AUTO: 67 FL (ref 78–100)
PLATELET # BLD AUTO: 257 10E3/UL (ref 150–450)
POTASSIUM SERPL-SCNC: 4.7 MMOL/L (ref 3.4–5.3)
PSA SERPL DL<=0.01 NG/ML-MCNC: 1.02 NG/ML (ref 0–4.5)
RBC # BLD AUTO: 6.32 10E6/UL (ref 4.4–5.9)
SODIUM SERPL-SCNC: 141 MMOL/L (ref 135–145)
WBC # BLD AUTO: 8.6 10E3/UL (ref 4–11)

## 2023-12-05 PROCEDURE — 85027 COMPLETE CBC AUTOMATED: CPT | Performed by: FAMILY MEDICINE

## 2023-12-05 PROCEDURE — G0103 PSA SCREENING: HCPCS | Performed by: FAMILY MEDICINE

## 2023-12-05 PROCEDURE — 83036 HEMOGLOBIN GLYCOSYLATED A1C: CPT | Performed by: FAMILY MEDICINE

## 2023-12-05 PROCEDURE — 90662 IIV NO PRSV INCREASED AG IM: CPT | Performed by: FAMILY MEDICINE

## 2023-12-05 PROCEDURE — 80048 BASIC METABOLIC PNL TOTAL CA: CPT | Performed by: FAMILY MEDICINE

## 2023-12-05 PROCEDURE — 99214 OFFICE O/P EST MOD 30 MIN: CPT | Mod: 25 | Performed by: FAMILY MEDICINE

## 2023-12-05 PROCEDURE — 36415 COLL VENOUS BLD VENIPUNCTURE: CPT | Performed by: FAMILY MEDICINE

## 2023-12-05 PROCEDURE — 90471 IMMUNIZATION ADMIN: CPT | Performed by: FAMILY MEDICINE

## 2023-12-05 PROCEDURE — 99397 PER PM REEVAL EST PAT 65+ YR: CPT | Mod: 25 | Performed by: FAMILY MEDICINE

## 2023-12-05 RX ORDER — RESPIRATORY SYNCYTIAL VIRUS VACCINE 120MCG/0.5
0.5 KIT INTRAMUSCULAR ONCE
Qty: 1 EACH | Refills: 0 | Status: CANCELLED | OUTPATIENT
Start: 2023-12-05 | End: 2023-12-05

## 2023-12-05 RX ORDER — LISINOPRIL 20 MG/1
20 TABLET ORAL DAILY
Qty: 90 TABLET | Refills: 3 | Status: SHIPPED | OUTPATIENT
Start: 2023-12-05

## 2023-12-05 RX ORDER — GABAPENTIN 300 MG/1
600 CAPSULE ORAL 3 TIMES DAILY
Qty: 540 CAPSULE | Refills: 3 | Status: SHIPPED | OUTPATIENT
Start: 2023-12-05

## 2023-12-05 RX ORDER — SILDENAFIL 100 MG/1
50-100 TABLET, FILM COATED ORAL DAILY PRN
Qty: 90 TABLET | Refills: 3 | Status: SHIPPED | OUTPATIENT
Start: 2023-12-05

## 2023-12-05 ASSESSMENT — ENCOUNTER SYMPTOMS
COUGH: 0
HEMATURIA: 0
EYE PAIN: 0
SHORTNESS OF BREATH: 0
PARESTHESIAS: 0
FREQUENCY: 0
CONSTIPATION: 0
MYALGIAS: 0
HEMATOCHEZIA: 0
FEVER: 0
WEAKNESS: 0
CHILLS: 0
HEARTBURN: 0
DIARRHEA: 0
NERVOUS/ANXIOUS: 0
ARTHRALGIAS: 0
DIZZINESS: 0
ABDOMINAL PAIN: 0
PALPITATIONS: 0
JOINT SWELLING: 0
HEADACHES: 0
DYSURIA: 0
SORE THROAT: 0
NAUSEA: 0

## 2023-12-05 ASSESSMENT — ACTIVITIES OF DAILY LIVING (ADL): CURRENT_FUNCTION: NO ASSISTANCE NEEDED

## 2023-12-05 ASSESSMENT — PAIN SCALES - GENERAL: PAINLEVEL: NO PAIN (0)

## 2023-12-05 NOTE — PATIENT INSTRUCTIONS
Patient Education   Personalized Prevention Plan  You are due for the preventive services outlined below.  Your care team is available to assist you in scheduling these services.  If you have already completed any of these items, please share that information with your care team to update in your medical record.  Health Maintenance Due   Topic Date Due     LUNG CANCER SCREENING  Never done     RSV VACCINE (Pregnancy & 60+) (1 - 1-dose 60+ series) Never done     AORTIC ANEURYSM SCREENING (SYSTEM ASSIGNED)  Never done     Diabetic Foot Exam  11/16/2022     Asthma Action Plan - yearly  11/16/2022     Flu Vaccine (1) 09/01/2023     COVID-19 Vaccine (6 - 2023-24 season) 09/01/2023     Eye Exam  12/01/2023     Learning About Dietary Guidelines  What are the Dietary Guidelines for Americans?     Dietary Guidelines for Americans provide tips for eating well and staying healthy. This helps reduce the risk for long-term (chronic) diseases.  These guidelines recommend that you:  Eat and drink the right amount for you. The U.[place] government's food guide is called MyPlate. It can help you make your own well-balanced eating plan.  Try to balance your eating with your activity. This helps you stay at a healthy weight.  Drink alcohol in moderation, if at all.  Limit foods high in salt, saturated fat, trans fat, and added sugar.  These guidelines are from the U.S. Department of Agriculture and the U.S. Department of Health and Human Services. They are updated every 5 years.  What is MyPlate?  MyPlate is the U.[place] government's food guide. It can help you make your own well-balanced eating plan. A balanced eating plan means that you eat enough, but not too much, and that your food gives you the nutrients you need to stay healthy.  MyPlate focuses on eating plenty of whole grains, fruits, and vegetables, and on limiting fat and sugar. It is available online at www.ChooseMyPlate.gov.  How can you get started?  If you're trying to eat  "healthier, you can slowly change your eating habits over time. You don't have to make big changes all at once. Start by adding one or two healthy foods to your meals each day.  Grains  Choose whole-grain breads and cereals and whole-wheat pasta and whole-grain crackers.  Vegetables  Eat a variety of vegetables every day. They have lots of nutrients and are part of a heart-healthy diet.  Fruits  Eat a variety of fruits every day. Fruits contain lots of nutrients. Choose fresh fruit instead of fruit juice.  Protein foods  Choose fish and lean poultry more often. Eat red meat and fried meats less often. Dried beans, tofu, and nuts are also good sources of protein.  Dairy  Choose low-fat or fat-free products from this food group. If you have problems digesting milk, try eating cheese or yogurt instead.  Fats and oils  Limit fats and oils if you're trying to cut calories. Choose healthy fats when you cook. These include canola oil and olive oil.  Where can you learn more?  Go to https://www.[employer]iot[employer].net/patiented  Enter D676 in the search box to learn more about \"Learning About Dietary Guidelines.\"  Current as of: February 28, 2023               Content Version: 13.8    5201-2771 [employer].   Care instructions adapted under license by your healthcare professional. If you have questions about a medical condition or this instruction, always ask your healthcare professional. [employer] disclaims any warranty or liability for your use of this information.      Hearing Loss: Care Instructions  Overview     Hearing loss is a sudden or slow decrease in how well you hear. It can range from slight to profound. Permanent hearing loss can occur with aging. It also can happen when you are exposed long-term to loud noise. Examples include listening to loud music, riding motorcycles, or being around other loud machines.  Hearing loss can affect your work and home life. It can make you feel lonely or " depressed. You may feel that you have lost your independence. But hearing aids and other devices can help you hear better and feel connected to others.  Follow-up care is a key part of your treatment and safety. Be sure to make and go to all appointments, and call your doctor if you are having problems. It's also a good idea to know your test results and keep a list of the medicines you take.  How can you care for yourself at home?  Avoid loud noises whenever possible. This helps keep your hearing from getting worse.  Always wear hearing protection around loud noises.  Wear a hearing aid as directed.  A professional can help you pick a hearing aid that will work best for you.  You can also get hearing aids over the counter for mild to moderate hearing loss.  Have hearing tests as your doctor suggests. They can show whether your hearing has changed. Your hearing aid may need to be adjusted.  Use other devices as needed. These may include:  Telephone amplifiers and hearing aids that can connect to a television, stereo, radio, or microphone.  Devices that use lights or vibrations. These alert you to the doorbell, a ringing telephone, or a baby monitor.  Television closed-captioning. This shows the words at the bottom of the screen. Most new TVs can do this.  TTY (text telephone). This lets you type messages back and forth on the telephone instead of talking or listening. These devices are also called TDD. When messages are typed on the keyboard, they are sent over the phone line to a receiving TTY. The message is shown on a monitor.  Use text messaging, social media, and email if it is hard for you to communicate by telephone.  Try to learn a listening technique called speechreading. It is not lipreading. You pay attention to people's gestures, expressions, posture, and tone of voice. These clues can help you understand what a person is saying. Face the person you are talking to, and have them face you. Make sure the  "lighting is good. You need to see the other person's face clearly.  Think about counseling if you need help to adjust to your hearing loss.  When should you call for help?  Watch closely for changes in your health, and be sure to contact your doctor if:    You think your hearing is getting worse.     You have new symptoms, such as dizziness or nausea.   Where can you learn more?  Go to https://www.[employer].net/patiented  Enter R798 in the search box to learn more about \"Hearing Loss: Care Instructions.\"  Current as of: February 28, 2023               Content Version: 13.8    3344-5235 [employer].   Care instructions adapted under license by your healthcare professional. If you have questions about a medical condition or this instruction, always ask your healthcare professional. [employer] disclaims any warranty or liability for your use of this information.         "

## 2023-12-15 ENCOUNTER — E-VISIT (OUTPATIENT)
Dept: FAMILY MEDICINE | Facility: CLINIC | Age: 68
End: 2023-12-15
Payer: COMMERCIAL

## 2023-12-15 ENCOUNTER — LAB (OUTPATIENT)
Dept: LAB | Facility: CLINIC | Age: 68
End: 2023-12-15
Attending: NURSE PRACTITIONER
Payer: COMMERCIAL

## 2023-12-15 DIAGNOSIS — R09.81 SINUS CONGESTION: ICD-10-CM

## 2023-12-15 DIAGNOSIS — R09.81 SINUS CONGESTION: Primary | ICD-10-CM

## 2023-12-15 DIAGNOSIS — U07.1 INFECTION DUE TO 2019 NOVEL CORONAVIRUS: Primary | ICD-10-CM

## 2023-12-15 LAB
FLUAV RNA SPEC QL NAA+PROBE: NEGATIVE
FLUBV RNA RESP QL NAA+PROBE: NEGATIVE
RSV RNA SPEC NAA+PROBE: NEGATIVE
SARS-COV-2 RNA RESP QL NAA+PROBE: POSITIVE

## 2023-12-15 PROCEDURE — 99421 OL DIG E/M SVC 5-10 MIN: CPT | Performed by: NURSE PRACTITIONER

## 2023-12-15 PROCEDURE — 87637 SARSCOV2&INF A&B&RSV AMP PRB: CPT

## 2023-12-15 NOTE — PATIENT INSTRUCTIONS
For informational purposes only. Not to replace the advice of your health care provider. Copyright   2022 [facility]. All rights reserved. Clinically reviewed by Kanchan Jesus, PharmD, BCACP. Natanael Ulien [id number] - REV 06/23.  COVID-19 Outpatient Treatments  Your care team can help you find the best treatments for COVID-19. Talk to a health care provider or refer to the FDA medicine fact sheets below.  Paxlovid (nirmatrelvir and ritonavir): https://www.paxlovid.DigitalAdvisor/resources  Molnupiravir (Lagevrio): https://www.fda.gov/media/357741/download  Important: We can only prescribe Paxlovid or Molnupiravir when it can be started within 5 days of first having symptoms.  Paxlovid (nimatrelvir and ritonavir)  How it works  Two medicines (nirmatrelvir and ritonavir) are taken together. They stop the virus from growing. Less amount of virus is easier for your body to fight.  Benefits  Lowers risk of a hospital stay or death from COVID-19.  How to take  Medicine comes in a daily container with both medicine tablets. Take by mouth twice daily (once in the morning, once at night) for 5 days.  The number of tablets to take varies by patient.  Don't chew or break capsules. Swallow whole.  When to take  Take it as soon as possible and within 5 days of your first symptoms.  Who can take it  Patients must be 12 years or older weigh at least 88 pounds. Paxlovid is the preferred treatment for pregnant patients.  Possible side effects  Can cause altered sense of taste, diarrhea (loose, watery stools), high blood pressure, muscle aches.  Medicine conflicts  Some medicines may conflict with Paxlovid and may cause serious side effects.  Tell your care team about all the medicines you take, including prescription and over-the-counter medicines, vitamins, and herbal supplements.  Your care team will review your medicines to make sure that you can safely take Paxlovid.  Cautions  Paxlovid is not advised for patients with severe  kidney or liver disease. If you have kidney or liver problems, the dose may need to be changed.  If you're pregnant or breastfeeding, talk to your care team about your options.  If you take hormonal birth control (such as the Pill), then you or your partner should also use a non-hormonal form of birth control (such as a condom). Keep doing this for 1 menstrual cycle after your last dose of Paxlovid.  Molnupiravir (lagevrio)  How it works  Stops the virus from growing. Less amount of virus is easier for your body to fight.  Benefits  Lowers risk of a hospital stay or death from COVID-19.  How to take  Take 4 capsules by mouth every 12 hours (4 in the morning and 4 at night) for 5 days. Don't chew or break capsules. Swallow whole.  When to take  Take as soon as possible and within 5 days of your first symptoms.  Who can take it  Patients must be 18 years or older.   Possible side effects  Diarrhea (loose, watery stools), nausea (feeling sick to your stomach), dizziness, headaches.  Medicine conflicts  Right now, there are no known conflicts with other drugs. But tell your care team about all medicines you take.  Cautions  This medicine is not advised for patients who are pregnant.  If you are someone who could become pregnant, use trusted birth control until 4 days after your last dose of molnupiravir.  If your partner could become pregnant, you should use trusted birth control until 3 months after your last dose of molnupiravir.

## 2023-12-15 NOTE — PATIENT INSTRUCTIONS
Marc,    Thank you for choosing us for your care. I have placed an order for a lab test(s). View your full visit summary for details by clicking on the link below. You can schedule a lab only appointment right here in [employer], or by calling [phone].    You will receive your lab results and next steps via [employer] when the lab results return.    Sincerely,  BOGDAN Browning CNP

## 2023-12-16 ENCOUNTER — TELEPHONE (OUTPATIENT)
Dept: NURSING | Facility: CLINIC | Age: 68
End: 2023-12-16
Payer: COMMERCIAL

## 2023-12-16 NOTE — TELEPHONE ENCOUNTER
Patient classified as COVID treatment eligible by Epic high risk algorithm:  No    Coronavirus (COVID-19) Notification    Reason for call  Notify of POSITIVE COVID-19 lab result, assess symptoms,  review [facility] recommendations    Lab Result   Lab test for 2019-nCoV rRt-PCR or SARS-COV-2 PCR  Oropharyngeal AND/OR nasopharyngeal swabs were POSITIVE for 2019-nCoV RNA [OR] SARS-COV-2 RNA (COVID-19) RNA     We have been unable to reach patient by phone at this time to notify of their Positive COVID-19 result.    Left voicemail message requesting a call back to [phone] [facility] for results.        A Positive COVID-19 letter will be sent via [employer] or the mail.    Kathya Arrieta

## 2023-12-27 DIAGNOSIS — I89.0 LYMPHEDEMA: ICD-10-CM

## 2023-12-27 DIAGNOSIS — J45.30 MILD PERSISTENT ASTHMA, UNCOMPLICATED: ICD-10-CM

## 2023-12-27 DIAGNOSIS — E11.9 TYPE 2 DIABETES MELLITUS WITHOUT COMPLICATION, WITH LONG-TERM CURRENT USE OF INSULIN (H): ICD-10-CM

## 2023-12-27 DIAGNOSIS — Z79.4 UNCONTROLLED TYPE 2 DIABETES MELLITUS WITH HYPERGLYCEMIA, WITH LONG-TERM CURRENT USE OF INSULIN (H): ICD-10-CM

## 2023-12-27 DIAGNOSIS — E11.65 UNCONTROLLED TYPE 2 DIABETES MELLITUS WITH HYPERGLYCEMIA, WITH LONG-TERM CURRENT USE OF INSULIN (H): ICD-10-CM

## 2023-12-27 DIAGNOSIS — Z79.4 TYPE 2 DIABETES MELLITUS WITHOUT COMPLICATION, WITH LONG-TERM CURRENT USE OF INSULIN (H): ICD-10-CM

## 2023-12-27 DIAGNOSIS — E78.5 HYPERLIPIDEMIA, UNSPECIFIED HYPERLIPIDEMIA TYPE: ICD-10-CM

## 2023-12-28 RX ORDER — DULAGLUTIDE 3 MG/.5ML
3 INJECTION, SOLUTION SUBCUTANEOUS
Refills: 7 | OUTPATIENT
Start: 2023-12-28

## 2023-12-28 RX ORDER — DULAGLUTIDE 4.5 MG/.5ML
4.5 INJECTION, SOLUTION SUBCUTANEOUS WEEKLY
Qty: 6 ML | Refills: 3 | Status: SHIPPED | OUTPATIENT
Start: 2023-12-28 | End: 2024-04-11

## 2023-12-28 RX ORDER — SIMVASTATIN 40 MG
TABLET ORAL
Qty: 90 TABLET | Refills: 2 | Status: SHIPPED | OUTPATIENT
Start: 2023-12-28 | End: 2024-10-04

## 2023-12-28 RX ORDER — FUROSEMIDE 40 MG
20 TABLET ORAL DAILY
Qty: 45 TABLET | Refills: 3 | Status: SHIPPED | OUTPATIENT
Start: 2023-12-28

## 2023-12-28 RX ORDER — BECLOMETHASONE DIPROPIONATE HFA 80 UG/1
AEROSOL, METERED RESPIRATORY (INHALATION)
Qty: 31.8 G | Refills: 0 | Status: SHIPPED | OUTPATIENT
Start: 2023-12-28 | End: 2024-03-20

## 2024-01-23 ENCOUNTER — TRANSFERRED RECORDS (OUTPATIENT)
Dept: HEALTH INFORMATION MANAGEMENT | Facility: CLINIC | Age: 69
End: 2024-01-23
Payer: COMMERCIAL

## 2024-01-23 LAB — RETINOPATHY: NEGATIVE

## 2024-02-14 DIAGNOSIS — L73.2 HIDRADENITIS SUPPURATIVA: ICD-10-CM

## 2024-02-14 RX ORDER — CLINDAMYCIN AND BENZOYL PEROXIDE 10; 50 MG/G; MG/G
GEL TOPICAL 2 TIMES DAILY
Qty: 50 G | Refills: 1 | Status: SHIPPED | OUTPATIENT
Start: 2024-02-14 | End: 2024-04-23

## 2024-03-20 ENCOUNTER — MYC REFILL (OUTPATIENT)
Dept: FAMILY MEDICINE | Facility: CLINIC | Age: 69
End: 2024-03-20

## 2024-03-20 DIAGNOSIS — E11.9 TYPE 2 DIABETES MELLITUS WITHOUT COMPLICATION, WITH LONG-TERM CURRENT USE OF INSULIN (H): ICD-10-CM

## 2024-03-20 DIAGNOSIS — Z79.4 TYPE 2 DIABETES MELLITUS WITHOUT COMPLICATION, WITH LONG-TERM CURRENT USE OF INSULIN (H): ICD-10-CM

## 2024-03-20 DIAGNOSIS — J45.30 MILD PERSISTENT ASTHMA, UNCOMPLICATED: ICD-10-CM

## 2024-03-21 RX ORDER — METFORMIN HCL 500 MG
TABLET, EXTENDED RELEASE 24 HR ORAL
Qty: 360 TABLET | Refills: 0 | Status: SHIPPED | OUTPATIENT
Start: 2024-03-21 | End: 2024-06-18

## 2024-03-21 RX ORDER — BECLOMETHASONE DIPROPIONATE HFA 80 UG/1
AEROSOL, METERED RESPIRATORY (INHALATION)
Qty: 31.8 G | Refills: 0 | OUTPATIENT
Start: 2024-03-21

## 2024-03-21 RX ORDER — DULAGLUTIDE 3 MG/.5ML
3 INJECTION, SOLUTION SUBCUTANEOUS
Qty: 6 ML | Refills: 0 | Status: SHIPPED | OUTPATIENT
Start: 2024-03-21 | End: 2024-04-11

## 2024-03-21 RX ORDER — BECLOMETHASONE DIPROPIONATE HFA 80 UG/1
AEROSOL, METERED RESPIRATORY (INHALATION)
Qty: 31.8 G | Refills: 0 | Status: SHIPPED | OUTPATIENT
Start: 2024-03-21 | End: 2024-04-16

## 2024-04-11 ENCOUNTER — OFFICE VISIT (OUTPATIENT)
Dept: FAMILY MEDICINE | Facility: CLINIC | Age: 69
End: 2024-04-11
Payer: COMMERCIAL

## 2024-04-11 VITALS
TEMPERATURE: 97.2 F | OXYGEN SATURATION: 98 % | DIASTOLIC BLOOD PRESSURE: 60 MMHG | RESPIRATION RATE: 17 BRPM | HEART RATE: 92 BPM | WEIGHT: 257 LBS | HEIGHT: 69 IN | SYSTOLIC BLOOD PRESSURE: 120 MMHG | BODY MASS INDEX: 38.06 KG/M2

## 2024-04-11 DIAGNOSIS — Z79.4 TYPE 2 DIABETES MELLITUS WITHOUT COMPLICATION, WITH LONG-TERM CURRENT USE OF INSULIN (H): Primary | ICD-10-CM

## 2024-04-11 DIAGNOSIS — E11.9 TYPE 2 DIABETES MELLITUS WITHOUT COMPLICATION, WITH LONG-TERM CURRENT USE OF INSULIN (H): Primary | ICD-10-CM

## 2024-04-11 DIAGNOSIS — E11.42 DIABETIC POLYNEUROPATHY ASSOCIATED WITH TYPE 2 DIABETES MELLITUS (H): ICD-10-CM

## 2024-04-11 DIAGNOSIS — I10 ESSENTIAL HYPERTENSION, BENIGN: ICD-10-CM

## 2024-04-11 DIAGNOSIS — E66.812 CLASS 2 SEVERE OBESITY DUE TO EXCESS CALORIES WITH SERIOUS COMORBIDITY AND BODY MASS INDEX (BMI) OF 36.0 TO 36.9 IN ADULT (H): ICD-10-CM

## 2024-04-11 DIAGNOSIS — E66.01 CLASS 2 SEVERE OBESITY DUE TO EXCESS CALORIES WITH SERIOUS COMORBIDITY AND BODY MASS INDEX (BMI) OF 36.0 TO 36.9 IN ADULT (H): ICD-10-CM

## 2024-04-11 DIAGNOSIS — E78.5 HYPERLIPIDEMIA, UNSPECIFIED HYPERLIPIDEMIA TYPE: ICD-10-CM

## 2024-04-11 DIAGNOSIS — D50.9 MICROCYTIC ANEMIA: ICD-10-CM

## 2024-04-11 LAB
ERYTHROCYTE [DISTWIDTH] IN BLOOD BY AUTOMATED COUNT: 19.1 % (ref 10–15)
HBA1C MFR BLD: 7.5 % (ref 0–5.6)
HCT VFR BLD AUTO: 37 % (ref 40–53)
HGB BLD-MCNC: 11.8 G/DL (ref 13.3–17.7)
HOLD SPECIMEN: NORMAL
MCH RBC QN AUTO: 21.3 PG (ref 26.5–33)
MCHC RBC AUTO-ENTMCNC: 31.9 G/DL (ref 31.5–36.5)
MCV RBC AUTO: 67 FL (ref 78–100)
PLATELET # BLD AUTO: 257 10E3/UL (ref 150–450)
RBC # BLD AUTO: 5.54 10E6/UL (ref 4.4–5.9)
WBC # BLD AUTO: 7.2 10E3/UL (ref 4–11)

## 2024-04-11 PROCEDURE — 80048 BASIC METABOLIC PNL TOTAL CA: CPT | Performed by: FAMILY MEDICINE

## 2024-04-11 PROCEDURE — 85027 COMPLETE CBC AUTOMATED: CPT | Performed by: FAMILY MEDICINE

## 2024-04-11 PROCEDURE — 36415 COLL VENOUS BLD VENIPUNCTURE: CPT | Performed by: FAMILY MEDICINE

## 2024-04-11 PROCEDURE — G2211 COMPLEX E/M VISIT ADD ON: HCPCS | Performed by: FAMILY MEDICINE

## 2024-04-11 PROCEDURE — 83036 HEMOGLOBIN GLYCOSYLATED A1C: CPT | Performed by: FAMILY MEDICINE

## 2024-04-11 PROCEDURE — 99214 OFFICE O/P EST MOD 30 MIN: CPT | Performed by: FAMILY MEDICINE

## 2024-04-11 PROCEDURE — 82043 UR ALBUMIN QUANTITATIVE: CPT | Performed by: FAMILY MEDICINE

## 2024-04-11 PROCEDURE — 82570 ASSAY OF URINE CREATININE: CPT | Performed by: FAMILY MEDICINE

## 2024-04-11 RX ORDER — RESPIRATORY SYNCYTIAL VIRUS VACCINE 120MCG/0.5
0.5 KIT INTRAMUSCULAR ONCE
Qty: 1 EACH | Refills: 0 | Status: CANCELLED | OUTPATIENT
Start: 2024-04-11 | End: 2024-04-11

## 2024-04-11 RX ORDER — DULAGLUTIDE 4.5 MG/.5ML
4.5 INJECTION, SOLUTION SUBCUTANEOUS WEEKLY
Qty: 6 ML | Refills: 3 | Status: SHIPPED | OUTPATIENT
Start: 2024-04-11

## 2024-04-11 ASSESSMENT — PAIN SCALES - GENERAL: PAINLEVEL: NO PAIN (0)

## 2024-04-11 NOTE — PROGRESS NOTES
Assessment/Plan:    Type 2 diabetes mellitus without complication, with long-term current use of insulin (H)  Type 2 diabetes reviewed with A1c increasing slightly from 6.9% to 7.5%.  Has continued Trulicity 4.5 mg weekly however type 2 diabetes reviewed with A1c increasing slightly from 6.9% to 7.5%.  Has continued Trulicity 4.5 mg weekly however incorrect dose had recently been sent to pharmacy for 3 mg weekly.  Lantus 60 units daily as well as metformin XR 2 tablets twice daily.  Reassess at follow-up in 4 months.  - Albumin Random Urine Quantitative with Creat Ratio  - Dulaglutide (TRULICITY) 4.5 MG/0.5ML SOPN  Dispense: 6 mL; Refill: 3  - Basic metabolic panel    Essential hypertension, benign  Hypertension appears stable with lisinopril 20 mg daily.    Hyperlipidemia, unspecified hyperlipidemia type  Utilizing simvastatin 40 mg at bedtime.    Class 2 severe obesity due to excess calories with serious comorbidity and body mass index (BMI) of 36.0 to 36.9 in adult (H)  Dietary and exercise modification for weight goal less than 250 pounds initially, less than 240 pounds ideally.    Diabetic polyneuropathy associated with type 2 diabetes mellitus (H)  Benefits of gabapentin 300 mg using 2 capsules 3 times daily.    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  - CBC with platelets    The longitudinal plan of care for the diagnosis(es)/condition(s) as documented were addressed during this visit. Due to the added complexity in care, I will continue to support Marc in the subsequent management and with ongoing continuity of care.     The following high BMI interventions were performed this visit: encouragement to exercise, weight monitoring, weight loss from baseline weight, and lifestyle education regarding diet         Subjective:    Leandro Brewer is seen today for follow-up assessment.  Type 2 diabetes.  Dietary indiscretions over the holidays.  5 pound weight gains December  "2023.  Using Trulicity 4.5 mg weekly plus Lantus 60 units daily and metformin  mg using 2 tablets twice daily.  Lisinopril 20 mg daily for hypertension.  Simvastatin 40 mg at bedtime for lipid management.  Persistent asthma stable with Qvar 80 mcg using 2 puffs twice daily.  Denies recent illness.  Comprehensive review of systems as above otherwise all negative.       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 Thompson); right inguinal hernia age 6 months; right total hip arthroplasty at [facility] 2019 with Dr. Evans.  Hospitalizations: early 20s \"infected gallbladder\" WITHOUT surgery... ; cellulitis in legs (hospitalized twice); abdominal wall cyst requiring IV antibiotics x 4 days... ; right Lumbar 3 - Lumbar 4 hemilaminectomy and medial facetectomy and Right redo Lumbar 4 -Lumbar 5 hemilaminectomy and medial facetectomy 21 (Dr. Lopez)    19-19 [facility] for right LE cellulitis/sepsis; right LE cellulitis 19-19 ([facility]); 10/15/19 s/p left MELITA (Dr. Evans)    FYI: see lab result \"Wild Chemistry\" from 11 re: hemoglobin Tracey trait resulting in mild microcyctic anemia (look for further cause of anemia if Hb < 11.0)         Past Surgical History:   Procedure Laterality Date    BACK SURGERY      laminectomy L45 by Dr. Villarreal     HERNIA REPAIR Right     inguinal; child    TOTAL HIP ARTHROPLASTY Bilateral 2019 (right) and 2019 (left)    ZZC HARE W/O FACETEC FORAMOT/[facility]  VRT SEG, LUMBAR Right 2021    Procedure: Right Lumbar 3 - Lumbar 4 hemilaminectomy and medial facetectomy and Right redo Lumbar 4 -Lumbar 5 hemilaminectomy and " medial facetectomy;  Surgeon: Julissa Lopez MD;  Location: [facility] OR;  Service: Spine        Family History   Problem Relation Age of Onset    Coronary Artery Disease Mother     Obesity Mother     Heart Disease Father     Cancer Father         throat    Coronary Artery Disease Father     Diabetes Sister     Diabetes Brother         Past Medical History:   Diagnosis Date    Anemia     Arthritis     Diabetes mellitus, type II (H)     Hypertension     Obesity     Plantar fasciitis         Social History     Tobacco Use    Smoking status: Former    Smokeless tobacco: Never   Vaping Use    Vaping status: Never Used   Substance Use Topics    Alcohol use: Yes     Alcohol/week: 1.0 standard drink of alcohol     Comment: Alcoholic Drinks/day: occasional    Drug use: No        Current Outpatient Medications   Medication Sig Dispense Refill    albuterol (PROAIR HFA/PROVENTIL HFA/VENTOLIN HFA) 108 (90 Base) MCG/ACT inhaler INHALE 2 PUFFS INTO THE LUNGS 4 TIMES DAILY AS NEEDED FOR SHORTNESS OF BREATH / DYSPNEA OR WHEEZING 18 g 6    APPLE CIDER VINEGAR PO Take 1 tablet by mouth      aspirin (ASA) 325 MG EC tablet       augmented betamethasone dipropionate (DIPROLENE-AF) 0.05 % external ointment APPLY TO AFFECTED AREA TWICE A DAY 15 g 3    beclomethasone HFA (QVAR REDIHALER) 80 MCG/ACT inhaler INHALE 2 PUFFS BY MOUTH TWICE A DAY - RINSE MOUTH AFTER USE-DO NOT SHAKE UNIT 31.8 g 0    blood glucose (NO BRAND SPECIFIED) lancets standard [employer] Microlet MISC  Check blood sugar 2 times per day and as needed      chlorhexidine (HIBICLENS) 4 % liquid Wash groin daily 473 mL 11    Cholecalciferol (D3 DOTS) 50 MCG (2000 UT) TBDP Take 1 capsule by mouth      clindamycin-benzoyl peroxide (BENZACLIN) 1-5 % external gel Apply topically 2 times daily 50 g 1    clotrimazole-betamethasone (LOTRISONE) 1-0.05 % external cream Apply topically 2 times daily 50 g 1    doxycycline monohydrate (MONODOX) 50 MG capsule Take 1 capsule (50  "mg) by mouth daily 60 capsule 6    Dulaglutide (TRULICITY) 4.5 MG/0.5ML SOPN Inject 4.5 mg Subcutaneous once a week 6 mL 3    furosemide (LASIX) 40 MG tablet TAKE 1/2 TABLET BY MOUTH EVERY DAY 45 tablet 3    gabapentin (NEURONTIN) 300 MG capsule Take 2 capsules (600 mg) by mouth 3 times daily 540 capsule 3    insulin glargine (LANTUS SOLOSTAR) 100 UNIT/ML pen INJECT 60 UNITS SUBCUTANEOUSLY AT BEDTIME 45 mL 3    insulin pen needle (32G X 4 MM) 32G X 4 MM miscellaneous Use one pen needle daily or as directed. 100 each 3    lisinopril (ZESTRIL) 20 MG tablet Take 1 tablet (20 mg) by mouth daily 90 tablet 3    Magnesium Oxide 500 MG TABS Take 500 mg by mouth      metFORMIN (GLUCOPHAGE XR) 500 MG 24 hr tablet TAKE 2 TABLETS BY MOUTH TWICE A DAY WITH MEALS 360 tablet 0    Multiple Vitamin (MULTIVITAMIN ADULT PO) Take 1 tablet by mouth      potassium gluconate 2.5 MEQ TABS Take 1 tablet by mouth      sildenafil (VIAGRA) 100 MG tablet Take 0.5-1 tablets ( mg) by mouth daily as needed (erectile difficulties) 90 tablet 3    simvastatin (ZOCOR) 40 MG tablet TAKE 1 TABLET BY MOUTH EVERYDAY AT BEDTIME 90 tablet 2    vitamin (B COMPLEX) capsule Take 1 capsule by mouth      vitamin C (ASCORBIC ACID) 500 MG tablet Take 500 mg by mouth      sildenafil (REVATIO) 20 MG tablet Take 2-3 tablets (40-60 mg) by mouth daily as needed (male erectile dysfunction) 90 tablet 3          Objective:    Vitals:    04/11/24 1420   BP: 120/60   Pulse: 92   Resp: 17   Temp: 97.2  F (36.2  C)   SpO2: 98%   Weight: 116.6 kg (257 lb)   Height: 1.759 m (5' 9.25\")      Body mass index is 37.68 kg/m .    Alert.  No apparent distress.  Chest clear.  Cardiac exam regular.  Extremities warm and dry.      This note has been dictated using voice recognition software and as a result may contain minor grammatical errors and unintended word substitutions.       Answers submitted by the patient for this visit:  Diabetes Visit (Submitted on 4/10/2024)  Chief " Complaint: Chronic problems general questions HPI Form  Frequency of checking blood sugars:: one time daily  What time of day are you checking your blood sugars : before meals  Have you had any blood sugars above 200?: Yes  Have you had any blood sugars below 70?: No  Hypoglycemia symptoms:: shakiness, lethargy  Diabetic concerns:: none  Paraesthesia present:: burning in feet  General Questionnaire (Submitted on 4/10/2024)  Chief Complaint: Chronic problems general questions HPI Form  How many servings of fruits and vegetables do you eat daily?: 0-1  On average, how many sweetened beverages do you drink each day (Examples: soda, juice, sweet tea, etc.  Do NOT count diet or artificially sweetened beverages)?: 1  How many minutes a day do you exercise enough to make your heart beat faster?: 60 or more  How many days a week do you exercise enough to make your heart beat faster?: 5  How many days per week do you miss taking your medication?: 0

## 2024-04-12 LAB
ANION GAP SERPL CALCULATED.3IONS-SCNC: 12 MMOL/L (ref 7–15)
BUN SERPL-MCNC: 20.1 MG/DL (ref 8–23)
CALCIUM SERPL-MCNC: 9.3 MG/DL (ref 8.8–10.2)
CHLORIDE SERPL-SCNC: 103 MMOL/L (ref 98–107)
CREAT SERPL-MCNC: 1.02 MG/DL (ref 0.67–1.17)
CREAT UR-MCNC: 85.2 MG/DL
DEPRECATED HCO3 PLAS-SCNC: 24 MMOL/L (ref 22–29)
EGFRCR SERPLBLD CKD-EPI 2021: 81 ML/MIN/1.73M2
GLUCOSE SERPL-MCNC: 227 MG/DL (ref 70–99)
MICROALBUMIN UR-MCNC: <12 MG/L
MICROALBUMIN/CREAT UR: NORMAL MG/G{CREAT}
POTASSIUM SERPL-SCNC: 4.3 MMOL/L (ref 3.4–5.3)
SODIUM SERPL-SCNC: 139 MMOL/L (ref 135–145)

## 2024-04-15 DIAGNOSIS — J45.30 MILD PERSISTENT ASTHMA, UNCOMPLICATED: ICD-10-CM

## 2024-04-16 RX ORDER — BECLOMETHASONE DIPROPIONATE HFA 80 UG/1
AEROSOL, METERED RESPIRATORY (INHALATION)
Qty: 31.8 G | Refills: 0 | Status: SHIPPED | OUTPATIENT
Start: 2024-04-16 | End: 2024-08-28

## 2024-04-23 ENCOUNTER — OFFICE VISIT (OUTPATIENT)
Dept: DERMATOLOGY | Facility: CLINIC | Age: 69
End: 2024-04-23
Payer: COMMERCIAL

## 2024-04-23 DIAGNOSIS — L73.2 HIDRADENITIS SUPPURATIVA: Primary | ICD-10-CM

## 2024-04-23 PROCEDURE — 99213 OFFICE O/P EST LOW 20 MIN: CPT | Performed by: DERMATOLOGY

## 2024-04-23 RX ORDER — CLINDAMYCIN AND BENZOYL PEROXIDE 10; 50 MG/G; MG/G
GEL TOPICAL 2 TIMES DAILY
Qty: 50 G | Refills: 11 | Status: SHIPPED | OUTPATIENT
Start: 2024-04-23

## 2024-04-23 NOTE — LETTER
4/23/2024         RE: Leandro Brewer  [address] [facility] [address]        Dear Colleague,    Thank you for referring your patient, Leandro Brewer, to the [facility]. Please see a copy of my visit note below.    Leandro Brewer is an extremely pleasant 68 year old year old male patient here today for follow hidradenitis.   Clear on doxy once daily and benzaclin.  No issues.  Patient has no other skin complaints today.  Remainder of the HPI, Meds, PMH, Allergies, FH, and SH was reviewed in chart.      Past Medical History:   Diagnosis Date     Anemia      Arthritis      Diabetes mellitus, type II (H)      Hypertension      Obesity      Plantar fasciitis        Past Surgical History:   Procedure Laterality Date     BACK SURGERY      laminectomy L45 by Dr. Villarreal      HERNIA REPAIR Right     inguinal; child     TOTAL HIP ARTHROPLASTY Bilateral 2019 Feb 5, 2019 (right) and October 19, 2019 (left)     ZZC HARE W/O FACETEC FORAMOT/DSKC 1/2 VRT SEG, LUMBAR Right 1/20/2021    Procedure: Right Lumbar 3 - Lumbar 4 hemilaminectomy and medial facetectomy and Right redo Lumbar 4 -Lumbar 5 hemilaminectomy and medial facetectomy;  Surgeon: Julissa Lopez MD;  [place] [facility] [place]  Service: Spine        Family History   Problem Relation Age of Onset     Coronary Artery Disease Mother      Obesity Mother      Heart Disease Father      Cancer Father         throat     Coronary Artery Disease Father      Diabetes Sister      Diabetes Brother        Social History     Socioeconomic History     Marital status:      Spouse name: Not on file     Number of children: Not on file     Years of education: Not on file     Highest education level: Not on file   Occupational History     Not on file   Tobacco Use     Smoking status: Former     Smokeless tobacco: Never   Vaping Use     Vaping status: Never Used   Substance and Sexual Activity     Alcohol use: Yes      Alcohol/week: 1.0 standard drink of alcohol     Comment: Alcoholic Drinks/day: occasional     Drug use: No     Sexual activity: Not on file   Other Topics Concern     Not on file   Social History Narrative     Not on file     Social Determinants of Health     Financial Resource Strain: Low Risk  (12/5/2023)    Financial Resource Strain      Within the past 12 months, have you or your family members you live with been unable to get utilities (heat, electricity) when it was really needed?: No   Food Insecurity: Low Risk  (12/5/2023)    Food Insecurity      Within the past 12 months, did you worry that your food would run out before you got money to buy more?: No      Within the past 12 months, did the food you bought just not last and you didn t have money to get more?: No   Transportation Needs: Low Risk  (12/5/2023)    Transportation Needs      Within the past 12 months, has lack of transportation kept you from medical appointments, getting your medicines, non-medical meetings or appointments, work, or from getting things that you need?: No   Physical Activity: Not on file   Stress: Not on file   Social Connections: Not on file   Interpersonal Safety: Low Risk  (12/5/2023)    Interpersonal Safety      Do you feel physically and emotionally safe where you currently live?: Yes      Within the past 12 months, have you been hit, slapped, kicked or otherwise physically hurt by someone?: No      Within the past 12 months, have you been humiliated or emotionally abused in other ways by your partner or ex-partner?: No   Housing Stability: Low Risk  (12/5/2023)    Housing Stability      Do you have housing? : Yes      Are you worried about losing your housing?: No       Outpatient Encounter Medications as of 4/23/2024   Medication Sig Dispense Refill     albuterol (PROAIR HFA/PROVENTIL HFA/VENTOLIN HFA) 108 (90 Base) MCG/ACT inhaler INHALE 2 PUFFS INTO THE LUNGS 4 TIMES DAILY AS NEEDED FOR SHORTNESS OF BREATH / DYSPNEA OR  WHEEZING 18 g 6     APPLE CIDER VINEGAR PO Take 1 tablet by mouth       aspirin (ASA) 325 MG EC tablet        augmented betamethasone dipropionate (DIPROLENE-AF) 0.05 % external ointment APPLY TO AFFECTED AREA TWICE A DAY 15 g 3     blood glucose (NO BRAND SPECIFIED) lancets standard [employer] Microlet MISC  Check blood sugar 2 times per day and as needed       chlorhexidine (HIBICLENS) 4 % liquid Wash groin daily 473 mL 11     Cholecalciferol (D3 DOTS) 50 MCG (2000 UT) TBDP Take 1 capsule by mouth       clindamycin-benzoyl peroxide (BENZACLIN) 1-5 % external gel Apply topically 2 times daily 50 g 1     clotrimazole-betamethasone (LOTRISONE) 1-0.05 % external cream Apply topically 2 times daily 50 g 1     doxycycline monohydrate (MONODOX) 50 MG capsule Take 1 capsule (50 mg) by mouth daily 60 capsule 6     Dulaglutide (TRULICITY) 4.5 MG/0.5ML SOPN Inject 4.5 mg Subcutaneous once a week 6 mL 3     furosemide (LASIX) 40 MG tablet TAKE 1/2 TABLET BY MOUTH EVERY DAY 45 tablet 3     gabapentin (NEURONTIN) 300 MG capsule Take 2 capsules (600 mg) by mouth 3 times daily 540 capsule 3     insulin glargine (LANTUS SOLOSTAR) 100 UNIT/ML pen INJECT 60 UNITS SUBCUTANEOUSLY AT BEDTIME 45 mL 3     insulin pen needle (32G X 4 MM) 32G X 4 MM miscellaneous Use one pen needle daily or as directed. 100 each 3     lisinopril (ZESTRIL) 20 MG tablet Take 1 tablet (20 mg) by mouth daily 90 tablet 3     Magnesium Oxide 500 MG TABS Take 500 mg by mouth       metFORMIN (GLUCOPHAGE XR) 500 MG 24 hr tablet TAKE 2 TABLETS BY MOUTH TWICE A DAY WITH MEALS 360 tablet 0     Multiple Vitamin (MULTIVITAMIN ADULT PO) Take 1 tablet by mouth       potassium gluconate 2.5 MEQ TABS Take 1 tablet by mouth       QVAR REDIHALER 80 MCG/ACT inhaler INHALE 2 PUFFS BY MOUTH TWICE A DAY - RINSE MOUTH AFTER USE-DO NOT SHAKE UNIT 31.8 g 0     sildenafil (REVATIO) 20 MG tablet Take 2-3 tablets (40-60 mg) by mouth daily as needed (male erectile dysfunction) 90 tablet 3      sildenafil (VIAGRA) 100 MG tablet Take 0.5-1 tablets ( mg) by mouth daily as needed (erectile difficulties) 90 tablet 3     simvastatin (ZOCOR) 40 MG tablet TAKE 1 TABLET BY MOUTH EVERYDAY AT BEDTIME 90 tablet 2     vitamin (B COMPLEX) capsule Take 1 capsule by mouth       vitamin C (ASCORBIC ACID) 500 MG tablet Take 500 mg by mouth       No facility-administered encounter medications on file as of 4/23/2024.             O:   NAD, WDWN, Alert & Oriented, Mood & Affect wnl, Vitals stable   Here today alone   General appearance normal   Vitals stable   Alert, oriented and in no acute distress     Clear per patient       Eyes: Conjunctivae/lids:Normal     ENT: Lips, buccal mucosa, tongue: normal    MSK:Normal    Cardiovascular: peripheral edema none    Pulm: Breathing Normal    Neuro/Psych: Orientation:Alert and Orientedx3 ; Mood/Affect:normal       A/P:  Hidradenitis  He would like to stop doxy   Stop doxy   Cont benzaclin  It was a pleasure speaking to Leandro Brewer today.  Previous clinic notes and pertinent laboratory tests were reviewed prior to Leandro Brewer's visit.  Return to clinic 12 months      Again, thank you for allowing me to participate in the care of your patient.        Sincerely,        Arcenio Fernandez MD

## 2024-04-23 NOTE — PROGRESS NOTES
Leandro Brewer is an extremely pleasant 68 year old year old male patient here today for follow hidradenitis.   Clear on doxy once daily and benzaclin.  No issues.  Patient has no other skin complaints today.  Remainder of the HPI, Meds, PMH, Allergies, FH, and SH was reviewed in chart.      Past Medical History:   Diagnosis Date    Anemia     Arthritis     Diabetes mellitus, type II (H)     Hypertension     Obesity     Plantar fasciitis        Past Surgical History:   Procedure Laterality Date    BACK SURGERY      laminectomy L45 by Dr. Villarreal     HERNIA REPAIR Right     inguinal; child    TOTAL HIP ARTHROPLASTY Bilateral 2019 Feb 5, 2019 (right) and October 19, 2019 (left)    ZZC HARE W/O FACETEC FORAMOT/DSKC 1/2 VRT SEG, LUMBAR Right 1/20/2021    Procedure: Right Lumbar 3 - Lumbar 4 hemilaminectomy and medial facetectomy and Right redo Lumbar 4 -Lumbar 5 hemilaminectomy and medial facetectomy;  Surgeon: Julissa Lopez MD;  Location: [facility] - [place];  Service: Spine        Family History   Problem Relation Age of Onset    Coronary Artery Disease Mother     Obesity Mother     Heart Disease Father     Cancer Father         throat    Coronary Artery Disease Father     Diabetes Sister     Diabetes Brother        Social History     Socioeconomic History    Marital status:      Spouse name: Not on file    Number of children: Not on file    Years of education: Not on file    Highest education level: Not on file   Occupational History    Not on file   Tobacco Use    Smoking status: Former    Smokeless tobacco: Never   Vaping Use    Vaping status: Never Used   Substance and Sexual Activity    Alcohol use: Yes     Alcohol/week: 1.0 standard drink of alcohol     Comment: Alcoholic Drinks/day: occasional    Drug use: No    Sexual activity: Not on file   Other Topics Concern    Not on file   Social History Narrative    Not on file     Social Determinants of Health     Financial Resource Strain: Low Risk   (12/5/2023)    Financial Resource Strain     Within the past 12 months, have you or your family members you live with been unable to get utilities (heat, electricity) when it was really needed?: No   Food Insecurity: Low Risk  (12/5/2023)    Food Insecurity     Within the past 12 months, did you worry that your food would run out before you got money to buy more?: No     Within the past 12 months, did the food you bought just not last and you didn t have money to get more?: No   Transportation Needs: Low Risk  (12/5/2023)    Transportation Needs     Within the past 12 months, has lack of transportation kept you from medical appointments, getting your medicines, non-medical meetings or appointments, work, or from getting things that you need?: No   Physical Activity: Not on file   Stress: Not on file   Social Connections: Not on file   Interpersonal Safety: Low Risk  (12/5/2023)    Interpersonal Safety     Do you feel physically and emotionally safe where you currently live?: Yes     Within the past 12 months, have you been hit, slapped, kicked or otherwise physically hurt by someone?: No     Within the past 12 months, have you been humiliated or emotionally abused in other ways by your partner or ex-partner?: No   Housing Stability: Low Risk  (12/5/2023)    Housing Stability     Do you have housing? : Yes     Are you worried about losing your housing?: No       Outpatient Encounter Medications as of 4/23/2024   Medication Sig Dispense Refill    albuterol (PROAIR HFA/PROVENTIL HFA/VENTOLIN HFA) 108 (90 Base) MCG/ACT inhaler INHALE 2 PUFFS INTO THE LUNGS 4 TIMES DAILY AS NEEDED FOR SHORTNESS OF BREATH / DYSPNEA OR WHEEZING 18 g 6    APPLE CIDER VINEGAR PO Take 1 tablet by mouth      aspirin (ASA) 325 MG EC tablet       augmented betamethasone dipropionate (DIPROLENE-AF) 0.05 % external ointment APPLY TO AFFECTED AREA TWICE A DAY 15 g 3    blood glucose (NO BRAND SPECIFIED) lancets standard [employer] Microlet MISC   Check blood sugar 2 times per day and as needed      chlorhexidine (HIBICLENS) 4 % liquid Wash groin daily 473 mL 11    Cholecalciferol (D3 DOTS) 50 MCG (2000 UT) TBDP Take 1 capsule by mouth      clindamycin-benzoyl peroxide (BENZACLIN) 1-5 % external gel Apply topically 2 times daily 50 g 1    clotrimazole-betamethasone (LOTRISONE) 1-0.05 % external cream Apply topically 2 times daily 50 g 1    doxycycline monohydrate (MONODOX) 50 MG capsule Take 1 capsule (50 mg) by mouth daily 60 capsule 6    Dulaglutide (TRULICITY) 4.5 MG/0.5ML SOPN Inject 4.5 mg Subcutaneous once a week 6 mL 3    furosemide (LASIX) 40 MG tablet TAKE 1/2 TABLET BY MOUTH EVERY DAY 45 tablet 3    gabapentin (NEURONTIN) 300 MG capsule Take 2 capsules (600 mg) by mouth 3 times daily 540 capsule 3    insulin glargine (LANTUS SOLOSTAR) 100 UNIT/ML pen INJECT 60 UNITS SUBCUTANEOUSLY AT BEDTIME 45 mL 3    insulin pen needle (32G X 4 MM) 32G X 4 MM miscellaneous Use one pen needle daily or as directed. 100 each 3    lisinopril (ZESTRIL) 20 MG tablet Take 1 tablet (20 mg) by mouth daily 90 tablet 3    Magnesium Oxide 500 MG TABS Take 500 mg by mouth      metFORMIN (GLUCOPHAGE XR) 500 MG 24 hr tablet TAKE 2 TABLETS BY MOUTH TWICE A DAY WITH MEALS 360 tablet 0    Multiple Vitamin (MULTIVITAMIN ADULT PO) Take 1 tablet by mouth      potassium gluconate 2.5 MEQ TABS Take 1 tablet by mouth      QVAR REDIHALER 80 MCG/ACT inhaler INHALE 2 PUFFS BY MOUTH TWICE A DAY - RINSE MOUTH AFTER USE-DO NOT SHAKE UNIT 31.8 g 0    sildenafil (REVATIO) 20 MG tablet Take 2-3 tablets (40-60 mg) by mouth daily as needed (male erectile dysfunction) 90 tablet 3    sildenafil (VIAGRA) 100 MG tablet Take 0.5-1 tablets ( mg) by mouth daily as needed (erectile difficulties) 90 tablet 3    simvastatin (ZOCOR) 40 MG tablet TAKE 1 TABLET BY MOUTH EVERYDAY AT BEDTIME 90 tablet 2    vitamin (B COMPLEX) capsule Take 1 capsule by mouth      vitamin C (ASCORBIC ACID) 500 MG tablet Take  500 mg by mouth       No facility-administered encounter medications on file as of 4/23/2024.             O:   NAD, WDWN, Alert & Oriented, Mood & Affect wnl, Vitals stable   Here today alone   General appearance normal   Vitals stable   Alert, oriented and in no acute distress     Clear per patient       Eyes: Conjunctivae/lids:Normal     ENT: Lips, buccal mucosa, tongue: normal    MSK:Normal    Cardiovascular: peripheral edema none    Pulm: Breathing Normal    Neuro/Psych: Orientation:Alert and Orientedx3 ; Mood/Affect:normal       A/P:  Hidradenitis  He would like to stop doxy   Stop doxy   Cont benzaclin  It was a pleasure speaking to Leandro Brewer today.  Previous clinic notes and pertinent laboratory tests were reviewed prior to Leandro Brewer's visit.  Return to clinic 12 months

## 2024-05-06 DIAGNOSIS — L03.115 CELLULITIS OF RIGHT LOWER EXTREMITY: ICD-10-CM

## 2024-05-06 RX ORDER — CEPHALEXIN 500 MG/1
500 CAPSULE ORAL 4 TIMES DAILY
Qty: 56 CAPSULE | Refills: 0 | Status: ON HOLD | OUTPATIENT
Start: 2024-05-06 | End: 2024-06-26

## 2024-05-08 DIAGNOSIS — L03.115 CELLULITIS OF RIGHT LOWER EXTREMITY: Primary | ICD-10-CM

## 2024-05-08 RX ORDER — SULFAMETHOXAZOLE/TRIMETHOPRIM 800-160 MG
1 TABLET ORAL 2 TIMES DAILY
Qty: 20 TABLET | Refills: 0 | Status: SHIPPED | OUTPATIENT
Start: 2024-05-08 | End: 2024-05-18

## 2024-06-05 DIAGNOSIS — E11.9 TYPE 2 DIABETES MELLITUS WITHOUT COMPLICATION, WITHOUT LONG-TERM CURRENT USE OF INSULIN (H): ICD-10-CM

## 2024-06-05 RX ORDER — INSULIN GLARGINE 100 [IU]/ML
INJECTION, SOLUTION SUBCUTANEOUS
Qty: 60 ML | Refills: 3 | Status: SHIPPED | OUTPATIENT
Start: 2024-06-05

## 2024-06-17 DIAGNOSIS — Z79.4 TYPE 2 DIABETES MELLITUS WITHOUT COMPLICATION, WITH LONG-TERM CURRENT USE OF INSULIN (H): ICD-10-CM

## 2024-06-17 DIAGNOSIS — E11.9 TYPE 2 DIABETES MELLITUS WITHOUT COMPLICATION, WITH LONG-TERM CURRENT USE OF INSULIN (H): ICD-10-CM

## 2024-06-18 RX ORDER — METFORMIN HCL 500 MG
TABLET, EXTENDED RELEASE 24 HR ORAL
Qty: 360 TABLET | Refills: 0 | Status: SHIPPED | OUTPATIENT
Start: 2024-06-18 | End: 2024-09-23

## 2024-06-24 ENCOUNTER — NURSE TRIAGE (OUTPATIENT)
Dept: FAMILY MEDICINE | Facility: CLINIC | Age: 69
End: 2024-06-24

## 2024-06-24 ENCOUNTER — HOSPITAL ENCOUNTER (INPATIENT)
Facility: HOSPITAL | Age: 69
LOS: 2 days | Discharge: HOME | End: 2024-06-26
Attending: EMERGENCY MEDICINE | Admitting: STUDENT IN AN ORGANIZED HEALTH CARE EDUCATION/TRAINING PROGRAM
Payer: COMMERCIAL

## 2024-06-24 DIAGNOSIS — L03.115 CELLULITIS OF RIGHT LOWER EXTREMITY: ICD-10-CM

## 2024-06-24 LAB
ANION GAP SERPL CALCULATED.3IONS-SCNC: 13 MMOL/L (ref 7–15)
BASOPHILS # BLD AUTO: 0 10E3/UL (ref 0–0.2)
BASOPHILS NFR BLD AUTO: 1 %
BUN SERPL-MCNC: 15.1 MG/DL (ref 8–23)
CALCIUM SERPL-MCNC: 9.5 MG/DL (ref 8.8–10.2)
CHLORIDE SERPL-SCNC: 103 MMOL/L (ref 98–107)
CREAT SERPL-MCNC: 0.86 MG/DL (ref 0.67–1.17)
CRP SERPL-MCNC: 111.6 MG/L
DEPRECATED HCO3 PLAS-SCNC: 23 MMOL/L (ref 22–29)
EGFRCR SERPLBLD CKD-EPI 2021: >90 ML/MIN/1.73M2
EOSINOPHIL # BLD AUTO: 0.1 10E3/UL (ref 0–0.7)
EOSINOPHIL NFR BLD AUTO: 1 %
ERYTHROCYTE [DISTWIDTH] IN BLOOD BY AUTOMATED COUNT: 19.4 % (ref 10–15)
GLUCOSE BLDC GLUCOMTR-MCNC: 120 MG/DL (ref 70–99)
GLUCOSE BLDC GLUCOMTR-MCNC: 79 MG/DL (ref 70–99)
GLUCOSE SERPL-MCNC: 208 MG/DL (ref 70–99)
HCT VFR BLD AUTO: 38 % (ref 40–53)
HGB BLD-MCNC: 12.2 G/DL (ref 13.3–17.7)
HOLD SPECIMEN: NORMAL
IMM GRANULOCYTES # BLD: 0.1 10E3/UL
IMM GRANULOCYTES NFR BLD: 1 %
LACTATE SERPL-SCNC: 1.3 MMOL/L (ref 0.7–2)
LACTATE SERPL-SCNC: 2.3 MMOL/L (ref 0.7–2)
LYMPHOCYTES # BLD AUTO: 2.1 10E3/UL (ref 0.8–5.3)
LYMPHOCYTES NFR BLD AUTO: 26 %
MCH RBC QN AUTO: 21.7 PG (ref 26.5–33)
MCHC RBC AUTO-ENTMCNC: 32.1 G/DL (ref 31.5–36.5)
MCV RBC AUTO: 68 FL (ref 78–100)
MONOCYTES # BLD AUTO: 0.7 10E3/UL (ref 0–1.3)
MONOCYTES NFR BLD AUTO: 8 %
NEUTROPHILS # BLD AUTO: 5.1 10E3/UL (ref 1.6–8.3)
NEUTROPHILS NFR BLD AUTO: 64 %
NRBC # BLD AUTO: 0 10E3/UL
NRBC BLD AUTO-RTO: 0 /100
PLATELET # BLD AUTO: 249 10E3/UL (ref 150–450)
POTASSIUM SERPL-SCNC: 4.3 MMOL/L (ref 3.4–5.3)
RBC # BLD AUTO: 5.63 10E6/UL (ref 4.4–5.9)
SODIUM SERPL-SCNC: 139 MMOL/L (ref 135–145)
WBC # BLD AUTO: 8 10E3/UL (ref 4–11)

## 2024-06-24 PROCEDURE — 85041 AUTOMATED RBC COUNT: CPT | Performed by: EMERGENCY MEDICINE

## 2024-06-24 PROCEDURE — 250N000011 HC RX IP 250 OP 636: Mod: JZ | Performed by: STUDENT IN AN ORGANIZED HEALTH CARE EDUCATION/TRAINING PROGRAM

## 2024-06-24 PROCEDURE — 250N000013 HC RX MED GY IP 250 OP 250 PS 637: Performed by: STUDENT IN AN ORGANIZED HEALTH CARE EDUCATION/TRAINING PROGRAM

## 2024-06-24 PROCEDURE — 83605 ASSAY OF LACTIC ACID: CPT | Performed by: EMERGENCY MEDICINE

## 2024-06-24 PROCEDURE — 82565 ASSAY OF CREATININE: CPT | Performed by: EMERGENCY MEDICINE

## 2024-06-24 PROCEDURE — 96360 HYDRATION IV INFUSION INIT: CPT | Mod: 59

## 2024-06-24 PROCEDURE — 96365 THER/PROPH/DIAG IV INF INIT: CPT | Mod: 59

## 2024-06-24 PROCEDURE — 258N000003 HC RX IP 258 OP 636: Mod: JZ | Performed by: EMERGENCY MEDICINE

## 2024-06-24 PROCEDURE — 86140 C-REACTIVE PROTEIN: CPT | Performed by: EMERGENCY MEDICINE

## 2024-06-24 PROCEDURE — 82962 GLUCOSE BLOOD TEST: CPT

## 2024-06-24 PROCEDURE — 99285 EMERGENCY DEPT VISIT HI MDM: CPT | Mod: 25

## 2024-06-24 PROCEDURE — 36415 COLL VENOUS BLD VENIPUNCTURE: CPT | Performed by: EMERGENCY MEDICINE

## 2024-06-24 PROCEDURE — 120N000001 HC R&B MED SURG/OB

## 2024-06-24 PROCEDURE — 250N000011 HC RX IP 250 OP 636: Performed by: EMERGENCY MEDICINE

## 2024-06-24 PROCEDURE — 87040 BLOOD CULTURE FOR BACTERIA: CPT | Performed by: EMERGENCY MEDICINE

## 2024-06-24 PROCEDURE — 99223 1ST HOSP IP/OBS HIGH 75: CPT | Performed by: STUDENT IN AN ORGANIZED HEALTH CARE EDUCATION/TRAINING PROGRAM

## 2024-06-24 RX ORDER — ASPIRIN 81 MG/1
81 TABLET ORAL DAILY
Status: DISCONTINUED | OUTPATIENT
Start: 2024-06-25 | End: 2024-06-26 | Stop reason: HOSPADM

## 2024-06-24 RX ORDER — DEXTROSE MONOHYDRATE 25 G/50ML
25-50 INJECTION, SOLUTION INTRAVENOUS
Status: DISCONTINUED | OUTPATIENT
Start: 2024-06-24 | End: 2024-06-26 | Stop reason: HOSPADM

## 2024-06-24 RX ORDER — FUROSEMIDE 20 MG
20 TABLET ORAL DAILY
Status: DISCONTINUED | OUTPATIENT
Start: 2024-06-25 | End: 2024-06-26 | Stop reason: HOSPADM

## 2024-06-24 RX ORDER — CALCIUM CARBONATE 500 MG/1
1000 TABLET, CHEWABLE ORAL 4 TIMES DAILY PRN
Status: DISCONTINUED | OUTPATIENT
Start: 2024-06-24 | End: 2024-06-26 | Stop reason: HOSPADM

## 2024-06-24 RX ORDER — ONDANSETRON 2 MG/ML
4 INJECTION INTRAMUSCULAR; INTRAVENOUS EVERY 6 HOURS PRN
Status: DISCONTINUED | OUTPATIENT
Start: 2024-06-24 | End: 2024-06-26 | Stop reason: HOSPADM

## 2024-06-24 RX ORDER — NICOTINE POLACRILEX 4 MG
15-30 LOZENGE BUCCAL
Status: DISCONTINUED | OUTPATIENT
Start: 2024-06-24 | End: 2024-06-26 | Stop reason: HOSPADM

## 2024-06-24 RX ORDER — SIMVASTATIN 10 MG
40 TABLET ORAL AT BEDTIME
Status: DISCONTINUED | OUTPATIENT
Start: 2024-06-24 | End: 2024-06-26 | Stop reason: HOSPADM

## 2024-06-24 RX ORDER — ASPIRIN 81 MG/1
81 TABLET ORAL DAILY
COMMUNITY

## 2024-06-24 RX ORDER — GABAPENTIN 300 MG/1
600 CAPSULE ORAL 3 TIMES DAILY
Status: DISCONTINUED | OUTPATIENT
Start: 2024-06-24 | End: 2024-06-26 | Stop reason: HOSPADM

## 2024-06-24 RX ORDER — HYDRALAZINE HYDROCHLORIDE 10 MG/1
10 TABLET, FILM COATED ORAL EVERY 4 HOURS PRN
Status: DISCONTINUED | OUTPATIENT
Start: 2024-06-24 | End: 2024-06-26 | Stop reason: HOSPADM

## 2024-06-24 RX ORDER — AMOXICILLIN 250 MG
2 CAPSULE ORAL 2 TIMES DAILY PRN
Status: DISCONTINUED | OUTPATIENT
Start: 2024-06-24 | End: 2024-06-26 | Stop reason: HOSPADM

## 2024-06-24 RX ORDER — AMOXICILLIN 250 MG
1 CAPSULE ORAL 2 TIMES DAILY PRN
Status: DISCONTINUED | OUTPATIENT
Start: 2024-06-24 | End: 2024-06-26 | Stop reason: HOSPADM

## 2024-06-24 RX ORDER — LISINOPRIL 20 MG/1
20 TABLET ORAL DAILY
Status: DISCONTINUED | OUTPATIENT
Start: 2024-06-25 | End: 2024-06-26 | Stop reason: HOSPADM

## 2024-06-24 RX ORDER — HEPARIN SODIUM 5000 [USP'U]/.5ML
5000 INJECTION, SOLUTION INTRAVENOUS; SUBCUTANEOUS EVERY 8 HOURS
Status: DISCONTINUED | OUTPATIENT
Start: 2024-06-24 | End: 2024-06-26 | Stop reason: HOSPADM

## 2024-06-24 RX ORDER — MAGNESIUM OXIDE 400 MG/1
400 TABLET ORAL EVERY EVENING
Status: DISCONTINUED | OUTPATIENT
Start: 2024-06-24 | End: 2024-06-26 | Stop reason: HOSPADM

## 2024-06-24 RX ORDER — ALBUTEROL SULFATE 90 UG/1
2 AEROSOL, METERED RESPIRATORY (INHALATION) 4 TIMES DAILY PRN
Status: DISCONTINUED | OUTPATIENT
Start: 2024-06-24 | End: 2024-06-26 | Stop reason: HOSPADM

## 2024-06-24 RX ORDER — CEFTRIAXONE 2 G/1
2 INJECTION, POWDER, FOR SOLUTION INTRAMUSCULAR; INTRAVENOUS EVERY 24 HOURS
Status: DISCONTINUED | OUTPATIENT
Start: 2024-06-25 | End: 2024-06-26 | Stop reason: HOSPADM

## 2024-06-24 RX ORDER — ACETAMINOPHEN 325 MG/1
650 TABLET ORAL EVERY 4 HOURS PRN
Status: DISCONTINUED | OUTPATIENT
Start: 2024-06-24 | End: 2024-06-26 | Stop reason: HOSPADM

## 2024-06-24 RX ORDER — ONDANSETRON 4 MG/1
4 TABLET, ORALLY DISINTEGRATING ORAL EVERY 6 HOURS PRN
Status: DISCONTINUED | OUTPATIENT
Start: 2024-06-24 | End: 2024-06-26 | Stop reason: HOSPADM

## 2024-06-24 RX ORDER — HYDRALAZINE HYDROCHLORIDE 20 MG/ML
10 INJECTION INTRAMUSCULAR; INTRAVENOUS EVERY 4 HOURS PRN
Status: DISCONTINUED | OUTPATIENT
Start: 2024-06-24 | End: 2024-06-26 | Stop reason: HOSPADM

## 2024-06-24 RX ORDER — ACETAMINOPHEN 650 MG/1
650 SUPPOSITORY RECTAL EVERY 4 HOURS PRN
Status: DISCONTINUED | OUTPATIENT
Start: 2024-06-24 | End: 2024-06-26 | Stop reason: HOSPADM

## 2024-06-24 RX ORDER — CEFTRIAXONE 2 G/1
2 INJECTION, POWDER, FOR SOLUTION INTRAMUSCULAR; INTRAVENOUS ONCE
Status: COMPLETED | OUTPATIENT
Start: 2024-06-24 | End: 2024-06-24

## 2024-06-24 RX ORDER — CLINDAMYCIN AND BENZOYL PEROXIDE 10; 50 MG/G; MG/G
GEL TOPICAL 2 TIMES DAILY
Status: DISCONTINUED | OUTPATIENT
Start: 2024-06-24 | End: 2024-06-26 | Stop reason: HOSPADM

## 2024-06-24 RX ORDER — LIDOCAINE 40 MG/G
CREAM TOPICAL
Status: DISCONTINUED | OUTPATIENT
Start: 2024-06-24 | End: 2024-06-26 | Stop reason: HOSPADM

## 2024-06-24 RX ADMIN — GABAPENTIN 600 MG: 300 CAPSULE ORAL at 20:55

## 2024-06-24 RX ADMIN — HEPARIN SODIUM 5000 UNITS: 10000 INJECTION, SOLUTION INTRAVENOUS; SUBCUTANEOUS at 17:15

## 2024-06-24 RX ADMIN — BECLOMETHASONE DIPROPIONATE HFA 2 PUFF: 80 AEROSOL, METERED RESPIRATORY (INHALATION) at 20:54

## 2024-06-24 RX ADMIN — MAGNESIUM OXIDE TAB 400 MG (241.3 MG ELEMENTAL MG) 400 MG: 400 (241.3 MG) TAB at 20:55

## 2024-06-24 RX ADMIN — CEFTRIAXONE SODIUM 2 G: 2 INJECTION, POWDER, FOR SOLUTION INTRAMUSCULAR; INTRAVENOUS at 12:17

## 2024-06-24 RX ADMIN — SODIUM CHLORIDE 1000 ML: 9 INJECTION, SOLUTION INTRAVENOUS at 12:20

## 2024-06-24 RX ADMIN — SIMVASTATIN 40 MG: 40 TABLET, FILM COATED ORAL at 21:04

## 2024-06-24 RX ADMIN — GABAPENTIN 600 MG: 300 CAPSULE ORAL at 17:15

## 2024-06-24 RX ADMIN — VANCOMYCIN HYDROCHLORIDE 2250 MG: 1 INJECTION, POWDER, LYOPHILIZED, FOR SOLUTION INTRAVENOUS at 13:00

## 2024-06-24 ASSESSMENT — ACTIVITIES OF DAILY LIVING (ADL)
ADLS_ACUITY_SCORE: 35
ADLS_ACUITY_SCORE: 37
ADLS_ACUITY_SCORE: 35
ADLS_ACUITY_SCORE: 37
ADLS_ACUITY_SCORE: 37
ADLS_ACUITY_SCORE: 35
ADLS_ACUITY_SCORE: 35

## 2024-06-24 ASSESSMENT — COLUMBIA-SUICIDE SEVERITY RATING SCALE - C-SSRS
1. IN THE PAST MONTH, HAVE YOU WISHED YOU WERE DEAD OR WISHED YOU COULD GO TO SLEEP AND NOT WAKE UP?: NO
6. HAVE YOU EVER DONE ANYTHING, STARTED TO DO ANYTHING, OR PREPARED TO DO ANYTHING TO END YOUR LIFE?: NO
2. HAVE YOU ACTUALLY HAD ANY THOUGHTS OF KILLING YOURSELF IN THE PAST MONTH?: NO

## 2024-06-24 NOTE — TELEPHONE ENCOUNTER
Provider Response to 2nd Level Triage Request    I have reviewed the RN documentation. My recommendation is:  Agree with plan.  Chart reviewed.  Patient admitted to hospital.  Thank you.

## 2024-06-24 NOTE — PHARMACY-VANCOMYCIN DOSING SERVICE
Pharmacy Vancomycin Initial Note  Date of Service 2024  Patient's  1955  68 year old, male    Indication: Skin and Soft Tissue Infection    Current estimated CrCl = Estimated Creatinine Clearance: 107.7 mL/min (based on SCr of 0.86 mg/dL).    Creatinine for last 3 days  2024: 12:04 PM Creatinine 0.86 mg/dL    Recent Vancomycin Level(s) for last 3 days  No results found for requested labs within last 3 days.      Vancomycin IV Administrations (past 72 hours)                     vancomycin (VANCOCIN) 2,250 mg in sodium chloride 0.9 % 500 mL intermittent infusion (mg) 2,250 mg New Bag 24 1300                    Nephrotoxins and other renal medications (From now, onward)      Start     Dose/Rate Route Frequency Ordered Stop    24 0100  vancomycin (VANCOCIN) 1,500 mg in sodium chloride 0.9 % 250 mL intermittent infusion         1,500 mg  over 90 Minutes Intravenous EVERY 12 HOURS 24 1427      24 1230  vancomycin (VANCOCIN) 2,250 mg in sodium chloride 0.9 % 500 mL intermittent infusion         2,250 mg  over 120 Minutes Intravenous ONCE 24 1207              Contrast Orders - past 72 hours (72h ago, onward)      None            [employer] Prediction of Planned Initial Vancomycin Regimen    Loading dose: N/A  Regimen: 1500 mg IV every 12 hours.  Start time: 01:00 on 2024  Exposure target: AUC24 (range)400-600 mg/L.hr   AUC24,ss: 576 mg/L.hr  Probability of AUC24 > 400: 84 %  Ctrough,ss: 18.7 mg/L  Probability of Ctrough,ss > 20: 44 %  Probability of nephrotoxicity (Lodise CASI ): 15 %          Plan:  Start vancomycin  1500 mg IV q12h.   Vancomycin monitoring method: AUC  Vancomycin therapeutic monitoring goal: 400-600 mg*h/L  Pharmacy will check vancomycin levels as appropriate in 1-3 Days.    Serum creatinine levels will be ordered daily for the first week of therapy and at least twice weekly for subsequent weeks.      Shoshana Bowser, [facility]   
Pharmacy Vancomycin Initial Note  Date of Service 2024  Patient's  1955  68 year old, male    Indication: Skin and Soft Tissue Infection    Current estimated CrCl = Estimated Creatinine Clearance: 90.8 mL/min (based on SCr of 1.02 mg/dL).    Creatinine for last 3 days  No results found for requested labs within last 3 days.    Recent Vancomycin Level(s) for last 3 days  No results found for requested labs within last 3 days.      Vancomycin IV Administrations (past 72 hours)        No vancomycin orders with administrations in past 72 hours.                    Nephrotoxins and other renal medications (From now, onward)      Start     Dose/Rate Route Frequency Ordered Stop    24 1230  vancomycin (VANCOCIN) 2,250 mg in sodium chloride 0.9 % 500 mL intermittent infusion         2,250 mg  over 120 Minutes Intravenous ONCE 24 1207              Contrast Orders - past 72 hours (72h ago, onward)      None                Plan:  Start vancomycin 2250 mg IV x1.   Please reconsult pharmacy if vancomycin therapy is continued.     Rajani Lee, PharmD    
denies pain/discomfort

## 2024-06-24 NOTE — ED TRIAGE NOTES
Yesterday noted cellulitis on right leg. Called PCP and started cephalexin. Today noted red line going up right thigh. Yesterday fever chills, none today. Has had 6 doses of cephalexin, 4 yesterday and two today.

## 2024-06-24 NOTE — TELEPHONE ENCOUNTER
Nurse Triage SBAR    Is this a 2nd Level Triage? YES, LICENSED PRACTITIONER REVIEW IS REQUIRED    Situation: Pt called with complaints of cellulitis in right lower extremity.    Background: Hx of cellulitis, lymphedema, MRSA, obesity    Assessment: Pt stated that his cellulitis flare started back again yesterday and the redness was covering 2/3 of his lower leg like a band around the leg from ankle and 2/3 the way up to the knee, today it is now from ankle to knee and there is a red streak going from leg up to the groin. Pt denied any fever, chest pain, SOB. Pt reported pain at 2/10 and mild swelling. Pt denied any other symptoms at this time. Pt stated that Dr. Shafer keeps keflex ordered so when he gets a flare he can start taking this. Pt started keflex last night but the rash is getting worse and the appearance of the streak.     Protocol Recommended Disposition:   See in Office Today    Recommendation: Pt was advised to be evaluated today and was advised to go to the nearest ER. Pt agreed and is heading now over to the [place] ER.        Routed to provider    Does the patient meet one of the following criteria for ADS visit consideration? 16+ years old, with an [facility] PCP     Jelani Eugene RN  [facility]       TIP  Providers, please consider if this condition is appropriate for management at one of our Acute and Diagnostic Services sites.     If patient is a good candidate, please use dotphrase <dot>triageresponse and select Refer to ADS to document.      Reason for Disposition   Red streak (or line) longer than 4 inches (10 cm) runs from area of infection    Additional Information   Negative: Shock suspected (e.g., cold/pale/clammy skin, too weak to stand, low BP, rapid pulse)   Negative: Sounds like a life-threatening emergency to the triager   Negative: Surgical wound infection suspected (post-op)   Negative: Widespread rash and drug rash suspected (i.e., allergic reaction to  "antibiotic)   Negative: Animal bite wound infection suspected   Negative: SEVERE pain   Negative: SEVERE pain with bending of finger (or toe) and cellulitis on hand (or foot)   Negative: Widespread rash and bright red, sunburn-like   Negative: Fever > 103 F (39.4 C)   Negative: Black (necrotic), dark purple, or blisters develop in area of cellulitis   Negative: Patient sounds very sick or weak to the triager   Negative: Taking antibiotic > 24 hours and fever persists   Negative: Taking antibiotic > 24 hours and red streak (or line) runs from area of infection   Negative: Fever > 100 F (37.8 C) and new-onset   Negative: Red streak runs from area of infection and new-onset   Negative: Caller has URGENT question and triager unable to answer question   Negative: Taking antibiotic > 24 hours and cellulitis symptoms are WORSE (e.g., spreading redness, pain, swelling)   Negative: Finished taking antibiotic and cellulitis symptoms are WORSE (e.g., redness, pain, drainage, swelling)    Answer Assessment - Initial Assessment Questions  1. SYMPTOM: \"What's the main symptom you're concerned about?\" (e.g., redness, swelling, pain, fever, weakness)      Redness and swelling, red streak   2. CELLULITIS LOCATION: \"Where is the cellulitis located?\" (e.g., hand, arm, foot, leg, face)      Right leg wrapped around the shin and around the lag   3. CELLULITIS SIZE: \"What is the size of the red area?\" (e.g., inches, centimeters; compare to size of a coin) .      Wrapped around the lower leg from ankle to knee  4. BETTER-SAME-WORSE: \"Are you getting better, staying the same, or getting worse compared to the day you started the antibiotics?\"       Getting worse, only 2/3 was red now the full ankle to knee  5. PAIN: Do you have any pain?\"  If Yes, ask: \"How bad is the pain?\"  (e.g., Scale 1-10; mild, moderate, or severe)     - MILD (1-3): Doesn't interfere with normal activities.      - MODERATE (4-7): Interferes with normal activities or " "awakens from sleep.     - SEVERE (8-10): Excruciating pain, unable to do any normal activities.        2/10  6. FEVER: \"Do you have a fever?\" If Yes, ask: \"What is it, how was it measured and when did it start?\"      No fever today  7. OTHER SYMPTOMS: \"Do you have any other symptoms?\" (e.g., pus coming from a wound, red streaks, weakness)      Red streak  8. DIAGNOSIS DATE: \"When was the cellulitis diagnosed?\" \"By whom?\"       1982 some other provider   9. ANTIBIOTIC NAME: \"What antibiotic(s) are you taking?\"  \"How many times per day?\" (Be sure the patient is receiving the antibiotic as directed).       keflex  10. ANTIBIOTIC DATE: \"When was the antibiotic started?\"        6/23/24  11. FOLLOW-UP APPOINTMENT: \"Do you have follow-up appointment with your doctor?\"        Not yet    Protocols used: Cellulitis on Antibiotic Follow-up Call-A-[place]    "

## 2024-06-24 NOTE — H&P
[facility]    History and Physical - Hospitalist Service       Date of Admission:  6/24/2024    Assessment & Plan      Leandro Brewer is a 68 year old male admitted on 6/24/2024. He presented with right LE erythema and warmth for 3 days.He was started on keflex by his PCP. He took about 6 doses but erythema extended to his thigh. Had subjective fever. He has recurrent episodes of cellulitis (last episode 7 weeks ago). In the ED, Crp and lactate were elevated. He is started on vancomycin and ceftriaxone. He is admitted for RLE cellulitis.     RLE cellulitis, recurrent  -- worsening despite 6 doses of keflex  -- Bcx pending  -- C/w Vancomycin and ceftriaxone for now. Deescalate as able  -- MRSA contact precaution    DM-II  -- Home trulicity, metformin held  -- Accu-checks, lantus 60 units qam, moderate sliding scale, I:C 1: 15, hypoglycemia protocol  -- A1c 7.5% on 04/11/24    Essential Hypertension  -- C/w PTA Lisinopril  -- Hydralazin iv prn  -- Monitor vital signs per protocol    Hyperlipidemia  -- C/w PTA Simvastatin    Hidradenitis   -- C/w PTA benzaclin. Following with dermatology as OP    Anemia  -- Monitor CBC. No e/o bleeding    Diabetic polyneuropathy  -- C/w PTA gabapentin    Intermittent Asthma  -- PTA Qqvar, albuterol          Diet: Combination Diet Moderate Consistent Carb (60 g CHO per Meal) Diet; 2 gm NA Diet; No Caffeine Diet  DVT Prophylaxis: Pneumatic Compression Devices  Mcqueen Catheter: Not present  Lines: None     Cardiac Monitoring: None  Code Status: No Order, Heparin sq    Clinically Significant Risk Factors Present on Admission                # Drug Induced Platelet Defect: home medication list includes an antiplatelet medication   # Hypertension: Noted on problem list            # DMII: A1C = 7.5 % (Ref range: 0.0 - 5.6 %) within past 6 months    # Obesity: Estimated body mass index is 38.81 kg/m  as calculated from the following:    Height as of this encounter:  "1.753 m (5' 9\").    Weight as of this encounter: 119.2 kg (262 lb 12.8 oz).       # Asthma: noted on problem list        Disposition Plan     Medically Ready for Discharge: Anticipated in 2-4 Days           Lindsey De Jesus MD  Hospitalist Service  [facility]  Securely message with [employer] (more info)  Text page via [employer] Paging/Directory     ______________________________________________________________________    Chief Complaint   Right leg erythema    History is obtained from the patient    History of Present Illness   Leandro Brewer is a 68 year old male who presented with right LE erythema and warmth for 3 days.He was started on keflex by his PCP. He took about 6 doses but erythema extended to his thigh. Had subjective fever. He has recurrent episodes of cellulitis (last episode 7 weeks ago). In the ED, Crp and lactate were elevated. He is started on vancomycin and ceftriaxone. He is admitted for RLE cellulitis.       Past Medical History    Past Medical History:   Diagnosis Date    Anemia     Arthritis     Diabetes mellitus, type II (H)     Hypertension     Obesity     Plantar fasciitis        Past Surgical History   Past Surgical History:   Procedure Laterality Date    BACK SURGERY      laminectomy L45 by Dr. iVllarreal     HERNIA REPAIR Right     inguinal; child    TOTAL HIP ARTHROPLASTY Bilateral 2019 Feb 5, 2019 (right) and October 19, 2019 (left)    ZZC HARE W/O FACETEC FORAMOT/DSKC 1/2 VRT SEG, LUMBAR Right 1/20/2021    Procedure: Right Lumbar 3 - Lumbar 4 hemilaminectomy and medial facetectomy and Right redo Lumbar 4 -Lumbar 5 hemilaminectomy and medial facetectomy;  Surgeon: Julissa Lopez MD;  Location: [facility];  Service: Spine       Prior to Admission Medications   Prior to Admission Medications   Prescriptions Last Dose Informant Patient Reported? Taking?   APPLE CIDER VINEGAR PO 6/23/2024 at pm  Yes Yes   Sig: Take 1 tablet by mouth   Dulaglutide " (TRULICITY) 4.5 MG/0.5ML SOPN Past Week at fri  No Yes   Sig: Inject 4.5 mg Subcutaneous once a week   Magnesium Oxide 500 MG TABS 6/23/2024 at pm  Yes Yes   Sig: Take 500 mg by mouth   Multiple Vitamin (MULTIVITAMIN ADULT PO) 6/24/2024 at am  Yes Yes   Sig: Take 1 tablet by mouth   QVAR REDIHALER 80 MCG/ACT inhaler 6/24/2024 at am  No Yes   Sig: INHALE 2 PUFFS BY MOUTH TWICE A DAY - RINSE MOUTH AFTER USE-DO NOT SHAKE UNIT   albuterol (PROAIR HFA/PROVENTIL HFA/VENTOLIN HFA) 108 (90 Base) MCG/ACT inhaler Past Month at prn  No Yes   Sig: INHALE 2 PUFFS INTO THE LUNGS 4 TIMES DAILY AS NEEDED FOR SHORTNESS OF BREATH / DYSPNEA OR WHEEZING   aspirin 81 MG EC tablet 6/24/2024 at am  Yes Yes   Sig: Take 81 mg by mouth daily   blood glucose (NO BRAND SPECIFIED) lancets standard   Yes No   Sig: [employer] Microlet MISC  Check blood sugar 2 times per day and as needed   cephALEXin (KEFLEX) 500 MG capsule 6/24/2024 at am 2x  No Yes   Sig: Take 1 capsule (500 mg) by mouth 4 times daily   clindamycin-benzoyl peroxide (BENZACLIN) 1-5 % external gel 6/24/2024 at am  No Yes   Sig: Apply topically 2 times daily   furosemide (LASIX) 40 MG tablet 6/24/2024 at am  No Yes   Sig: TAKE 1/2 TABLET BY MOUTH EVERY DAY   gabapentin (NEURONTIN) 300 MG capsule 6/24/2024 at am  No Yes   Sig: Take 2 capsules (600 mg) by mouth 3 times daily   insulin glargine (LANTUS SOLOSTAR) 100 UNIT/ML pen 6/23/2024 at pm  No Yes   Sig: INJECT 60 UNITS SUBCUTANEOUSLY AT BEDTIME   insulin pen needle (32G X 4 MM) 32G X 4 MM miscellaneous   No No   Sig: Use one pen needle daily or as directed.   lisinopril (ZESTRIL) 20 MG tablet 6/24/2024 at am  No Yes   Sig: Take 1 tablet (20 mg) by mouth daily   metFORMIN (GLUCOPHAGE XR) 500 MG 24 hr tablet 6/24/2024 at am  No Yes   Sig: TAKE 2 TABLETS BY MOUTH TWICE A DAY WITH MEALS   potassium gluconate 2.5 MEQ TABS 6/24/2024 at am  Yes Yes   Sig: Take 1 tablet by mouth   sildenafil (VIAGRA) 100 MG tablet Unknown at prn  No Yes    Sig: Take 0.5-1 tablets ( mg) by mouth daily as needed (erectile difficulties)   simvastatin (ZOCOR) 40 MG tablet 6/23/2024 at pm  No Yes   Sig: TAKE 1 TABLET BY MOUTH EVERYDAY AT BEDTIME   vitamin C (ASCORBIC ACID) 500 MG tablet 6/24/2024 at am  Yes Yes   Sig: Take 500 mg by mouth      Facility-Administered Medications: None        Review of Systems    The 10 point Review of Systems is negative other than noted in the HPI or here.      Physical Exam   Vital Signs: Temp: 98.5  F (36.9  C) Temp src: Oral BP: 116/62 Pulse: 76   Resp: 20 SpO2: 94 % O2 Device: None (Room air)    Weight: 262 lbs 12.8 oz    GEN: Alert and oriented. Not in acute distress.  HEENT: Atraumatic, mucous membrane- moist and pink.  Chest: Bilateral air entry.  CVS: S1S2 regular.   Abdomen: Soft. Non-tender, non-distended. No organomegaly. No guarding or rigidity. Bowel sounds active.   Extremities: No pedal edema.  CNS: No involuntary movements.  Skin: no cyanosis or clubbing. RLE erythema, warm shin. Streaking erythema up the right thigh.    Medical Decision Making       75 MINUTES SPENT BY ME on the date of service doing chart review, history, exam, documentation & further activities per the note.      Data

## 2024-06-24 NOTE — MEDICATION SCRIBE - ADMISSION MEDICATION HISTORY
Medication Scribe Admission Medication History    Admission medication history is complete. The information provided in this note is only as accurate as the sources available at the time of the update.    Information Source(s): Patient and CareEverywhere/SureScripts via in-person    Pertinent Information: pt was prescribed cephalexin 500 mg back in 5/6 for 14 days, per pt never completed the 14 day course due to side effects of med pt stopped taking however pt started back on tdy am and took 2 doses from left over med. Pt believes this is what caused him to come in today.     Changes made to PTA medication list:  Added: None  Deleted: None  Changed: None    Allergies reviewed with patient and updates made in EHR: yes    Medication History Completed By: HALLEY RICO 6/24/2024 2:29 PM    PTA Med List   Medication Sig Last Dose    albuterol (PROAIR HFA/PROVENTIL HFA/VENTOLIN HFA) 108 (90 Base) MCG/ACT inhaler INHALE 2 PUFFS INTO THE LUNGS 4 TIMES DAILY AS NEEDED FOR SHORTNESS OF BREATH / DYSPNEA OR WHEEZING Past Month at prn    APPLE CIDER VINEGAR PO Take 1 tablet by mouth 6/23/2024 at pm    aspirin 81 MG EC tablet Take 81 mg by mouth daily 6/24/2024 at am    cephALEXin (KEFLEX) 500 MG capsule Take 1 capsule (500 mg) by mouth 4 times daily 6/24/2024 at am 2x    clindamycin-benzoyl peroxide (BENZACLIN) 1-5 % external gel Apply topically 2 times daily 6/24/2024 at am    Dulaglutide (TRULICITY) 4.5 MG/0.5ML SOPN Inject 4.5 mg Subcutaneous once a week Past Week at fri    furosemide (LASIX) 40 MG tablet TAKE 1/2 TABLET BY MOUTH EVERY DAY 6/24/2024 at am    gabapentin (NEURONTIN) 300 MG capsule Take 2 capsules (600 mg) by mouth 3 times daily 6/24/2024 at am    insulin glargine (LANTUS SOLOSTAR) 100 UNIT/ML pen INJECT 60 UNITS SUBCUTANEOUSLY AT BEDTIME 6/23/2024 at pm    lisinopril (ZESTRIL) 20 MG tablet Take 1 tablet (20 mg) by mouth daily 6/24/2024 at am    Magnesium Oxide 500 MG TABS Take 500 mg by mouth 6/23/2024 at  pm    metFORMIN (GLUCOPHAGE XR) 500 MG 24 hr tablet TAKE 2 TABLETS BY MOUTH TWICE A DAY WITH MEALS 6/24/2024 at am    Multiple Vitamin (MULTIVITAMIN ADULT PO) Take 1 tablet by mouth 6/24/2024 at am    potassium gluconate 2.5 MEQ TABS Take 1 tablet by mouth 6/24/2024 at am    QVAR REDIHALER 80 MCG/ACT inhaler INHALE 2 PUFFS BY MOUTH TWICE A DAY - RINSE MOUTH AFTER USE-DO NOT SHAKE UNIT 6/24/2024 at am    sildenafil (VIAGRA) 100 MG tablet Take 0.5-1 tablets ( mg) by mouth daily as needed (erectile difficulties) Unknown at prn    simvastatin (ZOCOR) 40 MG tablet TAKE 1 TABLET BY MOUTH EVERYDAY AT BEDTIME 6/23/2024 at pm    vitamin C (ASCORBIC ACID) 500 MG tablet Take 500 mg by mouth 6/24/2024 at am       Name band;

## 2024-06-24 NOTE — ED PROVIDER NOTES
EMERGENCY DEPARTMENT ENCOUNTER      NAME: Leandro Brewer  AGE: 68 year old male  YOB: 1955  MRN: [id number]  EVALUATION DATE & TIME: 2024 11:46 AM    PCP: Josh Shafer    ED PROVIDER: Paul Montana M.D.      Chief Complaint   Patient presents with    Cellulitis         FINAL IMPRESSION:  1. Cellulitis of right lower extremity          ED COURSE & MEDICAL DECISION MAKIN year old male presents to the Emergency Department for evaluation of right lower extremity cellulitis.  Patient has a history of this.  He has been on Keflex for a day and a half with worsening despite this.  He arrives to the emergency department afebrile and vitally stable without systemic or constitutional complaints.  He does have a streak of lymphangitis extending up most of his thigh.  Labs notable for normal white blood cell count but elevated CRP.  He had a minimally elevated lactic acid level, he was given some fluids to address this and will be started on vancomycin and ceftriaxone for more significant skin and soft tissue infection.  Discussed admitting him to the hospital.  Reviewed case with hospitalist    At the conclusion of the encounter I discussed the results of all of the tests and the disposition. The questions were answered. The patient or family acknowledged understanding and was agreeable with the care plan.     12:04 PM I met with the patient for the initial interview and physical examination. Discussed plan for treatment and workup in the ED.     12:48 PM I discussed the case with hospitalist, Dr De Jesus, who accepts the patient.      Medical Decision Making  Obtained supplemental history:Supplemental history obtained?: No  Reviewed external records: External records reviewed?: Documented in chart  Care impacted by chronic illness:Other: Hypertension, hyperlipidemia, lymphedema, diabetes type 2, cellulitis, MRSA infection, osteoarthritis  Care significantly affected by social  determinants of health:N/A  Did you consider but not order tests?: Work up considered but not performed and documented in chart, if applicable  Did you interpret images independently?: Independent interpretation of ECG and images noted in documentation, when applicable.  Consultation discussion with other provider:Did you involve another provider (consultant, , pharmacy, etc.)?: I discussed the care with another health care provider, see documentation for details.  Admit.        MEDICATIONS GIVEN IN THE EMERGENCY:  Medications   lidocaine 1 % 0.1-1 mL (has no administration in time range)   lidocaine (LMX4) cream (has no administration in time range)   sodium chloride (PF) 0.9% PF flush 3 mL (3 mLs Intracatheter Not Given 6/24/24 1517)   sodium chloride (PF) 0.9% PF flush 3 mL (has no administration in time range)   senna-docusate (SENOKOT-S/PERICOLACE) 8.6-50 MG per tablet 1 tablet (has no administration in time range)     Or   senna-docusate (SENOKOT-S/PERICOLACE) 8.6-50 MG per tablet 2 tablet (has no administration in time range)   calcium carbonate (TUMS) chewable tablet 1,000 mg (has no administration in time range)   hydrALAZINE (APRESOLINE) tablet 10 mg (has no administration in time range)     Or   hydrALAZINE (APRESOLINE) injection 10 mg (has no administration in time range)   acetaminophen (TYLENOL) tablet 650 mg (has no administration in time range)     Or   acetaminophen (TYLENOL) Suppository 650 mg (has no administration in time range)   ondansetron (ZOFRAN ODT) ODT tab 4 mg (has no administration in time range)     Or   ondansetron (ZOFRAN) injection 4 mg (has no administration in time range)   cefTRIAXone (ROCEPHIN) 2 g vial to attach to  ml bag for ADULTS or NS 50 ml bag for PEDS (has no administration in time range)   glucose gel 15-30 g (has no administration in time range)     Or   dextrose 50 % injection 25-50 mL (has no administration in time range)     Or   glucagon injection 1 mg (has  no administration in time range)   insulin aspart (NovoLOG) injection (RAPID ACTING) ( Subcutaneous Not Given 6/24/24 1713)   insulin aspart (NovoLOG) injection (RAPID ACTING) (has no administration in time range)   insulin aspart (NovoLOG) injection (RAPID ACTING) ( Subcutaneous Not Given 6/24/24 1712)   vancomycin (VANCOCIN) 1,500 mg in sodium chloride 0.9 % 250 mL intermittent infusion (has no administration in time range)   albuterol (PROVENTIL HFA/VENTOLIN HFA) inhaler (has no administration in time range)   aspirin EC tablet 81 mg (has no administration in time range)   clindamycin-benzoyl peroxide (BENZACLIN) 1-5 % gel GEL (has no administration in time range)   furosemide (LASIX) tablet 20 mg (has no administration in time range)   gabapentin (NEURONTIN) capsule 600 mg (600 mg Oral Incomplete 6/24/24 1715)   insulin glargine (LANTUS PEN) injection 60 Units (has no administration in time range)   beclomethasone HFA (QVAR REDIHALER) 80 MCG/ACT inhaler 2 puff (has no administration in time range)   simvastatin (ZOCOR) tablet 40 mg (has no administration in time range)   lisinopril (ZESTRIL) tablet 20 mg (has no administration in time range)   magnesium oxide (MAG-OX) tablet 400 mg (has no administration in time range)   heparin ANTICOAGULANT injection 5,000 Units (5,000 Units Subcutaneous Incomplete 6/24/24 1715)   cefTRIAXone (ROCEPHIN) 2 g vial to attach to  ml bag for ADULTS or NS 50 ml bag for PEDS (0 g Intravenous Stopped 6/24/24 1305)   vancomycin (VANCOCIN) 2,250 mg in sodium chloride 0.9 % 500 mL intermittent infusion (0 mg Intravenous Stopped 6/24/24 1610)   sodium chloride 0.9% BOLUS 1,000 mL (0 mLs Intravenous Stopped 6/24/24 1306)       NEW PRESCRIPTIONS STARTED AT TODAY'S ER VISIT  New Prescriptions    No medications on file          =================================================================    HPI    Patient information was obtained from: patient     Use of : N/A        Leandro Brewer is a 68 year old male with a pertinent history of Hypertension, hyperlipidemia, lymphedema, diabetes type 2, cellulitis, MRSA infection, osteoarthritis who presents to this ED via private vehicle for evaluation of leg cellulitis.    Patient reports right lower extremity warmth and redness starting yesterday that is now spread up his right thigh.  Has been on 6 doses of Keflex restarted yesterday morning per PCP orders.  Has a significant history of cellulitis and MRSA infections, most recently 8 weeks ago.      REVIEW OF SYSTEMS   All systems reviewed and negative except as noted in HPI.    PAST MEDICAL HISTORY:  Past Medical History:   Diagnosis Date    Anemia     Arthritis     Diabetes mellitus, type II (H)     Hypertension     Obesity     Plantar fasciitis        PAST SURGICAL HISTORY:  Past Surgical History:   Procedure Laterality Date    BACK SURGERY      laminectomy L45 by Dr. Villarreal     HERNIA REPAIR Right     inguinal; child    TOTAL HIP ARTHROPLASTY Bilateral 2019 Feb 5, 2019 (right) and October 19, 2019 (left)    ZZC HARE W/O FACETEC FORAMOT/[facility]KC 1/2 VRT SEG, LUMBAR Right 1/20/2021    Procedure: Right Lumbar 3 - Lumbar 4 hemilaminectomy and medial facetectomy and Right redo Lumbar 4 -Lumbar 5 hemilaminectomy and medial facetectomy;  Surgeon: Julissa Lopez MD;  [facility];  Service: Spine           CURRENT MEDICATIONS:    Current Facility-Administered Medications   Medication Dose Route Frequency Provider Last Rate Last Admin    acetaminophen (TYLENOL) tablet 650 mg  650 mg Oral Q4H PRN Lindsey De Jesus MD        Or    acetaminophen (TYLENOL) Suppository 650 mg  650 mg Rectal Q4H PRN Lindsey De Jesus MD        albuterol (PROVENTIL HFA/VENTOLIN HFA) inhaler  2 puff Inhalation 4x Daily PRN Lindsey De Jesus MD        [START ON 6/25/2024] aspirin EC tablet 81 mg  81 mg Oral Daily Lindsey De Jesus MD        beclomethasone HFA (QVAR REDIHALER) 80 MCG/ACT  inhaler 2 puff  2 puff Inhalation Q12H Lindsey De Jesus MD        calcium carbonate (TUMS) chewable tablet 1,000 mg  1,000 mg Oral 4x Daily PRN Lindsey De Jesus MD        [START ON 6/25/2024] cefTRIAXone (ROCEPHIN) 2 g vial to attach to  ml bag for ADULTS or NS 50 ml bag for PEDS  2 g Intravenous Q24H Lindsey De Jesus MD        clindamycin-benzoyl peroxide (BENZACLIN) 1-5 % gel GEL   Topical BID Lindsey De Jesus MD        glucose gel 15-30 g  15-30 g Oral Q15 Min PRN Lindsey De Jesus MD        Or    dextrose 50 % injection 25-50 mL  25-50 mL Intravenous Q15 Min PRN Lindsey De Jesus MD        Or    glucagon injection 1 mg  1 mg Subcutaneous Q15 Min PRN Lindsey De Jesus MD        [START ON 6/25/2024] furosemide (LASIX) tablet 20 mg  20 mg Oral Daily Lindsey De Jesus MD        gabapentin (NEURONTIN) capsule 600 mg  600 mg Oral TID Lindsey De Jesus MD        heparin ANTICOAGULANT injection 5,000 Units  5,000 Units Subcutaneous Q8H Lindsey De Jesus MD        hydrALAZINE (APRESOLINE) tablet 10 mg  10 mg Oral Q4H PRN Lindsey De Jesus MD        Or    hydrALAZINE (APRESOLINE) injection 10 mg  10 mg Intravenous Q4H PRN Lindsey De Jesus MD        insulin aspart (NovoLOG) injection (RAPID ACTING)  1-10 Units Subcutaneous TID AC Lindsey De Jesus MD        insulin aspart (NovoLOG) injection (RAPID ACTING)  1-7 Units Subcutaneous At Bedtime Lindsey De Jesus MD        insulin aspart (NovoLOG) injection (RAPID ACTING)   Subcutaneous TID AC Lindsey De Jesus MD        insulin glargine (LANTUS PEN) injection 60 Units  60 Units Subcutaneous At Bedtime Lindsey De Jesus MD        lidocaine (LMX4) cream   Topical Q1H PRN Lindsey De Jesus MD        lidocaine 1 % 0.1-1 mL  0.1-1 mL Other Q1H PRN Lindsey De Jesus MD        [START ON 6/25/2024] lisinopril (ZESTRIL) tablet 20 mg  20 mg Oral Daily Lindsey De Jesus MD        magnesium oxide (MAG-OX) tablet 400 mg  400 mg Oral QPM  Lindsey De Jesus MD        ondansetron (ZOFRAN ODT) ODT tab 4 mg  4 mg Oral Q6H PRN Lindsey De Jesus MD        Or    ondansetron (ZOFRAN) injection 4 mg  4 mg Intravenous Q6H PRN Lindsey De Jesus MD        senna-docusate (SENOKOT-S/PERICOLACE) 8.6-50 MG per tablet 1 tablet  1 tablet Oral BID PRN Lindsey De Jesus MD        Or    senna-docusate (SENOKOT-S/PERICOLACE) 8.6-50 MG per tablet 2 tablet  2 tablet Oral BID PRN Lindsey De Jesus MD        simvastatin (ZOCOR) tablet 40 mg  40 mg Oral At Bedtime Lindsey De Jesus MD        sodium chloride (PF) 0.9% PF flush 3 mL  3 mL Intracatheter Q8H Lindsey De Jesus MD        sodium chloride (PF) 0.9% PF flush 3 mL  3 mL Intracatheter q1 min prn Lindsey De Jesus MD        [START ON 6/25/2024] vancomycin (VANCOCIN) 1,500 mg in sodium chloride 0.9 % 250 mL intermittent infusion  1,500 mg Intravenous Q12H Lindsey De Jesus MD         Current Outpatient Medications   Medication Sig Dispense Refill    albuterol (PROAIR HFA/PROVENTIL HFA/VENTOLIN HFA) 108 (90 Base) MCG/ACT inhaler INHALE 2 PUFFS INTO THE LUNGS 4 TIMES DAILY AS NEEDED FOR SHORTNESS OF BREATH / DYSPNEA OR WHEEZING 18 g 6    APPLE CIDER VINEGAR PO Take 1 tablet by mouth      aspirin 81 MG EC tablet Take 81 mg by mouth daily      cephALEXin (KEFLEX) 500 MG capsule Take 1 capsule (500 mg) by mouth 4 times daily 56 capsule 0    clindamycin-benzoyl peroxide (BENZACLIN) 1-5 % external gel Apply topically 2 times daily 50 g 11    Dulaglutide (TRULICITY) 4.5 MG/0.5ML SOPN Inject 4.5 mg Subcutaneous once a week 6 mL 3    furosemide (LASIX) 40 MG tablet TAKE 1/2 TABLET BY MOUTH EVERY DAY 45 tablet 3    gabapentin (NEURONTIN) 300 MG capsule Take 2 capsules (600 mg) by mouth 3 times daily 540 capsule 3    insulin glargine (LANTUS SOLOSTAR) 100 UNIT/ML pen INJECT 60 UNITS SUBCUTANEOUSLY AT BEDTIME 60 mL 3    lisinopril (ZESTRIL) 20 MG tablet Take 1 tablet (20 mg) by mouth daily 90 tablet 3    Magnesium  Oxide 500 MG TABS Take 500 mg by mouth      metFORMIN (GLUCOPHAGE XR) 500 MG 24 hr tablet TAKE 2 TABLETS BY MOUTH TWICE A DAY WITH MEALS 360 tablet 0    Multiple Vitamin (MULTIVITAMIN ADULT PO) Take 1 tablet by mouth      potassium gluconate 2.5 MEQ TABS Take 1 tablet by mouth      QVAR REDIHALER 80 MCG/ACT inhaler INHALE 2 PUFFS BY MOUTH TWICE A DAY - RINSE MOUTH AFTER USE-DO NOT SHAKE UNIT 31.8 g 0    sildenafil (VIAGRA) 100 MG tablet Take 0.5-1 tablets ( mg) by mouth daily as needed (erectile difficulties) 90 tablet 3    simvastatin (ZOCOR) 40 MG tablet TAKE 1 TABLET BY MOUTH EVERYDAY AT BEDTIME 90 tablet 2    vitamin C (ASCORBIC ACID) 500 MG tablet Take 500 mg by mouth      blood glucose (NO BRAND SPECIFIED) lancets standard [employer] Microlet MISC  Check blood sugar 2 times per day and as needed      insulin pen needle (32G X 4 MM) 32G X 4 MM miscellaneous Use one pen needle daily or as directed. 100 each 3         ALLERGIES:  Allergies   Allergen Reactions    Amoxicillin Hives       FAMILY HISTORY:  Family History   Problem Relation Age of Onset    Coronary Artery Disease Mother     Obesity Mother     Heart Disease Father     Cancer Father         throat    Coronary Artery Disease Father     Diabetes Sister     Diabetes Brother        SOCIAL HISTORY:   Social History     Socioeconomic History    Marital status:    Tobacco Use    Smoking status: Former    Smokeless tobacco: Never   Vaping Use    Vaping status: Never Used   Substance and Sexual Activity    Alcohol use: Yes     Alcohol/week: 1.0 standard drink of alcohol     Comment: Alcoholic Drinks/day: occasional    Drug use: No     Social Determinants of Health     Financial Resource Strain: Low Risk  (12/5/2023)    Financial Resource Strain     Within the past 12 months, have you or your family members you live with been unable to get utilities (heat, electricity) when it was really needed?: No   Food Insecurity: Low Risk  (12/5/2023)    Food  "Insecurity     Within the past 12 months, did you worry that your food would run out before you got money to buy more?: No     Within the past 12 months, did the food you bought just not last and you didn t have money to get more?: No   Transportation Needs: Low Risk  (12/5/2023)    Transportation Needs     Within the past 12 months, has lack of transportation kept you from medical appointments, getting your medicines, non-medical meetings or appointments, work, or from getting things that you need?: No   Interpersonal Safety: Low Risk  (12/5/2023)    Interpersonal Safety     Do you feel physically and emotionally safe where you currently live?: Yes     Within the past 12 months, have you been hit, slapped, kicked or otherwise physically hurt by someone?: No     Within the past 12 months, have you been humiliated or emotionally abused in other ways by your partner or ex-partner?: No   Housing Stability: Low Risk  (12/5/2023)    Housing Stability     Do you have housing? : Yes     Are you worried about losing your housing?: No       VITALS:  /67   Pulse 74   Temp 97.7  F (36.5  C) (Oral)   Resp 20   Ht 1.753 m (5' 9\")   Wt 119.2 kg (262 lb 12.8 oz)   SpO2 96%   BMI 38.81 kg/m      PHYSICAL EXAM    Constitutional: Well developed, Well nourished, NAD.  HENT: Normocephalic, Atraumatic. Neck Supple.  Eyes: EOMI, Conjunctiva normal.  Respiratory: Breathing comfortably on room air. Speaks full sentences easily. Lungs clear to ascultation.  Cardiovascular: Normal heart rate, Regular rhythm. No peripheral edema.  Abdomen: Soft  Musculoskeletal: Good range of motion in all major joints. No major deformities noted.  Integument: There is a patch of erythema and warmth centered on the right shin but enveloping most of the right calf circumferentially.  There is a streak of lymphangitis extending more proximally up the leg.  No palpable fluctuance.  Neurologic: Alert & awake, Normal motor function, Normal sensory " function, No focal deficits noted.   Psychiatric: Cooperative. Affect appropriate.     LAB:  All pertinent labs reviewed and interpreted.  Labs Ordered and Resulted from Time of ED Arrival to Time of ED Departure   LACTIC ACID WHOLE BLOOD WITH 1X REPEAT IN 2 HR WHEN >2 - Abnormal       Result Value    Lactic Acid, Initial 2.3 (*)    BASIC METABOLIC PANEL - Abnormal    Sodium 139      Potassium 4.3      Chloride 103      Carbon Dioxide (CO2) 23      Anion Gap 13      Urea Nitrogen 15.1      Creatinine 0.86      GFR Estimate >90      Calcium 9.5      Glucose 208 (*)    CRP INFLAMMATION - Abnormal    CRP Inflammation 111.60 (*)    CBC WITH PLATELETS AND DIFFERENTIAL - Abnormal    WBC Count 8.0      RBC Count 5.63      Hemoglobin 12.2 (*)     Hematocrit 38.0 (*)     MCV 68 (*)     MCH 21.7 (*)     MCHC 32.1      RDW 19.4 (*)     Platelet Count 249      % Neutrophils 64      % Lymphocytes 26      % Monocytes 8      % Eosinophils 1      % Basophils 1      % Immature Granulocytes 1      NRBCs per 100 WBC 0      Absolute Neutrophils 5.1      Absolute Lymphocytes 2.1      Absolute Monocytes 0.7      Absolute Eosinophils 0.1      Absolute Basophils 0.0      Absolute Immature Granulocytes 0.1      Absolute NRBCs 0.0     LACTIC ACID WHOLE BLOOD - Normal    Lactic Acid 1.3     GLUCOSE BY METER - Normal    GLUCOSE BY METER POCT 79     GLUCOSE MONITOR NURSING POCT   GLUCOSE MONITOR NURSING POCT   GLUCOSE MONITOR NURSING POCT   BLOOD CULTURE   BLOOD CULTURE         I, Yudy Castro, am serving as a scribe to document services personally performed by Dr. Paul Montana, based on my observation and the provider's statements to me. I, Paul Montana MD attest that Yudy Castro is acting in a scribe capacity, has observed my performance of the services and has documented them in accordance with my direction.    Paul Montana M.D.  Emergency Medicine  [facility] EMERGENCY DEPARTMENT  [address]  [facility] [address]  [phone]  Dept: [phone]       Paul Montana MD  06/24/24 9746

## 2024-06-25 LAB
ANION GAP SERPL CALCULATED.3IONS-SCNC: 10 MMOL/L (ref 7–15)
BASOPHILS # BLD AUTO: 0 10E3/UL (ref 0–0.2)
BASOPHILS NFR BLD AUTO: 1 %
BUN SERPL-MCNC: 12.2 MG/DL (ref 8–23)
CALCIUM SERPL-MCNC: 9.4 MG/DL (ref 8.8–10.2)
CHLORIDE SERPL-SCNC: 102 MMOL/L (ref 98–107)
CREAT SERPL-MCNC: 0.81 MG/DL (ref 0.67–1.17)
DEPRECATED HCO3 PLAS-SCNC: 26 MMOL/L (ref 22–29)
EGFRCR SERPLBLD CKD-EPI 2021: >90 ML/MIN/1.73M2
EOSINOPHIL # BLD AUTO: 0.1 10E3/UL (ref 0–0.7)
EOSINOPHIL NFR BLD AUTO: 2 %
ERYTHROCYTE [DISTWIDTH] IN BLOOD BY AUTOMATED COUNT: 19.6 % (ref 10–15)
GLUCOSE BLDC GLUCOMTR-MCNC: 134 MG/DL (ref 70–99)
GLUCOSE BLDC GLUCOMTR-MCNC: 136 MG/DL (ref 70–99)
GLUCOSE BLDC GLUCOMTR-MCNC: 146 MG/DL (ref 70–99)
GLUCOSE BLDC GLUCOMTR-MCNC: 167 MG/DL (ref 70–99)
GLUCOSE SERPL-MCNC: 148 MG/DL (ref 70–99)
HCT VFR BLD AUTO: 40.9 % (ref 40–53)
HGB BLD-MCNC: 12.8 G/DL (ref 13.3–17.7)
IMM GRANULOCYTES # BLD: 0 10E3/UL
IMM GRANULOCYTES NFR BLD: 1 %
LYMPHOCYTES # BLD AUTO: 2 10E3/UL (ref 0.8–5.3)
LYMPHOCYTES NFR BLD AUTO: 32 %
MCH RBC QN AUTO: 21.3 PG (ref 26.5–33)
MCHC RBC AUTO-ENTMCNC: 31.3 G/DL (ref 31.5–36.5)
MCV RBC AUTO: 68 FL (ref 78–100)
MONOCYTES # BLD AUTO: 0.5 10E3/UL (ref 0–1.3)
MONOCYTES NFR BLD AUTO: 7 %
MRSA DNA SPEC QL NAA+PROBE: NEGATIVE
NEUTROPHILS # BLD AUTO: 3.6 10E3/UL (ref 1.6–8.3)
NEUTROPHILS NFR BLD AUTO: 58 %
NRBC # BLD AUTO: 0 10E3/UL
NRBC BLD AUTO-RTO: 0 /100
PLATELET # BLD AUTO: 267 10E3/UL (ref 150–450)
POTASSIUM SERPL-SCNC: 4.4 MMOL/L (ref 3.4–5.3)
RBC # BLD AUTO: 6.01 10E6/UL (ref 4.4–5.9)
SA TARGET DNA: NEGATIVE
SODIUM SERPL-SCNC: 138 MMOL/L (ref 135–145)
WBC # BLD AUTO: 6.3 10E3/UL (ref 4–11)

## 2024-06-25 PROCEDURE — 87641 MR-STAPH DNA AMP PROBE: CPT | Performed by: INTERNAL MEDICINE

## 2024-06-25 PROCEDURE — 85004 AUTOMATED DIFF WBC COUNT: CPT | Performed by: STUDENT IN AN ORGANIZED HEALTH CARE EDUCATION/TRAINING PROGRAM

## 2024-06-25 PROCEDURE — 36415 COLL VENOUS BLD VENIPUNCTURE: CPT | Performed by: STUDENT IN AN ORGANIZED HEALTH CARE EDUCATION/TRAINING PROGRAM

## 2024-06-25 PROCEDURE — 258N000003 HC RX IP 258 OP 636: Mod: JZ | Performed by: STUDENT IN AN ORGANIZED HEALTH CARE EDUCATION/TRAINING PROGRAM

## 2024-06-25 PROCEDURE — 120N000001 HC R&B MED SURG/OB

## 2024-06-25 PROCEDURE — 82962 GLUCOSE BLOOD TEST: CPT

## 2024-06-25 PROCEDURE — 250N000013 HC RX MED GY IP 250 OP 250 PS 637: Performed by: STUDENT IN AN ORGANIZED HEALTH CARE EDUCATION/TRAINING PROGRAM

## 2024-06-25 PROCEDURE — 82565 ASSAY OF CREATININE: CPT | Performed by: STUDENT IN AN ORGANIZED HEALTH CARE EDUCATION/TRAINING PROGRAM

## 2024-06-25 PROCEDURE — 250N000011 HC RX IP 250 OP 636: Performed by: STUDENT IN AN ORGANIZED HEALTH CARE EDUCATION/TRAINING PROGRAM

## 2024-06-25 PROCEDURE — 99233 SBSQ HOSP IP/OBS HIGH 50: CPT | Performed by: INTERNAL MEDICINE

## 2024-06-25 PROCEDURE — 999N000157 HC STATISTIC RCP TIME EA 10 MIN

## 2024-06-25 PROCEDURE — 82374 ASSAY BLOOD CARBON DIOXIDE: CPT | Performed by: STUDENT IN AN ORGANIZED HEALTH CARE EDUCATION/TRAINING PROGRAM

## 2024-06-25 PROCEDURE — 250N000012 HC RX MED GY IP 250 OP 636 PS 637: Performed by: STUDENT IN AN ORGANIZED HEALTH CARE EDUCATION/TRAINING PROGRAM

## 2024-06-25 RX ADMIN — GABAPENTIN 600 MG: 300 CAPSULE ORAL at 09:17

## 2024-06-25 RX ADMIN — FUROSEMIDE 20 MG: 20 TABLET ORAL at 09:17

## 2024-06-25 RX ADMIN — BECLOMETHASONE DIPROPIONATE HFA 2 PUFF: 80 AEROSOL, METERED RESPIRATORY (INHALATION) at 22:12

## 2024-06-25 RX ADMIN — HEPARIN SODIUM 5000 UNITS: 10000 INJECTION, SOLUTION INTRAVENOUS; SUBCUTANEOUS at 17:00

## 2024-06-25 RX ADMIN — GABAPENTIN 600 MG: 300 CAPSULE ORAL at 22:10

## 2024-06-25 RX ADMIN — MAGNESIUM OXIDE TAB 400 MG (241.3 MG ELEMENTAL MG) 400 MG: 400 (241.3 MG) TAB at 22:10

## 2024-06-25 RX ADMIN — CEFTRIAXONE SODIUM 2 G: 2 INJECTION, POWDER, FOR SOLUTION INTRAMUSCULAR; INTRAVENOUS at 12:31

## 2024-06-25 RX ADMIN — HEPARIN SODIUM 5000 UNITS: 10000 INJECTION, SOLUTION INTRAVENOUS; SUBCUTANEOUS at 09:13

## 2024-06-25 RX ADMIN — HEPARIN SODIUM 5000 UNITS: 10000 INJECTION, SOLUTION INTRAVENOUS; SUBCUTANEOUS at 00:26

## 2024-06-25 RX ADMIN — INSULIN GLARGINE 60 UNITS: 100 INJECTION, SOLUTION SUBCUTANEOUS at 09:11

## 2024-06-25 RX ADMIN — SODIUM CHLORIDE 1500 MG: 9 INJECTION, SOLUTION INTRAVENOUS at 13:21

## 2024-06-25 RX ADMIN — ASPIRIN 81 MG: 81 TABLET, COATED ORAL at 09:17

## 2024-06-25 RX ADMIN — SIMVASTATIN 40 MG: 40 TABLET, FILM COATED ORAL at 22:10

## 2024-06-25 RX ADMIN — BECLOMETHASONE DIPROPIONATE HFA 2 PUFF: 80 AEROSOL, METERED RESPIRATORY (INHALATION) at 09:21

## 2024-06-25 RX ADMIN — SODIUM CHLORIDE 1500 MG: 9 INJECTION, SOLUTION INTRAVENOUS at 00:26

## 2024-06-25 RX ADMIN — INSULIN ASPART 2 UNITS: 100 INJECTION, SOLUTION INTRAVENOUS; SUBCUTANEOUS at 12:56

## 2024-06-25 RX ADMIN — LISINOPRIL 20 MG: 20 TABLET ORAL at 09:17

## 2024-06-25 RX ADMIN — GABAPENTIN 600 MG: 300 CAPSULE ORAL at 14:29

## 2024-06-25 ASSESSMENT — ACTIVITIES OF DAILY LIVING (ADL)
ADLS_ACUITY_SCORE: 37
ADLS_ACUITY_SCORE: 24
ADLS_ACUITY_SCORE: 37
ADLS_ACUITY_SCORE: 24
ADLS_ACUITY_SCORE: 24
ADLS_ACUITY_SCORE: 37

## 2024-06-25 NOTE — PLAN OF CARE
Goal Outcome Evaluation:      Plan of Care Reviewed With: patient    Overall Patient Progress: improvingOverall Patient Progress: improving    Outcome Evaluation: IMPROVING    Slept through the night denies pain and discomfort. On IV abx, no concern overnight.

## 2024-06-25 NOTE — PROGRESS NOTES
"[facility]    Medicine Progress Note - Hospitalist Service    Date of Admission:  6/24/2024    Assessment & Plan      Leandro Brewer is a 68 year old male admitted on 6/24/2024. He presented with right LE erythema and warmth for 3 days.He was started on keflex by his PCP. He took about 6 doses but erythema extended to his thigh. Had subjective fever. He has recurrent episodes of cellulitis (last episode 7 weeks ago). In the ED, Crp and lactate were elevated. He is started on vancomycin and ceftriaxone. He is admitted for RLE cellulitis.     RLE cellulitis, recurrent  -- worsening despite 6 doses of keflex  -- Bcx negative so far   -- H/o MRSA, C/w Vancomycin and ceftriaxone for now. Check MRSA screen   -- MRSA contact precaution     DM-II with long acting insulin use - BG at goal so far   -- Home trulicity, metformin held  -- Accu-checks, lantus 60 units qam, moderate sliding scale, I:C 1: 15, hypoglycemia protocol  -- A1c 7.5% on 04/11/24    Essential Hypertension   -- C/w PTA Lisinopril  -- Hydralazin iv prn  -- Monitor vital signs per protocol    Hyperlipidemia  -- C/w PTA Simvastatin    Hidradenitis   -- C/w PTA benzaclin. Following with dermatology as OP    Anemia  -- Monitor CBC. No e/o bleeding    Diabetic polyneuropathy  -- C/w PTA gabapentin    Intermittent Asthma  -- PTA Qqvar, albuterol          Diet: Combination Diet Moderate Consistent Carb (60 g CHO per Meal) Diet; 2 gm NA Diet; No Caffeine Diet    DVT Prophylaxis: Ambulate every shift  Mcqueen Catheter: Not present  Lines: None     Cardiac Monitoring: None  Code Status: Full Code      Clinically Significant Risk Factors                  # Hypertension: Noted on problem list             # DMII: A1C = 7.5 % (Ref range: 0.0 - 5.6 %) within past 6 months, PRESENT ON ADMISSION  # Obesity: Estimated body mass index is 38.81 kg/m  as calculated from the following:    Height as of this encounter: 1.753 m (5' 9\").    Weight as of " this encounter: 119.2 kg (262 lb 12.8 oz)., PRESENT ON ADMISSION     # Asthma: noted on problem list        Disposition Plan     Medically Ready for Discharge: Anticipated in 2-4 Days             Mari Salazar MD  Hospitalist Service  [facility]  Securely message with [employer] (more info)  Text page via [employer] Paging/Directory   ______________________________________________________________________    Interval History   Right leg redness and swelling has improved since admission.    No fevers or chills  Tolerates antibiotics well, no nausea or diarrhea   Reports having about 20 episodes of cellulitis in the past, mostly in his right leg     Physical Exam   Vital Signs: Temp: 97.7  F (36.5  C) Temp src: Oral BP: 108/66 Pulse: 71   Resp: 20 SpO2: 96 % O2 Device: None (Room air)    Weight: 262 lbs 12.8 oz    General Appearance: NAD  Respiratory: Lungs are clear  Cardiovascular: RRR  GI: Abdomen obese, soft, nontender  Extremities: Right lower leg swelling and erythema over the lower half of the shin, warm and tender to touch   Other: Awake, alert, nonfocal      Medical Decision Making       50 MINUTES SPENT BY ME on the date of service doing chart review, history, exam, documentation & further activities per the note.  MANAGEMENT DISCUSSED with the following over the past 24 hours: patient and RN       Data     I have personally reviewed the following data over the past 24 hrs:    6.3  \   12.8 (L)   / 267     138 102 12.2 /  167 (H)   4.4 26 0.81 \       Imaging results reviewed over the past 24 hrs:   No results found for this or any previous visit (from the past 24 hour(s)).

## 2024-06-25 NOTE — PLAN OF CARE
Goal Outcome Evaluation:       Pt. Pleasant and cooperative, denies pain, eating well BG this evening 134, ate well, up independently in room, IV abx, will cont to monitor.

## 2024-06-25 NOTE — UTILIZATION REVIEW
Admission Status; Secondary Review Determination   Under the authority of the Utilization Management Committee, the utilization review process indicated a secondary review on Leandro Brewer. The review outcome is based on review of the medical records, discussions with staff, and applying clinical experience noted on the date of the review.   (x) Inpatient Status Appropriate - This patient's medical care is consistent with medical management for inpatient care and reasonable inpatient medical practice.     RATIONALE FOR DETERMINATION   Leandro Brewer is a A 68-year-old male was admitted on 06/24/2024 for right lower extremity (RLE) cellulitis, presenting with erythema and warmth for 3 days. Despite taking 6 doses of Keflex, the erythema extended to his thigh, and he experienced a subjective fever. He has a history of recurrent cellulitis, with the last episode occurring 7 weeks ago. In the ED, he was afebrile and vitally stable but had a streak of lymphangitis up his thigh. Labs showed normal WBC count, elevated CRP, and minimally elevated lactate. He was started on vancomycin and ceftriaxone. His medical history includes DM-II, essential hypertension, hyperlipidemia, hidradenitis, anemia, diabetic polyneuropathy, and intermittent asthma.    Inpatient admission is necessary due to the patient's recurrent cellulitis and the extension of erythema despite initial antibiotic treatment, indicating a potentially severe infection. Additionally, elevated CRP and lactate levels suggest systemic involvement, and his diabetes mellitus increases the risk of complications such as sepsis or worsening infection if managed as an outpatient    At the time of admission with the information available to the attending physician more than 2 nights Hospital complex care was anticipated, based on patient risk of adverse outcome if treated as outpatient and complex care required. Inpatient admission is appropriate based on the  Medicare guidelines.   The information on this document is developed by the utilization review team in order for the business office to ensure compliance. This only denotes the appropriateness of proper admission status and does not reflect the quality of care rendered.   The definitions of Inpatient Status and Observation Status used in making the determination above are those provided in the CMS Coverage Manual, Chapter 1 and Chapter 6, section 70.4.   Sincerely,   Tati Luciano MD  Utilization Review  Physician Advisor  [facility]

## 2024-06-25 NOTE — PLAN OF CARE
Problem: Skin or Soft Tissue Infection  Goal: Absence of Infection Signs and Symptoms  Outcome: Progressing   Goal Outcome Evaluation:       Redness to RLE has decreased per patient report.  Denies pain. Mild edema to RLE.  IV antibiotics infused per order.  Vital signs stable.

## 2024-06-26 VITALS
SYSTOLIC BLOOD PRESSURE: 114 MMHG | BODY MASS INDEX: 37.42 KG/M2 | DIASTOLIC BLOOD PRESSURE: 73 MMHG | OXYGEN SATURATION: 95 % | TEMPERATURE: 97.7 F | HEIGHT: 69 IN | HEART RATE: 83 BPM | RESPIRATION RATE: 20 BRPM | WEIGHT: 252.65 LBS

## 2024-06-26 LAB
CREAT SERPL-MCNC: 0.83 MG/DL (ref 0.67–1.17)
EGFRCR SERPLBLD CKD-EPI 2021: >90 ML/MIN/1.73M2
GLUCOSE BLDC GLUCOMTR-MCNC: 150 MG/DL (ref 70–99)
GLUCOSE BLDC GLUCOMTR-MCNC: 161 MG/DL (ref 70–99)
GLUCOSE BLDC GLUCOMTR-MCNC: 178 MG/DL (ref 70–99)

## 2024-06-26 PROCEDURE — 250N000013 HC RX MED GY IP 250 OP 250 PS 637: Performed by: STUDENT IN AN ORGANIZED HEALTH CARE EDUCATION/TRAINING PROGRAM

## 2024-06-26 PROCEDURE — 36415 COLL VENOUS BLD VENIPUNCTURE: CPT | Performed by: STUDENT IN AN ORGANIZED HEALTH CARE EDUCATION/TRAINING PROGRAM

## 2024-06-26 PROCEDURE — 82565 ASSAY OF CREATININE: CPT | Performed by: STUDENT IN AN ORGANIZED HEALTH CARE EDUCATION/TRAINING PROGRAM

## 2024-06-26 PROCEDURE — 99239 HOSP IP/OBS DSCHRG MGMT >30: CPT | Performed by: INTERNAL MEDICINE

## 2024-06-26 PROCEDURE — 258N000003 HC RX IP 258 OP 636: Performed by: STUDENT IN AN ORGANIZED HEALTH CARE EDUCATION/TRAINING PROGRAM

## 2024-06-26 PROCEDURE — 250N000011 HC RX IP 250 OP 636: Performed by: STUDENT IN AN ORGANIZED HEALTH CARE EDUCATION/TRAINING PROGRAM

## 2024-06-26 RX ORDER — DOXYCYCLINE 100 MG/1
100 CAPSULE ORAL 2 TIMES DAILY
Qty: 14 CAPSULE | Refills: 0 | Status: SHIPPED | OUTPATIENT
Start: 2024-06-26 | End: 2024-08-13

## 2024-06-26 RX ADMIN — BECLOMETHASONE DIPROPIONATE HFA 2 PUFF: 80 AEROSOL, METERED RESPIRATORY (INHALATION) at 08:55

## 2024-06-26 RX ADMIN — HEPARIN SODIUM 5000 UNITS: 10000 INJECTION, SOLUTION INTRAVENOUS; SUBCUTANEOUS at 08:52

## 2024-06-26 RX ADMIN — FUROSEMIDE 20 MG: 20 TABLET ORAL at 08:51

## 2024-06-26 RX ADMIN — HEPARIN SODIUM 5000 UNITS: 10000 INJECTION, SOLUTION INTRAVENOUS; SUBCUTANEOUS at 00:33

## 2024-06-26 RX ADMIN — LISINOPRIL 20 MG: 20 TABLET ORAL at 08:51

## 2024-06-26 RX ADMIN — INSULIN GLARGINE 60 UNITS: 100 INJECTION, SOLUTION SUBCUTANEOUS at 08:56

## 2024-06-26 RX ADMIN — INSULIN ASPART 1 UNITS: 100 INJECTION, SOLUTION INTRAVENOUS; SUBCUTANEOUS at 08:53

## 2024-06-26 RX ADMIN — INSULIN ASPART 2 UNITS: 100 INJECTION, SOLUTION INTRAVENOUS; SUBCUTANEOUS at 12:01

## 2024-06-26 RX ADMIN — GABAPENTIN 600 MG: 300 CAPSULE ORAL at 08:51

## 2024-06-26 RX ADMIN — ASPIRIN 81 MG: 81 TABLET, COATED ORAL at 08:51

## 2024-06-26 RX ADMIN — SODIUM CHLORIDE 1500 MG: 9 INJECTION, SOLUTION INTRAVENOUS at 00:37

## 2024-06-26 ASSESSMENT — ACTIVITIES OF DAILY LIVING (ADL)
ADLS_ACUITY_SCORE: 24

## 2024-06-26 NOTE — PLAN OF CARE
Problem: Adult Inpatient Plan of Care  Goal: Plan of Care Review  Description: The Plan of Care Review/Shift note should be completed every shift.  The Outcome Evaluation is a brief statement about your assessment that the patient is improving, declining, or no change.  This information will be displayed automatically on your shift  note.  Outcome: Progressing  Flowsheets (Taken 6/26/2024 1042)  Plan of Care Reviewed With: patient  Overall Patient Progress: improving     Problem: Comorbidity Management  Goal: Blood Glucose Levels Within Targeted Range  Outcome: Progressing     Problem: Skin or Soft Tissue Infection  Goal: Absence of Infection Signs and Symptoms  Outcome: Progressing  Intervention: Minimize and Manage Infection Progression  Recent Flowsheet Documentation  Taken 6/26/2024 0900 by Tracey Martinez, RN  Isolation Precautions: contact precautions maintained   Goal Outcome Evaluation:      Plan of Care Reviewed With: patient    Overall Patient Progress: improvingOverall Patient Progress: improving      Pt A&O x 4, Independent in room, denies pain, slight redness to RLE, . Brought CPAP from home. Tolerating IV antibiotics.

## 2024-06-26 NOTE — DISCHARGE SUMMARY
"[facility]  Hospitalist Discharge Summary      Date of Admission:  6/24/2024  Date of Discharge:  6/26/2024  1:40 PM  Discharging Provider: Mari Salazar MD  Discharge Service: Hospitalist Service    Discharge Diagnoses   Right lower extremity cellulitis  DM2 with long-term insulin use  Peripheral neuropathy  Obesity Body mass index is 37.31 kg/m .      Clinically Significant Risk Factors     # DMII: A1C = 7.5 % (Ref range: 0.0 - 5.6 %) within past 6 months  # Obesity: Estimated body mass index is 37.31 kg/m  as calculated from the following:    Height as of this encounter: 1.753 m (5' 9\").    Weight as of this encounter: 114.6 kg (252 lb 10.4 oz).       Follow-ups Needed After Discharge   Follow-up Appointments     Follow-up and recommended labs and tests       Follow up with primary care provider, Josh Shafer, within 7 days for   hospital follow- up.  The following labs/tests are recommended: CBC, CRP.              Unresulted Labs Ordered in the Past 30 Days of this Admission       Date and Time Order Name Status Description    6/24/2024 11:45 AM Blood Culture Peripheral Blood Preliminary     6/24/2024 11:45 AM Blood Culture Peripheral Blood Preliminary         These results will be followed up by [facility]    Discharge Disposition   Discharged to home  Condition at discharge: Good    Hospital Course     Leandro Brewer is a 68 year old male with PMH of DM2, hypertension, hyperlipidemia, obesity, multiple episodes of cellulitis in the past who presented to [facility] with right right lower extremity erythema, edema and warmth progressing despite 6 doses of Keflex.  He was started on IV Zosyn and vancomycin with dramatic improvement in symptoms.  Will be discharged home on doxycycline to complete 10-day course.        Consultations This Hospital Stay   PHARMACY TO DOSE VANCO  PHARMACY TO DOSE VANCO  INFECTION PREVENTION IP CONSULT    Code Status   Prior    Time Spent on this " Encounter   I, Mari Salazar MD, personally saw the patient today and spent greater than 30 minutes discharging this patient.       Mari Salazar MD  [address] [address]  Phone: [phone]  Fax: [phone]  ______________________________________________________________________    Physical Exam   Vital Signs: Temp: 97.7  F (36.5  C) Temp src: Oral BP: 114/73 Pulse: 83   Resp: 20 SpO2: 95 % O2 Device: None (Room air)    Weight: 252 lbs 10.35 oz    General Appearance: No acute distress  Respiratory: Respirations unlabored  Skin: Minimal erythema and swelling over the lower right shin (improved dramatically since admission)         Primary Care Physician   Josh Shafer    Discharge Orders      Reason for your hospital stay    Right leg cellulitis     Follow-up and recommended labs and tests     Follow up with primary care provider, Josh Shafer, within 7 days for hospital follow- up.  The following labs/tests are recommended: CBC, CRP.     Activity    Your activity upon discharge: activity as tolerated     Diet    Follow this diet upon discharge: Orders Placed This Encounter      Combination Diet Moderate Consistent Carb (60 g CHO per Meal) Diet; 2 gm NA Diet       Significant Results and Procedures   Most Recent 3 CBC's:  Recent Labs   Lab Test 06/25/24  0828 06/24/24  1204 04/11/24  1423   WBC 6.3 8.0 7.2   HGB 12.8* 12.2* 11.8*   MCV 68* 68* 67*    249 257     Most Recent 3 BMP's:  Recent Labs   Lab Test 06/26/24  1131 06/26/24  0823 06/26/24  0647 06/26/24  0159 06/25/24  1141 06/25/24  0828 06/24/24  1711 06/24/24  1204 04/11/24  1423   NA  --   --   --   --   --  138  --  139 139   POTASSIUM  --   --   --   --   --  4.4  --  4.3 4.3   CHLORIDE  --   --   --   --   --  102  --  103 103   CO2  --   --   --   --   --  26  --  23 24   BUN  --   --   --   --   --  12.2  --  15.1 20.1   CR  --   --  0.83  --   --  0.81  --  0.86  1.02   ANIONGAP  --   --   --   --   --  10  --  13 12   GARRET  --   --   --   --   --  9.4  --  9.5 9.3   * 150*  --  161*   < > 148*   < > 208* 227*    < > = values in this interval not displayed.     Most Recent ESR & CRP:  Recent Labs   Lab Test 06/24/24  1204 04/20/21  1152   CRP  --  0.8   CRPI 111.60*  --    ,   Results for orders placed or performed during the hospital encounter of 11/13/21   XR Lumbar Bending Only 2/3 Views    Narrative    EXAM: XR LUMBAR BENDING ONLY 2/3 VIEWS  LOCATION: [facility]  DATE/TIME: 11/13/2021 8:36 AM    INDICATION: Lumbar radiculopathy.    COMPARISON: Lumbar spine MR 5/24/2021.    TECHNIQUE: CR lumbar spine.      Impression    IMPRESSION: Degenerative grade 1 anterolisthesis of L4 upon L5 measuring 2-3 mm is again noted without significant change in the flexed or extended positions. Alignment of the lumbar vertebrae is otherwise normal. Vertebral body heights normal. No   fractures.       Discharge Medications   Discharge Medication List as of 6/26/2024 12:41 PM        START taking these medications    Details   doxycycline hyclate (VIBRAMYCIN) 100 MG capsule Take 1 capsule (100 mg) by mouth 2 times daily, Disp-14 capsule, R-0, E-Prescribe           CONTINUE these medications which have NOT CHANGED    Details   albuterol (PROAIR HFA/PROVENTIL HFA/VENTOLIN HFA) 108 (90 Base) MCG/ACT inhaler INHALE 2 PUFFS INTO THE LUNGS 4 TIMES DAILY AS NEEDED FOR SHORTNESS OF BREATH / DYSPNEA OR WHEEZING, Disp-18 g, R-6, E-Prescribe      APPLE CIDER VINEGAR PO Take 1 tablet by mouth, Historical      aspirin 81 MG EC tablet Take 81 mg by mouth daily, Historical      clindamycin-benzoyl peroxide (BENZACLIN) 1-5 % external gel Apply topically 2 times dailyDisp-50 g, [id number]      Dulaglutide (TRULICITY) 4.5 MG/0.5ML SOPN Inject 4.5 mg Subcutaneous once a week, Disp-6 mL, R-3, E-Prescribe4/11/24 FYI:  correct dose (4.5 mg weekly)      furosemide (LASIX) 40 MG  tablet TAKE 1/2 TABLET BY MOUTH EVERY DAY, Disp-45 tablet, R-3, E-Prescribe      gabapentin (NEURONTIN) 300 MG capsule Take 2 capsules (600 mg) by mouth 3 times daily, Disp-540 capsule, R-3, E-Prescribe      insulin glargine (LANTUS SOLOSTAR) 100 UNIT/ML pen INJECT 60 UNITS SUBCUTANEOUSLY AT BEDTIME, Disp-60 mL, R-3, E-PrescribeIf Lantus is not covered by insurance, may substitute Basaglar or Semglee or other insulin glargine product per insurance preference at same dose and frequency.        lisinopril (ZESTRIL) 20 MG tablet Take 1 tablet (20 mg) by mouth daily, Disp-90 tablet, R-3, E-Prescribe      Magnesium Oxide 500 MG TABS Take 500 mg by mouth, Historical      metFORMIN (GLUCOPHAGE XR) 500 MG 24 hr tablet TAKE 2 TABLETS BY MOUTH TWICE A DAY WITH MEALS, Disp-360 tablet, R-0, E-Prescribe      Multiple Vitamin (MULTIVITAMIN ADULT PO) Take 1 tablet by mouth, Historical      potassium gluconate 2.5 MEQ TABS Take 1 tablet by mouth, Historical      QVAR REDIHALER 80 MCG/ACT inhaler INHALE 2 PUFFS BY MOUTH TWICE A DAY - RINSE MOUTH AFTER USE-DO NOT SHAKE UNIT, Disp-31.8 g, R-0, E-PrescribeDX Code Needed  PT IS REQUESTING A NEW RX.      sildenafil (VIAGRA) 100 MG tablet Take 0.5-1 tablets ( mg) by mouth daily as needed (erectile difficulties), Disp-90 tablet, R-3, [id number]/5/23 FYI:  Good Rx for #90 = $39.06      simvastatin (ZOCOR) 40 MG tablet TAKE 1 TABLET BY MOUTH EVERYDAY AT BEDTIME, Disp-90 tablet, R-2, E-Prescribe      vitamin C (ASCORBIC ACID) 500 MG tablet Take 500 mg by mouth, Historical      blood glucose (NO BRAND SPECIFIED) lancets standard [employer] Microlet MISC  Check blood sugar 2 times per day and as neededHistorical      insulin pen needle (32G X 4 MM) 32G X 4 MM miscellaneous Use one pen needle daily or as directed.Disp-100 each, [id number]           STOP taking these medications       cephALEXin (KEFLEX) 500 MG capsule Comments:   Reason for Stopping:             Allergies   Allergies    Allergen Reactions    Amoxicillin Hives

## 2024-06-26 NOTE — PLAN OF CARE
Problem: Adult Inpatient Plan of Care  Goal: Absence of Hospital-Acquired Illness or Injury  Intervention: Prevent Skin Injury  Recent Flowsheet Documentation  Taken 6/26/2024 0900 by Tracey Martinez RN  Body Position: position changed independently     Problem: Adult Inpatient Plan of Care  Goal: Plan of Care Review  Description: The Plan of Care Review/Shift note should be completed every shift.  The Outcome Evaluation is a brief statement about your assessment that the patient is improving, declining, or no change.  This information will be displayed automatically on your shift  note.  6/26/2024 1241 by Tracey Martinez RN  Outcome: Met  6/26/2024 1042 by Tracey Martinez RN  Outcome: Progressing  Flowsheets (Taken 6/26/2024 1042)  Plan of Care Reviewed With: patient  Overall Patient Progress: improving     Problem: Skin or Soft Tissue Infection  Goal: Absence of Infection Signs and Symptoms  6/26/2024 1241 by Tracey Martinez RN  Outcome: Met  6/26/2024 1042 by Tracey Martinez RN  Outcome: Progressing  Intervention: Minimize and Manage Infection Progression  Recent Flowsheet Documentation  Taken 6/26/2024 0900 by Tracey Martinez RN  Isolation Precautions: contact precautions maintained   Goal Outcome Evaluation:      Plan of Care Reviewed With: patient    Overall Patient Progress: improvingOverall Patient Progress: improving     Pt discharged to home at 1337 with spouse. AVS discussed and all questions answered. Belongings send with pt: CPAP, cell, clothing.

## 2024-06-26 NOTE — CONSULTS
Infection Prevention Progress Note  6/26/2024      Patient Name: Leandro Brewer [id number]  Admit Date: 6/24/2024    Infection Status as of 6/26/2024 8:39 AM: MRSA  Isolation Status as of 6/26/2024 8:39 AM: Contact     MDRO Discontinuation  Infection Prevention has reviewed this patient's chart per the MDRO D/C Policy and have taken the following action:    The patient does not qualify for discontinuation as patient is currently on antibiotics effective against organism. When non-qualifying reason(s) no longer apply, please contact Infection Prevention for re-evaluation.      Patient requires two consecutive negative cultures from nares prior to continuing discontinuation evaluation. Surveillance cultures collected this admission are not eligible due to vancomycin administration.    Contact Precautions ordered for the following MDRO(s): MRSA    If you have any questions, please contact Infection Prevention.    Fely Magallanes, Infection Prevention

## 2024-06-26 NOTE — PLAN OF CARE
Problem: Adult Inpatient Plan of Care  Goal: Absence of Hospital-Acquired Illness or Injury  Intervention: Identify and Manage Fall Risk  Recent Flowsheet Documentation  Taken 6/26/2024 0036 by Bonnie Canales RN  Safety Promotion/Fall Prevention:   clutter free environment maintained   lighting adjusted   nonskid shoes/slippers when out of bed   safety round/check completed  Intervention: Prevent Skin Injury  Recent Flowsheet Documentation  Taken 6/26/2024 0036 by Bonnie Canales RN  Body Position: position changed independently     Problem: Infection  Goal: Absence of Infection Signs and Symptoms  Intervention: Prevent or Manage Infection  Recent Flowsheet Documentation  Taken 6/26/2024 0036 by Bonnie Canales RN  Isolation Precautions: (once arrived on unit) contact precautions initiated     Problem: Comorbidity Management  Goal: Blood Glucose Levels Within Targeted Range  Intervention: Monitor and Manage Glycemia  Recent Flowsheet Documentation  Taken 6/26/2024 0036 by Bonnie Canales RN  Glycemic Management: blood glucose monitored  Medication Review/Management:   medications reviewed   high-risk medications identified     Problem: Skin or Soft Tissue Infection  Goal: Absence of Infection Signs and Symptoms  Intervention: Minimize and Manage Infection Progression  Recent Flowsheet Documentation  Taken 6/26/2024 0036 by Bonnie Canales RN  Isolation Precautions: (once arrived on unit) contact precautions initiated   Goal Outcome Evaluation:       Patient is alert and oriented and very pleasant.  VSS.  Redness, warmth, and edema present on right shin/calf. Skin intact.  Denies pain and discomfort.  Patient slept throughout the night with CPAP from home.  Denies pain.  Patient is hoping to start oral antibiotics in hopes of discharge.  Continue to monitor.    Bonnie RN

## 2024-06-26 NOTE — PROGRESS NOTES
Care Management Discharge Note    Discharge Date: 06/26/2024       Discharge Disposition: Home    Discharge Services: None    Discharge DME: None    Discharge Transportation:      Private pay costs discussed: Not applicable    Does the patient's insurance plan have a 3 day qualifying hospital stay waiver?  No    PAS Confirmation Code:    Patient/family educated on Medicare website which has current facility and service quality ratings:      Education Provided on the Discharge Plan: Yes  Persons Notified of Discharge Plans: Patient - Per Team   Patient/Family in Agreement with the Plan: yes    Handoff Referral Completed: Yes    Additional Information:  Patient to discharge home, family transport.     Isabelle Wang RN

## 2024-06-26 NOTE — PLAN OF CARE
Goal Outcome Evaluation:      Plan of Care Reviewed With: patient    Overall Patient Progress: improving    Problem: Skin or Soft Tissue Infection  Goal: Absence of Infection Signs and Symptoms  Outcome: Progressing  Intervention: Minimize and Manage Infection Progression  Recent Flowsheet Documentation  Taken 6/25/2024 2015 by Mary Guadarrama RN  Isolation Precautions: (once arrived on unit) contact precautions initiated     Outcome Evaluation: A&Ox4. Pleasant. Vitals stable, denies pain. Redness, warmth, and edema present on right shin/calf. Pt indicated that redness had been all the way up to this thigh. Redness now mid-shin. Skin intact.  Blood sugar WDL. Provided areli crackers and peanut butter on pt request for bedtime snack. Independent in room. Pt's own CPAP present and set up for pt use. IV antibiotics as ordered. Pt hoping to get oral Abx and discharge tomorrow.

## 2024-06-27 ENCOUNTER — PATIENT OUTREACH (OUTPATIENT)
Dept: FAMILY MEDICINE | Facility: CLINIC | Age: 69
End: 2024-06-27
Payer: COMMERCIAL

## 2024-06-27 NOTE — TELEPHONE ENCOUNTER
"  Transitions of Care Outreach  Chief Complaint   Patient presents with    Hospital F/U       Most Recent Admission Date: 6/24/2024   Most Recent Admission Diagnosis: Cellulitis of right lower extremity - L03.115     Most Recent Discharge Date: 6/26/2024   Most Recent Discharge Diagnosis: Cellulitis of right lower extremity - L03.115     Transitions of Care Assessment    Discharge Assessment  How are you doing now that you are home?: \"Feeling like a millon dollars minus a dollar\"  How are your symptoms? (Red Flag symptoms escalate to triage hotline per guidelines): Improved  Do you know how to contact your clinic care team if you have future questions or changes to your health status? : Yes  Does the patient have their discharge instructions? : Yes  Does the patient have questions regarding their discharge instructions? : No  Were you started on any new medications or were there changes to any of your previous medications? : Yes  Does the patient have all of their medications?: No (see comment)  Do you have questions regarding any of your medications? : No  Do you have all of your needed medical supplies or equipment (DME)?  (i.e. oxygen tank, CPAP, cane, etc.): Yes    Follow up Plan     Discharge Follow-Up  Discharge follow up appointment scheduled in alignment with recommended follow up timeframe or Transitions of Risk Category? (Low = within 30 days; Moderate= within 14 days; High= within 7 days): Yes  Discharge Follow Up Appointment Date: 07/02/24  Discharge Follow Up Appointment Scheduled with?: Primary Care Provider    Future Appointments   Date Time Provider Department Center   7/2/2024 11:30 AM Arcenio Porras MD [facility]   8/13/2024  7:00 AM Josh Shafer MD [facility]   12/12/2024  8:50 AM Josh Shafer MD [facility]   4/21/2025  2:30 PM Arcenio Fernandez MD [facility]       Outpatient Plan as outlined on AVS reviewed with patient.    For any urgent concerns, please " contact our 24 hour nurse triage line: [phone] ([phone])       Pao Velasquez RN

## 2024-06-29 LAB
BACTERIA BLD CULT: NO GROWTH
BACTERIA BLD CULT: NO GROWTH

## 2024-07-02 ENCOUNTER — OFFICE VISIT (OUTPATIENT)
Dept: FAMILY MEDICINE | Facility: CLINIC | Age: 69
End: 2024-07-02
Payer: COMMERCIAL

## 2024-07-02 VITALS
HEIGHT: 69 IN | WEIGHT: 255.7 LBS | SYSTOLIC BLOOD PRESSURE: 138 MMHG | OXYGEN SATURATION: 98 % | BODY MASS INDEX: 37.87 KG/M2 | DIASTOLIC BLOOD PRESSURE: 84 MMHG | TEMPERATURE: 98.1 F | HEART RATE: 83 BPM | RESPIRATION RATE: 18 BRPM

## 2024-07-02 DIAGNOSIS — D50.9 MICROCYTIC ANEMIA: ICD-10-CM

## 2024-07-02 DIAGNOSIS — L03.115 CELLULITIS OF RIGHT LOWER EXTREMITY: Primary | ICD-10-CM

## 2024-07-02 DIAGNOSIS — E11.9 TYPE 2 DIABETES MELLITUS WITHOUT COMPLICATION, WITH LONG-TERM CURRENT USE OF INSULIN (H): ICD-10-CM

## 2024-07-02 DIAGNOSIS — Z79.4 TYPE 2 DIABETES MELLITUS WITHOUT COMPLICATION, WITH LONG-TERM CURRENT USE OF INSULIN (H): ICD-10-CM

## 2024-07-02 DIAGNOSIS — L03.90 RECURRENT CELLULITIS: ICD-10-CM

## 2024-07-02 LAB
ERYTHROCYTE [DISTWIDTH] IN BLOOD BY AUTOMATED COUNT: 19.1 % (ref 10–15)
HBA1C MFR BLD: 7.9 % (ref 0–5.6)
HCT VFR BLD AUTO: 38.5 % (ref 40–53)
HGB BLD-MCNC: 12.5 G/DL (ref 13.3–17.7)
MCH RBC QN AUTO: 21.1 PG (ref 26.5–33)
MCHC RBC AUTO-ENTMCNC: 32.5 G/DL (ref 31.5–36.5)
MCV RBC AUTO: 65 FL (ref 78–100)
PLATELET # BLD AUTO: 285 10E3/UL (ref 150–450)
RBC # BLD AUTO: 5.92 10E6/UL (ref 4.4–5.9)
WBC # BLD AUTO: 8.7 10E3/UL (ref 4–11)

## 2024-07-02 PROCEDURE — 83036 HEMOGLOBIN GLYCOSYLATED A1C: CPT | Performed by: FAMILY MEDICINE

## 2024-07-02 PROCEDURE — 80053 COMPREHEN METABOLIC PANEL: CPT | Performed by: FAMILY MEDICINE

## 2024-07-02 PROCEDURE — 36415 COLL VENOUS BLD VENIPUNCTURE: CPT | Performed by: FAMILY MEDICINE

## 2024-07-02 PROCEDURE — 85027 COMPLETE CBC AUTOMATED: CPT | Performed by: FAMILY MEDICINE

## 2024-07-02 PROCEDURE — 86140 C-REACTIVE PROTEIN: CPT | Performed by: FAMILY MEDICINE

## 2024-07-02 PROCEDURE — 99495 TRANSJ CARE MGMT MOD F2F 14D: CPT | Performed by: FAMILY MEDICINE

## 2024-07-02 ASSESSMENT — PAIN SCALES - GENERAL: PAINLEVEL: NO PAIN (0)

## 2024-07-02 NOTE — PROGRESS NOTES
"   Hospital Follow-up Visit:    Hospital/Nursing Home/IP Rehab Facility: [facility]  Date of Admission: June 24, 2024  Date of Discharge: June 26, 2024  Reason(s) for Admission: Cellulitis right lower extremity  Was the patient in the ICU or did the patient experience delirium during hospitalization?  No  Do you have any other stressors you would like to discuss with your provider? No    Problems taking medications regularly:  None  Medication changes since discharge: None  Problems adhering to non-medication therapy:  None    Summary of hospitalization:  [facility] discharge summary reviewed  Diagnostic Tests/Treatments reviewed.  Follow up needed: none  Other Healthcare Providers Involved in Patient s Care:         None  Update since discharge: improved.        Leandro Brewer  /84   Pulse 83   Temp 98.1  F (36.7  C)   Resp 18   Ht 1.753 m (5' 9\")   Wt 116 kg (255 lb 11.2 oz)   SpO2 98%   BMI 37.76 kg/m       Assessment/Plan:                Marc was seen today for recheck medication and hospital f/u.    Diagnoses and all orders for this visit:    Cellulitis of right lower extremity  -     CBC with platelets; Future  -     CRP, inflammation; Future  -     CBC with platelets  -     CRP, inflammation    Recurrent cellulitis  -     Infectious Disease General Adult IP Consult    Type 2 diabetes mellitus without complication, with long-term current use of insulin (H)  -     Hemoglobin A1c; Future  -     Comprehensive metabolic panel (BMP + Alb, Alk Phos, ALT, AST, Total. Bili, TP); Future  -     Hemoglobin A1c  -     Comprehensive metabolic panel (BMP + Alb, Alk Phos, ALT, AST, Total. Bili, TP)    Microcytic anemia         DISCUSSION  Finish course of doxycycline.  Recheck labs as noted.  Referral to infectious disease, see additional discussion below.  Subjective:     HPI:    Leandro Brewer is a 68 year old male with a past medical history that includes " type 2 diabetes, hypertension, hyperlipidemia, body mass index 37, hidradenitis and suppurativa and multiple episodes of cellulitis in the past.  He called to report worsening cellulitis in the right leg and was instructed to go to the emergency room for evaluation.  Patient reports rather abrupt worsening of cellulitis symptoms.  He was admitted and treated with IV Zosyn and vancomycin.  He had great improvement in his symptoms in just 48 hours.  He was discharged home on a 10-day course of doxycycline.  Patient continues to report improvement.  Blood cultures have remained negative.  Reviewed lab testing noted elevated CRP and persistent microcytic anemia.  Discharge physician recommended follow-up laboratory testing including CRP and hemogram.  Patient also due for A1c.  Patient reports blood sugars are slightly higher than his usual state.  He reports no significant concerns with blood sugars.  Today we did talk about his recurrent bouts of cellulitis.  He states he had seen an infectious disease doctor on 1 occasion in 2019.  Patient does note that he was instructed by a dermatologist to take doxycycline on an ongoing basis due to his hidradenitis suppurativa.  He does express some frustration about not knowing why he has had these recurrent bouts of cellulitis that have stemmed back to 1982 even before he was ever diagnosed with diabetes.  He does not report any other locations of infection specifically denying sinus infections, other respiratory infections with any unusual frequency, denies any history of pneumonia has not had a urinary tract infection.  We talked about options for further consideration related to his recurrent infections and elected to send him to infectious disease for further evaluation.    ROS:  Complete review of systems is obtained.  Other than the specific considerations noted above complete review of systems is negative.          Objective:   Medications:  Current Outpatient  Medications   Medication Sig Dispense Refill    albuterol (PROAIR HFA/PROVENTIL HFA/VENTOLIN HFA) 108 (90 Base) MCG/ACT inhaler INHALE 2 PUFFS INTO THE LUNGS 4 TIMES DAILY AS NEEDED FOR SHORTNESS OF BREATH / DYSPNEA OR WHEEZING 18 g 6    APPLE CIDER VINEGAR PO Take 1 tablet by mouth      aspirin 81 MG EC tablet Take 81 mg by mouth daily      blood glucose (NO BRAND SPECIFIED) lancets standard [employer] Microlet MISC  Check blood sugar 2 times per day and as needed      clindamycin-benzoyl peroxide (BENZACLIN) 1-5 % external gel Apply topically 2 times daily 50 g 11    doxycycline hyclate (VIBRAMYCIN) 100 MG capsule Take 1 capsule (100 mg) by mouth 2 times daily 14 capsule 0    Dulaglutide (TRULICITY) 4.5 MG/0.5ML SOPN Inject 4.5 mg Subcutaneous once a week 6 mL 3    furosemide (LASIX) 40 MG tablet TAKE 1/2 TABLET BY MOUTH EVERY DAY 45 tablet 3    gabapentin (NEURONTIN) 300 MG capsule Take 2 capsules (600 mg) by mouth 3 times daily 540 capsule 3    insulin glargine (LANTUS SOLOSTAR) 100 UNIT/ML pen INJECT 60 UNITS SUBCUTANEOUSLY AT BEDTIME 60 mL 3    insulin pen needle (32G X 4 MM) 32G X 4 MM miscellaneous Use one pen needle daily or as directed. 100 each 3    lisinopril (ZESTRIL) 20 MG tablet Take 1 tablet (20 mg) by mouth daily 90 tablet 3    Magnesium Oxide 500 MG TABS Take 500 mg by mouth      metFORMIN (GLUCOPHAGE XR) 500 MG 24 hr tablet TAKE 2 TABLETS BY MOUTH TWICE A DAY WITH MEALS 360 tablet 0    Multiple Vitamin (MULTIVITAMIN ADULT PO) Take 1 tablet by mouth      potassium gluconate 2.5 MEQ TABS Take 1 tablet by mouth      QVAR REDIHALER 80 MCG/ACT inhaler INHALE 2 PUFFS BY MOUTH TWICE A DAY - RINSE MOUTH AFTER USE-DO NOT SHAKE UNIT 31.8 g 0    sildenafil (VIAGRA) 100 MG tablet Take 0.5-1 tablets ( mg) by mouth daily as needed (erectile difficulties) 90 tablet 3    simvastatin (ZOCOR) 40 MG tablet TAKE 1 TABLET BY MOUTH EVERYDAY AT BEDTIME 90 tablet 2    vitamin C (ASCORBIC ACID) 500 MG tablet Take 500  mg by mouth       No current facility-administered medications for this visit.        Allergies:     Allergies   Allergen Reactions    Amoxicillin Hives        Social History     Socioeconomic History    Marital status:      Spouse name: Not on file    Number of children: Not on file    Years of education: Not on file    Highest education level: Not on file   Occupational History    Not on file   Tobacco Use    Smoking status: Former    Smokeless tobacco: Never   Vaping Use    Vaping status: Never Used   Substance and Sexual Activity    Alcohol use: Yes     Alcohol/week: 1.0 standard drink of alcohol     Comment: Alcoholic Drinks/day: occasional    Drug use: No    Sexual activity: Not on file   Other Topics Concern    Not on file   Social History Narrative    Not on file     Social Determinants of Health     Financial Resource Strain: Low Risk  (12/5/2023)    Financial Resource Strain     Within the past 12 months, have you or your family members you live with been unable to get utilities (heat, electricity) when it was really needed?: No   Food Insecurity: Low Risk  (12/5/2023)    Food Insecurity     Within the past 12 months, did you worry that your food would run out before you got money to buy more?: No     Within the past 12 months, did the food you bought just not last and you didn t have money to get more?: No   Transportation Needs: Low Risk  (12/5/2023)    Transportation Needs     Within the past 12 months, has lack of transportation kept you from medical appointments, getting your medicines, non-medical meetings or appointments, work, or from getting things that you need?: No   Physical Activity: Not on file   Stress: Not on file   Social Connections: Not on file   Interpersonal Safety: Low Risk  (12/5/2023)    Interpersonal Safety     Do you feel physically and emotionally safe where you currently live?: Yes     Within the past 12 months, have you been hit, slapped, kicked or otherwise physically  hurt by someone?: No     Within the past 12 months, have you been humiliated or emotionally abused in other ways by your partner or ex-partner?: No   Housing Stability: Low Risk  (12/5/2023)    Housing Stability     Do you have housing? : Yes     Are you worried about losing your housing?: No       Family History   Problem Relation Age of Onset    Coronary Artery Disease Mother     Obesity Mother     Heart Disease Father     Cancer Father         throat    Coronary Artery Disease Father     Diabetes Sister     Diabetes Brother         Most Recent Immunizations   Administered Date(s) Administered    COVID-19 Bivalent 12+ (Pfizer) 11/25/2022    COVID-19 MONOVALENT 12+ (Pfizer) 11/16/2021    COVID-19 Monovalent 12+ (Pfizer 2022) 07/21/2022    DT (PEDS <7y) 01/01/1998    Flu, Unspecified 10/31/2020    Influenza (H1N1) 02/25/2010    Influenza (IIV3) PF 09/24/2009    Influenza Vaccine 18-64 (Flublok) 11/16/2021    Influenza Vaccine 65+ (Fluzone HD) 12/05/2023    Influenza Vaccine >6 months,quad, PF 12/29/2018    Influenza Vaccine, 6+MO IM (QUADRIVALENT W/PRESERVATIVES) 09/17/2010    Influenza, seasonal, injectable, PF 09/17/2010    Influenza,INJ,MDCK,PF,Quad >6mo(Flucelvax) 10/31/2020    Pneumococcal 20 valent Conjugate (Prevnar 20) 07/21/2022    Pneumococcal 23 valent 01/19/2021    TDAP (Adacel,Boostrix) 12/05/2007    Td (Adult), Adsorbed 07/11/2017    Td,adult,historic,unspecified 12/05/2007    Zoster recombinant adjuvanted (SHINGRIX) 11/16/2021        Wt Readings from Last 3 Encounters:   07/02/24 116 kg (255 lb 11.2 oz)   06/25/24 114.6 kg (252 lb 10.4 oz)   04/11/24 116.6 kg (257 lb)        BP Readings from Last 6 Encounters:   07/02/24 138/84   06/26/24 114/73   04/11/24 120/60   12/05/23 110/60   08/01/23 120/80   03/30/23 126/64        Hemoglobin A1C   Date Value Ref Range Status   04/11/2024 7.5 (H) 0.0 - 5.6 % Final     Comment:     Normal <5.7%   Prediabetes 5.7-6.4%    Diabetes 6.5% or higher     Note: Adopted  "from ADA consensus guidelines.   12/05/2023 6.9 (H) 0.0 - 5.6 % Final     Comment:     Normal <5.7%   Prediabetes 5.7-6.4%    Diabetes 6.5% or higher     Note: Adopted from ADA consensus guidelines.   08/01/2023 8.1 (H) 0.0 - 5.6 % Final     Comment:     Normal <5.7%   Prediabetes 5.7-6.4%    Diabetes 6.5% or higher     Note: Adopted from ADA consensus guidelines.              PHYSICAL EXAM:    /84   Pulse 83   Temp 98.1  F (36.7  C)   Resp 18   Ht 1.753 m (5' 9\")   Wt 116 kg (255 lb 11.2 oz)   SpO2 98%   BMI 37.76 kg/m           General Appearance:    Alert, cooperative, no distress   Eyes:   No scleral icterus or conjunctival irritation       Lungs:     Clear to auscultation bilaterally, respirations unlabored, no wheezes or crackles   Heart:    Regular rate and rhythm,  No murmur   Extremities: Minimal nonpitting edema noted in both legs.  Minimal redness noted in the right leg.  No evidence of any skin breakdown.   Skin:  No concerning skin findings, no suspicious moles, no rashes   Neurologic:  On gross examination there is no motor or sensory deficit.  Patient walks with a normal gait                                     "

## 2024-07-03 LAB
ALBUMIN SERPL BCG-MCNC: 4.6 G/DL (ref 3.5–5.2)
ALP SERPL-CCNC: 73 U/L (ref 40–150)
ALT SERPL W P-5'-P-CCNC: 34 U/L (ref 0–70)
ANION GAP SERPL CALCULATED.3IONS-SCNC: 11 MMOL/L (ref 7–15)
AST SERPL W P-5'-P-CCNC: 30 U/L (ref 0–45)
BILIRUB SERPL-MCNC: 0.6 MG/DL
BUN SERPL-MCNC: 18.8 MG/DL (ref 8–23)
CALCIUM SERPL-MCNC: 10 MG/DL (ref 8.8–10.2)
CHLORIDE SERPL-SCNC: 100 MMOL/L (ref 98–107)
CREAT SERPL-MCNC: 1.06 MG/DL (ref 0.67–1.17)
CRP SERPL-MCNC: <3 MG/L
DEPRECATED HCO3 PLAS-SCNC: 26 MMOL/L (ref 22–29)
EGFRCR SERPLBLD CKD-EPI 2021: 77 ML/MIN/1.73M2
GLUCOSE SERPL-MCNC: 123 MG/DL (ref 70–99)
POTASSIUM SERPL-SCNC: 5 MMOL/L (ref 3.4–5.3)
PROT SERPL-MCNC: 7.5 G/DL (ref 6.4–8.3)
SODIUM SERPL-SCNC: 137 MMOL/L (ref 135–145)

## 2024-07-13 ENCOUNTER — OFFICE VISIT (OUTPATIENT)
Dept: FAMILY MEDICINE | Facility: CLINIC | Age: 69
End: 2024-07-13
Payer: COMMERCIAL

## 2024-07-13 ENCOUNTER — TELEPHONE (OUTPATIENT)
Dept: FAMILY MEDICINE | Facility: CLINIC | Age: 69
End: 2024-07-13

## 2024-07-13 VITALS
TEMPERATURE: 97.8 F | DIASTOLIC BLOOD PRESSURE: 65 MMHG | BODY MASS INDEX: 38.28 KG/M2 | RESPIRATION RATE: 18 BRPM | SYSTOLIC BLOOD PRESSURE: 107 MMHG | OXYGEN SATURATION: 97 % | HEART RATE: 77 BPM | WEIGHT: 259.2 LBS

## 2024-07-13 DIAGNOSIS — L03.119 RECURRENT CELLULITIS OF LOWER EXTREMITY: Primary | ICD-10-CM

## 2024-07-13 LAB
BASOPHILS # BLD AUTO: 0 10E3/UL (ref 0–0.2)
BASOPHILS NFR BLD AUTO: 0 %
CRP SERPL-MCNC: 118 MG/L
EOSINOPHIL # BLD AUTO: 0.1 10E3/UL (ref 0–0.7)
EOSINOPHIL NFR BLD AUTO: 1 %
ERYTHROCYTE [DISTWIDTH] IN BLOOD BY AUTOMATED COUNT: 18.5 % (ref 10–15)
HCT VFR BLD AUTO: 36.1 % (ref 40–53)
HGB BLD-MCNC: 11.8 G/DL (ref 13.3–17.7)
IMM GRANULOCYTES # BLD: 0 10E3/UL
IMM GRANULOCYTES NFR BLD: 0 %
LYMPHOCYTES # BLD AUTO: 2.2 10E3/UL (ref 0.8–5.3)
LYMPHOCYTES NFR BLD AUTO: 27 %
MCH RBC QN AUTO: 21.5 PG (ref 26.5–33)
MCHC RBC AUTO-ENTMCNC: 32.7 G/DL (ref 31.5–36.5)
MCV RBC AUTO: 66 FL (ref 78–100)
MONOCYTES # BLD AUTO: 0.7 10E3/UL (ref 0–1.3)
MONOCYTES NFR BLD AUTO: 9 %
NEUTROPHILS # BLD AUTO: 5 10E3/UL (ref 1.6–8.3)
NEUTROPHILS NFR BLD AUTO: 62 %
PLATELET # BLD AUTO: 240 10E3/UL (ref 150–450)
RBC # BLD AUTO: 5.49 10E6/UL (ref 4.4–5.9)
WBC # BLD AUTO: 8 10E3/UL (ref 4–11)

## 2024-07-13 PROCEDURE — 36415 COLL VENOUS BLD VENIPUNCTURE: CPT

## 2024-07-13 PROCEDURE — 99214 OFFICE O/P EST MOD 30 MIN: CPT

## 2024-07-13 PROCEDURE — 86140 C-REACTIVE PROTEIN: CPT

## 2024-07-13 PROCEDURE — 85025 COMPLETE CBC W/AUTO DIFF WBC: CPT

## 2024-07-13 RX ORDER — SULFAMETHOXAZOLE/TRIMETHOPRIM 800-160 MG
1 TABLET ORAL 2 TIMES DAILY
Qty: 28 TABLET | Refills: 0 | Status: SHIPPED | OUTPATIENT
Start: 2024-07-13 | End: 2024-07-27

## 2024-07-13 NOTE — TELEPHONE ENCOUNTER
Pt returning call. MA relayed provider message to pt. Pt states understanding. No questions at time of call.      Albert Dunbar MA on 7/13/2024 at 3:31 PM

## 2024-07-13 NOTE — TELEPHONE ENCOUNTER
Left VM for pt to call back. Call back number provided. Call later if no call back from pt. Please relay provider message if pt does call back.      Albert Dunbar MA on 7/13/2024 at 3:22 PM

## 2024-07-13 NOTE — PROGRESS NOTES
Assessment & Plan     Recurrent cellulitis of lower extremity  Patient history of recurrent cellulitis of the lower right leg most recently started back in May.  Patient was hospitalized and treated with IV antibiotics at the end of June.  Patient was sent out with doxycycline, symptoms had improved however, when stopped doxycycline, symptoms started to recur.  Given patient recurrence of lower extremity cellulitis will treat with 2 weeks worth of Bactrim.  Will get CBC and CRP as well today to trend.  Priority infectious disease consult placed as well.  If patient begins to develop systematic symptoms, low threshold for patient to be presenting to ED. This would be the safest choice to be treated urgently. Patient agreeable w/ this. discussed medication risks and benefits of Bactrim with patient in detail with patient verbal understanding. Patient fully understands and is agreeable with plan of care, at this point patient will follow up as needed unless acute concerns arise in the meantime.    - sulfamethoxazole-trimethoprim (BACTRIM DS) 800-160 MG tablet  Dispense: 28 tablet; Refill: 0  - CBC with platelets and differential  - CRP, inflammation  -Adult Infectious Disease  Referral          No follow-ups on file.    BOGDAN Mera [facility]    Carson Tran is a 68 year old male who presents to clinic today for the following health issues:  Chief Complaint   Patient presents with    Musculoskeletal Problem     Since yesterday redness on right leg and warm to touch.       Musculoskeletal Problem      -Recurrent cellulitis of right leg, started back in May  -Was recently hospitalized and treated w/ outpatient doxy  -Got better but symtpoms started to recur.   -Fever last night, broke during night   -during this patient neuropathy worsened.   -currently patient feeling ok, no fever, chills,myalgias.       Review of Systems  Constitutional, HEENT, cardiovascular,  pulmonary, GI, , musculoskeletal, neuro, skin, endocrine and psych systems are negative, except as otherwise noted.      Objective    /65 (BP Location: Right arm, Patient Position: Sitting, Cuff Size: Adult Regular)   Pulse 77   Temp 97.8  F (36.6  C) (Oral)   Resp 18   Wt 117.6 kg (259 lb 3.2 oz)   SpO2 97%   BMI 38.28 kg/m    Physical Exam  Vitals and nursing note reviewed.   Constitutional:       General: He is not in acute distress.     Appearance: Normal appearance. He is obese. He is not ill-appearing.   Cardiovascular:      Rate and Rhythm: Normal rate.   Pulmonary:      Effort: Pulmonary effort is normal.   Musculoskeletal:      Right lower leg: Swelling and tenderness present.      Comments: Erythema to right lower leg. See picture for details. Also noted skin abrasion to right 3rd toe caused by 4th toe toenail.    Skin:     General: Skin is warm and dry.   Neurological:      Mental Status: He is alert.   Psychiatric:         Mood and Affect: Mood normal.         Behavior: Behavior normal.         Thought Content: Thought content normal.         Judgment: Judgment normal.

## 2024-07-13 NOTE — TELEPHONE ENCOUNTER
----- Message from Karthikeyan Cruz sent at 7/13/2024  2:20 PM CDT -----  Please notify patient that his CBC is relatively stable, his inflammatory markers have risen again however. Continue w/ current prescription. If symptoms worsen he should present to the ED. Would also recommend PCP follow up from this visit.     BOGDAN Mera CNP

## 2024-08-05 ENCOUNTER — OFFICE VISIT (OUTPATIENT)
Dept: INFECTIOUS DISEASES | Facility: CLINIC | Age: 69
End: 2024-08-05
Payer: COMMERCIAL

## 2024-08-05 VITALS
SYSTOLIC BLOOD PRESSURE: 134 MMHG | HEART RATE: 77 BPM | DIASTOLIC BLOOD PRESSURE: 76 MMHG | BODY MASS INDEX: 37.21 KG/M2 | WEIGHT: 252 LBS | OXYGEN SATURATION: 98 %

## 2024-08-05 DIAGNOSIS — L03.119 RECURRENT CELLULITIS OF LOWER EXTREMITY: ICD-10-CM

## 2024-08-05 DIAGNOSIS — Z88.9 DRUG ALLERGY: Primary | ICD-10-CM

## 2024-08-05 PROCEDURE — 99204 OFFICE O/P NEW MOD 45 MIN: CPT | Performed by: INTERNAL MEDICINE

## 2024-08-05 RX ORDER — FLUCONAZOLE 200 MG/1
200 TABLET ORAL DAILY
Qty: 2 TABLET | Refills: 2 | Status: SHIPPED | OUTPATIENT
Start: 2024-08-05 | End: 2024-08-07

## 2024-08-05 RX ORDER — DOXYCYCLINE HYCLATE 50 MG/1
50 CAPSULE ORAL DAILY
Qty: 30 CAPSULE | Refills: 3 | Status: SHIPPED | OUTPATIENT
Start: 2024-08-05

## 2024-08-05 NOTE — PROGRESS NOTES
[facility]     Infectious Disease Consultation     Date of Consult (When I saw the patient): 08/05/24    Assessment & Plan   Leandro Brewer is a 68 year old male who is referred for recurrent lower extremity cellulitis    Impression:  Recurrent cellulitis, multiple episodes and hospitalization  Previous history of MRSA colonization  Most likely beta-hemolytic strep cellulitis, nonpurulent, tender lymphadenopathy in the past  History of MELITA in February 2019, followed by cellulitis  Multiple risk factors including lower extremity orthopedic surgeries, lymphedema, prior onychomycosis, diabetic neuropathy  Onychomycosis has been treated with terbinafine in the past  Has been seen by dermatology and placed on low-dose doxycycline with decreasing recurrence  Most recent hospitalization was in June 2024  Oral candidiasis    Recommendations:  Continue low-dose doxycycline  Lymphedema, management  If there is recurrence of infection would consider IV antibiotics, hospitalist patient, followed by suppressive oral antibiotics such as penicillin  Allergy referral for penicillin allergy testing.  Patient reported amoxicillin caused hives when he was on doxycycline in the past.  Had been able to tolerate amoxicillin previously  Look on resolve inguinal candidiasis.  Hold simvastatin while on fluconazole.  Short course  Detailed discussion with patient and questions answered    Patient Instructions     Doxycycline - continue for now  Elevated leg  Allergy referral for Amoxil allergy hx-- need to check for PCN allergy    Fluconazole for possible groin yeast infection    Means of preventing future episodes of cellulitis if recurrence while on Doxycycline  -- control edema  -treated onychomycosis (12 weeks of oral terbinafine, monitor LFTs while on med)  --prophylactic penicillin 500mg two times a day, likely for about 6 months ( if recurrence on doxycycline)      Follow up in 6 months- earlier if needed  .         Renetta Mendoza MD  [facility]  Answering Service: [phone]  On-Call ID provider: [phone], option: 9     Reason for Consult   Reason for consult: I was asked to evaluate this patient for recurrent cellulitis  .    Primary Care Physician   Josh Shafer    Chief Complaint   Recurrent lower extremity cellulitis    History is obtained from the patient and medical records    History of Present Illness   Leandro Brewer is a 68 year old male who presents with  Cellulitis- 1981- recurrent episodes-- blunt trauma on oily sheet of plywood, with possible open wound  Treated and recurrent infection  2019- cellulitis, after hip replacement- went to [facility]  Off doxycycline for 2 months- April- May 2024    Admitted and discharged on Doxycycline in June 2024-- 100 mg Q 12hrs- x 7 days    July 2024- recurrence of cellulitis on R, though papules on left leg- started Doxycycline 50 mg PO Q 12hrs, and topical     Most recent hospitalization in June 2024, discharged on doxycycline and improving  Follow papular lesions in the left lower extremity.  No current cellulitis on right lower extremity      Past Medical History   I have reviewed this patient's medical history and updated it with pertinent information if needed.   Past Medical History:   Diagnosis Date    Anemia     Arthritis     Diabetes mellitus, type II (H)     Hypertension     Obesity     Plantar fasciitis        Past Surgical History   I have reviewed this patient's surgical history and updated it with pertinent information if needed.  Past Surgical History:   Procedure Laterality Date    BACK SURGERY      laminectomy L45 by Dr. Villarreal     HERNIA REPAIR Right     inguinal; child    TOTAL HIP ARTHROPLASTY Bilateral 2019    Feb 5, 2019 (right) and October 19, 2019 (left)    VICTORIA HARE W/O FACETEC FORAMOT/[facility] 1/2 VRT SEG, LUMBAR Right 1/20/2021    Procedure: Right Lumbar 3 - Lumbar 4 hemilaminectomy and medial  facetectomy and Right redo Lumbar 4 -Lumbar 5 hemilaminectomy and medial facetectomy;  Surgeon: Julissa Lopez MD;  Location: [facility];  Service: Spine       Prior to Admission Medications   Cannot display prior to admission medications because the patient has not been admitted in this contact.     Allergies   Allergies   Allergen Reactions    Amoxicillin Hives       Immunization History   Immunization History   Administered Date(s) Administered    COVID-19 Bivalent 12+ (Pfizer) 11/25/2022    COVID-19 MONOVALENT 12+ (Pfizer) 03/16/2021, 04/03/2021, 11/16/2021    COVID-19 Monovalent 12+ (Pfizer 2022) 07/21/2022    DT (PEDS <7y) 01/01/1998    Flu, Unspecified 12/05/2007, 10/22/2008, 12/15/2018, 10/31/2020    Influenza (H1N1) 02/25/2010    Influenza (IIV3) PF 10/17/2000, 12/01/2006, 12/05/2007, 10/22/2008, 09/24/2009    Influenza Vaccine 18-64 (Flublok) 09/17/2019, 11/16/2021    Influenza Vaccine 65+ (Fluzone HD) 11/25/2022, 12/05/2023    Influenza Vaccine >6 months,quad, PF 11/12/2015, 12/29/2018    Influenza Vaccine, 6+MO IM (QUADRIVALENT W/PRESERVATIVES) 09/24/2009, 09/17/2010    Influenza, seasonal, injectable, PF 09/17/2010    Influenza,INJ,MDCK,PF,Quad >6mo(Flucelvax) 10/31/2020    Pneumococcal 20 valent Conjugate (Prevnar 20) 07/21/2022    Pneumococcal 23 valent 12/05/2007, 01/19/2021    TDAP (Adacel,Boostrix) 12/05/2007    Td (Adult), Adsorbed 07/11/2017    Td,adult,historic,unspecified 12/05/2007    Zoster recombinant adjuvanted (SHINGRIX) 11/12/2020, 11/16/2021       Social History   I have reviewed this patient's social history and updated it with pertinent information if needed. Leandro Brewer  reports that he has quit smoking. He has never used smokeless tobacco. He reports current alcohol use of about 1.0 standard drink of alcohol per week. He reports that he does not use drugs.    Family History   I have reviewed this patient's family history and updated it with pertinent information  if needed.   Family History   Problem Relation Age of Onset    Coronary Artery Disease Mother     Obesity Mother     Heart Disease Father     Cancer Father         throat    Coronary Artery Disease Father     Diabetes Sister     Diabetes Brother        Review of Systems   The 10 point Review of Systems is negative other than noted in the HPI or here.     Physical Exam       BP: 134/76 Pulse: 77     SpO2: 98 %      Vital Signs with Ranges  Pulse:  [77] 77  BP: (134)/(76) 134/76  SpO2:  [98 %] 98 %  252 lbs 0 oz  Body mass index is 37.21 kg/m .    GENERAL APPEARANCE:  awake, NAD  EYES: Eyes grossly normal to inspection, conjunctivae and sclerae normal  HENT: mouth without ulcers or lesions  NECK: supple  RESP: normal breathing pattern  CV: No JVD elevation  LYMPHATICS: Slight lymphedema  ABDOMEN: soft, nontender, nondistended  MS: extremities normal- no gross deformities noted  SKIN: Inguinal candidiasis  Papular lesion near knee, left lower extremity  NEURO: coherent      LABORATORY DATA:  Reviewed    CBC RESULTS:   Recent Labs   Lab Test 07/13/24  1034   WBC 8.0   RBC 5.49   HGB 11.8*   HCT 36.1*   MCV 66*   MCH 21.5*   MCHC 32.7   RDW 18.5*           Last Comprehensive Metabolic Panel:  Sodium   Date Value Ref Range Status   07/02/2024 137 135 - 145 mmol/L Final     Potassium   Date Value Ref Range Status   07/02/2024 5.0 3.4 - 5.3 mmol/L Final   03/16/2022 4.5 3.5 - 5.0 mmol/L Final     Chloride   Date Value Ref Range Status   07/02/2024 100 98 - 107 mmol/L Final   03/16/2022 104 98 - 107 mmol/L Final     Carbon Dioxide (CO2)   Date Value Ref Range Status   07/02/2024 26 22 - 29 mmol/L Final   03/16/2022 25 22 - 31 mmol/L Final     Anion Gap   Date Value Ref Range Status   07/02/2024 11 7 - 15 mmol/L Final   03/16/2022 13 5 - 18 mmol/L Final     Glucose   Date Value Ref Range Status   07/02/2024 123 (H) 70 - 99 mg/dL Final   03/16/2022 184 (H) 70 - 125 mg/dL Final     GLUCOSE BY METER POCT   Date Value Ref  Range Status   06/26/2024 178 (H) 70 - 99 mg/dL Final     Urea Nitrogen   Date Value Ref Range Status   07/02/2024 18.8 8.0 - 23.0 mg/dL Final   03/16/2022 22 8 - 22 mg/dL Final     Creatinine   Date Value Ref Range Status   07/02/2024 1.06 0.67 - 1.17 mg/dL Final     GFR Estimate   Date Value Ref Range Status   07/02/2024 77 >60 mL/min/1.73m2 Final     Comment:     eGFR calculated using 2021 CKD-EPI equation.   07/01/2021 >60 >60 mL/min/1.73m2 Final     Calcium   Date Value Ref Range Status   07/02/2024 10.0 8.8 - 10.2 mg/dL Final     Bilirubin Total   Date Value Ref Range Status   07/02/2024 0.6 <=1.2 mg/dL Final     Alkaline Phosphatase   Date Value Ref Range Status   07/02/2024 73 40 - 150 U/L Final     ALT   Date Value Ref Range Status   07/02/2024 34 0 - 70 U/L Final     Comment:     Reference intervals for this test were updated on 6/12/2023 to more accurately reflect our healthy population. There may be differences in the flagging of prior results with similar values performed with this method. Interpretation of those prior results can be made in the context of the updated reference intervals.       AST   Date Value Ref Range Status   07/02/2024 30 0 - 45 U/L Final     Comment:     Reference intervals for this test were updated on 6/12/2023 to more accurately reflect our healthy population. There may be differences in the flagging of prior results with similar values performed with this method. Interpretation of those prior results can be made in the context of the updated reference intervals.             CRP   Date Value Ref Range Status   04/20/2021 0.8 0.0 - 0.8 mg/dL Final            MICROBIOLOGY:    Reviewed      60 MINUTES SPENT BY ME on the date of service doing chart review, history, exam, documentation & further activities per the note.

## 2024-08-05 NOTE — PATIENT INSTRUCTIONS
Doxycycline - continue for now  Elevated leg  Allergy referral for Amoxil allergy hx-- need to check for PCN allergy    Fluconazole for possible groin yeast infection    Means of preventing future episodes of cellulitis if recurrence while on Doxycycline  -- control edema  -treated onychomycosis (12 weeks of oral terbinafine, monitor LFTs while on med)  --prophylactic penicillin 500mg two times a day, likely for about 6 months ( if recurrence on doxycycline)      Follow up in 6 months- earlier if needed  .

## 2024-08-08 ENCOUNTER — TRANSFERRED RECORDS (OUTPATIENT)
Dept: MULTI SPECIALTY CLINIC | Facility: CLINIC | Age: 69
End: 2024-08-08

## 2024-08-08 LAB — RETINOPATHY: NORMAL

## 2024-08-13 ENCOUNTER — OFFICE VISIT (OUTPATIENT)
Dept: FAMILY MEDICINE | Facility: CLINIC | Age: 69
End: 2024-08-13
Payer: COMMERCIAL

## 2024-08-13 VITALS
RESPIRATION RATE: 16 BRPM | SYSTOLIC BLOOD PRESSURE: 130 MMHG | HEIGHT: 69 IN | DIASTOLIC BLOOD PRESSURE: 70 MMHG | WEIGHT: 258 LBS | OXYGEN SATURATION: 98 % | TEMPERATURE: 97.2 F | HEART RATE: 85 BPM | BODY MASS INDEX: 38.21 KG/M2

## 2024-08-13 DIAGNOSIS — L03.119 RECURRENT CELLULITIS OF LOWER EXTREMITY: ICD-10-CM

## 2024-08-13 DIAGNOSIS — E11.9 TYPE 2 DIABETES MELLITUS WITHOUT COMPLICATION, WITH LONG-TERM CURRENT USE OF INSULIN (H): Primary | ICD-10-CM

## 2024-08-13 DIAGNOSIS — I10 ESSENTIAL HYPERTENSION, BENIGN: ICD-10-CM

## 2024-08-13 DIAGNOSIS — Z79.4 TYPE 2 DIABETES MELLITUS WITHOUT COMPLICATION, WITH LONG-TERM CURRENT USE OF INSULIN (H): Primary | ICD-10-CM

## 2024-08-13 DIAGNOSIS — E78.5 HYPERLIPIDEMIA, UNSPECIFIED HYPERLIPIDEMIA TYPE: ICD-10-CM

## 2024-08-13 LAB
ALBUMIN SERPL BCG-MCNC: 4.3 G/DL (ref 3.5–5.2)
ALP SERPL-CCNC: 65 U/L (ref 40–150)
ALT SERPL W P-5'-P-CCNC: 23 U/L (ref 0–70)
ANION GAP SERPL CALCULATED.3IONS-SCNC: 9 MMOL/L (ref 7–15)
AST SERPL W P-5'-P-CCNC: 22 U/L (ref 0–45)
BILIRUB SERPL-MCNC: 0.4 MG/DL
BUN SERPL-MCNC: 18.4 MG/DL (ref 8–23)
CALCIUM SERPL-MCNC: 9.6 MG/DL (ref 8.8–10.4)
CHLORIDE SERPL-SCNC: 104 MMOL/L (ref 98–107)
CHOLEST SERPL-MCNC: 128 MG/DL
CREAT SERPL-MCNC: 0.97 MG/DL (ref 0.67–1.17)
CRP SERPL-MCNC: 5.59 MG/L
EGFRCR SERPLBLD CKD-EPI 2021: 86 ML/MIN/1.73M2
ERYTHROCYTE [DISTWIDTH] IN BLOOD BY AUTOMATED COUNT: 19.2 % (ref 10–15)
FASTING STATUS PATIENT QL REPORTED: YES
FASTING STATUS PATIENT QL REPORTED: YES
GLUCOSE SERPL-MCNC: 205 MG/DL (ref 70–99)
HBA1C MFR BLD: 7.9 % (ref 0–5.6)
HCO3 SERPL-SCNC: 25 MMOL/L (ref 22–29)
HCT VFR BLD AUTO: 36.5 % (ref 40–53)
HDLC SERPL-MCNC: 31 MG/DL
HGB BLD-MCNC: 11.6 G/DL (ref 13.3–17.7)
LDLC SERPL CALC-MCNC: 57 MG/DL
MCH RBC QN AUTO: 21.2 PG (ref 26.5–33)
MCHC RBC AUTO-ENTMCNC: 31.8 G/DL (ref 31.5–36.5)
MCV RBC AUTO: 67 FL (ref 78–100)
NONHDLC SERPL-MCNC: 97 MG/DL
PLATELET # BLD AUTO: 231 10E3/UL (ref 150–450)
POTASSIUM SERPL-SCNC: 5.2 MMOL/L (ref 3.4–5.3)
PROT SERPL-MCNC: 7 G/DL (ref 6.4–8.3)
RBC # BLD AUTO: 5.47 10E6/UL (ref 4.4–5.9)
SODIUM SERPL-SCNC: 138 MMOL/L (ref 135–145)
TRIGL SERPL-MCNC: 200 MG/DL
WBC # BLD AUTO: 6 10E3/UL (ref 4–11)

## 2024-08-13 PROCEDURE — 80061 LIPID PANEL: CPT | Performed by: FAMILY MEDICINE

## 2024-08-13 PROCEDURE — 86140 C-REACTIVE PROTEIN: CPT | Performed by: FAMILY MEDICINE

## 2024-08-13 PROCEDURE — 83036 HEMOGLOBIN GLYCOSYLATED A1C: CPT | Performed by: FAMILY MEDICINE

## 2024-08-13 PROCEDURE — 36415 COLL VENOUS BLD VENIPUNCTURE: CPT | Performed by: FAMILY MEDICINE

## 2024-08-13 PROCEDURE — 85027 COMPLETE CBC AUTOMATED: CPT | Performed by: FAMILY MEDICINE

## 2024-08-13 PROCEDURE — 80053 COMPREHEN METABOLIC PANEL: CPT | Performed by: FAMILY MEDICINE

## 2024-08-13 PROCEDURE — 99214 OFFICE O/P EST MOD 30 MIN: CPT | Performed by: FAMILY MEDICINE

## 2024-08-13 ASSESSMENT — PAIN SCALES - GENERAL: PAINLEVEL: MILD PAIN (2)

## 2024-08-13 NOTE — PROGRESS NOTES
"Assessment/Plan:    Type 2 diabetes mellitus without complications (H)  Type 2 diabetes remains relatively stable with A1c at 7.9% 2024.  Has had recurrent cellulitis concerns recently etc.  Will reassess at annual wellness visit 2024 while continuing Trulicity 4.5 mg weekly, low-dose aspirin, Lantus 60 units daily metformin  mg using 2 tablets twice daily.    Essential hypertension, benign  Lisinopril 20 mg daily continuing.  Weight goal less than 250 pounds initially, less than 240 pounds ideally.    Hyperlipidemia, unspecified hyperlipidemia type  Update lipid cascade today while fasting.  Simvastatin 40 mg at bedtime.  - Lipid panel reflex to direct LDL Fasting    Recurrent cellulitis of lower extremity  Working with infectious disease.  Doxycycline 50 mg daily continuing.  Update CRP today.  - CBC with platelets               Subjective:    Leandro Brewer is seen today for follow-up assessment.  Recurrent cellulitis.  Has been seen 3 times.  Was admitted to [facility].  Now on doxycycline 50 mg daily.  Saw Dr. Mendoza infectious disease specialist 2024.  Continues doxycycline 50 mg daily likely minimum 6 months however may take long-term.  Was referred to allergy to see if in fact penicillin allergy.  Continues Trulicity 4.5 mg weekly, metformin  mg using 2 tablets twice daily, low-dose aspirin and Lantus 60 units daily.  Lisinopril 20 mg daily for hypertension and simvastatin 40 mg at bedtime for lipid management.  Patient is fasting.  Comprehensive review of systems as above otherwise all negative.       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 Thompson); right inguinal hernia " "age 8 months; right total hip arthroplasty at [facility] February 5, 2019 with Dr. Evans.  Hospitalizations: early 20s \"infected gallbladder\" WITHOUT surgery... ; cellulitis in legs (hospitalized twice); abdominal wall cyst requiring IV antibiotics x 4 days... ; right Lumbar 3 - Lumbar 4 hemilaminectomy and medial facetectomy and Right redo Lumbar 4 -Lumbar 5 hemilaminectomy and medial facetectomy 1/20/21 (Dr. Lopez)    4/7/19-4/12/19 [facility] for right LE cellulitis/sepsis; right LE cellulitis 9/8/19-9/9/19 ([facility]); 10/15/19 s/p left MELITA (Dr. 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 Villarreal     HERNIA REPAIR Right     inguinal; child    TOTAL HIP ARTHROPLASTY Bilateral 2019 Feb 5, 2019 (right) and October 19, 2019 (left)    ZZC HARE W/O FACETEC FORAMOT/[facility] 1/2 VRT SEG, LUMBAR Right 1/20/2021    Procedure: Right Lumbar 3 - Lumbar 4 hemilaminectomy and medial facetectomy and Right redo Lumbar 4 -Lumbar 5 hemilaminectomy and medial facetectomy;  Surgeon: Julissa Lopez MD;  Location: [facility] [place];  Service: Spine        Family History   Problem Relation Age of Onset    Coronary Artery Disease Mother     Obesity Mother     Heart Disease Father     Cancer Father         throat    Coronary Artery Disease Father     Diabetes Sister     Diabetes Brother         Past Medical History:   Diagnosis Date    Anemia     Arthritis     Diabetes mellitus, type II (H)     Hypertension     Obesity     Plantar fasciitis         Social History     Tobacco Use    Smoking status: Former    Smokeless tobacco: Never   Vaping Use    Vaping status: Never Used   Substance Use Topics    Alcohol use: Yes     Alcohol/week: 1.0 standard drink of alcohol     Comment: Alcoholic " Drinks/day: occasional    Drug use: No        Current Outpatient Medications   Medication Sig Dispense Refill    albuterol (PROAIR HFA/PROVENTIL HFA/VENTOLIN HFA) 108 (90 Base) MCG/ACT inhaler INHALE 2 PUFFS INTO THE LUNGS 4 TIMES DAILY AS NEEDED FOR SHORTNESS OF BREATH / DYSPNEA OR WHEEZING 18 g 6    APPLE CIDER VINEGAR PO Take 1 tablet by mouth      aspirin 81 MG EC tablet Take 81 mg by mouth daily      blood glucose (NO BRAND SPECIFIED) lancets standard [employer] Microlet MISC  Check blood sugar 2 times per day and as needed      clindamycin-benzoyl peroxide (BENZACLIN) 1-5 % external gel Apply topically 2 times daily 50 g 11    doxycycline hyclate (VIBRAMYCIN) 50 MG capsule Take 1 capsule (50 mg) by mouth daily 30 capsule 3    Dulaglutide (TRULICITY) 4.5 MG/0.5ML SOPN Inject 4.5 mg Subcutaneous once a week 6 mL 3    furosemide (LASIX) 40 MG tablet TAKE 1/2 TABLET BY MOUTH EVERY DAY 45 tablet 3    gabapentin (NEURONTIN) 300 MG capsule Take 2 capsules (600 mg) by mouth 3 times daily 540 capsule 3    insulin glargine (LANTUS SOLOSTAR) 100 UNIT/ML pen INJECT 60 UNITS SUBCUTANEOUSLY AT BEDTIME 60 mL 3    insulin pen needle (32G X 4 MM) 32G X 4 MM miscellaneous Use one pen needle daily or as directed. 100 each 3    lisinopril (ZESTRIL) 20 MG tablet Take 1 tablet (20 mg) by mouth daily 90 tablet 3    Magnesium Oxide 500 MG TABS Take 500 mg by mouth      metFORMIN (GLUCOPHAGE XR) 500 MG 24 hr tablet TAKE 2 TABLETS BY MOUTH TWICE A DAY WITH MEALS 360 tablet 0    Multiple Vitamin (MULTIVITAMIN ADULT PO) Take 1 tablet by mouth      potassium gluconate 2.5 MEQ TABS Take 1 tablet by mouth      QVAR REDIHALER 80 MCG/ACT inhaler INHALE 2 PUFFS BY MOUTH TWICE A DAY - RINSE MOUTH AFTER USE-DO NOT SHAKE UNIT 31.8 g 0    sildenafil (VIAGRA) 100 MG tablet Take 0.5-1 tablets ( mg) by mouth daily as needed (erectile difficulties) 90 tablet 3    simvastatin (ZOCOR) 40 MG tablet TAKE 1 TABLET BY MOUTH EVERYDAY AT BEDTIME 90 tablet  "2    vitamin C (ASCORBIC ACID) 500 MG tablet Take 500 mg by mouth      doxycycline hyclate (VIBRAMYCIN) 100 MG capsule Take 1 capsule (100 mg) by mouth 2 times daily (Patient not taking: Reported on 8/13/2024) 14 capsule 0          Objective:    Vitals:    08/13/24 0652   BP: 130/70   Pulse: 85   Resp: 16   Temp: 97.2  F (36.2  C)   SpO2: 98%   Weight: 117 kg (258 lb)   Height: 1.753 m (5' 9\")      Body mass index is 38.1 kg/m .    Alert.  No apparent distress.  Chest clear.  Cardiac exam regular.  Extremities warm and dry right anteromedial shin slightly erythematous and shiny without significant edema however.  No tenderness.      This note has been dictated using voice recognition software and as a result may contain minor grammatical errors and unintended word substitutions.           Answers submitted by the patient for this visit:  Diabetes Visit (Submitted on 8/10/2024)  Chief Complaint: Chronic problems general questions HPI Form  Frequency of checking blood sugars:: one time daily  What time of day are you checking your blood sugars : before meals  Have you had any blood sugars above 200?: Yes  Have you had any blood sugars below 70?: No  Hypoglycemia symptoms:: shakiness  Diabetic concerns:: frequent infections  Paraesthesia present:: burning in feet, weight gain  Back Pain Visit Questionnaire (Submitted on 8/10/2024)  Your back pain is: chronic  Chronic or Recurring Back Pain Visit Questionnaire (Submitted on 8/10/2024)  Where is your back pain located? : right lower back, left lower back, right shoulder, left side of waist  How would you describe your back pain? : gnawing  Where does your back pain spread? : nowhere  Since you noticed your back pain, how has it changed? : always present, but gets better and worse  Does your back pain interfere with your job?: No  General Questionnaire (Submitted on 8/10/2024)  Chief Complaint: Chronic problems general questions HPI Form  What is the reason for your visit " today? : Follow up for cellulitis infection. Lower back arthritis  How many servings of fruits and vegetables do you eat daily?: 2-3  On average, how many sweetened beverages do you drink each day (Examples: soda, juice, sweet tea, etc.  Do NOT count diet or artificially sweetened beverages)?: 0  How many minutes a day do you exercise enough to make your heart beat faster?: 60 or more  How many days a week do you exercise enough to make your heart beat faster?: 5  How many days per week do you miss taking your medication?: 0

## 2024-08-27 DIAGNOSIS — J45.30 MILD PERSISTENT ASTHMA, UNCOMPLICATED: ICD-10-CM

## 2024-08-28 RX ORDER — BECLOMETHASONE DIPROPIONATE HFA 80 UG/1
AEROSOL, METERED RESPIRATORY (INHALATION)
Qty: 31.8 G | Refills: 0 | Status: SHIPPED | OUTPATIENT
Start: 2024-08-28

## 2024-09-21 DIAGNOSIS — Z79.4 TYPE 2 DIABETES MELLITUS WITHOUT COMPLICATION, WITH LONG-TERM CURRENT USE OF INSULIN (H): ICD-10-CM

## 2024-09-21 DIAGNOSIS — E11.9 TYPE 2 DIABETES MELLITUS WITHOUT COMPLICATION, WITH LONG-TERM CURRENT USE OF INSULIN (H): ICD-10-CM

## 2024-09-23 RX ORDER — METFORMIN HCL 500 MG
TABLET, EXTENDED RELEASE 24 HR ORAL
Qty: 360 TABLET | Refills: 1 | Status: SHIPPED | OUTPATIENT
Start: 2024-09-23

## 2024-09-26 ENCOUNTER — MYC MEDICAL ADVICE (OUTPATIENT)
Dept: FAMILY MEDICINE | Facility: CLINIC | Age: 69
End: 2024-09-26
Payer: COMMERCIAL

## 2024-09-27 DIAGNOSIS — R60.0 PERIPHERAL EDEMA: Primary | ICD-10-CM

## 2024-09-27 DIAGNOSIS — E11.42 DIABETIC POLYNEUROPATHY ASSOCIATED WITH TYPE 2 DIABETES MELLITUS (H): ICD-10-CM

## 2024-09-27 NOTE — PROGRESS NOTES
DME (Durable Medical Equipment) Orders and Documentation  Orders Placed This Encounter   Procedures    Orthotics, Mastectomy and Custom Compression Orders    Ankle/Foot Bracing Supplies Order Other (comments)        The patient was assessed and it was determined the patient is in need of the following listed DME Supplies/Equipment. Please complete supporting documentation below to demonstrate medical necessity.         DME (Durable Medical Equipment) Orders and Documentation  Orders Placed This Encounter   Procedures    Orthotics, Mastectomy and Custom Compression Orders    Ankle/Foot Bracing Supplies Order Other (comments)      The patient was assessed and it was determined the patient is in need of the following listed DME Supplies/Equipment. Please complete supporting documentation below to demonstrate medical necessity.      DME All Other Item(s) Documentation    List reason for need and supporting documentation for medical necessity below for each DME item.     1.  Type 2 DM with peripheral neuropathy with need for diabetic orthotic shoes and inserts

## 2024-10-02 ENCOUNTER — OFFICE VISIT (OUTPATIENT)
Dept: FAMILY MEDICINE | Facility: CLINIC | Age: 69
End: 2024-10-02
Payer: COMMERCIAL

## 2024-10-02 VITALS
HEART RATE: 85 BPM | SYSTOLIC BLOOD PRESSURE: 136 MMHG | OXYGEN SATURATION: 98 % | TEMPERATURE: 97.9 F | DIASTOLIC BLOOD PRESSURE: 80 MMHG | HEIGHT: 69 IN | BODY MASS INDEX: 38.06 KG/M2 | RESPIRATION RATE: 14 BRPM | WEIGHT: 257 LBS

## 2024-10-02 DIAGNOSIS — Z23 FLU VACCINE NEED: ICD-10-CM

## 2024-10-02 DIAGNOSIS — E66.812 CLASS 2 SEVERE OBESITY DUE TO EXCESS CALORIES WITH SERIOUS COMORBIDITY AND BODY MASS INDEX (BMI) OF 36.0 TO 36.9 IN ADULT (H): ICD-10-CM

## 2024-10-02 DIAGNOSIS — M54.50 ACUTE BILATERAL LOW BACK PAIN WITHOUT SCIATICA: Primary | ICD-10-CM

## 2024-10-02 DIAGNOSIS — E66.01 CLASS 2 SEVERE OBESITY DUE TO EXCESS CALORIES WITH SERIOUS COMORBIDITY AND BODY MASS INDEX (BMI) OF 36.0 TO 36.9 IN ADULT (H): ICD-10-CM

## 2024-10-02 PROCEDURE — 99214 OFFICE O/P EST MOD 30 MIN: CPT | Mod: 25 | Performed by: PHYSICIAN ASSISTANT

## 2024-10-02 PROCEDURE — 90471 IMMUNIZATION ADMIN: CPT | Performed by: PHYSICIAN ASSISTANT

## 2024-10-02 PROCEDURE — 91320 SARSCV2 VAC 30MCG TRS-SUC IM: CPT | Performed by: PHYSICIAN ASSISTANT

## 2024-10-02 PROCEDURE — 90480 ADMN SARSCOV2 VAC 1/ONLY CMP: CPT | Performed by: PHYSICIAN ASSISTANT

## 2024-10-02 PROCEDURE — 90662 IIV NO PRSV INCREASED AG IM: CPT | Performed by: PHYSICIAN ASSISTANT

## 2024-10-02 ASSESSMENT — ASTHMA QUESTIONNAIRES
QUESTION_1 LAST FOUR WEEKS HOW MUCH OF THE TIME DID YOUR ASTHMA KEEP YOU FROM GETTING AS MUCH DONE AT WORK, SCHOOL OR AT HOME: NONE OF THE TIME
ACT_TOTALSCORE: 25
QUESTION_3 LAST FOUR WEEKS HOW OFTEN DID YOUR ASTHMA SYMPTOMS (WHEEZING, COUGHING, SHORTNESS OF BREATH, CHEST TIGHTNESS OR PAIN) WAKE YOU UP AT NIGHT OR EARLIER THAN USUAL IN THE MORNING: NOT AT ALL
QUESTION_2 LAST FOUR WEEKS HOW OFTEN HAVE YOU HAD SHORTNESS OF BREATH: NOT AT ALL
QUESTION_5 LAST FOUR WEEKS HOW WOULD YOU RATE YOUR ASTHMA CONTROL: COMPLETELY CONTROLLED
QUESTION_4 LAST FOUR WEEKS HOW OFTEN HAVE YOU USED YOUR RESCUE INHALER OR NEBULIZER MEDICATION (SUCH AS ALBUTEROL): NOT AT ALL
ACT_TOTALSCORE: 25

## 2024-10-02 ASSESSMENT — ENCOUNTER SYMPTOMS: BACK PAIN: 1

## 2024-10-02 NOTE — PROGRESS NOTES
The longitudinal plan of care for the diagnosis(es)/condition(s) as documented were addressed during this visit. Due to the added complexity in care, I will continue to support Marc in the subsequent management and with ongoing continuity of care.    I spent a total of 30 minutes on the day of the visit.   Time spent by me doing chart review, history and exam, documentation and further activities per the note  Assessment & Plan   Problem List Items Addressed This Visit       Class 2 severe obesity due to excess calories with serious comorbidity and body mass index (BMI) of 36.0 to 36.9 in adult (H)      BMI 37.         Acute bilateral low back pain without sciatica - Primary     Presents today for evaluation of low back pain for the past month.  The pain is worse on the left than the right.  The pain radiates down his buttocks and into his upper anterior leg.  His leg feels as though it is going to give out.  He has had previous bilateral hip arthroplasty surgery.  He has had previous back surgery in 2008 and again in 2021.   In January 2021 he underwent L3-4 hemilaminectomy and facetectomy.  I reviewed the results of his previous MRI of the lumbar spine in May 2021 which was completed following his lumbar spine surgery.  Eyes changes in bowel or bladder.  No loss of bowel or bladder control.  No fevers.    MRI 5/2021:  IMPRESSION:  1.  Overall appearance of the lumbar spine is quite similar to prior MRI 03/11/2021.  2.  Central canal remains adequate throughout the lumbar region.  3.  Again seen are postop changes from right L3-4 hemilaminectomy with enhancing fibrosis in the operative bed. No recurrent disc protrusion. Previously noted small amount of fluid in the laminectomy bed has resolved. The central canal and neural   foramen   at this level remain adequate.  4.  At the L4-L5 level again seen are changes from previous laminectomy with enhancing fibrosis in the operative site extending along the right L5 root  "as before. No recurrent disc herniation, central canal narrowing or significant foraminal narrowing   at   this level.  5.  At the L5-S1 level there is a stable minimal disc and osteophyte complex with moderate facet arthropathy. The central canal and neural foramen are adequate.    Exam today he was able to stand on his heels and toes.  There was no weakness of his lower extremities.  It was difficult to obtain a patellar knee reflex on the right.  On exam of his spine he has a well-healed old surgical scar near the lumbar spine.  There is palpable swelling of his left lower back over the paraspinal muscles when compared to the right side.  Seated straight leg raise.  Some pain with rotation of his left hip particularly with internal rotation.    Proceeding with MRI of the lumbar spine along with referral to spine specialist within the next 3 to 4 days.  Time we will continue to use ibuprofen for 100 mg twice daily.  Encouraged to take Prilosec 20 mg daily to help avoid GI bleed.  He may apply ice as needed for comfort.  We discussed that if he develops bowel or bladder changes or loss of bowel or bladder control, increased leg weakness or worsening pain or development of fevers he is to go to the emergency room.         Relevant Orders    MR Lumbar Spine w/o Contrast    Spine  Referral     Other Visit Diagnoses       Flu vaccine need        Relevant Medications    influenza trivalent vaccine high-dose for ages 65 years and greater (FLUZONE-HD) injection 0.5 mL (Start on 10/3/2024 10:00 AM)             BMI  Estimated body mass index is 37.95 kg/m  as calculated from the following:    Height as of this encounter: 1.753 m (5' 9\").    Weight as of this encounter: 116.6 kg (257 lb).         Subjective   Marc is a 68 year old, presenting for the following health issues:  Back Pain (Lower back pain for 1 month- no known injury per patient/Back surgeries 2001 and 2008 per patient) and Swelling (Left lower " "quadrant- noticed on Friday when patient was at a chiropractor- no xray done there)        10/2/2024     3:18 PM   Additional Questions   Roomed by Lexie LOPES   Accompanied by Self     History of Present Illness       Reason for visit:  Pain in my lower back that extends out through my side of my hips. At times I have felt like my legs are going to give out.   He is taking medications regularly.           Objective    /80 (BP Location: Left arm, Patient Position: Sitting, Cuff Size: Adult Large)   Pulse 85   Temp 97.9  F (36.6  C) (Oral)   Resp 14   Ht 1.753 m (5' 9\")   Wt 116.6 kg (257 lb)   SpO2 98%   BMI 37.95 kg/m    Body mass index is 37.95 kg/m .  Physical Exam  Vitals and nursing note reviewed.   Constitutional:       Appearance: Normal appearance.   Neurological:      Mental Status: He is alert.      Comments: Back exam:  Curvature of the cervical, thoracic and lumbar spine are within normal limits.  No tenderness on palpation of spinous processes.  Para spinal muscles are not tender and are without spasm.   Noted to have welling left lower back.  Erythema.  No pain with palpation of this area.  The area is fluctuant and soft to palpate with no firmness.  Straight leg raise test is negative bilaterally.    Hip exam:  No obvious deformity of the hip.  No tenderness over the trochanteric bursa.  No SI joint tenderness.  Negative Stinchfield exam.  Logroll test is negative.  Some pain with internal rotation of his left hip.    Neurological:    General:  No focal deficit present.  Sensory:  No sensory deficit present.  Motor:  No weakness.  Coordination: Able to stand on heels and toes.    Gait.  Gait normal.  Deep tendon reflexes: Difficulty obtaining patellar reflex on the right side.  Also had difficulty obtaining the ankle reflexes bilaterally.                Signed Electronically by: Mary Cisneros PA-C    "

## 2024-10-02 NOTE — ASSESSMENT & PLAN NOTE
Presents today for evaluation of low back pain for the past month.  The pain is worse on the left than the right.  The pain radiates down his buttocks and into his upper anterior leg.  His leg feels as though it is going to give out.  He has had previous bilateral hip arthroplasty surgery.  He has had previous back surgery in 2008 and again in 2021.   In January 2021 he underwent L3-4 hemilaminectomy and facetectomy.  I reviewed the results of his previous MRI of the lumbar spine in May 2021 which was completed following his lumbar spine surgery.  Eyes changes in bowel or bladder.  No loss of bowel or bladder control.  No fevers.    MRI 5/2021:  IMPRESSION:  1.  Overall appearance of the lumbar spine is quite similar to prior MRI 03/11/2021.  2.  Central canal remains adequate throughout the lumbar region.  3.  Again seen are postop changes from right L3-4 hemilaminectomy with enhancing fibrosis in the operative bed. No recurrent disc protrusion. Previously noted small amount of fluid in the laminectomy bed has resolved. The central canal and neural   foramen   at this level remain adequate.  4.  At the L4-L5 level again seen are changes from previous laminectomy with enhancing fibrosis in the operative site extending along the right L5 root as before. No recurrent disc herniation, central canal narrowing or significant foraminal narrowing   at   this level.  5.  At the L5-S1 level there is a stable minimal disc and osteophyte complex with moderate facet arthropathy. The central canal and neural foramen are adequate.    Exam today he was able to stand on his heels and toes.  There was no weakness of his lower extremities.  It was difficult to obtain a patellar knee reflex on the right.  On exam of his spine he has a well-healed old surgical scar near the lumbar spine.  There is palpable swelling of his left lower back over the paraspinal muscles when compared to the right side.  Seated straight leg raise.  Some pain  with rotation of his left hip particularly with internal rotation.    Proceeding with MRI of the lumbar spine along with referral to spine specialist within the next 3 to 4 days.  Time we will continue to use ibuprofen for 100 mg twice daily.  Encouraged to take Prilosec 20 mg daily to help avoid GI bleed.  He may apply ice as needed for comfort.  We discussed that if he develops bowel or bladder changes or loss of bowel or bladder control, increased leg weakness or worsening pain or development of fevers he is to go to the emergency room.

## 2024-10-02 NOTE — ASSESSMENT & PLAN NOTE
Previous x-rays in November 2021 demonstrated:  Degenerative grade 1 anterolisthesis of L4 upon L5 measuring 2-3 mm is again noted without significant change in the flexed or extended positions. Alignment of the lumbar vertebrae is otherwise normal. Vertebral body heights normal. No   fractures.

## 2024-10-04 DIAGNOSIS — E78.5 HYPERLIPIDEMIA, UNSPECIFIED HYPERLIPIDEMIA TYPE: ICD-10-CM

## 2024-10-04 RX ORDER — SIMVASTATIN 40 MG
TABLET ORAL
Qty: 90 TABLET | Refills: 2 | Status: SHIPPED | OUTPATIENT
Start: 2024-10-04

## 2024-10-07 DIAGNOSIS — G47.33 OSA (OBSTRUCTIVE SLEEP APNEA): Primary | ICD-10-CM

## 2024-10-09 ENCOUNTER — TELEPHONE (OUTPATIENT)
Dept: FAMILY MEDICINE | Facility: CLINIC | Age: 69
End: 2024-10-09
Payer: COMMERCIAL

## 2024-10-09 DIAGNOSIS — M54.50 ACUTE BILATERAL LOW BACK PAIN WITHOUT SCIATICA: Primary | ICD-10-CM

## 2024-10-09 RX ORDER — CYCLOBENZAPRINE HCL 5 MG
5 TABLET ORAL 3 TIMES DAILY PRN
Qty: 30 TABLET | Refills: 0 | Status: SHIPPED | OUTPATIENT
Start: 2024-10-09

## 2024-10-09 NOTE — TELEPHONE ENCOUNTER
Called patient back and relayed below from Mary Cisneros PA-C as written.  Patient verbalized understanding, will update on symptoms in 6 weeks as requested, and had no further questions/concerns.    PT referral faxed to [facility].      Kait Heller RN  [facility]

## 2024-10-09 NOTE — TELEPHONE ENCOUNTER
"Received below message from Mary Cisneros PA-C:    \" FW: [facility] Denial Notification  Received: Today  Mary Cisneros PA-C  [facility] [place] [facility]  Please let patient know that we received a denial for his MRI due to the fact that we have not tried 6 weeks of conservative treatment.  Please notify him of this and ask him if he is willing to try some physical therapy?  We could also try a muscle relaxant if he is interested.  Please check and see if his symptoms have worsened improved since his clinic visit?  Please let me know how he would like to proceed.  Thank you.\"      Called patient and relayed above message from Mary Cisneros PA-C as written.      Patient agreeable to PT and requests referral be sent to [facility] in [place] as he's already established there from previous PT, and has continued going there 4-5 days/week for pool therapy, which he's done on his own \"for years.\"  Regarding symptoms, states overall they have improved since he was seen on 10/2/24.  He is okay with having a muscle relaxant on-hand to take if needed, but currently does not feel he needs it at this time.     PT referral pended for review/approval, if appropriate.  Please also advise on muscle relaxant and route back to nurse pool to fax referral orders and follow up with patient on plan.        Kait Heller RN  [facility]   "

## 2024-10-09 NOTE — TELEPHONE ENCOUNTER
Please let patient know that I have completed the referral for physical therapy.  I have also prescribed Flexeril to be used up to 3 times a day for muscle spasm.  Please let him know that this may cause drowsiness.  Also, please let him know that if he develops increasing pain, loss of bowel or bladder control or muscle weakness in his legs he should be evaluated in the emergency room.  In 6 weeks he should send an update on his symptoms.  If symptoms persist we will proceed with MRI.

## 2024-10-22 DIAGNOSIS — M47.26 OSTEOARTHRITIS OF SPINE WITH RADICULOPATHY, LUMBAR REGION: Primary | ICD-10-CM

## 2024-10-22 DIAGNOSIS — M48.061 SPINAL STENOSIS OF LUMBAR REGION, UNSPECIFIED WHETHER NEUROGENIC CLAUDICATION PRESENT: ICD-10-CM

## 2024-10-22 RX ORDER — PREDNISONE 20 MG/1
40 TABLET ORAL DAILY
Qty: 10 TABLET | Refills: 0 | Status: SHIPPED | OUTPATIENT
Start: 2024-10-22 | End: 2024-11-01

## 2024-10-22 NOTE — TELEPHONE ENCOUNTER
"Patient returning call, he is aware MRI is not covered. Patient reports he has only had one physical therapy appointment which was his assessment so not able to confirm if helping or not. He will update clinic in a few weeks.     He would like to pass along to Mary that he is still open to the idea of a course of steroids if she is willing. He understands increased risk of infection but at this point \"its a horse apiece.\"   "

## 2024-10-22 NOTE — TELEPHONE ENCOUNTER
Left message to call back for: Marc  Information to relay to patient: Please relay  message from Mary MUNIZ below and inquire on PT and his symptoms

## 2024-10-22 NOTE — TELEPHONE ENCOUNTER
"Left message to call back for:  Marc  Information to relay to patient:  Please see below and relay upon return call.      Prednisone prescription teed up for review/approval, if appropriate.      Kait Heller RN  [facility]       ===========================================    \"Mary Cisneros PA-C   to [facility]   RN    10/22/24  5:26 PM   Please let patient know I am willing to give him prednisone 40 mg daily for 5 days.  Please kaley this up for me.  Thank you.\"  "

## 2024-10-22 NOTE — TELEPHONE ENCOUNTER
----- Message from Mary Cisneros sent at 10/22/2024 11:48 AM CDT -----  Please call patient and let him know that the MRI that I previously ordered is not covered by his insurance at this time.  Please inquire to see if the physical therapy is helping to improve his symptoms.  Thank you.

## 2024-11-01 ENCOUNTER — OFFICE VISIT (OUTPATIENT)
Dept: PHYSICAL MEDICINE AND REHAB | Facility: CLINIC | Age: 69
End: 2024-11-01
Attending: PHYSICIAN ASSISTANT
Payer: COMMERCIAL

## 2024-11-01 VITALS
HEART RATE: 97 BPM | TEMPERATURE: 98 F | SYSTOLIC BLOOD PRESSURE: 121 MMHG | WEIGHT: 256.2 LBS | HEIGHT: 69 IN | DIASTOLIC BLOOD PRESSURE: 56 MMHG | BODY MASS INDEX: 37.95 KG/M2

## 2024-11-01 DIAGNOSIS — M47.816 LUMBAR FACET ARTHROPATHY: Primary | ICD-10-CM

## 2024-11-01 DIAGNOSIS — Z98.890 HISTORY OF LUMBAR SURGERY: ICD-10-CM

## 2024-11-01 DIAGNOSIS — M54.16 LUMBAR RADICULAR PAIN: ICD-10-CM

## 2024-11-01 PROCEDURE — 99213 OFFICE O/P EST LOW 20 MIN: CPT | Performed by: PHYSICIAN ASSISTANT

## 2024-11-01 ASSESSMENT — PAIN SCALES - GENERAL: PAINLEVEL_OUTOF10: MILD PAIN (2)

## 2024-11-01 NOTE — PATIENT INSTRUCTIONS
I am so happy that you are feeling better!  Keep up the great work with physical therapy.  Strengthening your core will help prevent/minimize future flare ups.  Please send me a [facility] message or call my nurse at [phone] if your pain worsens again or fails to improve.

## 2024-11-01 NOTE — LETTER
11/1/2024      Leandro Brewer  [address] [id number]      Dear Colleague,    Thank you for referring your patient, Leandro Brewer, to the [facility]. Please see a copy of my visit note below.    Pain isAssessment:   Leandro Brewer is a 68 y.o. male with past medical history significant for carpal tunnel syndrome, diabetic polyneuropathy, mild persistent asthma type 2 diabetes mellitus, obstructive sleep apnea, obesity, hyperlipidemia, hypertension who presents today for follow-up regarding a flare of left low back pain with radiation into the left lower extremity associated numbness and tingling.  Pain flared up about 2 months ago after [place] which [place] walking 8 to 10 miles per day.  Pain has improved significantly (greater than 80%) with conservative treatment including prednisone and physical therapy.  Pain is most consistent with a flare of lumbar facet arthropathy with lumbar radiculitis.  He is neurologically intact on exam today.  - Patient has a history of a right L3-4 hemilaminectomy and redo right L4-5 hemilaminectomy with Dr. Lopez January 2021.   MRI lumbar spine from May 2021 does show moderate to advanced facet arthropathy L4-5 and moderate facet arthropathy L5-S1.         Plan:     A shared decision making plan was used.  The patient's values and choices were respected.  The following represents what was discussed and decided upon by the physician assistant and the patient.      1.  DIAGNOSTIC TESTS: I reviewed the MRI lumbar spine from May 2021.  No additional diagnostic test were ordered today.  An MRI had been ordered at his primary care clinic last month but it was denied by insurance because he had not tried 6 weeks of conservative care.  He has been participating physical therapy for the past 2 weeks.  If pain worsens or fails to improve after 6 weeks of physical therapy we could resubmit the request for  the MRI.    2.  PHYSICAL THERAPY: Patient is currently in physical therapy for low back pain encourage Tashi.  He will continue with physical therapy and the home exercises.    3.  MEDICATIONS: No changes are made to the patient's medications.  - Cyclobenzaprine was prescribed by his primary care clinic but he has not tried it.  I encouraged him to try it if needed.  - Patient takes gabapentin 600 milligrams 3 times daily for neuropathy  - Patient takes ibuprofen as needed  - Prednisone was helpful  - Tylenol has not been helpful    4.  INTERVENTIONS: No interventions were ordered today.  Pain has improved significantly with conservative treatment.  If pain worsens or fails to improve or the we could consider interventional pain management depending on the results of an updated MRI.      5.  PATIENT EDUCATION: Patient is in agreement the above plan.  All questions were answered.      6.  FOLLOW-UP: I offered to follow-up with the patient in 4 weeks to monitor progress with physical therapy.  He declined.  He prefers to follow-up as needed.  If asked questions or concerns, he should not hesitate to call.    Subjective:     Leandro Brewer is a 68 y.o. male who presents today for follow-up regarding a flare of left low back pain with radiation into the left lower extremity.  I have not seen this patient since December 16, 2021.  Patient reports that he was doing well until about 2 months ago.  He had [place] which [place] walking 8 to 10 miles per day.  In the beginning of September he began to feel intermittent left-sided low back pain.  He went to the chiropractor a couple times which was not helpful.  The chiropractor told him he had swelling on his lower back and recommended he see a doctor.  He saw a provider at his primary clinic.  They prescribed prednisone which was helpful.  They also ordered an MRI which was denied by insurance.  They referred her to physical therapy.  He has had several  sessions so far.  Patient reports at least 80% improvement in pain.    Patient complains of left-sided low back pain.  Pain radiates into the left buttock.  He has intermittent pain radiating down the left lateral thigh.  Pain does not reach the knee.  He has intermittent numbness and tingling in the same distribution as his pain down the leg.  When pain was severe he had episodes where his left leg would give way.  That has not happened for the past 1 month.  He denies loss of bowel or bladder control.  Denies right-sided symptoms.  Denies fevers.  He rates his pain today as a 2 out of 10.  At its worst it is a 7 out of 10.  At its best it is a 0 out of 10.  Pain is aggravated to being inactive and lying down.  Pain is also aggravated first thing in the morning.  It takes about an hour to feel better.  Pain is alleviated with activity.    Treatment today:  - right L3-4 hemilaminectomy and redo right L4-5 hemilaminectomy with Dr. Lopez January 20, 2021  - Partial laminectomy L4-5 with Dr. Thompson 2008  - Current physical therapy at [facility]  - Physical therapy for low back pain April 2021  - Right L5-S1 transforaminal epidural steroid injection November 18, 2020  - Right L4-5 transforaminal epidural steroid injection January 7, 2020  - Right L3-4 transforaminal epidural steroid injection December 5, 2019  - Tylenol  - Tizanidine  - Ibuprofen helpful  - Prednisone helpful  - Gabapentin 600 mg 3 times daily helpful for neuropathy        Review of Systems:  Positive for numbness/tingling, weakness, pain much worse at night, trip/stumble/falls.  Negative for loss of bowel/bladder control, inability to urinate, headache, difficulty swallowing, difficulty with hand skills, fevers, unintentional weight loss     Objective:   CONSTITUTIONAL:  Vital signs as above.  No acute distress.  The patient is well nourished and well groomed.    PSYCHIATRIC:  The patient is awake, alert, oriented to person, place and time.  The  patient is answering questions appropriately with clear speech.  Normal affect.  HEENT: Normocephalic, atraumatic.  Sclera clear.    SKIN:  Skin over the face, posterior torso, bilateral upper and lower extremities is clean, dry, intact without rashes.  MUSCULOSKELETAL: Patient has epi sacral lipoma's bilaterally.  I do not appreciate any swelling in the paraspinous muscles.  Gait is nonantalgic.  Able to ambulate on toes and heels bilaterally.  The patient is able to heel and toe walk without any difficulty.  Lumbar flexion mildly restricted.  Lumbar extension mildly restricted with increased pain.  Lumbar facet loading maneuvers increased low back pain on the left.  Mild tenderness palpation left lower lumbar paraspinous muscles L4-5 and L5-S1.    The patient has 5/5 strength bilateral hip flexors, knee flexors/extensors, ankle dorsi/plantar flexors.  Negative straight leg raise bilaterally.  NEUROLOGICAL: Reflexes were 2+ left 1+ right patellar.  Trace bilateral Achilles.  Sensation to light touch is intact bilateral lower extremities.  No ankle clonus.  Negative Babinski's bilaterally.     RESULTS:    I reviewed the MRI lumbar spine with and without contrast from [facility] dated May 24, 2021.  This shows postoperative changes of a right L3-4 hemilaminectomy.  The central canal and neural foramen at this level are adequate.  At L4-5 there are postoperative changes of a laminectomy with some enhancing fibrosis in the operative site extending along the right L5 nerve root.  There is no significant central canal or neuroforaminal stenosis at this level.  At L5-S1 there is a minimal disc osteophyte complex and moderate facet arthropathy but no neural compromise.  Please see report for further details.      EXAM: XR LUMBAR BENDING ONLY 2/3 VIEWS  LOCATION: [facility]  DATE/TIME: 11/13/2021 8:36 AM     INDICATION: Lumbar radiculopathy.     COMPARISON: Lumbar spine MR 5/24/2021.     TECHNIQUE:  CR lumbar spine.                                                                      IMPRESSION: Degenerative grade 1 anterolisthesis of L4 upon L5 measuring 2-3 mm is again noted without significant change in the flexed or extended positions. Alignment of the lumbar vertebrae is otherwise normal. Vertebral body heights normal. No   fractures.      EMG right lower extremity June 23, 2021:  Comment NCS: Borderline study  1.  Low amplitude bilateral sural SNAPs.    2.  Borderline proximal amplitude of the right peroneal CMAP without amplitude drop or slowing across the fibular neck.  Likely normal.  3.  Normal right peroneal CMAP to the tibialis anterior.  4.  Normal right tibial CMAP  5.  Normal left peroneal CMAP to the EDB.     Comment EMG: Normal study  1.  Normal needle EMG right lower extremity.     Interpretation: Essentially normal study: There is electrodiagnostic evidence of:     1.  Mildly low amplitude bilateral sural sensory studies.  These findings can be normal for the patient's age or may represent a sensory or sensorimotor peripheral polyneuropathy which would be consistent with a history of diabetes mellitus.       2. There is no electrodiagnostic evidence of lumbosacral radiculopathy, lumbosacral plexopathy, or focal neuropathy in the right lower extremity.  Specifically, there is no electrodiagnostic evidence of a right L5 radiculopathy.         Again, thank you for allowing me to participate in the care of your patient.        Sincerely,        Antonieta Thompson PA-C

## 2024-11-01 NOTE — PROGRESS NOTES
Pain isAssessment:   Leandro Brewer is a 68 y.o. male with past medical history significant for carpal tunnel syndrome, diabetic polyneuropathy, mild persistent asthma type 2 diabetes mellitus, obstructive sleep apnea, obesity, hyperlipidemia, hypertension who presents today for follow-up regarding a flare of left low back pain with radiation into the left lower extremity associated numbness and tingling.  Pain flared up about 2 months ago after [place] which involves walking 8 to 10 miles per day.  Pain has improved significantly (greater than 80%) with conservative treatment including prednisone and physical therapy.  Pain is most consistent with a flare of lumbar facet arthropathy with lumbar radiculitis.  He is neurologically intact on exam today.  - Patient has a history of a right L3-4 hemilaminectomy and redo right L4-5 hemilaminectomy with Dr. Lopez January 2021.   MRI lumbar spine from May 2021 does show moderate to advanced facet arthropathy L4-5 and moderate facet arthropathy L5-S1.         Plan:     A shared decision making plan was used.  The patient's values and choices were respected.  The following represents what was discussed and decided upon by the physician assistant and the patient.      1.  DIAGNOSTIC TESTS: I reviewed the MRI lumbar spine from May 2021.  No additional diagnostic test were ordered today.  An MRI had been ordered at his primary care clinic last month but it was denied by insurance because he had not tried 6 weeks of conservative care.  He has been participating physical therapy for the past 2 weeks.  If pain worsens or fails to improve after 6 weeks of physical therapy we could resubmit the request for the MRI.    2.  PHYSICAL THERAPY: Patient is currently in physical therapy for low back pain encourage Tashi.  He will continue with physical therapy and the home exercises.    3.  MEDICATIONS: No changes are made to the patient's medications.  - Cyclobenzaprine  was prescribed by his primary care clinic but he has not tried it.  I encouraged him to try it if needed.  - Patient takes gabapentin 600 milligrams 3 times daily for neuropathy  - Patient takes ibuprofen as needed  - Prednisone was helpful  - Tylenol has not been helpful    4.  INTERVENTIONS: No interventions were ordered today.  Pain has improved significantly with conservative treatment.  If pain worsens or fails to improve or the we could consider interventional pain management depending on the results of an updated MRI.      5.  PATIENT EDUCATION: Patient is in agreement the above plan.  All questions were answered.      6.  FOLLOW-UP: I offered to follow-up with the patient in 4 weeks to monitor progress with physical therapy.  He declined.  He prefers to follow-up as needed.  If asked questions or concerns, he should not hesitate to call.    Subjective:     Leandro Brewer is a 68 y.o. male who presents today for follow-up regarding a flare of left low back pain with radiation into the left lower extremity.  I have not seen this patient since December 16, 2021.  Patient reports that he was doing well until about 2 months ago.  He had [place] at [place] [employer] which involves walking 8 to 10 miles per day.  In the beginning of September he began to feel intermittent left-sided low back pain.  He went to the chiropractor a couple times which was not helpful.  The chiropractor told him he had swelling on his lower back and recommended he see a doctor.  He saw a provider at his primary clinic.  They prescribed prednisone which was helpful.  They also ordered an MRI which was denied by insurance.  They referred her to physical therapy.  He has had several sessions so far.  Patient reports at least 80% improvement in pain.    Patient complains of left-sided low back pain.  Pain radiates into the left buttock.  He has intermittent pain radiating down the left lateral thigh.  Pain does not reach the knee.  He has  intermittent numbness and tingling in the same distribution as his pain down the leg.  When pain was severe he had episodes where his left leg would give way.  That has not happened for the past 1 month.  He denies loss of bowel or bladder control.  Denies right-sided symptoms.  Denies fevers.  He rates his pain today as a 2 out of 10.  At its worst it is a 7 out of 10.  At its best it is a 0 out of 10.  Pain is aggravated to being inactive and lying down.  Pain is also aggravated first thing in the morning.  It takes about an hour to feel better.  Pain is alleviated with activity.    Treatment today:  - right L3-4 hemilaminectomy and redo right L4-5 hemilaminectomy with Dr. Lopez January 20, 2021  - Partial laminectomy L4-5 with Dr. Thompson 2008  - [facility] at [facility]  - Physical therapy for low back pain April 2021  - Right L5-S1 transforaminal epidural steroid injection November 18, 2020  - Right L4-5 transforaminal epidural steroid injection January 7, 2020  - Right L3-4 transforaminal epidural steroid injection December 5, 2019  - Tylenol  - Tizanidine  - Ibuprofen helpful  - Prednisone helpful  - Gabapentin 600 mg 3 times daily helpful for neuropathy        Review of Systems:  Positive for numbness/tingling, weakness, pain much worse at night, trip/stumble/falls.  Negative for loss of bowel/bladder control, inability to urinate, headache, difficulty swallowing, difficulty with hand skills, fevers, unintentional weight loss     Objective:   CONSTITUTIONAL:  Vital signs as above.  No acute distress.  The patient is well nourished and well groomed.    PSYCHIATRIC:  The patient is awake, alert, oriented to person, place and time.  The patient is answering questions appropriately with clear speech.  Normal affect.  HEENT: Normocephalic, atraumatic.  Sclera clear.    SKIN:  Skin over the face, posterior torso, bilateral upper and lower extremities is clean, dry, intact without  joyce.  MUSCULOSKELETAL: Patient has epi sacral lipoma's bilaterally.  I do not appreciate any swelling in the paraspinous muscles.  Gait is nonantalgic.  Able to ambulate on toes and heels bilaterally.  The patient is able to heel and toe walk without any difficulty.  Lumbar flexion mildly restricted.  Lumbar extension mildly restricted with increased pain.  Lumbar facet loading maneuvers increased low back pain on the left.  Mild tenderness palpation left lower lumbar paraspinous muscles L4-5 and L5-S1.    The patient has 5/5 strength bilateral hip flexors, knee flexors/extensors, ankle dorsi/plantar flexors.  Negative straight leg raise bilaterally.  NEUROLOGICAL: Reflexes were 2+ left 1+ right patellar.  Trace bilateral Achilles.  Sensation to light touch is intact bilateral lower extremities.  No ankle clonus.  Negative Babinski's bilaterally.     RESULTS:    I reviewed the MRI lumbar spine with and without contrast from [facility] dated May 24, 2021.  This shows postoperative changes of a right L3-4 hemilaminectomy.  The central canal and neural foramen at this level are adequate.  At L4-5 there are postoperative changes of a laminectomy with some enhancing fibrosis in the operative site extending along the right L5 nerve root.  There is no significant central canal or neuroforaminal stenosis at this level.  At L5-S1 there is a minimal disc osteophyte complex and moderate facet arthropathy but no neural compromise.  Please see report for further details.      EXAM: XR LUMBAR BENDING ONLY 2/3 VIEWS  LOCATION: [facility]  DATE/TIME: 11/13/2021 8:36 AM     INDICATION: Lumbar radiculopathy.     COMPARISON: Lumbar spine MR 5/24/2021.     TECHNIQUE: CR lumbar spine.                                                                      IMPRESSION: Degenerative grade 1 anterolisthesis of L4 upon L5 measuring 2-3 mm is again noted without significant change in the flexed or extended positions.  Alignment of the lumbar vertebrae is otherwise normal. Vertebral body heights normal. No   fractures.      EMG right lower extremity June 23, 2021:  Comment NCS: Borderline study  1.  Low amplitude bilateral sural SNAPs.    2.  Borderline proximal amplitude of the right peroneal CMAP without amplitude drop or slowing across the fibular neck.  Likely normal.  3.  Normal right peroneal CMAP to the tibialis anterior.  4.  Normal right tibial CMAP  5.  Normal left peroneal CMAP to the EDB.     Comment EMG: Normal study  1.  Normal needle EMG right lower extremity.     Interpretation: Essentially normal study: There is electrodiagnostic evidence of:     1.  Mildly low amplitude bilateral sural sensory studies.  These findings can be normal for the patient's age or may represent a sensory or sensorimotor peripheral polyneuropathy which would be consistent with a history of diabetes mellitus.       2. There is no electrodiagnostic evidence of lumbosacral radiculopathy, lumbosacral plexopathy, or focal neuropathy in the right lower extremity.  Specifically, there is no electrodiagnostic evidence of a right L5 radiculopathy.

## 2024-11-05 ENCOUNTER — OFFICE VISIT (OUTPATIENT)
Dept: ALLERGY | Facility: CLINIC | Age: 69
End: 2024-11-05
Payer: COMMERCIAL

## 2024-11-05 VITALS — OXYGEN SATURATION: 98 % | HEART RATE: 85 BPM | BODY MASS INDEX: 37.67 KG/M2 | WEIGHT: 255.1 LBS

## 2024-11-05 DIAGNOSIS — R21 RASH AND NONSPECIFIC SKIN ERUPTION: ICD-10-CM

## 2024-11-05 DIAGNOSIS — L03.119 RECURRENT CELLULITIS OF LOWER EXTREMITY: ICD-10-CM

## 2024-11-05 DIAGNOSIS — Z88.9 DRUG ALLERGY: Primary | ICD-10-CM

## 2024-11-05 PROCEDURE — 99243 OFF/OP CNSLTJ NEW/EST LOW 30: CPT | Performed by: ALLERGY & IMMUNOLOGY

## 2024-11-05 NOTE — PROGRESS NOTES
Subjective   Marc is a 68 year old, presenting for the following health issues:  Allergy Consult    HPI     Chief complaint: Allergies    History of present illness: This is a pleasant 68-year-old gentleman that I was asked to see for evaluation of penicillin allergy by Dr. Mendoza.  Patient states that he had a hip replacement this last year.  He states he was told to take amoxicillin prior to dental work.  He took 4 tablets and within 10 minutes noted full body hives.  He took Benadryl and symptoms resolved.  No skin sloughing or mucosal lesions.  He states the hives are red itchy and raised.  The hives resolved within the day.  He had no breathing difficulty gastrointestinal distress or rhinitis.  He states the same time he was taking doxycycline and has taken doxycycline since that time without any symptoms.  He has taken amoxicillin prior without any symptoms.    Past medical history: Orthopedic surgeries, recurrent cellulitis    Social history: He is a , lives at home with radiator heat, former smoker    Family history is noncontributory          Objective    Pulse 85   Wt 115.7 kg (255 lb 1.6 oz)   SpO2 98%   BMI 37.67 kg/m    Body mass index is 37.67 kg/m .  Physical Exam       Gen: Pleasant male not in acute distress  HEENT: Eyes no erythema of the bulbar or palpebral conjunctiva, no edema. Ears: No deformities or lesions. Nose: No congestion,  Mouth: Throat clear, no lip or tongue edema.   Neck: No masses lesions or swelling  Respiratory: No coughing with breathing, no retractions  Lymph: No visible supraclavicular or cervical lymphadenopathy  Skin: Flat pinpoint rash on the back of the right calf, nonblanching  Psych: Alert and appropriate for age    Impression report and plan:  1.  Penicillin or amoxicillin allergy    Recommend penicillin skin testing possibly followed by amoxicillin challenge.  History is consistent with an IgE mediated allergy.  Reviewed risk of  anaphylaxis and patient would like to schedule.    2.  Skin rash    Patient mention to me this rash he has on his right lower leg.  He states it comes and goes.  Is not itchy.  It worsens when he is on his feet for a long period of time.  He works at the [employer] and he notes that it worsens at that time especially with heat.  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          Signed Electronically by: Aiyana Suggs MD

## 2024-11-05 NOTE — PATIENT INSTRUCTIONS
Penicillin allergy testing     Followed by amoxicillin testing    Off antihistamine for 5 days    Must be healthy

## 2024-11-05 NOTE — Clinical Note
Patient requires penicillin skin testing followed by amoxicillin challenge.  Can be done on the same visit

## 2024-11-05 NOTE — LETTER
11/5/2024      Leandro Brewer  [address] [address]      Dear Colleague,    Thank you for referring your patient, Leandro Brewer, to the [facility]. Please see a copy of my visit note below.          Subjective  Marc is a 68 year old, presenting for the following health issues:  Allergy Consult    HPI     Chief complaint: Allergies    History of present illness: This is a pleasant 68-year-old gentleman that I was asked to see for evaluation of penicillin allergy by Dr. Mendoza.  Patient states that he had a hip replacement this last year.  He states he was told to take amoxicillin prior to dental work.  He took 4 tablets and within 10 minutes noted full body hives.  He took Benadryl and symptoms resolved.  No skin sloughing or mucosal lesions.  He states the hives are red itchy and raised.  The hives resolved within the day.  He had no breathing difficulty gastrointestinal distress or rhinitis.  He states the same time he was taking doxycycline and has taken doxycycline since that time without any symptoms.  He has taken amoxicillin prior without any symptoms.    Past medical history: Orthopedic surgeries, recurrent cellulitis    Social history: He is a , lives at home with radiator heat, former smoker    Family history is noncontributory          Objective   Pulse 85   Wt 115.7 kg (255 lb 1.6 oz)   SpO2 98%   BMI 37.67 kg/m    Body mass index is 37.67 kg/m .  Physical Exam       Gen: Pleasant male not in acute distress  HEENT: Eyes no erythema of the bulbar or palpebral conjunctiva, no edema. Ears: No deformities or lesions. Nose: No congestion,  Mouth: Throat clear, no lip or tongue edema.   Neck: No masses lesions or swelling  Respiratory: No coughing with breathing, no retractions  Lymph: No visible supraclavicular or cervical lymphadenopathy  Skin: Flat pinpoint rash on the back of the right calf, nonblanching  Psych: Alert and  appropriate for age    Impression report and plan:  1.  Penicillin or amoxicillin allergy    Recommend penicillin skin testing possibly followed by amoxicillin challenge.  History is consistent with an IgE mediated allergy.  Reviewed risk of anaphylaxis and patient would like to schedule.    2.  Skin rash    Patient mention to me this rash he has on his right lower leg.  He states it comes and goes.  Is not itchy.  It worsens when he is on his feet for a long period of time.  He works at the [employer] and he notes that it worsens at that time especially with heat.  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          Signed Electronically by: Aiyana Suggs MD      Again, thank you for allowing me to participate in the care of your patient.        Sincerely,        Aiyana Suggs MD

## 2024-11-06 DIAGNOSIS — J45.30 MILD PERSISTENT ASTHMA, UNCOMPLICATED: ICD-10-CM

## 2024-11-06 DIAGNOSIS — I10 ESSENTIAL (PRIMARY) HYPERTENSION: ICD-10-CM

## 2024-11-07 RX ORDER — BECLOMETHASONE DIPROPIONATE HFA 80 UG/1
AEROSOL, METERED RESPIRATORY (INHALATION)
Qty: 31.8 G | Refills: 0 | Status: SHIPPED | OUTPATIENT
Start: 2024-11-07

## 2024-11-07 RX ORDER — LISINOPRIL 20 MG/1
20 TABLET ORAL DAILY
Qty: 90 TABLET | Refills: 2 | Status: SHIPPED | OUTPATIENT
Start: 2024-11-07

## 2024-12-05 DIAGNOSIS — F52.8 OTHER SEXUAL DYSFUNCTION NOT DUE TO A SUBSTANCE OR KNOWN PHYSIOLOGICAL CONDITION: ICD-10-CM

## 2024-12-05 DIAGNOSIS — L03.119 RECURRENT CELLULITIS OF LOWER EXTREMITY: ICD-10-CM

## 2024-12-05 DIAGNOSIS — G62.9 PERIPHERAL POLYNEUROPATHY: ICD-10-CM

## 2024-12-05 RX ORDER — GABAPENTIN 300 MG/1
600 CAPSULE ORAL 3 TIMES DAILY
Qty: 180 CAPSULE | Refills: 11 | Status: SHIPPED | OUTPATIENT
Start: 2024-12-05

## 2024-12-05 RX ORDER — DOXYCYCLINE HYCLATE 50 MG/1
50 CAPSULE ORAL DAILY
Qty: 30 CAPSULE | Refills: 2 | Status: SHIPPED | OUTPATIENT
Start: 2024-12-05

## 2024-12-05 RX ORDER — SILDENAFIL 100 MG/1
TABLET, FILM COATED ORAL
Qty: 90 TABLET | Refills: 3 | Status: SHIPPED | OUTPATIENT
Start: 2024-12-05

## 2024-12-09 DIAGNOSIS — L73.2 HIDRADENITIS SUPPURATIVA: ICD-10-CM

## 2024-12-09 NOTE — TELEPHONE ENCOUNTER
Received a refill request for  doxycycline monohydrate but medication not in med list.   [facility] Sabrina Zabala [phone] Fax [phone]    Evangelina Cunningham   [facility] [place]   [facility]  [phone]

## 2024-12-10 RX ORDER — DOXYCYCLINE 50 MG/1
50 CAPSULE ORAL DAILY
Qty: 60 CAPSULE | Refills: 6 | Status: SHIPPED | OUTPATIENT
Start: 2024-12-10

## 2024-12-10 SDOH — HEALTH STABILITY: PHYSICAL HEALTH: ON AVERAGE, HOW MANY MINUTES DO YOU ENGAGE IN EXERCISE AT THIS LEVEL?: 50 MIN

## 2024-12-10 SDOH — HEALTH STABILITY: PHYSICAL HEALTH: ON AVERAGE, HOW MANY DAYS PER WEEK DO YOU ENGAGE IN MODERATE TO STRENUOUS EXERCISE (LIKE A BRISK WALK)?: 5 DAYS

## 2024-12-10 ASSESSMENT — SOCIAL DETERMINANTS OF HEALTH (SDOH): HOW OFTEN DO YOU GET TOGETHER WITH FRIENDS OR RELATIVES?: ONCE A WEEK

## 2024-12-10 ASSESSMENT — ASTHMA QUESTIONNAIRES
QUESTION_3 LAST FOUR WEEKS HOW OFTEN DID YOUR ASTHMA SYMPTOMS (WHEEZING, COUGHING, SHORTNESS OF BREATH, CHEST TIGHTNESS OR PAIN) WAKE YOU UP AT NIGHT OR EARLIER THAN USUAL IN THE MORNING: NOT AT ALL
ACT_TOTALSCORE: 24
QUESTION_4 LAST FOUR WEEKS HOW OFTEN HAVE YOU USED YOUR RESCUE INHALER OR NEBULIZER MEDICATION (SUCH AS ALBUTEROL): ONCE A WEEK OR LESS
QUESTION_2 LAST FOUR WEEKS HOW OFTEN HAVE YOU HAD SHORTNESS OF BREATH: NOT AT ALL
QUESTION_1 LAST FOUR WEEKS HOW MUCH OF THE TIME DID YOUR ASTHMA KEEP YOU FROM GETTING AS MUCH DONE AT WORK, SCHOOL OR AT HOME: NONE OF THE TIME
ACT_TOTALSCORE: 24
QUESTION_5 LAST FOUR WEEKS HOW WOULD YOU RATE YOUR ASTHMA CONTROL: COMPLETELY CONTROLLED

## 2024-12-10 NOTE — TELEPHONE ENCOUNTER
Sent son asking if he requested this and what are his symptoms.    Thank you,    Jessica RICHARDSON RN BSN  [facility]- [phone]

## 2024-12-10 NOTE — TELEPHONE ENCOUNTER
Pt responded to message that he is still using this medication    Sallie CISNEROS RN  Dermatology   [phone]

## 2024-12-12 ENCOUNTER — OFFICE VISIT (OUTPATIENT)
Dept: ALLERGY | Facility: CLINIC | Age: 69
End: 2024-12-12
Payer: COMMERCIAL

## 2024-12-12 ENCOUNTER — OFFICE VISIT (OUTPATIENT)
Dept: FAMILY MEDICINE | Facility: CLINIC | Age: 69
End: 2024-12-12
Attending: FAMILY MEDICINE
Payer: COMMERCIAL

## 2024-12-12 VITALS — WEIGHT: 251 LBS | HEIGHT: 69 IN | BODY MASS INDEX: 37.18 KG/M2 | OXYGEN SATURATION: 99 % | HEART RATE: 88 BPM

## 2024-12-12 VITALS
RESPIRATION RATE: 15 BRPM | WEIGHT: 251 LBS | DIASTOLIC BLOOD PRESSURE: 70 MMHG | HEART RATE: 86 BPM | HEIGHT: 69 IN | SYSTOLIC BLOOD PRESSURE: 118 MMHG | OXYGEN SATURATION: 98 % | BODY MASS INDEX: 37.18 KG/M2 | TEMPERATURE: 97.7 F

## 2024-12-12 DIAGNOSIS — E11.65 UNCONTROLLED TYPE 2 DIABETES MELLITUS WITH HYPERGLYCEMIA, WITH LONG-TERM CURRENT USE OF INSULIN (H): ICD-10-CM

## 2024-12-12 DIAGNOSIS — Z79.4 UNCONTROLLED TYPE 2 DIABETES MELLITUS WITH HYPERGLYCEMIA, WITH LONG-TERM CURRENT USE OF INSULIN (H): ICD-10-CM

## 2024-12-12 DIAGNOSIS — D12.6 TUBULAR ADENOMA OF COLON: ICD-10-CM

## 2024-12-12 DIAGNOSIS — Z00.00 MEDICARE ANNUAL WELLNESS VISIT, SUBSEQUENT: Primary | ICD-10-CM

## 2024-12-12 DIAGNOSIS — D50.9 MICROCYTIC ANEMIA: ICD-10-CM

## 2024-12-12 DIAGNOSIS — L03.119 RECURRENT CELLULITIS OF LOWER EXTREMITY: ICD-10-CM

## 2024-12-12 DIAGNOSIS — J45.30 MILD PERSISTENT ASTHMA, UNCOMPLICATED: ICD-10-CM

## 2024-12-12 DIAGNOSIS — E78.5 HYPERLIPIDEMIA, UNSPECIFIED HYPERLIPIDEMIA TYPE: ICD-10-CM

## 2024-12-12 DIAGNOSIS — E66.01 CLASS 2 SEVERE OBESITY WITH SERIOUS COMORBIDITY AND BODY MASS INDEX (BMI) OF 37.0 TO 37.9 IN ADULT, UNSPECIFIED OBESITY TYPE (H): ICD-10-CM

## 2024-12-12 DIAGNOSIS — Z88.0 PENICILLIN ALLERGY: Primary | ICD-10-CM

## 2024-12-12 DIAGNOSIS — E66.812 CLASS 2 SEVERE OBESITY WITH SERIOUS COMORBIDITY AND BODY MASS INDEX (BMI) OF 37.0 TO 37.9 IN ADULT, UNSPECIFIED OBESITY TYPE (H): ICD-10-CM

## 2024-12-12 DIAGNOSIS — G62.9 PERIPHERAL POLYNEUROPATHY: ICD-10-CM

## 2024-12-12 DIAGNOSIS — I10 ESSENTIAL HYPERTENSION, BENIGN: ICD-10-CM

## 2024-12-12 LAB
ANION GAP SERPL CALCULATED.3IONS-SCNC: 8 MMOL/L (ref 7–15)
BUN SERPL-MCNC: 18.9 MG/DL (ref 8–23)
CALCIUM SERPL-MCNC: 9.8 MG/DL (ref 8.8–10.4)
CHLORIDE SERPL-SCNC: 101 MMOL/L (ref 98–107)
CREAT SERPL-MCNC: 1.01 MG/DL (ref 0.67–1.17)
CRP SERPL-MCNC: <3 MG/L
EGFRCR SERPLBLD CKD-EPI 2021: 82 ML/MIN/1.73M2
ERYTHROCYTE [DISTWIDTH] IN BLOOD BY AUTOMATED COUNT: 19.2 % (ref 10–15)
EST. AVERAGE GLUCOSE BLD GHB EST-MCNC: 217 MG/DL
GLUCOSE SERPL-MCNC: 139 MG/DL (ref 70–99)
HBA1C MFR BLD: 9.2 % (ref 0–5.6)
HCO3 SERPL-SCNC: 29 MMOL/L (ref 22–29)
HCT VFR BLD AUTO: 39 % (ref 40–53)
HGB BLD-MCNC: 12.7 G/DL (ref 13.3–17.7)
HOLD SPECIMEN: NORMAL
HOLD SPECIMEN: NORMAL
MCH RBC QN AUTO: 21.1 PG (ref 26.5–33)
MCHC RBC AUTO-ENTMCNC: 32.6 G/DL (ref 31.5–36.5)
MCV RBC AUTO: 65 FL (ref 78–100)
PLATELET # BLD AUTO: 260 10E3/UL (ref 150–450)
POTASSIUM SERPL-SCNC: 5 MMOL/L (ref 3.4–5.3)
RBC # BLD AUTO: 6.03 10E6/UL (ref 4.4–5.9)
SODIUM SERPL-SCNC: 138 MMOL/L (ref 135–145)
WBC # BLD AUTO: 7.1 10E3/UL (ref 4–11)

## 2024-12-12 RX ORDER — TIRZEPATIDE 2.5 MG/.5ML
2.5 INJECTION, SOLUTION SUBCUTANEOUS
Qty: 2 ML | Refills: 0 | Status: SHIPPED | OUTPATIENT
Start: 2024-12-12

## 2024-12-12 ASSESSMENT — PAIN SCALES - GENERAL: PAINLEVEL_OUTOF10: NO PAIN (0)

## 2024-12-12 NOTE — PROGRESS NOTES
Preventive Care Visit  [facility]  Josh Shafer MD, Family Medicine  Dec 12, 2024      Assessment & Plan     Medicare annual wellness visit, subsequent  Annual Wellness Visit completed.  Risk questionaire reviewed in detail.  Appropriate preventive services were discussed with this patient, including applicable screening as appropriate for fall prevention, nutrition, physical activity, tobacco-use cessation, weight loss, and cognition.  Annual Wellness Visits recommended to continue.     Class 2 severe obesity with serious comorbidity and body mass index (BMI) of 37.0 to 37.9 in adult, unspecified obesity type (H)  Obesity noted with BMI 37.07.  Dietary and exercise modifications reviewed.  No benefits of Trulicity and will utilize Mounjaro 2.5 mg weekly and titrate down to highest tolerated dose with reassessment in next 3 to 4 months.  - MOUNJARO 2.5 MG/0.5ML SOAJ  Dispense: 2 mL; Refill: 0    Uncontrolled type 2 diabetes mellitus with hyperglycemia, with long-term current use of insulin (H)  Type 2 diabetes uncontrolled with A1c increasing from 7.9% to 9.2%.  Utilizing Trulicity 4.5 mg weekly, Lantus insulin between 60 and 61 units daily, metformin  mg using 2 tablets twice daily.  Remains on low-dose aspirin.  Peripheral neuropathy.  Prior microalbumin screen normal April 11, 2024.  Diabetic eye exam earlier this year without retinopathy.  - MOUNJARO 2.5 MG/0.5ML SOAJ  Dispense: 2 mL; Refill: 0  - Basic metabolic panel    Essential hypertension, benign  Hypertension appears stable with lisinopril 20 mg daily.  - Basic metabolic panel    Hyperlipidemia, unspecified hyperlipidemia type  Hyperlipidemia.  Continue simvastatin 40 mg at bedtime with cholesterol results near goal August 13, 2024 with weight goal remaining less than 240 pounds initially, less than 220 pounds ideally.    Tubular adenoma of colon  Prior tubular adenoma of colon.  Colonoscopy February 16, 2023 with  "hyperplastic polyp and told to repeat at 5-year interval.    Mild persistent asthma, uncomplicated  Asthma remains stable.    Recurrent cellulitis of lower extremity  Now utilizing doxycycline 50 mg daily for cellulitis prophylaxis.  - CRP inflammation    Peripheral polyneuropathy  Gabapentin 600 mg 3 times daily.    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  - CBC with platelets      Patient has been advised of split billing requirements and indicates understanding: Yes        BMI  Estimated body mass index is 37.07 kg/m  as calculated from the following:    Height as of this encounter: 1.753 m (5' 9\").    Weight as of this encounter: 113.9 kg (251 lb).   Weight management plan: Discussed healthy diet and exercise guidelines    Counseling  Appropriate preventive services were addressed with this patient via screening, questionnaire, or discussion as appropriate for fall prevention, nutrition, physical activity, Tobacco-use cessation, social engagement, weight loss and cognition.  Checklist reviewing preventive services available has been given to the patient.  Reviewed patient's diet, addressing concerns and/or questions.   Patient reported safety concerns were addressed today.The patient was provided with written information regarding signs of hearing loss.   Information on urinary incontinence and treatment options given to patient.           Carson Tran is a 69 year old, presenting for the following:  Medicare Visit (Pt is fasting)        12/12/2024     8:26 AM   Additional Questions   Roomed by [facility]    Patient seen today for annual wellness visit.  In general doing well.  Anticipated retiring from  position January 2 however delay in long term due to candidate dropping out.  Type 2 diabetes.  States blood sugar was 174 this morning.  Using between 60 and 61 units of Lantus daily plus Trulicity 4.5 mg weekly, metformin XR " "500 mg using 2 tablets twice daily.  Continues low-dose aspirin.  Lisinopril 20 mg daily for hypertension.  Simvastatin 40 mg at bedtime for lipid management.  Microcytic anemia secondary to hemoglobin Tarcey trait.  Tubular adenoma on colonoscopy in the past.  Recurrent cellulitis.  Doxycycline 50 mg daily for cellulitis prophylaxis.  Gabapentin 600 mg 3 times daily for peripheral neuropathy.  Comprehensive review of systems as above otherwise all negative.       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 Thompson); right inguinal hernia age 6 months; right total hip arthroplasty at [facility] 2019 with Dr. Evans.  Hospitalizations: early 20s \"infected gallbladder\" WITHOUT surgery... ; cellulitis in legs (hospitalized twice); abdominal wall cyst requiring IV antibiotics x 4 days... ; right Lumbar 3 - Lumbar 4 hemilaminectomy and medial facetectomy and Right redo Lumbar 4 -Lumbar 5 hemilaminectomy and medial facetectomy 21 (Dr. Lopez)    19-19 [facility] for right LE cellulitis/sepsis; right LE cellulitis 19-19 ([facility]); 10/15/19 s/p left MELITA (Dr. Evans)    FYI: see lab result \"[facility]\" from 11 re: hemoglobin Tracey trait resulting in mild microcyctic anemia (look for further cause of anemia if Hb < 11.0)        Health Care Directive  Patient does not have a Health Care Directive: Discussed advance care planning with patient; information given to patient to review.      12/10/2024   General Health   How would you rate your overall physical health? Excellent   Feel stress (tense, anxious, or unable to sleep) Only a little      (!) STRESS CONCERN      12/10/2024 "   Nutrition   Diet: Regular (no restrictions)    Breakfast skipped       Multiple values from one day are sorted in reverse-chronological order         12/10/2024   Exercise   Days per week of moderate/strenous exercise 5 days   Average minutes spent exercising at this level 50 min            12/10/2024   Social Factors   Frequency of gathering with friends or relatives Once a week   Worry food won't last until get money to buy more No   Food not last or not have enough money for food? No   Do you have housing? (Housing is defined as stable permanent housing and does not include staying ouside in a car, in a tent, in an abandoned building, in an overnight shelter, or couch-surfing.) Yes   Are you worried about losing your housing? No   Lack of transportation? No   Unable to get utilities (heat,electricity)? No            12/12/2024   Fall Risk   Fallen 2 or more times in the past year? No    Trouble with walking or balance? No        Patient-reported          12/10/2024   Activities of Daily Living- Home Safety   Needs help with the following daily activites None of the above   Safety concerns in the home No grab bars in the bathroom            12/10/2024   Dental   Dentist two times every year? Yes            12/10/2024   Hearing Screening   Hearing concerns? (!) I FEEL THAT PEOPLE ARE MUMBLING OR NOT SPEAKING CLEARLY.    (!) I NEED TO ASK PEOPLE TO SPEAK UP OR REPEAT THEMSELVES.    (!) IT'S HARDER TO UNDERSTAND WOMEN'S VOICES THAN MEN'S VOICES.    (!) IT'S HARD TO FOLLOW A CONVERSATION IN A NOISY RESTAURANT OR CROWDED ROOM.    (!) TROUBLE UNDESTANDING A SPEAKER IN A PUBLIC MEETING OR [employer] SERVICE.    (!) TROUBLE UNDERSTANDING SOFT OR WHISPERED SPEECH.       Multiple values from one day are sorted in reverse-chronological order         12/10/2024   Driving Risk Screening   Patient/family members have concerns about driving No            12/10/2024   General Alertness/Fatigue Screening   Have you been more  tired than usual lately? No            12/10/2024   Urinary Incontinence Screening   Bothered by leaking urine in past 6 months Yes            12/10/2024   TB Screening   Were you born outside of the [place]? No            Today's PHQ-2 Score:       2024     9:51 AM   PHQ-2 (  [employer])   Q1: Little interest or pleasure in doing things 0    Q2: Feeling down, depressed or hopeless 0    PHQ-2 Score 0    Q1: Little interest or pleasure in doing things Not at all   Q2: Feeling down, depressed or hopeless Not at all   PHQ-2 Score 0       Patient-reported           12/10/2024   Substance Use   Alcohol more than 3/day or more than 7/wk No   Do you have a current opioid prescription? No   How severe/bad is pain from 1 to 10? 2/10   Do you use any other substances recreationally? No        Social History     Tobacco Use    Smoking status: Former    Smokeless tobacco: Never   Vaping Use    Vaping status: Never Used   Substance Use Topics    Alcohol use: Yes     Alcohol/week: 1.0 standard drink of alcohol     Comment: Alcoholic Drinks/day: occasional    Drug use: No       Last PSA:   Prostate Specific Antigen Screen   Date Value Ref Range Status   2023 1.02 0.00 - 4.50 ng/mL Final   2021 0.78 0.00 - 4.50 ug/L Final     ASCVD Risk   The ASCVD Risk score (Carlene BEY, et al., 2019) failed to calculate for the following reasons:    The valid total cholesterol range is 130 to 320 mg/dL    Fracture Risk Assessment Tool  Link to Frax Calculator  Use the information below to complete the Frax calculator  : 1955  Sex: male  Weight (kg): 113.9 kg (actual weight)  Height (cm): 175.3 cm  Previous Fragility Fracture:  No  History of parent with fractured hip:  No  Current Smoking:  No  Patient has been on glucocorticoids for more than 3 months (5mg/day or more): No  Rheumatoid Arthritis on Problem List:  No  Secondary Osteoporosis on Problem List:  No  Consumes 3 or more units of alcohol per day: No  Femoral  Neck BMD (g/cm2)            Reviewed and updated as needed this visit by Provider                    Past Medical History:   Diagnosis Date    Anemia     Arthritis     Diabetes mellitus, type II (H)     Hypertension     Obesity     Plantar fasciitis      Past Surgical History:   Procedure Laterality Date    BACK SURGERY      laminectomy L45 by Dr. Villarreal     HERNIA REPAIR Right     inguinal; child    TOTAL HIP ARTHROPLASTY Bilateral 2019    Feb 5, 2019 (right) and October 19, 2019 (left)    ZZC HARE W/O FACETEC FORAMOT/DSKC 1/2 VRT SEG, LUMBAR Right 1/20/2021    Procedure: Right Lumbar 3 - Lumbar 4 hemilaminectomy and medial facetectomy and Right redo Lumbar 4 -Lumbar 5 hemilaminectomy and medial facetectomy;  Surgeon: Julissa Lopez MD;  Location: [facility] [place];  Service: Spine     Lab work is in process  Labs reviewed in EPIC  BP Readings from Last 3 Encounters:   12/12/24 118/70   11/01/24 121/56   10/02/24 136/80    Wt Readings from Last 3 Encounters:   12/12/24 113.9 kg (251 lb)   11/05/24 115.7 kg (255 lb 1.6 oz)   11/01/24 116.2 kg (256 lb 3.2 oz)                  Patient Active Problem List   Diagnosis    Diverticulosis    Microcytic anemia    Hammer Toe Of The Right Second Toe    Tubular adenoma of colon    Insulin dependent diabetes mellitus    Class 2 severe obesity due to excess calories with serious comorbidity and body mass index (BMI) of 36.0 to 36.9 in adult (H)    Carpal Tunnel Syndrome    Essential hypertension, benign    Hyperlipidemia    Male Erectile Disorder    Lymphedema    Mild persistent asthma without complication    Tinea cruris    Spinal stenosis of lumbar region, unspecified whether neurogenic claudication present    Primary osteoarthritis of right hip    Uncontrolled type 2 diabetes mellitus with hyperglycemia, with long-term current use of insulin (H)    Diabetic neuropathy associated with type 2 diabetes mellitus (H)    History of cellulitis    History of MRSA infection     Osteoarthritis of spine with radiculopathy, lumbar region    Spondylolisthesis of lumbar region    Lumbar radiculopathy    GUERA (obstructive sleep apnea)    History of left hip replacement    Infected prosthetic hip, initial encounter (H)    Severe sepsis (H)    Polyp of colon    History of colonic polyps    Cellulitis of right lower extremity    Acute bilateral low back pain without sciatica     Past Surgical History:   Procedure Laterality Date    BACK SURGERY      laminectomy L45 by Dr. Villarreal     HERNIA REPAIR Right     inguinal; child    TOTAL HIP ARTHROPLASTY Bilateral 2019    Feb 5, 2019 (right) and October 19, 2019 (left)    ZZC HARE W/O FACETEC FORAMOT/DSKC 1/2 VRT SEG, LUMBAR Right 1/20/2021    Procedure: Right Lumbar 3 - Lumbar 4 hemilaminectomy and medial facetectomy and Right redo Lumbar 4 -Lumbar 5 hemilaminectomy and medial facetectomy;  Surgeon: Julissa Lopez MD;  Location: [facility] [place];  Service: Spine       Social History     Tobacco Use    Smoking status: Former    Smokeless tobacco: Never   Substance Use Topics    Alcohol use: Yes     Alcohol/week: 1.0 standard drink of alcohol     Comment: Alcoholic Drinks/day: occasional     Family History   Problem Relation Age of Onset    Coronary Artery Disease Mother     Obesity Mother     Heart Disease Father     Cancer Father         throat    Coronary Artery Disease Father     Diabetes Sister     Diabetes Brother          Current Outpatient Medications   Medication Sig Dispense Refill    albuterol (PROAIR HFA/PROVENTIL HFA/VENTOLIN HFA) 108 (90 Base) MCG/ACT inhaler INHALE 2 PUFFS INTO THE LUNGS 4 TIMES DAILY AS NEEDED FOR SHORTNESS OF BREATH / DYSPNEA OR WHEEZING 18 g 6    APPLE CIDER VINEGAR PO Take 1 tablet by mouth      aspirin 81 MG EC tablet Take 81 mg by mouth daily      blood glucose (NO BRAND SPECIFIED) lancets standard [employer] Microlet MISC  Check blood sugar 2 times per day and as needed      clindamycin-benzoyl peroxide (BENZACLIN)  1-5 % external gel Apply topically 2 times daily 50 g 11    cyclobenzaprine (FLEXERIL) 5 MG tablet Take 1 tablet (5 mg) by mouth 3 times daily as needed for muscle spasms. 30 tablet 0    doxycycline hyclate (VIBRAMYCIN) 50 MG capsule Take 1 capsule (50 mg) by mouth daily. 30 capsule 2    doxycycline monohydrate (MONODOX) 50 MG capsule Take 1 capsule (50 mg) by mouth daily. 60 capsule 6    furosemide (LASIX) 40 MG tablet TAKE 1/2 TABLET BY MOUTH EVERY DAY 45 tablet 3    gabapentin (NEURONTIN) 300 MG capsule TAKE 2 CAPSULES BY MOUTH 3 TIMES DAILY. 180 capsule 11    insulin glargine (LANTUS SOLOSTAR) 100 UNIT/ML pen INJECT 60 UNITS SUBCUTANEOUSLY AT BEDTIME 60 mL 3    insulin pen needle (32G X 4 MM) 32G X 4 MM miscellaneous Use one pen needle daily or as directed. 100 each 3    lisinopril (ZESTRIL) 20 MG tablet TAKE 1 TABLET BY MOUTH EVERY DAY 90 tablet 2    Magnesium Oxide 500 MG TABS Take 500 mg by mouth      metFORMIN (GLUCOPHAGE XR) 500 MG 24 hr tablet TAKE 2 TABLETS BY MOUTH TWICE A DAY WITH MEALS 360 tablet 1    MOUNJARO 2.5 MG/0.5ML SOAJ Inject 0.5 mLs (2.5 mg) subcutaneously every 7 days. 2 mL 0    Multiple Vitamin (MULTIVITAMIN ADULT PO) Take 1 tablet by mouth      potassium gluconate 2.5 MEQ TABS Take 1 tablet by mouth      QVAR REDIHALER 80 MCG/ACT inhaler INHALE 2 PUFFS BY MOUTH TWICE A DAY - RINSE MOUTH AFTER USE-DO NOT SHAKE UNIT 31.8 g 0    sildenafil (VIAGRA) 100 MG tablet TAKE 0.5-1 TABLET ( MG) BY MOUTH DAILY AS NEEDED (ERECTILE DIFFICULTIES) 90 tablet 3    simvastatin (ZOCOR) 40 MG tablet TAKE 1 TABLET BY MOUTH EVERYDAY AT BEDTIME 90 tablet 2    vitamin C (ASCORBIC ACID) 500 MG tablet Take 500 mg by mouth       Allergies   Allergen Reactions    Amoxicillin Hives     Recent Labs   Lab Test 12/12/24  0839 08/13/24  0730 07/02/24  1204 12/05/23  0734 08/01/23  0753 03/30/23  0913 11/25/22  0839 11/16/21  0714 07/01/21  1046 03/11/21  0638   A1C 9.2* 7.9* 7.9*   < > 8.1*   < > 7.1*   < > 8.4*  --     LDL  --  57  --   --  58  --  70   < >  --   --    HDL  --  31*  --   --  34*  --  40   < >  --   --    TRIG  --  200*  --   --  236*  --  155*   < >  --   --    ALT  --  23 34  --  35  --  35   < >  --   --    CR  --  0.97 1.06   < > 0.93   < > 0.98   < > 0.81 0.8   GFRESTIMATED  --  86 77   < > >90   < > 86   < > >60 >60   GFRESTBLACK  --   --   --   --   --   --   --   --  >60 >60   POTASSIUM  --  5.2 5.0   < > 4.7   < > 5.2   < > 4.8  --     < > = values in this interval not displayed.      Current providers sharing in care for this patient include:  Patient Care Team:  Josh Shafer MD as PCP - General  Josh Shafer MD as Assigned PCP  Arcenio Fernandez MD as Assigned Surgical Provider  Renetta Mendoza MD as MD (Infectious Diseases)  Aiyana Suggs MD as MD (Allergy & Immunology)  Renetta Mendoza MD as Assigned Infectious Disease Provider  Aiyana Suggs MD as Assigned Allergy Provider  Antonieta Thompson PA-C as Assigned Musculoskeletal Provider    The following health maintenance items are reviewed in Epic and correct as of today:  Health Maintenance   Topic Date Due    LUNG CANCER SCREENING  Never done    AORTIC ANEURYSM SCREENING (SYSTEM ASSIGNED)  Never done    DIABETIC FOOT EXAM  11/16/2022    ASTHMA ACTION PLAN  11/16/2022    EYE EXAM  01/23/2025    MICROALBUMIN  04/11/2025    A1C  06/12/2025    ASTHMA CONTROL TEST  06/12/2025    BMP  08/13/2025    LIPID  08/13/2025    ANNUAL REVIEW OF HM ORDERS  08/13/2025    MEDICARE ANNUAL WELLNESS VISIT  12/12/2025    FALL RISK ASSESSMENT  12/12/2025    DTAP/TDAP/TD IMMUNIZATION (3 - Td or Tdap) 07/11/2027    COLORECTAL CANCER SCREENING  02/16/2028    ADVANCE CARE PLANNING  12/12/2029    HEPATITIS C SCREENING  Completed    PHQ-2 (once per calendar year)  Completed    INFLUENZA VACCINE  Completed    Pneumococcal Vaccine: 65+ Years  Completed    ZOSTER IMMUNIZATION  Completed    RSV VACCINE  Completed    COVID-19 Vaccine  Completed    HPV IMMUNIZATION  " Aged Out    MENINGITIS IMMUNIZATION  Aged Out    RSV MONOCLONAL ANTIBODY  Aged Out         Review of Systems  Constitutional, HEENT, cardiovascular, pulmonary, GI, , musculoskeletal, neuro, skin, endocrine and psych systems are negative, except as otherwise noted.     Objective    Exam  /70   Pulse 86   Temp 97.7  F (36.5  C)   Resp 15   Ht 1.753 m (5' 9\")   Wt 113.9 kg (251 lb)   SpO2 98%   BMI 37.07 kg/m     Estimated body mass index is 37.07 kg/m  as calculated from the following:    Height as of this encounter: 1.753 m (5' 9\").    Weight as of this encounter: 113.9 kg (251 lb).    Physical Exam  GENERAL: alert and no distress.  BMI 37.07.  EYES: Eyes grossly normal to inspection, PERRL and conjunctivae and sclerae normal  HENT: ear canals and TM's normal, nose and mouth without ulcers or lesions.  Bilateral hearing aids in place.  NECK: no adenopathy, no asymmetry, masses, or scars  RESP: lungs clear to auscultation - no rales, rhonchi or wheezes  CV: regular rate and rhythm, normal S1 S2, no S3 or S4, no murmur, click or rub, no peripheral edema  ABDOMEN: soft, nontender, no hepatosplenomegaly, no masses and bowel sounds normal   (male): normal male genitalia without lesions or urethral discharge, no hernia  RECTAL: normal sphincter tone, no rectal masses, prostate normal size, smooth, nontender without nodules or masses  MS: no gross musculoskeletal defects noted, no edema.  Compression stockings in place.  Right foot hammertoe deformity and noted involving second or third toe.  Decreased dorsalis pedis and posterior tibial pulses.  Abnormal monofilament testing noted.  SKIN: no suspicious lesions or rashes  NEURO: Normal strength and tone, mentation intact and speech normal  PSYCH: mentation appears normal, affect normal/bright        12/12/2024   Mini Cog   Clock Draw Score 2 Normal   3 Item Recall 2 objects recalled   Mini Cog Total Score 4                 Signed Electronically by: Josh" NOEMY Shafer MD

## 2024-12-12 NOTE — PATIENT INSTRUCTIONS
Patient has a 2-6% chance of having an allergic reaction to penicillin antibiotic.  This does not predict non-allergic mediated reactions.

## 2024-12-12 NOTE — LETTER
"12/12/2024      Leandro Brewer  [address] [facility] [id number]      Dear Colleague,    Thank you for referring your patient, Leandro Brewer, to the [facility]. Please see a copy of my visit note below.          Subjective  Marc is a 69 year old, presenting for the following health issues:  RECHECK (PCN TESTING And amox challenge)    HPI     Chief complaint: Penicillin testing    History of present illness: This is a pleasant 69-year-old gentleman here today to undergo penicillin testing and possibly amoxicillin challenge.  Reports he is feeling well today with no cough, wheeze, shortness of breath, nasal congestion or skin rash.          Objective   Pulse 88   Ht 1.753 m (5' 9\")   Wt 113.9 kg (251 lb)   SpO2 99%   BMI 37.07 kg/m    Body mass index is 37.07 kg/m .  Physical Exam   Gen: Pleasant male not in acute distress  HEENT: Eyes no erythema of the bulbar or palpebral conjunctiva, no edema.  Nose: No congestion, Mouth: Throat clear, no lip or tongue edema.   Respiratory: Clear to auscultation bilaterally, no adventitious breath sounds  Skin: No rashes or lesions  Psych: Alert and oriented times 3    Controls     Needle Type --   Negative 0.05% Glycerine-Saline (W/F in millimeters) 0   Positive Histamine 1 mg/ml (W/F in millimeters) --   Positive Histamine 6 mg/ml (W/F in millimeters) 7/15   Intradermal Neg. 50% Control: Glycerine-Saline H (W/F in mm) --   Antibiotics     Pre Pen Prick 0   Pre Pen ID --   Pre Pen Intradermal #1 (W/F in mm) 0   Pre Pen Intradermal #2 (W/F in mm) 0   Penicillin G (10,000 u/ml) Prick 0   Penicillin G (10,000 u/ml) ID --   Penicillin G (10,000 u/ml) Intradermal #1 (W/F in mm) 0   PenicilliPenicillin G (10,000 u/ml) Intradermal #2 (W/F in mm)n G (10,000 u/ml) Intradermal #1 (W/F in mm) 0   Other Antibiotic Prick --   Other Antibiotic ID --   Other Antibiotic Intradermal #1 (W/F in mm) --   Other Antibiotic Intradermal #2 (W/F in " mm) --   Oral Challenge     Antibiotic/Concentration Amoxicillin 250 mg/5ml   1/100 Dose --   1/10 Dose 1ml at 1336   Full Dose 10 ml at 1357   Observation Discharged at 1458       Start time: 1336  End time:  1458    Impression report and plan:     Penicillin allergy    Patient has a 2-6% chance of having an IgE mediated reaction to penicillin antibiotic.  This does not predict non-IgE mediated reactions.    Signed Electronically by: Aiyana Suggs MD        Again, thank you for allowing me to participate in the care of your patient.        Sincerely,        Aiyana Suggs MD

## 2024-12-12 NOTE — PROGRESS NOTES
"      Subjective   Marc is a 69 year old, presenting for the following health issues:  RECHECK (PCN TESTING And amox challenge)    HPI     Chief complaint: Penicillin testing    History of present illness: This is a pleasant 69-year-old gentleman here today to undergo penicillin testing and possibly amoxicillin challenge.  Reports he is feeling well today with no cough, wheeze, shortness of breath, nasal congestion or skin rash.          Objective    Pulse 88   Ht 1.753 m (5' 9\")   Wt 113.9 kg (251 lb)   SpO2 99%   BMI 37.07 kg/m    Body mass index is 37.07 kg/m .  Physical Exam   Gen: Pleasant male not in acute distress  HEENT: Eyes no erythema of the bulbar or palpebral conjunctiva, no edema.  Nose: No congestion, Mouth: Throat clear, no lip or tongue edema.   Respiratory: Clear to auscultation bilaterally, no adventitious breath sounds  Skin: No rashes or lesions  Psych: Alert and oriented times 3    Controls     Needle Type --   Negative 0.05% Glycerine-Saline (W/F in millimeters) 0   Positive Histamine 1 mg/ml (W/F in millimeters) --   Positive Histamine 6 mg/ml (W/F in millimeters) 7/15   Intradermal Neg. 50% Control: Glycerine-Saline H (W/F in mm) --   Antibiotics     Pre Pen Prick 0   Pre Pen ID --   Pre Pen Intradermal #1 (W/F in mm) 0   Pre Pen Intradermal #2 (W/F in mm) 0   Penicillin G (10,000 u/ml) Prick 0   Penicillin G (10,000 u/ml) ID --   Penicillin G (10,000 u/ml) Intradermal #1 (W/F in mm) 0   PenicilliPenicillin G (10,000 u/ml) Intradermal #2 (W/F in mm)n G (10,000 u/ml) Intradermal #1 (W/F in mm) 0   Other Antibiotic Prick --   Other Antibiotic ID --   Other Antibiotic Intradermal #1 (W/F in mm) --   Other Antibiotic Intradermal #2 (W/F in mm) --   Oral Challenge     Antibiotic/Concentration Amoxicillin 250 mg/5ml   1/100 Dose --   1/10 Dose 1ml at 1336   Full Dose 10 ml at 1357   Observation Discharged at 1458       Start time: 1336  End time:  1458    Impression report and plan:     " Penicillin allergy    Patient has a 2-6% chance of having an IgE mediated reaction to penicillin antibiotic.  This does not predict non-IgE mediated reactions.    Signed Electronically by: Aiyana Suggs MD

## 2025-01-03 DIAGNOSIS — E66.01 CLASS 2 SEVERE OBESITY WITH SERIOUS COMORBIDITY AND BODY MASS INDEX (BMI) OF 37.0 TO 37.9 IN ADULT, UNSPECIFIED OBESITY TYPE (H): ICD-10-CM

## 2025-01-03 DIAGNOSIS — Z79.4 UNCONTROLLED TYPE 2 DIABETES MELLITUS WITH HYPERGLYCEMIA, WITH LONG-TERM CURRENT USE OF INSULIN (H): ICD-10-CM

## 2025-01-03 DIAGNOSIS — E11.65 UNCONTROLLED TYPE 2 DIABETES MELLITUS WITH HYPERGLYCEMIA, WITH LONG-TERM CURRENT USE OF INSULIN (H): ICD-10-CM

## 2025-01-03 DIAGNOSIS — E66.812 CLASS 2 SEVERE OBESITY WITH SERIOUS COMORBIDITY AND BODY MASS INDEX (BMI) OF 37.0 TO 37.9 IN ADULT, UNSPECIFIED OBESITY TYPE (H): ICD-10-CM

## 2025-01-06 DIAGNOSIS — E11.65 UNCONTROLLED TYPE 2 DIABETES MELLITUS WITH HYPERGLYCEMIA, WITH LONG-TERM CURRENT USE OF INSULIN (H): Primary | ICD-10-CM

## 2025-01-06 DIAGNOSIS — E66.812 CLASS 2 SEVERE OBESITY WITH SERIOUS COMORBIDITY AND BODY MASS INDEX (BMI) OF 37.0 TO 37.9 IN ADULT, UNSPECIFIED OBESITY TYPE (H): ICD-10-CM

## 2025-01-06 DIAGNOSIS — Z79.4 UNCONTROLLED TYPE 2 DIABETES MELLITUS WITH HYPERGLYCEMIA, WITH LONG-TERM CURRENT USE OF INSULIN (H): Primary | ICD-10-CM

## 2025-01-06 DIAGNOSIS — E66.01 CLASS 2 SEVERE OBESITY WITH SERIOUS COMORBIDITY AND BODY MASS INDEX (BMI) OF 37.0 TO 37.9 IN ADULT, UNSPECIFIED OBESITY TYPE (H): ICD-10-CM

## 2025-01-06 RX ORDER — TIRZEPATIDE 2.5 MG/.5ML
2.5 INJECTION, SOLUTION SUBCUTANEOUS
OUTPATIENT
Start: 2025-01-06

## 2025-01-06 RX ORDER — TIRZEPATIDE 5 MG/.5ML
5 INJECTION, SOLUTION SUBCUTANEOUS
Qty: 2 ML | Refills: 0 | Status: SHIPPED | OUTPATIENT
Start: 2025-01-06

## 2025-02-05 ENCOUNTER — MYC REFILL (OUTPATIENT)
Dept: FAMILY MEDICINE | Facility: CLINIC | Age: 70
End: 2025-02-05
Payer: COMMERCIAL

## 2025-02-05 DIAGNOSIS — E66.01 CLASS 2 SEVERE OBESITY WITH SERIOUS COMORBIDITY AND BODY MASS INDEX (BMI) OF 37.0 TO 37.9 IN ADULT, UNSPECIFIED OBESITY TYPE (H): ICD-10-CM

## 2025-02-05 DIAGNOSIS — E66.01 CLASS 2 SEVERE OBESITY WITH SERIOUS COMORBIDITY AND BODY MASS INDEX (BMI) OF 37.0 TO 37.9 IN ADULT, UNSPECIFIED OBESITY TYPE (H): Primary | ICD-10-CM

## 2025-02-05 DIAGNOSIS — E66.812 CLASS 2 SEVERE OBESITY WITH SERIOUS COMORBIDITY AND BODY MASS INDEX (BMI) OF 37.0 TO 37.9 IN ADULT, UNSPECIFIED OBESITY TYPE (H): Primary | ICD-10-CM

## 2025-02-05 DIAGNOSIS — E11.65 UNCONTROLLED TYPE 2 DIABETES MELLITUS WITH HYPERGLYCEMIA, WITH LONG-TERM CURRENT USE OF INSULIN (H): ICD-10-CM

## 2025-02-05 DIAGNOSIS — E66.812 CLASS 2 SEVERE OBESITY WITH SERIOUS COMORBIDITY AND BODY MASS INDEX (BMI) OF 37.0 TO 37.9 IN ADULT, UNSPECIFIED OBESITY TYPE (H): ICD-10-CM

## 2025-02-05 DIAGNOSIS — Z79.4 UNCONTROLLED TYPE 2 DIABETES MELLITUS WITH HYPERGLYCEMIA, WITH LONG-TERM CURRENT USE OF INSULIN (H): ICD-10-CM

## 2025-02-06 RX ORDER — TIRZEPATIDE 5 MG/.5ML
5 INJECTION, SOLUTION SUBCUTANEOUS
Qty: 2 ML | Refills: 0 | OUTPATIENT
Start: 2025-02-06

## 2025-02-06 RX ORDER — TIRZEPATIDE 5 MG/.5ML
5 INJECTION, SOLUTION SUBCUTANEOUS
OUTPATIENT
Start: 2025-02-06

## 2025-02-06 RX ORDER — TIRZEPATIDE 7.5 MG/.5ML
7.5 INJECTION, SOLUTION SUBCUTANEOUS
Qty: 2 ML | Refills: 0 | Status: SHIPPED | OUTPATIENT
Start: 2025-02-06

## 2025-02-17 ENCOUNTER — OFFICE VISIT (OUTPATIENT)
Dept: INFECTIOUS DISEASES | Facility: CLINIC | Age: 70
End: 2025-02-17
Attending: INTERNAL MEDICINE
Payer: COMMERCIAL

## 2025-02-17 VITALS
OXYGEN SATURATION: 98 % | SYSTOLIC BLOOD PRESSURE: 112 MMHG | HEART RATE: 89 BPM | WEIGHT: 257.2 LBS | BODY MASS INDEX: 37.98 KG/M2 | DIASTOLIC BLOOD PRESSURE: 72 MMHG

## 2025-02-17 DIAGNOSIS — L03.119 RECURRENT CELLULITIS OF LOWER EXTREMITY: Primary | ICD-10-CM

## 2025-02-17 PROCEDURE — 99212 OFFICE O/P EST SF 10 MIN: CPT | Performed by: INTERNAL MEDICINE

## 2025-02-17 NOTE — PROGRESS NOTES
Infectious Disease Progress Note  [facility] INFECTIOUS DISEASE PROGRESS NOTE    Date of Service : 02/17/2025     Assessment & Plan   Leandro Brewer is a 69 year old male     ASSESSMENT:    Recurrent cellulitis, multiple episodes and hospitalization  Previous history of MRSA colonization  Most likely beta-hemolytic strep cellulitis, nonpurulent, tender lymphadenopathy in the past  History of MELITA in February 2019, followed by cellulitis  Multiple risk factors including lower extremity orthopedic surgeries, lymphedema, prior onychomycosis, diabetic neuropathy  Onychomycosis has been treated with terbinafine in the past  Has been seen by dermatology and placed on low-dose doxycycline with decreasing recurrence  Most recent hospitalization was in June 2024  Oral candidiasis- resolved      RECOMMENDATIONS:    Continue Doxycycline 50 mg Once daily per Dermatology  CHG (Chlorhexidine) topical increased inflammation/infection prevention  Monitor for rash/cellulitis    In future, if MSSA recurrence, able to take Cephalexin. Please see Allergy consultant note and allergy delabelled.     Means of preventing future episodes of cellulitis -- control edema, treated onychomycosis (12 weeks of oral terbinafine, monitor LFTs while on med) in 2019 and skin care    Follow up as needed       Renetta Mnedoza MD  [facility]  [phone]      Interval History   Doing well  No new recurrence    Physical Exam       BP: 112/72 Pulse: 89     SpO2: 98 %      Vitals:    02/17/25 1418   Weight: 116.7 kg (257 lb 3.2 oz)     Vital Signs with Ranges  Pulse:  [89] 89  BP: (112)/(72) 112/72  SpO2:  [98 %] 98 %    Constitutional: Awake, no apparent distress  Lungs: normal breathing pattern  Cardiovascular: LE edema  Abdomen: non distended  Skin: warm  Neuro: deconditioned  Psych: able to answer questions    Medications   No current facility-administered medications for this visit.     No current facility-administered  "medications for this visit.       Data   All microbiology laboratory data reviewed.  Recent Labs   Lab Test 12/12/24  0839 08/13/24  0730 07/13/24  1034   WBC 7.1 6.0 8.0   HGB 12.7* 11.6* 11.8*   HCT 39.0* 36.5* 36.1*   MCV 65* 67* 66*    231 240     Recent Labs   Lab Test 12/12/24 0839 08/13/24 0730 07/02/24  1204   CR 1.01 0.97 1.06     No lab results found.  No lab results found.    Invalid input(s): \"UC\"    MICROBIOLOGY:    Reviewed    7-Day Micro Results       No results found for the last 168 hours.             RADIOLOGY:    Reviewed  No results found.               "

## 2025-02-17 NOTE — PATIENT INSTRUCTIONS
Hung Tran,  Thank you for taking the time to see us today. Continue Doxycycline 50 mg Once daily per Dermatology  CHG (Chlorhexidine) topical increased inflammation/infection prevention  Monitor for rash/cellulitis    In future, if MSSA recurrence, able to take Cephalexin. Please see Allergy consultant note and allergy delabelled.     Means of preventing future episodes of cellulitis -- control edema, treated onychomycosis (12 weeks of oral terbinafine, monitor LFTs while on med) in 2019 and skin care    Follow up as needed

## 2025-03-03 DIAGNOSIS — L03.119 RECURRENT CELLULITIS OF LOWER EXTREMITY: ICD-10-CM

## 2025-03-03 NOTE — TELEPHONE ENCOUNTER
Refill reuest from [facility]     Medication: Doxycycline 50mg  Last Refill: 2/4/25  Last OV: 2/17/25

## 2025-03-03 NOTE — TELEPHONE ENCOUNTER
Refill reuest from [facility]    Medication: Doxycycline 50mg  Last Refill: 2/4/25  Last OV: 2/17/25

## 2025-03-04 RX ORDER — DOXYCYCLINE HYCLATE 50 MG/1
50 CAPSULE ORAL DAILY
Qty: 30 CAPSULE | Refills: 2 | OUTPATIENT
Start: 2025-03-04

## 2025-03-05 DIAGNOSIS — E11.65 UNCONTROLLED TYPE 2 DIABETES MELLITUS WITH HYPERGLYCEMIA, WITH LONG-TERM CURRENT USE OF INSULIN (H): ICD-10-CM

## 2025-03-05 DIAGNOSIS — E66.812 CLASS 2 SEVERE OBESITY WITH SERIOUS COMORBIDITY AND BODY MASS INDEX (BMI) OF 37.0 TO 37.9 IN ADULT, UNSPECIFIED OBESITY TYPE (H): ICD-10-CM

## 2025-03-05 DIAGNOSIS — E66.01 CLASS 2 SEVERE OBESITY WITH SERIOUS COMORBIDITY AND BODY MASS INDEX (BMI) OF 37.0 TO 37.9 IN ADULT, UNSPECIFIED OBESITY TYPE (H): ICD-10-CM

## 2025-03-05 DIAGNOSIS — Z79.4 UNCONTROLLED TYPE 2 DIABETES MELLITUS WITH HYPERGLYCEMIA, WITH LONG-TERM CURRENT USE OF INSULIN (H): Primary | ICD-10-CM

## 2025-03-05 DIAGNOSIS — E11.65 UNCONTROLLED TYPE 2 DIABETES MELLITUS WITH HYPERGLYCEMIA, WITH LONG-TERM CURRENT USE OF INSULIN (H): Primary | ICD-10-CM

## 2025-03-05 DIAGNOSIS — Z79.4 UNCONTROLLED TYPE 2 DIABETES MELLITUS WITH HYPERGLYCEMIA, WITH LONG-TERM CURRENT USE OF INSULIN (H): ICD-10-CM

## 2025-03-05 DIAGNOSIS — J45.30 MILD PERSISTENT ASTHMA, UNCOMPLICATED: ICD-10-CM

## 2025-03-05 DIAGNOSIS — Z79.4 TYPE 2 DIABETES MELLITUS WITHOUT COMPLICATION, WITH LONG-TERM CURRENT USE OF INSULIN (H): ICD-10-CM

## 2025-03-05 DIAGNOSIS — E11.9 TYPE 2 DIABETES MELLITUS WITHOUT COMPLICATION, WITH LONG-TERM CURRENT USE OF INSULIN (H): ICD-10-CM

## 2025-03-05 RX ORDER — BECLOMETHASONE DIPROPIONATE HFA 80 UG/1
AEROSOL, METERED RESPIRATORY (INHALATION)
Qty: 31.8 G | Refills: 0 | Status: SHIPPED | OUTPATIENT
Start: 2025-03-05

## 2025-03-05 RX ORDER — TIRZEPATIDE 7.5 MG/.5ML
7.5 INJECTION, SOLUTION SUBCUTANEOUS
OUTPATIENT
Start: 2025-03-05

## 2025-03-05 RX ORDER — METFORMIN HYDROCHLORIDE 500 MG/1
TABLET, EXTENDED RELEASE ORAL
Qty: 360 TABLET | Refills: 2 | Status: SHIPPED | OUTPATIENT
Start: 2025-03-05

## 2025-03-08 NOTE — TELEPHONE ENCOUNTER
Patient call back on the phone as well and was transferred to writer.    Patient stated he was not able to use [facility] order pharmacy and found his letter in the mail stating that he needs to use the [facility] that he provided in this message. Not sure if it is the one located in [place] or [place].     Writer will call [facility] pharmacy and verbally give rx over the phone to them is able to as well.    MINISTERIO OrtegaN, RN  [facility]     nose bleed

## 2025-03-27 ENCOUNTER — OFFICE VISIT (OUTPATIENT)
Dept: FAMILY MEDICINE | Facility: CLINIC | Age: 70
End: 2025-03-27
Payer: COMMERCIAL

## 2025-03-27 VITALS
HEIGHT: 60 IN | TEMPERATURE: 97.8 F | OXYGEN SATURATION: 97 % | WEIGHT: 253 LBS | SYSTOLIC BLOOD PRESSURE: 110 MMHG | BODY MASS INDEX: 49.67 KG/M2 | RESPIRATION RATE: 17 BRPM | HEART RATE: 87 BPM | DIASTOLIC BLOOD PRESSURE: 60 MMHG

## 2025-03-27 DIAGNOSIS — Z13.6 SCREENING FOR AAA (ABDOMINAL AORTIC ANEURYSM): ICD-10-CM

## 2025-03-27 DIAGNOSIS — B35.4 TINEA CORPORIS: ICD-10-CM

## 2025-03-27 DIAGNOSIS — E11.9 TYPE 2 DIABETES MELLITUS WITHOUT COMPLICATION, WITH LONG-TERM CURRENT USE OF INSULIN (H): Primary | ICD-10-CM

## 2025-03-27 DIAGNOSIS — Z79.4 TYPE 2 DIABETES MELLITUS WITHOUT COMPLICATION, WITH LONG-TERM CURRENT USE OF INSULIN (H): Primary | ICD-10-CM

## 2025-03-27 DIAGNOSIS — E78.5 HYPERLIPIDEMIA, UNSPECIFIED HYPERLIPIDEMIA TYPE: ICD-10-CM

## 2025-03-27 DIAGNOSIS — I10 ESSENTIAL HYPERTENSION, BENIGN: ICD-10-CM

## 2025-03-27 LAB
ANION GAP SERPL CALCULATED.3IONS-SCNC: 12 MMOL/L (ref 7–15)
BUN SERPL-MCNC: 22.6 MG/DL (ref 8–23)
CALCIUM SERPL-MCNC: 9.7 MG/DL (ref 8.8–10.4)
CHLORIDE SERPL-SCNC: 100 MMOL/L (ref 98–107)
CHOLEST SERPL-MCNC: 136 MG/DL
CREAT SERPL-MCNC: 1.02 MG/DL (ref 0.67–1.17)
CREAT UR-MCNC: 38.5 MG/DL
EGFRCR SERPLBLD CKD-EPI 2021: 81 ML/MIN/1.73M2
EST. AVERAGE GLUCOSE BLD GHB EST-MCNC: 183 MG/DL
FASTING STATUS PATIENT QL REPORTED: ABNORMAL
FASTING STATUS PATIENT QL REPORTED: ABNORMAL
GLUCOSE SERPL-MCNC: 143 MG/DL (ref 70–99)
HBA1C MFR BLD: 8 % (ref 0–5.6)
HCO3 SERPL-SCNC: 26 MMOL/L (ref 22–29)
HDLC SERPL-MCNC: 33 MG/DL
HOLD SPECIMEN: NORMAL
HOLD SPECIMEN: NORMAL
LDLC SERPL CALC-MCNC: 68 MG/DL
MICROALBUMIN UR-MCNC: <12 MG/L
MICROALBUMIN/CREAT UR: NORMAL MG/G{CREAT}
NONHDLC SERPL-MCNC: 103 MG/DL
POTASSIUM SERPL-SCNC: 4.9 MMOL/L (ref 3.4–5.3)
SODIUM SERPL-SCNC: 138 MMOL/L (ref 135–145)
TRIGL SERPL-MCNC: 175 MG/DL

## 2025-03-27 RX ORDER — KETOCONAZOLE 20 MG/ML
SHAMPOO, SUSPENSION TOPICAL DAILY PRN
Qty: 120 ML | Refills: 0 | Status: SHIPPED | OUTPATIENT
Start: 2025-03-27

## 2025-03-27 ASSESSMENT — PAIN SCALES - GENERAL: PAINLEVEL_OUTOF10: NO PAIN (0)

## 2025-03-27 NOTE — PROGRESS NOTES
"Assessment/Plan:    Type 2 diabetes mellitus without complication, with long-term current use of insulin (H)  Type 2 diabetes reviewed with improvement noted with A1c decreasing from 9.2% to 8% after starting Mounjaro currently at 10 mg weekly dose.  No side effects.  Remains on Lantus insulin 60 units daily metformin  mg using 2 tablets twice daily.  Low-dose aspirin continuing.  Micro [place] screen to be updated.  Diabetic eye exams annually.  Prior monofilament testing normal.  - Basic metabolic panel  - Albumin Random Urine Quantitative with Creat Ratio  - Basic metabolic panel    Essential hypertension, benign  Hypertension stable with lisinopril 20 mg daily.    Hyperlipidemia, unspecified hyperlipidemia type  Continue simvastatin 40 mg at bedtime and will update lipid cascade today while fasting.  - Lipid panel reflex to direct LDL Fasting  - Lipid panel reflex to direct LDL Fasting    Tinea corporis  Tinea corporis more left upper extremity.  Ketoconazole shampoo apply topically, lather, allow to sit 5 minutes then rinse then repeat daily x 3 days.  - ketoconazole (NIZORAL) 2 % external shampoo  Dispense: 120 mL; Refill: 0    Screening for AAA (abdominal aortic aneurysm)  AAA screen to be completed with ultrasound.  - US Abdominal Aorta               Subjective:    Leandro Brewer is seen today for follow-up assessment.  Type 2 diabetes.  Continues Lantus 60 units daily metformin  mg using 2 tablets twice daily.  Tolerating.  Started on Mounjaro.  Titrated to 10 mg a week.  No side effects.  Lisinopril 20 mg daily.  Simvastatin 40 mg at bedtime for lipid management.  History of hemoglobin Tracey trait with microcytic anemia with prior hemoglobin 12.7 MCV 65.  Asymptomatic.  Comprehensive review of systems as above otherwise all negative.  Recently retired but still doing some part-time work for [employer].  Twice a gas $0.29 per gallon 51 years ago when he started.     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 Thompson); right inguinal hernia age 6 months; right total hip arthroplasty at [facility] 2019 with Dr. Evans.  Hospitalizations: early 20s \"infected gallbladder\" WITHOUT surgery... ; cellulitis in legs (hospitalized twice); abdominal wall cyst requiring IV antibiotics x 4 days... ; right Lumbar 3 - Lumbar 4 hemilaminectomy and medial facetectomy and Right redo Lumbar 4 -Lumbar 5 hemilaminectomy and medial facetectomy 21 (Dr. Lopez)    19-19 [facility] for right LE cellulitis/sepsis; right LE cellulitis 19-19 ([facility]); 10/15/19 s/p left MELITA (Dr. Evans)    FYI: see lab result \"[facility]\" from 11 re: hemoglobin Tracey trait resulting in mild microcyctic anemia (look for further cause of anemia if Hb < 11.0)      Past Surgical History:   Procedure Laterality Date    BACK SURGERY      laminectomy L45 by Dr. Villarreal     HERNIA REPAIR Right     inguinal; child    TOTAL HIP ARTHROPLASTY Bilateral 2019 (right) and 2019 (left)    ZZC HARE W/O FACETEC FORAMOT/DSKC  VRT SEG, LUMBAR Right 2021    Procedure: Right Lumbar 3 - Lumbar 4 hemilaminectomy and medial facetectomy and Right redo Lumbar 4 -Lumbar 5 hemilaminectomy and medial facetectomy;  Surgeon: Julissa Lopez MD;  Location: [facility];  Service: Spine        Family History   Problem Relation Age of Onset    Coronary Artery Disease Mother     Obesity Mother     Heart Disease Father     Cancer Father         throat    Coronary Artery Disease Father     Diabetes Sister     Diabetes Brother         Past Medical History:   Diagnosis Date    Anemia  "    Arthritis     Diabetes mellitus, type II (H)     Hypertension     Obesity     Plantar fasciitis         Social History     Tobacco Use    Smoking status: Former    Smokeless tobacco: Never   Vaping Use    Vaping status: Never Used   Substance Use Topics    Alcohol use: Yes     Alcohol/week: 1.0 standard drink of alcohol     Comment: Alcoholic Drinks/day: occasional    Drug use: No        Current Outpatient Medications   Medication Sig Dispense Refill    albuterol (PROAIR HFA/PROVENTIL HFA/VENTOLIN HFA) 108 (90 Base) MCG/ACT inhaler INHALE 2 PUFFS INTO THE LUNGS 4 TIMES DAILY AS NEEDED FOR SHORTNESS OF BREATH / DYSPNEA OR WHEEZING 18 g 6    APPLE CIDER VINEGAR PO Take 1 tablet by mouth      aspirin 81 MG EC tablet Take 81 mg by mouth daily      blood glucose (NO BRAND SPECIFIED) lancets standard [employer] Microlet MISC  Check blood sugar 2 times per day and as needed      clindamycin-benzoyl peroxide (BENZACLIN) 1-5 % external gel Apply topically 2 times daily 50 g 11    cyclobenzaprine (FLEXERIL) 5 MG tablet Take 1 tablet (5 mg) by mouth 3 times daily as needed for muscle spasms. 30 tablet 0    doxycycline hyclate (VIBRAMYCIN) 50 MG capsule Take 1 capsule (50 mg) by mouth daily. 30 capsule 2    doxycycline monohydrate (MONODOX) 50 MG capsule Take 1 capsule (50 mg) by mouth daily. 60 capsule 6    furosemide (LASIX) 40 MG tablet TAKE 1/2 TABLET BY MOUTH EVERY DAY 45 tablet 3    gabapentin (NEURONTIN) 300 MG capsule TAKE 2 CAPSULES BY MOUTH 3 TIMES DAILY. 180 capsule 11    insulin glargine (LANTUS SOLOSTAR) 100 UNIT/ML pen INJECT 60 UNITS SUBCUTANEOUSLY AT BEDTIME 60 mL 3    insulin pen needle (32G X 4 MM) 32G X 4 MM miscellaneous Use one pen needle daily or as directed. 100 each 3    ketoconazole (NIZORAL) 2 % external shampoo Apply topically daily as needed for itching or irritation. 120 mL 0    lisinopril (ZESTRIL) 20 MG tablet TAKE 1 TABLET BY MOUTH EVERY DAY 90 tablet 2    Magnesium Oxide 500 MG TABS Take 500 mg  "by mouth      metFORMIN (GLUCOPHAGE XR) 500 MG 24 hr tablet TAKE 2 TABLETS BY MOUTH TWICE A DAY WITH MEALS 360 tablet 2    Multiple Vitamin (MULTIVITAMIN ADULT PO) Take 1 tablet by mouth      potassium gluconate 2.5 MEQ TABS Take 1 tablet by mouth      QVAR REDIHALER 80 MCG/ACT inhaler INHALE 2 PUFFS BY MOUTH TWICE A DAY - RINSE MOUTH AFTER USE-DO NOT SHAKE UNIT 31.8 g 0    sildenafil (VIAGRA) 100 MG tablet TAKE 0.5-1 TABLET ( MG) BY MOUTH DAILY AS NEEDED (ERECTILE DIFFICULTIES) 90 tablet 3    simvastatin (ZOCOR) 40 MG tablet TAKE 1 TABLET BY MOUTH EVERYDAY AT BEDTIME 90 tablet 2    tirzepatide (MOUNJARO) 10 MG/0.5ML SOAJ auto-injector pen Inject 0.5 mLs (10 mg) subcutaneously once a week. 2 mL 0    vitamin C (ASCORBIC ACID) 500 MG tablet Take 500 mg by mouth      Tirzepatide (MOUNJARO) 7.5 MG/0.5ML SOAJ Inject 0.5 mLs (7.5 mg) subcutaneously every 7 days. 2 mL 0          Objective:    Vitals:    03/27/25 0935   BP: 110/60   Pulse: 87   Resp: 17   Temp: 97.8  F (36.6  C)   SpO2: 97%   Weight: 114.8 kg (253 lb)   Height: 1.499 m (4' 11\")      Body mass index is 51.1 kg/m .    Alert.  No apparent distress.  Chest clear.  Cardiac exam regular.  Extremities warm and dry.      This note has been dictated using voice recognition software and as a result may contain minor grammatical errors and unintended word substitutions.         Answers submitted by the patient for this visit:  Lipid Visit (Submitted on 3/22/2025)  Chief Complaint: Chronic problems general questions HPI Form  Are you regularly taking any medication or supplement to lower your cholesterol?: Yes  Are you having muscle aches or other side effects that you think could be caused by your cholesterol lowering medication?: Yes  Hypertension Visit (Submitted on 3/22/2025)  Chief Complaint: Chronic problems general questions HPI Form  Do you check your blood pressure regularly outside of the clinic?: Yes  Are your blood pressures ever more than 140 on the " top number (systolic) OR more than 90 on the bottom number (diastolic)? (For example, greater than 140/90): Yes  Are you following a low salt diet?: No  Diabetes Visit (Submitted on 3/22/2025)  Chief Complaint: Chronic problems general questions HPI Form  Frequency of checking blood sugars:: one time daily  What time of day are you checking your blood sugars : before meals  Have you had any blood sugars above 200?: Yes  Have you had any blood sugars below 70?: No  Hypoglycemia symptoms:: none  Diabetic concerns:: blood sugar frequently over 200  Paraesthesia present:: numbness in feet, burning in feet  Have you had a diabetic eye exam within the last year?: Yes  Date of last eye exam: : 8/24  Where was this eye exam done?: [facility]  General Questionnaire (Submitted on 3/22/2025)  Chief Complaint: Chronic problems general questions HPI Form  How many servings of fruits and vegetables do you eat daily?: 2-3  On average, how many sweetened beverages do you drink each day (Examples: soda, juice, sweet tea, etc.  Do NOT count diet or artificially sweetened beverages)?: 0  How many minutes a day do you exercise enough to make your heart beat faster?: 30 to 60  How many days a week do you exercise enough to make your heart beat faster?: 5  How many days per week do you miss taking your medication?: 0  Questionnaire about: Chronic problems general questions HPI Form (Submitted on 3/22/2025)  Chief Complaint: Chronic problems general questions HPI Form

## 2025-04-04 ENCOUNTER — HOSPITAL ENCOUNTER (OUTPATIENT)
Dept: ULTRASOUND IMAGING | Facility: HOSPITAL | Age: 70
Discharge: HOME | End: 2025-04-04
Attending: FAMILY MEDICINE | Admitting: FAMILY MEDICINE
Payer: COMMERCIAL

## 2025-04-04 DIAGNOSIS — Z13.6 SCREENING FOR AAA (ABDOMINAL AORTIC ANEURYSM): ICD-10-CM

## 2025-04-04 PROCEDURE — 76775 US EXAM ABDO BACK WALL LIM: CPT

## 2025-04-05 DIAGNOSIS — E66.01 CLASS 2 SEVERE OBESITY WITH SERIOUS COMORBIDITY AND BODY MASS INDEX (BMI) OF 37.0 TO 37.9 IN ADULT, UNSPECIFIED OBESITY TYPE (H): ICD-10-CM

## 2025-04-05 DIAGNOSIS — E11.65 UNCONTROLLED TYPE 2 DIABETES MELLITUS WITH HYPERGLYCEMIA, WITH LONG-TERM CURRENT USE OF INSULIN (H): ICD-10-CM

## 2025-04-05 DIAGNOSIS — E66.812 CLASS 2 SEVERE OBESITY WITH SERIOUS COMORBIDITY AND BODY MASS INDEX (BMI) OF 37.0 TO 37.9 IN ADULT, UNSPECIFIED OBESITY TYPE (H): ICD-10-CM

## 2025-04-05 DIAGNOSIS — Z79.4 UNCONTROLLED TYPE 2 DIABETES MELLITUS WITH HYPERGLYCEMIA, WITH LONG-TERM CURRENT USE OF INSULIN (H): ICD-10-CM

## 2025-04-07 RX ORDER — TIRZEPATIDE 10 MG/.5ML
INJECTION, SOLUTION SUBCUTANEOUS
Qty: 2 ML | Refills: 0 | Status: SHIPPED | OUTPATIENT
Start: 2025-04-07

## 2025-04-13 ENCOUNTER — MYC MEDICAL ADVICE (OUTPATIENT)
Dept: FAMILY MEDICINE | Facility: CLINIC | Age: 70
End: 2025-04-13
Payer: COMMERCIAL

## 2025-04-13 DIAGNOSIS — Z79.4 TYPE 2 DIABETES MELLITUS WITHOUT COMPLICATION, WITH LONG-TERM CURRENT USE OF INSULIN (H): ICD-10-CM

## 2025-04-13 DIAGNOSIS — Z79.4 UNCONTROLLED TYPE 2 DIABETES MELLITUS WITH HYPERGLYCEMIA, WITH LONG-TERM CURRENT USE OF INSULIN (H): Primary | ICD-10-CM

## 2025-04-13 DIAGNOSIS — E66.812 CLASS 2 SEVERE OBESITY WITH SERIOUS COMORBIDITY AND BODY MASS INDEX (BMI) OF 37.0 TO 37.9 IN ADULT, UNSPECIFIED OBESITY TYPE (H): ICD-10-CM

## 2025-04-13 DIAGNOSIS — E66.01 CLASS 2 SEVERE OBESITY WITH SERIOUS COMORBIDITY AND BODY MASS INDEX (BMI) OF 37.0 TO 37.9 IN ADULT, UNSPECIFIED OBESITY TYPE (H): ICD-10-CM

## 2025-04-13 DIAGNOSIS — E11.9 TYPE 2 DIABETES MELLITUS WITHOUT COMPLICATION, WITH LONG-TERM CURRENT USE OF INSULIN (H): ICD-10-CM

## 2025-04-13 DIAGNOSIS — E11.65 UNCONTROLLED TYPE 2 DIABETES MELLITUS WITH HYPERGLYCEMIA, WITH LONG-TERM CURRENT USE OF INSULIN (H): Primary | ICD-10-CM

## 2025-04-21 ENCOUNTER — OFFICE VISIT (OUTPATIENT)
Dept: DERMATOLOGY | Facility: CLINIC | Age: 70
End: 2025-04-21
Payer: COMMERCIAL

## 2025-04-21 DIAGNOSIS — L73.2 HIDRADENITIS SUPPURATIVA: ICD-10-CM

## 2025-04-21 DIAGNOSIS — L03.119 RECURRENT CELLULITIS OF LOWER EXTREMITY: ICD-10-CM

## 2025-04-21 PROCEDURE — 99213 OFFICE O/P EST LOW 20 MIN: CPT | Performed by: DERMATOLOGY

## 2025-04-21 RX ORDER — CLINDAMYCIN AND BENZOYL PEROXIDE 10; 50 MG/G; MG/G
GEL TOPICAL 2 TIMES DAILY
Qty: 50 G | Refills: 11 | Status: SHIPPED | OUTPATIENT
Start: 2025-04-21

## 2025-04-21 RX ORDER — DOXYCYCLINE 50 MG/1
50 CAPSULE ORAL DAILY
Qty: 60 CAPSULE | Refills: 6 | Status: SHIPPED | OUTPATIENT
Start: 2025-04-21

## 2025-04-21 NOTE — LETTER
4/21/2025      Leandro Brewer  [address] [place] [address]      Dear Colleague,    Thank you for referring your patient, Leandro Brewer, to the [facility]. Please see a copy of my visit note below.    Leandro Brewer is an extremely pleasant 69 year old year old male patient here today for hx of HS.  Was on doxy, benzaclin and hibiclens.  Last year we stopped doxy.  He was restarted on this by another provider for cellulitis. All clear no issues.  Patient has no other skin complaints today.  Remainder of the HPI, Meds, PMH, Allergies, FH, and SH was reviewed in chart.      Past Medical History:   Diagnosis Date     Anemia      Arthritis      Diabetes mellitus, type II (H)      Hypertension      Obesity      Plantar fasciitis        Past Surgical History:   Procedure Laterality Date     BACK SURGERY      laminectomy L45 by Dr. Villarreal      HERNIA REPAIR Right     inguinal; child     TOTAL HIP ARTHROPLASTY Bilateral 2019 Feb 5, 2019 (right) and October 19, 2019 (left)     ZZC HARE W/O FACETEC FORAMOT/[facility]KC 1/2 VRT SEG, LUMBAR Right 1/20/2021    Procedure: Right Lumbar 3 - Lumbar 4 hemilaminectomy and medial facetectomy and Right redo Lumbar 4 -Lumbar 5 hemilaminectomy and medial facetectomy;  Surgeon: Julissa Lopez MD;  Location: [facility];  Service: Spine        Family History   Problem Relation Age of Onset     Coronary Artery Disease Mother      Obesity Mother      Heart Disease Father      Cancer Father         throat     Coronary Artery Disease Father      Diabetes Sister      Diabetes Brother        Social History     Socioeconomic History     Marital status:      Spouse name: Not on file     Number of children: Not on file     Years of education: Not on file     Highest education level: Not on file   Occupational History     Not on file   Tobacco Use     Smoking status: Former     Smokeless tobacco: Never   Vaping Use     Vaping status:  Never Used   Substance and Sexual Activity     Alcohol use: Yes     Alcohol/week: 1.0 standard drink of alcohol     Comment: Alcoholic Drinks/day: occasional     Drug use: No     Sexual activity: Not on file   Other Topics Concern     Not on file   Social History Narrative     Not on file     Social Drivers of Health     Financial Resource Strain: Low Risk  (12/10/2024)    Financial Resource Strain      Within the past 12 months, have you or your family members you live with been unable to get utilities (heat, electricity) when it was really needed?: No   Food Insecurity: Low Risk  (12/10/2024)    Food Insecurity      Within the past 12 months, did you worry that your food would run out before you got money to buy more?: No      Within the past 12 months, did the food you bought just not last and you didn t have money to get more?: No   Transportation Needs: Low Risk  (12/10/2024)    Transportation Needs      Within the past 12 months, has lack of transportation kept you from medical appointments, getting your medicines, non-medical meetings or appointments, work, or from getting things that you need?: No   Physical Activity: Sufficiently Active (12/10/2024)    Exercise Vital Sign      Days of Exercise per Week: 5 days      Minutes of Exercise per Session: 50 min   Stress: No Stress Concern Present (12/10/2024)    [place] [employer] - Occupational Stress Questionnaire      Feeling of Stress : Only a little   Social Connections: Unknown (12/10/2024)    Social Connection and Isolation Panel [NHANES]      Frequency of Communication with Friends and Family: Not on file      Frequency of Social Gatherings with Friends and Family: Once a week      Attends Mosque Services: Not on file      Active Member of Clubs or Organizations: Not on file      Attends Club or Organization Meetings: Not on file      Marital Status: Not on file   Interpersonal Safety: Low Risk  (12/12/2024)    Interpersonal Safety       Do you feel physically and emotionally safe where you currently live?: Yes      Within the past 12 months, have you been hit, slapped, kicked or otherwise physically hurt by someone?: No      Within the past 12 months, have you been humiliated or emotionally abused in other ways by your partner or ex-partner?: No   Housing Stability: Low Risk  (12/10/2024)    Housing Stability      Do you have housing? : Yes      Are you worried about losing your housing?: No       Outpatient Encounter Medications as of 4/21/2025   Medication Sig Dispense Refill     albuterol (PROAIR HFA/PROVENTIL HFA/VENTOLIN HFA) 108 (90 Base) MCG/ACT inhaler INHALE 2 PUFFS INTO THE LUNGS 4 TIMES DAILY AS NEEDED FOR SHORTNESS OF BREATH / DYSPNEA OR WHEEZING 18 g 6     APPLE CIDER VINEGAR PO Take 1 tablet by mouth       aspirin 81 MG EC tablet Take 81 mg by mouth daily       blood glucose (NO BRAND SPECIFIED) lancets standard [employer] Microlet MISC  Check blood sugar 2 times per day and as needed       clindamycin-benzoyl peroxide (BENZACLIN) 1-5 % external gel Apply topically 2 times daily 50 g 11     cyclobenzaprine (FLEXERIL) 5 MG tablet Take 1 tablet (5 mg) by mouth 3 times daily as needed for muscle spasms. 30 tablet 0     doxycycline hyclate (VIBRAMYCIN) 50 MG capsule Take 1 capsule (50 mg) by mouth daily. 30 capsule 2     doxycycline monohydrate (MONODOX) 50 MG capsule Take 1 capsule (50 mg) by mouth daily. 60 capsule 6     furosemide (LASIX) 40 MG tablet TAKE 1/2 TABLET BY MOUTH EVERY DAY 45 tablet 3     gabapentin (NEURONTIN) 300 MG capsule TAKE 2 CAPSULES BY MOUTH 3 TIMES DAILY. 180 capsule 11     insulin glargine (LANTUS SOLOSTAR) 100 UNIT/ML pen INJECT 60 UNITS SUBCUTANEOUSLY AT BEDTIME 60 mL 3     insulin pen needle (32G X 4 MM) 32G X 4 MM miscellaneous Use one pen needle daily or as directed. 100 each 3     ketoconazole (NIZORAL) 2 % external shampoo Apply topically daily as needed for itching or irritation. 120 mL 0      lisinopril (ZESTRIL) 20 MG tablet TAKE 1 TABLET BY MOUTH EVERY DAY 90 tablet 2     Magnesium Oxide 500 MG TABS Take 500 mg by mouth       metFORMIN (GLUCOPHAGE XR) 500 MG 24 hr tablet TAKE 2 TABLETS BY MOUTH TWICE A DAY WITH MEALS 360 tablet 2     Multiple Vitamin (MULTIVITAMIN ADULT PO) Take 1 tablet by mouth       potassium gluconate 2.5 MEQ TABS Take 1 tablet by mouth       QVAR REDIHALER 80 MCG/ACT inhaler INHALE 2 PUFFS BY MOUTH TWICE A DAY - RINSE MOUTH AFTER USE-DO NOT SHAKE UNIT 31.8 g 0     sildenafil (VIAGRA) 100 MG tablet TAKE 0.5-1 TABLET ( MG) BY MOUTH DAILY AS NEEDED (ERECTILE DIFFICULTIES) 90 tablet 3     simvastatin (ZOCOR) 40 MG tablet TAKE 1 TABLET BY MOUTH EVERYDAY AT BEDTIME 90 tablet 2     tirzepatide (MOUNJARO) 10 MG/0.5ML SOAJ auto-injector pen INJECT 0.5 MLS (10 MG) SUBCUTANEOUSLY ONCE A WEEK. 2 mL 0     tirzepatide (MOUNJARO) 12.5 MG/0.5ML SOAJ auto-injector pen Inject 0.5 mLs (12.5 mg) subcutaneously every 7 days. 2 mL 0     vitamin C (ASCORBIC ACID) 500 MG tablet Take 500 mg by mouth       No facility-administered encounter medications on file as of 4/21/2025.             O:   NAD, WDWN, Alert & Oriented, Mood & Affect wnl, Vitals stable   General appearance normal   Vitals stable   Alert, oriented and in no acute distress     Clear      Eyes: Conjunctivae/lids:Normal     ENT: Lips, mucosa: normal    MSK:Normal    Cardiovascular: peripheral edema none    Pulm: Breathing Normal    Neuro/Psych: Orientation:Alert and Orientedx3 ; Mood/Affect:normal       A/P:  HS  Stable  Cont low dose doxy and benzaclin  Return to clinic 6-12 months  It was a pleasure speaking to Leandro Brewer today.  Previous clinic notes and pertinent laboratory tests were reviewed prior to Leandro Brewer's visit.  Skin care regimen reviewed with patient: Eliminate harsh soaps, i.e. Dial, zest, irsih spring; Mild soaps such as Cetaphil or Dove sensitive skin, avoid hot or cold showers, aggressive use of  emollients including vanicream, cetaphil or cerave discussed with patient.        Again, thank you for allowing me to participate in the care of your patient.        Sincerely,        Arcenio Fernandez MD    Electronically signed

## 2025-04-21 NOTE — PATIENT INSTRUCTIONS
Proper skin care from [facility]:    -Eliminate harsh soaps as they strip the natural oils from the skin, often resulting in dry itchy skin ( i.e. Dial, Zest, [place] Spring)  -Use mild soaps such as Cetaphil or Dove Sensitive Skin in the shower. You do not need to use soap on arms, legs, and trunk every time you shower unless visibly soiled.   -Avoid hot or cold showers.  -After showering, lightly dry off and apply moisturizing within 2-3 minutes. This will help trap moisture in the skin.   -Aggressive use of a moisturizer at least 1-2 times a day to the entire body (including -Vanicream, Cetaphil, Aquaphor or Cerave) and moisturize hands after every washing.  -We recommend using moisturizers that come in a tub that needs to be scooped out, not a pump. This has more of an oil base. It will hold moisture in your skin much better than a water base moisturizer. The above recommended are non-pore clogging.      Wear a sunscreen with at least SPF 30 on your face, ears, neck and V of the chest daily. Wear sunscreen on other areas of the body if those areas are exposed to the sun throughout the day. Sunscreens can contain physical and/or chemical blockers. Physical blockers are less likely to clog pores, these include zinc oxide and titanium dioxide. Reapply every two hour and after swimming.     Sunscreen examples: https://www.ewg.org/sunscreen/    UV radiation  UVA radiation remains constant throughout the day and throughout the year. It is a longer wavelength than UVB and therefore penetrates deeper into the skin leading to immediate and delayed tanning, photoaging, and skin cancer. 70-80% of UVA and UVB radiation occurs between the hours of 10am-2pm.  UVB radiation  UVB radiation causes the most harmful effects and is more significant during the summer months. However, snow and ice can reflect UVB radiation leading to skin damage during the winter months as well. UVB radiation is responsible for tanning,  burning, inflammation, delayed erythema (pinkness), pigmentation (brown spots), and skin cancer.     I recommend self monthly full body exams and yearly full body exams with a dermatology provider. If you develop a new or changing lesion please follow up for examination. Most skin cancers are pink and scaly or pink and pearly. However, we do see blue/brown/black skin cancers.  Consider the ABCDEs of melanoma when giving yourself your monthly full body exam ( don't forget the groin, buttocks, feet, toes, etc). A-asymmetry, B-borders, C-color, D-diameter, E-elevation or evolving. If you see any of these changes please follow up in clinic. If you cannot see your back I recommend purchasing a hand held mirror to use with a larger wall mirror.       Checking for Skin Cancer  You can find cancer early by checking your skin each month. There are 3 kinds of skin cancer. They are melanoma, basal cell carcinoma, and squamous cell carcinoma. Doing monthly skin checks is the best way to find new marks or skin changes. Follow the instructions below for checking your skin.   The ABCDEs of checking moles for melanoma   Check your moles or growths for signs of melanoma using ABCDE:   Asymmetry: the sides of the mole or growth don t match  Border: the edges are ragged, notched, or blurred  Color: the color within the mole or growth varies  Diameter: the mole or growth is larger than 6 mm (size of a pencil eraser)  Evolving: the size, shape, or color of the mole or growth is changing (evolving is not shown in the images below)    Checking for other types of skin cancer  Basal cell carcinoma or squamous cell carcinoma have symptoms such as:     A spot or mole that looks different from all other marks on your skin  Changes in how an area feels, such as itching, tenderness, or pain  Changes in the skin's surface, such as oozing, bleeding, or scaliness  A sore that does not heal  New swelling or redness beyond the border of a  mole    Who s at risk?  Anyone can get skin cancer. But you are at greater risk if you have:   Fair skin, light-colored hair, or light-colored eyes  Many moles or abnormal moles on your skin  A history of sunburns from sunlight or tanning beds  A family history of skin cancer  A history of exposure to radiation or chemicals  A weakened immune system  If you have had skin cancer in the past, you are at risk for recurring skin cancer.   How to check your skin  Do your monthly skin checkups in front of a full-length mirror. Check all parts of your body, including your:   Head (ears, face, neck, and scalp)  Torso (front, back, and sides)  Arms (tops, undersides, upper, and lower armpits)  Hands (palms, backs, and fingers, including under the nails)  Buttocks and genitals  Legs (front, back, and sides)  Feet (tops, soles, toes, including under the nails, and between toes)  If you have a lot of moles, take digital photos of them each month. Make sure to take photos both up close and from a distance. These can help you see if any moles change over time.   Most skin changes are not cancer. But if you see any changes in your skin, call your doctor right away. Only he or she can diagnose a problem. If you have skin cancer, seeing your doctor can be the first step toward getting the treatment that could save your life.   [employer] last reviewed this educational content on 4/1/2019 2000-2020 [employer]. [address] [place] [address], [place] [address]. All rights reserved. This information is not intended as a substitute for professional medical care. Always follow your healthcare professional's instructions.       When should I call my doctor?  If you are worsening or not improving, please, contact us or seek urgent care as noted below.     Who should I call with questions (adults)?    [facility] [phone]  For urgent needs outside of business hours call the [facility] at  [phone] and ask for the dermatology resident on call to be paged  If this is a medical emergency and you are unable to reach an ER, Call 911      If you need a prescription refill, please contact your pharmacy. Refills are approved or denied by our Physicians during normal business hours, Monday through Friday.  Per office policy, refills will not be granted if you have not been seen within the past year (or sooner depending on the condition).

## 2025-04-21 NOTE — PROGRESS NOTES
Leandro Brewer is an extremely pleasant 69 year old year old male patient here today for hx of HS.  Was on doxy, benzaclin and hibiclens.  Last year we stopped doxy.  He was restarted on this by another provider for cellulitis. All clear no issues.  Patient has no other skin complaints today.  Remainder of the HPI, Meds, PMH, Allergies, FH, and SH was reviewed in chart.      Past Medical History:   Diagnosis Date    Anemia     Arthritis     Diabetes mellitus, type II (H)     Hypertension     Obesity     Plantar fasciitis        Past Surgical History:   Procedure Laterality Date    BACK SURGERY      laminectomy L45 by Dr. Villarreal     HERNIA REPAIR Right     inguinal; child    TOTAL HIP ARTHROPLASTY Bilateral 2019 Feb 5, 2019 (right) and October 19, 2019 (left)    ZZC HARE W/O FACETEC FORAMOT/[facility] 1/2 VRT SEG, LUMBAR Right 1/20/2021    Procedure: Right Lumbar 3 - Lumbar 4 hemilaminectomy and medial facetectomy and Right redo Lumbar 4 -Lumbar 5 hemilaminectomy and medial facetectomy;  Surgeon: Julissa Lopez MD;  Location: [facility] OR;  Service: Spine        Family History   Problem Relation Age of Onset    Coronary Artery Disease Mother     Obesity Mother     Heart Disease Father     Cancer Father         throat    Coronary Artery Disease Father     Diabetes Sister     Diabetes Brother        Social History     Socioeconomic History    Marital status:      Spouse name: Not on file    Number of children: Not on file    Years of education: Not on file    Highest education level: Not on file   Occupational History    Not on file   Tobacco Use    Smoking status: Former    Smokeless tobacco: Never   Vaping Use    Vaping status: Never Used   Substance and Sexual Activity    Alcohol use: Yes     Alcohol/week: 1.0 standard drink of alcohol     Comment: Alcoholic Drinks/day: occasional    Drug use: No    Sexual activity: Not on file   Other Topics Concern    Not on file   Social History Narrative    Not  on file     Social Drivers of Health     Financial Resource Strain: Low Risk  (12/10/2024)    Financial Resource Strain     Within the past 12 months, have you or your family members you live with been unable to get utilities (heat, electricity) when it was really needed?: No   Food Insecurity: Low Risk  (12/10/2024)    Food Insecurity     Within the past 12 months, did you worry that your food would run out before you got money to buy more?: No     Within the past 12 months, did the food you bought just not last and you didn t have money to get more?: No   Transportation Needs: Low Risk  (12/10/2024)    Transportation Needs     Within the past 12 months, has lack of transportation kept you from medical appointments, getting your medicines, non-medical meetings or appointments, work, or from getting things that you need?: No   Physical Activity: Sufficiently Active (12/10/2024)    Exercise Vital Sign     Days of Exercise per Week: 5 days     Minutes of Exercise per Session: 50 min   Stress: No Stress Concern Present (12/10/2024)    [employer] - Occupational Stress Questionnaire     Feeling of Stress : Only a little   Social Connections: Unknown (12/10/2024)    Social Connection and Isolation Panel [NHANES]     Frequency of Communication with Friends and Family: Not on file     Frequency of Social Gatherings with Friends and Family: Once a week     Attends Yazdanism Services: Not on file     Active Member of Clubs or Organizations: Not on file     Attends Club or Organization Meetings: Not on file     Marital Status: Not on file   Interpersonal Safety: Low Risk  (12/12/2024)    Interpersonal Safety     Do you feel physically and emotionally safe where you currently live?: Yes     Within the past 12 months, have you been hit, slapped, kicked or otherwise physically hurt by someone?: No     Within the past 12 months, have you been humiliated or emotionally abused in other ways by your  partner or ex-partner?: No   Housing Stability: Low Risk  (12/10/2024)    Housing Stability     Do you have housing? : Yes     Are you worried about losing your housing?: No       Outpatient Encounter Medications as of 4/21/2025   Medication Sig Dispense Refill    albuterol (PROAIR HFA/PROVENTIL HFA/VENTOLIN HFA) 108 (90 Base) MCG/ACT inhaler INHALE 2 PUFFS INTO THE LUNGS 4 TIMES DAILY AS NEEDED FOR SHORTNESS OF BREATH / DYSPNEA OR WHEEZING 18 g 6    APPLE CIDER VINEGAR PO Take 1 tablet by mouth      aspirin 81 MG EC tablet Take 81 mg by mouth daily      blood glucose (NO BRAND SPECIFIED) lancets standard [employer] Microlet MISC  Check blood sugar 2 times per day and as needed      clindamycin-benzoyl peroxide (BENZACLIN) 1-5 % external gel Apply topically 2 times daily 50 g 11    cyclobenzaprine (FLEXERIL) 5 MG tablet Take 1 tablet (5 mg) by mouth 3 times daily as needed for muscle spasms. 30 tablet 0    doxycycline hyclate (VIBRAMYCIN) 50 MG capsule Take 1 capsule (50 mg) by mouth daily. 30 capsule 2    doxycycline monohydrate (MONODOX) 50 MG capsule Take 1 capsule (50 mg) by mouth daily. 60 capsule 6    furosemide (LASIX) 40 MG tablet TAKE 1/2 TABLET BY MOUTH EVERY DAY 45 tablet 3    gabapentin (NEURONTIN) 300 MG capsule TAKE 2 CAPSULES BY MOUTH 3 TIMES DAILY. 180 capsule 11    insulin glargine (LANTUS SOLOSTAR) 100 UNIT/ML pen INJECT 60 UNITS SUBCUTANEOUSLY AT BEDTIME 60 mL 3    insulin pen needle (32G X 4 MM) 32G X 4 MM miscellaneous Use one pen needle daily or as directed. 100 each 3    ketoconazole (NIZORAL) 2 % external shampoo Apply topically daily as needed for itching or irritation. 120 mL 0    lisinopril (ZESTRIL) 20 MG tablet TAKE 1 TABLET BY MOUTH EVERY DAY 90 tablet 2    Magnesium Oxide 500 MG TABS Take 500 mg by mouth      metFORMIN (GLUCOPHAGE XR) 500 MG 24 hr tablet TAKE 2 TABLETS BY MOUTH TWICE A DAY WITH MEALS 360 tablet 2    Multiple Vitamin (MULTIVITAMIN ADULT PO) Take 1 tablet by mouth       potassium gluconate 2.5 MEQ TABS Take 1 tablet by mouth      QVAR REDIHALER 80 MCG/ACT inhaler INHALE 2 PUFFS BY MOUTH TWICE A DAY - RINSE MOUTH AFTER USE-DO NOT SHAKE UNIT 31.8 g 0    sildenafil (VIAGRA) 100 MG tablet TAKE 0.5-1 TABLET ( MG) BY MOUTH DAILY AS NEEDED (ERECTILE DIFFICULTIES) 90 tablet 3    simvastatin (ZOCOR) 40 MG tablet TAKE 1 TABLET BY MOUTH EVERYDAY AT BEDTIME 90 tablet 2    tirzepatide (MOUNJARO) 10 MG/0.5ML SOAJ auto-injector pen INJECT 0.5 MLS (10 MG) SUBCUTANEOUSLY ONCE A WEEK. 2 mL 0    tirzepatide (MOUNJARO) 12.5 MG/0.5ML SOAJ auto-injector pen Inject 0.5 mLs (12.5 mg) subcutaneously every 7 days. 2 mL 0    vitamin C (ASCORBIC ACID) 500 MG tablet Take 500 mg by mouth       No facility-administered encounter medications on file as of 4/21/2025.             O:   NAD, WDWN, Alert & Oriented, Mood & Affect wnl, Vitals stable   General appearance normal   Vitals stable   Alert, oriented and in no acute distress     Clear      Eyes: Conjunctivae/lids:Normal     ENT: Lips, mucosa: normal    MSK:Normal    Cardiovascular: peripheral edema none    Pulm: Breathing Normal    Neuro/Psych: Orientation:Alert and Orientedx3 ; Mood/Affect:normal       A/P:  HS  Stable  Cont low dose doxy and benzaclin  Return to clinic 6-12 months  It was a pleasure speaking to Leandro Brewer today.  Previous clinic notes and pertinent laboratory tests were reviewed prior to Leandro Brewer's visit.  Skin care regimen reviewed with patient: Eliminate harsh soaps, i.e. Dial, zest, [place] spring; Mild soaps such as Cetaphil or Dove sensitive skin, avoid hot or cold showers, aggressive use of emollients including vanicream, cetaphil or cerave discussed with patient.

## 2025-05-31 DIAGNOSIS — E11.9 TYPE 2 DIABETES MELLITUS WITHOUT COMPLICATION, WITHOUT LONG-TERM CURRENT USE OF INSULIN (H): ICD-10-CM

## 2025-06-02 RX ORDER — INSULIN GLARGINE 100 [IU]/ML
INJECTION, SOLUTION SUBCUTANEOUS
Qty: 60 ML | Refills: 3 | Status: SHIPPED | OUTPATIENT
Start: 2025-06-02

## 2025-06-13 ENCOUNTER — MYC REFILL (OUTPATIENT)
Dept: FAMILY MEDICINE | Facility: CLINIC | Age: 70
End: 2025-06-13
Payer: COMMERCIAL

## 2025-06-13 DIAGNOSIS — J45.30 MILD PERSISTENT ASTHMA, UNCOMPLICATED: ICD-10-CM

## 2025-06-13 DIAGNOSIS — E66.812 CLASS 2 SEVERE OBESITY WITH SERIOUS COMORBIDITY AND BODY MASS INDEX (BMI) OF 37.0 TO 37.9 IN ADULT, UNSPECIFIED OBESITY TYPE (H): ICD-10-CM

## 2025-06-13 DIAGNOSIS — E66.01 CLASS 2 SEVERE OBESITY WITH SERIOUS COMORBIDITY AND BODY MASS INDEX (BMI) OF 37.0 TO 37.9 IN ADULT, UNSPECIFIED OBESITY TYPE (H): ICD-10-CM

## 2025-06-13 DIAGNOSIS — Z79.4 UNCONTROLLED TYPE 2 DIABETES MELLITUS WITH HYPERGLYCEMIA, WITH LONG-TERM CURRENT USE OF INSULIN (H): ICD-10-CM

## 2025-06-13 DIAGNOSIS — E11.65 UNCONTROLLED TYPE 2 DIABETES MELLITUS WITH HYPERGLYCEMIA, WITH LONG-TERM CURRENT USE OF INSULIN (H): ICD-10-CM

## 2025-06-14 RX ORDER — BECLOMETHASONE DIPROPIONATE HFA 80 UG/1
AEROSOL, METERED RESPIRATORY (INHALATION)
Qty: 31.8 G | Refills: 0 | Status: SHIPPED | OUTPATIENT
Start: 2025-06-14

## 2025-07-06 ENCOUNTER — MYC REFILL (OUTPATIENT)
Dept: FAMILY MEDICINE | Facility: CLINIC | Age: 70
End: 2025-07-06
Payer: COMMERCIAL

## 2025-07-06 DIAGNOSIS — E66.01 CLASS 2 SEVERE OBESITY WITH SERIOUS COMORBIDITY AND BODY MASS INDEX (BMI) OF 37.0 TO 37.9 IN ADULT, UNSPECIFIED OBESITY TYPE (H): ICD-10-CM

## 2025-07-06 DIAGNOSIS — E66.812 CLASS 2 SEVERE OBESITY WITH SERIOUS COMORBIDITY AND BODY MASS INDEX (BMI) OF 37.0 TO 37.9 IN ADULT, UNSPECIFIED OBESITY TYPE (H): ICD-10-CM

## 2025-07-06 DIAGNOSIS — E11.65 UNCONTROLLED TYPE 2 DIABETES MELLITUS WITH HYPERGLYCEMIA, WITH LONG-TERM CURRENT USE OF INSULIN (H): ICD-10-CM

## 2025-07-06 DIAGNOSIS — Z79.4 UNCONTROLLED TYPE 2 DIABETES MELLITUS WITH HYPERGLYCEMIA, WITH LONG-TERM CURRENT USE OF INSULIN (H): ICD-10-CM

## 2025-07-23 DIAGNOSIS — B35.4 TINEA CORPORIS: ICD-10-CM

## 2025-07-23 RX ORDER — KETOCONAZOLE 20 MG/ML
SHAMPOO, SUSPENSION TOPICAL DAILY PRN
Qty: 120 ML | Refills: 0 | Status: SHIPPED | OUTPATIENT
Start: 2025-07-23

## 2025-07-28 ASSESSMENT — ASTHMA QUESTIONNAIRES
QUESTION_5 LAST FOUR WEEKS HOW WOULD YOU RATE YOUR ASTHMA CONTROL: COMPLETELY CONTROLLED
QUESTION_2 LAST FOUR WEEKS HOW OFTEN HAVE YOU HAD SHORTNESS OF BREATH: NOT AT ALL
QUESTION_4 LAST FOUR WEEKS HOW OFTEN HAVE YOU USED YOUR RESCUE INHALER OR NEBULIZER MEDICATION (SUCH AS ALBUTEROL): ONCE A WEEK OR LESS
ACT_TOTALSCORE: 24
QUESTION_1 LAST FOUR WEEKS HOW MUCH OF THE TIME DID YOUR ASTHMA KEEP YOU FROM GETTING AS MUCH DONE AT WORK, SCHOOL OR AT HOME: NONE OF THE TIME
QUESTION_3 LAST FOUR WEEKS HOW OFTEN DID YOUR ASTHMA SYMPTOMS (WHEEZING, COUGHING, SHORTNESS OF BREATH, CHEST TIGHTNESS OR PAIN) WAKE YOU UP AT NIGHT OR EARLIER THAN USUAL IN THE MORNING: NOT AT ALL

## 2025-07-29 ENCOUNTER — OFFICE VISIT (OUTPATIENT)
Dept: FAMILY MEDICINE | Facility: CLINIC | Age: 70
End: 2025-07-29
Payer: COMMERCIAL

## 2025-07-29 VITALS
OXYGEN SATURATION: 99 % | WEIGHT: 251 LBS | SYSTOLIC BLOOD PRESSURE: 108 MMHG | HEIGHT: 69 IN | BODY MASS INDEX: 37.18 KG/M2 | DIASTOLIC BLOOD PRESSURE: 70 MMHG | HEART RATE: 84 BPM | TEMPERATURE: 97.8 F | RESPIRATION RATE: 15 BRPM

## 2025-07-29 DIAGNOSIS — E11.42 DIABETIC POLYNEUROPATHY ASSOCIATED WITH TYPE 2 DIABETES MELLITUS (H): ICD-10-CM

## 2025-07-29 DIAGNOSIS — E66.01 CLASS 2 SEVERE OBESITY WITH SERIOUS COMORBIDITY AND BODY MASS INDEX (BMI) OF 37.0 TO 37.9 IN ADULT, UNSPECIFIED OBESITY TYPE (H): ICD-10-CM

## 2025-07-29 DIAGNOSIS — E11.42 TYPE 2 DIABETES MELLITUS WITH DIABETIC POLYNEUROPATHY, WITH LONG-TERM CURRENT USE OF INSULIN (H): Primary | ICD-10-CM

## 2025-07-29 DIAGNOSIS — E66.812 CLASS 2 SEVERE OBESITY WITH SERIOUS COMORBIDITY AND BODY MASS INDEX (BMI) OF 37.0 TO 37.9 IN ADULT, UNSPECIFIED OBESITY TYPE (H): ICD-10-CM

## 2025-07-29 DIAGNOSIS — E78.5 HYPERLIPIDEMIA, UNSPECIFIED HYPERLIPIDEMIA TYPE: ICD-10-CM

## 2025-07-29 DIAGNOSIS — L81.9 ATYPICAL PIGMENTED SKIN LESION: ICD-10-CM

## 2025-07-29 DIAGNOSIS — Z79.4 TYPE 2 DIABETES MELLITUS WITH DIABETIC POLYNEUROPATHY, WITH LONG-TERM CURRENT USE OF INSULIN (H): Primary | ICD-10-CM

## 2025-07-29 DIAGNOSIS — I10 ESSENTIAL HYPERTENSION, BENIGN: ICD-10-CM

## 2025-07-29 PROBLEM — E11.40 DIABETIC NEUROPATHY ASSOCIATED WITH TYPE 2 DIABETES MELLITUS (H): Status: RESOLVED | Noted: 2019-09-09 | Resolved: 2025-07-29

## 2025-07-29 LAB
ANION GAP SERPL CALCULATED.3IONS-SCNC: 10 MMOL/L (ref 7–15)
BUN SERPL-MCNC: 18.4 MG/DL (ref 8–23)
CALCIUM SERPL-MCNC: 9.3 MG/DL (ref 8.8–10.4)
CHLORIDE SERPL-SCNC: 105 MMOL/L (ref 98–107)
CREAT SERPL-MCNC: 0.98 MG/DL (ref 0.67–1.17)
EGFRCR SERPLBLD CKD-EPI 2021: 85 ML/MIN/1.73M2
EST. AVERAGE GLUCOSE BLD GHB EST-MCNC: 148 MG/DL
GLUCOSE SERPL-MCNC: 120 MG/DL (ref 70–99)
HBA1C MFR BLD: 6.8 % (ref 0–5.6)
HCO3 SERPL-SCNC: 24 MMOL/L (ref 22–29)
HOLD SPECIMEN: NORMAL
POTASSIUM SERPL-SCNC: 4.6 MMOL/L (ref 3.4–5.3)
SODIUM SERPL-SCNC: 139 MMOL/L (ref 135–145)

## 2025-07-29 PROCEDURE — 80048 BASIC METABOLIC PNL TOTAL CA: CPT | Performed by: FAMILY MEDICINE

## 2025-07-29 PROCEDURE — 36415 COLL VENOUS BLD VENIPUNCTURE: CPT | Performed by: FAMILY MEDICINE

## 2025-07-29 PROCEDURE — 99214 OFFICE O/P EST MOD 30 MIN: CPT | Performed by: FAMILY MEDICINE

## 2025-07-29 PROCEDURE — 1125F AMNT PAIN NOTED PAIN PRSNT: CPT | Performed by: FAMILY MEDICINE

## 2025-07-29 PROCEDURE — G2211 COMPLEX E/M VISIT ADD ON: HCPCS | Performed by: FAMILY MEDICINE

## 2025-07-29 PROCEDURE — 3044F HG A1C LEVEL LT 7.0%: CPT | Performed by: FAMILY MEDICINE

## 2025-07-29 PROCEDURE — 3074F SYST BP LT 130 MM HG: CPT | Performed by: FAMILY MEDICINE

## 2025-07-29 PROCEDURE — 83036 HEMOGLOBIN GLYCOSYLATED A1C: CPT | Performed by: FAMILY MEDICINE

## 2025-07-29 PROCEDURE — 3078F DIAST BP <80 MM HG: CPT | Performed by: FAMILY MEDICINE

## 2025-07-29 ASSESSMENT — PAIN SCALES - GENERAL: PAINLEVEL_OUTOF10: SEVERE PAIN (9)

## 2025-07-29 NOTE — PROGRESS NOTES
Assessment/Plan:    Type 2 diabetes mellitus with diabetic polyneuropathy, with long-term current use of insulin (H)  Type 2 diabetes reviewed with improvement noted.  A1c decreasing from 8% to 6.8% while on Mounjaro.  Currently 12-1/2 mg weekly and will increase to 15 mg weekly at next appropriate titration point.  Will decrease Lantus from 60 units down to 40 units with future attempts at continuing to wean from Lantus will continue metformin  mg using 2 tablets twice daily.  Prior microalbumin screen normal March 27, 2025.  History of peripheral neuropathy.  Will schedule follow-up diabetic eye exam with his ophthalmologist.  - tirzepatide (MOUNJARO) 15 MG/0.5ML SOAJ auto-injector pen  Dispense: 6 mL; Refill: 3    Essential hypertension, benign  Hypertension stable on lisinopril 20 mg daily.  Medication monitoring completed.  - Basic metabolic panel    Hyperlipidemia, unspecified hyperlipidemia type  Simvastatin 40 mg at bedtime continuing.    Class 2 severe obesity with serious comorbidity and body mass index (BMI) of 37.0 to 37.9 in adult, unspecified obesity type (H)  Dietary and exercise modification for weight goal less than 240 pounds initially, less than 220 pounds ideally.    Diabetic polyneuropathy associated with type 2 diabetes mellitus (H)  Known history of neuropathy, stable.    Atypical pigmented skin lesion  Skin lesion left upper eyelid and we did refer patient to see Dr. Fernandez dermatologist who he has seen previously.  - Adult Dermatology  Referral    The longitudinal plan of care for the diagnosis(es)/condition(s) as documented were addressed during this visit. Due to the added complexity in care, I will continue to support Marc in the subsequent management and with ongoing continuity of care.            Subjective:    Leandro Brewer is seen today for follow-up assessment.  Type 2 diabetes.  Prior A1c improving from 9.2% down to 8% while on Mounjaro.  Has further titrated  "dose to 12.5 mg weekly and tolerating.  Sometimes hard to obtain medication.  Continues Lantus 60 units daily.  Metformin  mg using 2 tablets twice daily.  Lisinopril 20 mg daily for hypertension and simvastatin 40 mg at bedtime for lipid management.  Patient has skin lesion on left upper eyelid that he would like to have removed.  Has seen a dermatologist in the past.  Has an eye doctor and he will schedule follow-up at his convenience.  History of peripheral neuropathy, stable.  Comprehensive review of systems as above otherwise all negative.       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 Thompson); right inguinal hernia age 8 months; right total hip arthroplasty at [facility] 2019 with Dr. Evans.   Hospitalizations: early 20s \"infected gallbladder\" WITHOUT surgery... ; cellulitis in legs (hospitalized twice); abdominal wall cyst requiring IV antibiotics x 4 days... ; right Lumbar 3 - Lumbar 4 hemilaminectomy and medial facetectomy and Right redo Lumbar 4 -Lumbar 5 hemilaminectomy and medial facetectomy 21 (Dr. Lopez)     19-19 [facility] for right LE cellulitis/sepsis; right LE cellulitis 19-19 ([facility]); 10/15/19 s/p left MELITA (Dr. 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 Villarreal     HERNIA REPAIR Right     inguinal; child    TOTAL HIP ARTHROPLASTY Bilateral 2019    2019 (right) and 2019 (left)    [facility] HARE W/O " FACETEC FORAMOT/[facility] 1/2 VRT SEG, LUMBAR Right 1/20/2021    Procedure: Right Lumbar 3 - Lumbar 4 hemilaminectomy and medial facetectomy and Right redo Lumbar 4 -Lumbar 5 hemilaminectomy and medial facetectomy;  Surgeon: Julissa Lopez MD;  Location: [facility];  Service: Spine        Family History   Problem Relation Age of Onset    Coronary Artery Disease Mother     Obesity Mother     Heart Disease Father     Cancer Father         throat    Coronary Artery Disease Father     Diabetes Sister     Diabetes Brother         Past Medical History:   Diagnosis Date    Anemia     Arthritis     Diabetes mellitus, type II (H)     Hypertension     Obesity     Plantar fasciitis         Social History     Tobacco Use    Smoking status: Former    Smokeless tobacco: Never   Vaping Use    Vaping status: Never Used   Substance Use Topics    Alcohol use: Yes     Alcohol/week: 1.0 standard drink of alcohol     Comment: Alcoholic Drinks/day: occasional    Drug use: No        Current Outpatient Medications   Medication Sig Dispense Refill    albuterol (PROAIR HFA/PROVENTIL HFA/VENTOLIN HFA) 108 (90 Base) MCG/ACT inhaler INHALE 2 PUFFS INTO THE LUNGS 4 TIMES DAILY AS NEEDED FOR SHORTNESS OF BREATH / DYSPNEA OR WHEEZING 18 g 6    APPLE CIDER VINEGAR PO Take 1 tablet by mouth      aspirin 81 MG EC tablet Take 81 mg by mouth daily      beclomethasone HFA (QVAR REDIHALER) 80 MCG/ACT inhaler INHALE 2 PUFFS BY MOUTH TWICE A DAY - RINSE MOUTH AFTER USE-DO NOT SHAKE UNIT 31.8 g 0    blood glucose (NO BRAND SPECIFIED) lancets standard [employer] Microlet MISC  Check blood sugar 2 times per day and as needed      clindamycin-benzoyl peroxide (BENZACLIN) 1-5 % external gel Apply topically 2 times daily. 50 g 11    cyclobenzaprine (FLEXERIL) 5 MG tablet Take 1 tablet (5 mg) by mouth 3 times daily as needed for muscle spasms. 30 tablet 0    doxycycline hyclate (VIBRAMYCIN) 50 MG capsule Take 1 capsule (50 mg) by mouth daily. 30 capsule 2     "doxycycline monohydrate (MONODOX) 50 MG capsule Take 1 capsule (50 mg) by mouth daily. 60 capsule 6    furosemide (LASIX) 40 MG tablet TAKE 1/2 TABLET BY MOUTH EVERY DAY 45 tablet 3    gabapentin (NEURONTIN) 300 MG capsule TAKE 2 CAPSULES BY MOUTH 3 TIMES DAILY. 180 capsule 11    insulin glargine (LANTUS SOLOSTAR) 100 UNIT/ML pen INJECT 60 UNITS SUBCUTANEOUSLY AT BEDTIME 60 mL 3    insulin pen needle (32G X 4 MM) 32G X 4 MM miscellaneous Use one pen needle daily or as directed. 100 each 3    ketoconazole (NIZORAL) 2 % external shampoo APPLY TOPICALLY DAILY AS NEEDED FOR ITCHING OR IRRITATION 120 mL 0    lisinopril (ZESTRIL) 20 MG tablet TAKE 1 TABLET BY MOUTH EVERY DAY 90 tablet 2    Magnesium Oxide 500 MG TABS Take 500 mg by mouth      metFORMIN (GLUCOPHAGE XR) 500 MG 24 hr tablet TAKE 2 TABLETS BY MOUTH TWICE A DAY WITH MEALS 360 tablet 2    Multiple Vitamin (MULTIVITAMIN ADULT PO) Take 1 tablet by mouth      potassium gluconate 2.5 MEQ TABS Take 1 tablet by mouth      sildenafil (VIAGRA) 100 MG tablet TAKE 0.5-1 TABLET ( MG) BY MOUTH DAILY AS NEEDED (ERECTILE DIFFICULTIES) 90 tablet 3    simvastatin (ZOCOR) 40 MG tablet TAKE 1 TABLET BY MOUTH EVERYDAY AT BEDTIME 90 tablet 2    tirzepatide (MOUNJARO) 12.5 MG/0.5ML SOAJ auto-injector pen Inject 0.5 mLs (12.5 mg) subcutaneously every 7 days. 2 mL 0    tirzepatide (MOUNJARO) 15 MG/0.5ML SOAJ auto-injector pen Inject 0.5 mLs (15 mg) subcutaneously once a week. 6 mL 3    vitamin C (ASCORBIC ACID) 500 MG tablet Take 500 mg by mouth            Objective:    Vitals:    07/29/25 0659   BP: 108/70   Pulse: 84   Resp: 15   Temp: 97.8  F (36.6  C)   SpO2: 99%   Weight: 113.9 kg (251 lb)   Height: 1.753 m (5' 9\")      Body mass index is 37.07 kg/m .    Alert.  No apparent distress cardiac exam regular.  Chest clear to auscultation.  Extremities warm and dry.  BMI 37.07.      This note has been dictated using voice recognition software and as a result may contain minor " grammatical errors and unintended word substitutions.         Answers submitted by the patient for this visit:  Lipid Visit (Submitted on 7/28/2025)  Chief Complaint: Chronic problems general questions HPI Form  Are you regularly taking any medication or supplement to lower your cholesterol?: Yes  Are you having muscle aches or other side effects that you think could be caused by your cholesterol lowering medication?: No  Hypertension Visit (Submitted on 7/28/2025)  Chief Complaint: Chronic problems general questions HPI Form  Do you check your blood pressure regularly outside of the clinic?: Yes  Are your blood pressures ever more than 140 on the top number (systolic) OR more than 90 on the bottom number (diastolic)? (For example, greater than 140/90): No  Are you following a low salt diet?: No  Diabetes Visit (Submitted on 7/28/2025)  Chief Complaint: Chronic problems general questions HPI Form  Frequency of checking blood sugars:: one time daily  What time of day are you checking your blood sugars : before meals  Have you had any blood sugars above 200?: Yes  Have you had any blood sugars below 70?: No  Hypoglycemia symptoms:: lethargy  Diabetic concerns:: none  Paraesthesia present:: numbness in feet, burning in feet, weight loss  Have you had a diabetic eye exam within the last year?: No  General Questionnaire (Submitted on 7/28/2025)  Chief Complaint: Chronic problems general questions HPI Form  How many servings of fruits and vegetables do you eat daily?: 0-1  On average, how many sweetened beverages do you drink each day (Examples: soda, juice, sweet tea, etc.  Do NOT count diet or artificially sweetened beverages)?: 0  How many minutes a day do you exercise enough to make your heart beat faster?: 30 to 60  How many days a week do you exercise enough to make your heart beat faster?: 5  How many days per week do you miss taking your medication?: 0  Questionnaire about: Chronic problems general questions HPI  Form (Submitted on 7/28/2025)  Chief Complaint: Chronic problems general questions HPI Form

## 2025-07-30 ENCOUNTER — PATIENT OUTREACH (OUTPATIENT)
Dept: CARE COORDINATION | Facility: CLINIC | Age: 70
End: 2025-07-30
Payer: COMMERCIAL

## 2025-08-28 DIAGNOSIS — I10 ESSENTIAL (PRIMARY) HYPERTENSION: ICD-10-CM

## 2025-08-28 RX ORDER — LISINOPRIL 20 MG/1
20 TABLET ORAL DAILY
Qty: 90 TABLET | Refills: 2 | Status: SHIPPED | OUTPATIENT
Start: 2025-08-28

## 2025-08-30 ENCOUNTER — MYC REFILL (OUTPATIENT)
Dept: FAMILY MEDICINE | Facility: CLINIC | Age: 70
End: 2025-08-30
Payer: COMMERCIAL

## 2025-08-30 DIAGNOSIS — I89.0 LYMPHEDEMA: ICD-10-CM

## 2025-09-02 RX ORDER — FUROSEMIDE 40 MG/1
20 TABLET ORAL DAILY
Qty: 45 TABLET | Refills: 2 | Status: SHIPPED | OUTPATIENT
Start: 2025-09-02